# Patient Record
Sex: MALE | Race: WHITE | Employment: OTHER | URBAN - METROPOLITAN AREA
[De-identification: names, ages, dates, MRNs, and addresses within clinical notes are randomized per-mention and may not be internally consistent; named-entity substitution may affect disease eponyms.]

---

## 2017-01-04 ENCOUNTER — ANESTHESIA EVENT (OUTPATIENT)
Dept: ENDOSCOPY | Age: 77
End: 2017-01-04
Payer: MEDICARE

## 2017-01-05 ENCOUNTER — SURGERY (OUTPATIENT)
Age: 77
End: 2017-01-05

## 2017-01-05 ENCOUNTER — ANESTHESIA (OUTPATIENT)
Dept: ENDOSCOPY | Age: 77
End: 2017-01-05
Payer: MEDICARE

## 2017-01-05 PROCEDURE — 74011250636 HC RX REV CODE- 250/636

## 2017-01-05 RX ORDER — PROPOFOL 10 MG/ML
INJECTION, EMULSION INTRAVENOUS AS NEEDED
Status: DISCONTINUED | OUTPATIENT
Start: 2017-01-05 | End: 2017-01-05 | Stop reason: HOSPADM

## 2017-01-05 RX ADMIN — PROPOFOL 30 MG: 10 INJECTION, EMULSION INTRAVENOUS at 09:29

## 2017-01-05 RX ADMIN — PROPOFOL 20 MG: 10 INJECTION, EMULSION INTRAVENOUS at 09:27

## 2017-01-05 RX ADMIN — PROPOFOL 20 MG: 10 INJECTION, EMULSION INTRAVENOUS at 09:35

## 2017-01-05 RX ADMIN — PROPOFOL 50 MG: 10 INJECTION, EMULSION INTRAVENOUS at 09:26

## 2017-01-05 RX ADMIN — PROPOFOL 10 MG: 10 INJECTION, EMULSION INTRAVENOUS at 09:31

## 2017-01-05 NOTE — ANESTHESIA POSTPROCEDURE EVALUATION
Post-Anesthesia Evaluation and Assessment    Patient: Alexandre Friedman MRN: [id number]  SSN: [id number]    YOB: 1940  Age: 77 y.o. Sex: male       Cardiovascular Function/Vital Signs  Visit Vitals    /72    Pulse 78    Temp 36.6 °C (97.8 °F)    Resp 18    Ht 6' 2\" (1.88 m)    Wt 116.6 kg (257 lb)    SpO2 95%    BMI 33 kg/m2       Patient is status post MAC anesthesia for Procedure(s):  ENDOSCOPY w/ biopsies. Nausea/Vomiting: None    Postoperative hydration reviewed and adequate. Pain:  Pain Scale 1: Numeric (0 - 10) (01/05/17 0943)  Pain Intensity 1: 0 (01/05/17 0943)   Managed    Neurological Status: At baseline    Mental Status and Level of Consciousness: Arousable    Pulmonary Status:   O2 Device: Room air (01/05/17 0943)   Adequate oxygenation and airway patent    Complications related to anesthesia: None    Post-anesthesia assessment completed.  No concerns    Signed By: Kelsey Sena MD     January 5, 2017

## 2017-01-05 NOTE — ANESTHESIA PREPROCEDURE EVALUATION
Anesthetic History   No history of anesthetic complications            Review of Systems / Medical History  Patient summary reviewed and pertinent labs reviewed    Pulmonary  Within defined limits                 Neuro/Psych   Within defined limits           Cardiovascular  Within defined limits  Hypertension              Exercise tolerance: >4 METS     GI/Hepatic/Renal  Within defined limits              Endo/Other  Within defined limits      Arthritis     Other Findings   Comments: FLU SHOT received? YES       If NO, provide reason: Pt did not want a Flu shot    Current Smoker? NO       Elective Surgery? Yes       Abstained from smoking 24 hours prior to anesthesia? N/A    Risk Factors for Postoperative nausea/vomiting:       History of postoperative nausea/vomiting? NO       Female? NO       Motion sickness? NO       Intended opioid administration for postoperative analgesia?   NO           Physical Exam    Airway  Mallampati: II  TM Distance: 4 - 6 cm  Neck ROM: normal range of motion   Mouth opening: Normal     Cardiovascular    Rhythm: regular  Rate: normal         Dental  No notable dental hx       Pulmonary  Breath sounds clear to auscultation               Abdominal  GI exam deferred       Other Findings            Anesthetic Plan    ASA: 3  Anesthesia type: MAC          Induction: Intravenous  Anesthetic plan and risks discussed with: Patient

## 2017-01-11 ENCOUNTER — HOSPITAL ENCOUNTER (OUTPATIENT)
Dept: GENERAL RADIOLOGY | Age: 77
Discharge: HOME | End: 2017-01-11
Attending: INTERNAL MEDICINE
Payer: MEDICARE

## 2017-01-11 DIAGNOSIS — R13.10 PAINFUL SWALLOWING: ICD-10-CM

## 2017-01-11 PROCEDURE — 74011000255 HC RX REV CODE- 255: Performed by: INTERNAL MEDICINE

## 2017-01-11 PROCEDURE — 74011000250 HC RX REV CODE- 250: Performed by: INTERNAL MEDICINE

## 2017-01-11 PROCEDURE — 74220 X-RAY XM ESOPHAGUS 1CNTRST: CPT

## 2017-01-11 RX ADMIN — ANTACID/ANTIFLATULENT 4 G: 380; 550; 10; 10 GRANULE, EFFERVESCENT ORAL at 08:00

## 2017-01-11 RX ADMIN — BARIUM SULFATE 135 ML: 980 POWDER, FOR SUSPENSION ORAL at 08:00

## 2017-01-11 RX ADMIN — BARIUM SULFATE 700 MG: 700 TABLET ORAL at 08:00

## 2017-01-11 RX ADMIN — BARIUM SULFATE 105 G: 960 POWDER, FOR SUSPENSION ORAL at 08:00

## 2017-01-13 ENCOUNTER — HOSPITAL ENCOUNTER (OUTPATIENT)
Dept: CT IMAGING | Age: 77
Discharge: HOME | End: 2017-01-13
Attending: INTERNAL MEDICINE
Payer: MEDICARE

## 2017-01-13 DIAGNOSIS — R22.1 NECK MASS: ICD-10-CM

## 2017-01-13 LAB — CREAT UR-MCNC: 1.1 MG/DL (ref 0.6–1.3)

## 2017-01-13 PROCEDURE — 82565 ASSAY OF CREATININE: CPT

## 2017-01-13 PROCEDURE — 70491 CT SOFT TISSUE NECK W/DYE: CPT

## 2017-01-13 PROCEDURE — 74011636320 HC RX REV CODE- 636/320: Performed by: INTERNAL MEDICINE

## 2017-01-13 RX ADMIN — IOPAMIDOL 100 ML: 612 INJECTION, SOLUTION INTRAVENOUS at 09:59

## 2017-01-25 ENCOUNTER — HOSPITAL ENCOUNTER (OUTPATIENT)
Age: 77
Setting detail: OUTPATIENT SURGERY
Discharge: HOME | End: 2017-01-25
Attending: INTERNAL MEDICINE | Admitting: INTERNAL MEDICINE
Payer: MEDICARE

## 2017-01-25 ENCOUNTER — SURGERY (OUTPATIENT)
Age: 77
End: 2017-01-25

## 2017-01-25 VITALS
DIASTOLIC BLOOD PRESSURE: 73 MMHG | HEART RATE: 79 BPM | RESPIRATION RATE: 16 BRPM | OXYGEN SATURATION: 98 % | BODY MASS INDEX: 32.98 KG/M2 | WEIGHT: 257 LBS | SYSTOLIC BLOOD PRESSURE: 143 MMHG | HEIGHT: 74 IN

## 2017-01-25 PROCEDURE — 91010 ESOPHAGUS MOTILITY STUDY: CPT | Performed by: INTERNAL MEDICINE

## 2017-01-25 PROCEDURE — 74011000250 HC RX REV CODE- 250: Performed by: INTERNAL MEDICINE

## 2017-01-25 RX ORDER — LIDOCAINE HYDROCHLORIDE 20 MG/ML
JELLY TOPICAL AS NEEDED
Status: DISCONTINUED | OUTPATIENT
Start: 2017-01-25 | End: 2017-01-25 | Stop reason: HOSPADM

## 2017-01-25 RX ADMIN — LIDOCAINE HYDROCHLORIDE 5 ML: 20 JELLY TOPICAL at 08:53

## 2017-01-25 NOTE — PERIOP NOTES
Esophageal manometry performed. Lidocaine prior to probe insertion in Left Nare. Probe insertion, no resistance met. Patient tolerated procedure well. Impedance not showing up on study.

## 2017-01-25 NOTE — DISCHARGE INSTRUCTIONS
Krish Johnsoner  [id number]  1940      MANOMETRY DISCHARGE INSTRUCTION    You may resume your regular diet as tolerated. You may resume your normal daily activities. If you develop a sore throat- throat lozenges or warm salt water gargles will help. Call your Physician if you have any complications or questions. How can you care for yourself at home? Activity  · Rest as much as you need to after you go home. · You should be able to go back to your usual activities the day after the test.  Diet  · Follow your doctor's directions for eating after the test.  · Drink plenty of fluids (unless your doctor has told you not to). Medications  · If you have a sore throat the day after the test, use an over-the-counter spray to numb your throat. Follow-up care is a key part of your treatment and safety. Be sure to make and go to all appointments, and call your doctor if you are having problems. It's also a good idea to know your test results and keep a list of the medicines you take. When should you call for help? Call 911 anytime you think you may need emergency care. For example, call if:  · You passed out (lost consciousness). · You cough up blood. · You vomit blood or what looks like coffee grounds. · You pass maroon or very bloody stools. Call your doctor now or seek immediate medical care if:  · You have trouble swallowing. · You have belly pain. · Your stools are black and tarlike or have streaks of blood. · You are sick to your stomach or cannot keep fluids down. Watch closely for changes in your health, and be sure to contact your doctor if:  · Your throat still hurts after a day or two. · You do not get better as expected. Where can you learn more? Go to [employer].be  Enter J454 in the search box to learn more about \"Upper GI Endoscopy: What to Expect at Home. \"   © 3443-2543 Healthwise, Incorporated.  Care instructions adapted under license by Oliver Willis (which disclaims liability or warranty for this information). This care instruction is for use with your licensed healthcare professional. If you have questions about a medical condition or this instruction, always ask your healthcare professional. [address] any warranty or liability for your use of this information. Content Version: 33.1.855616; Current as of: November 14, 2014    DISCHARGE SUMMARY from Nurse     POST-PROCEDURE INSTRUCTIONS:    Call your Physician if you:  ? Observe any excess bleeding. ? Develop a temperature over 100.5o F.  ? Experience abdominal, shoulder or chest pain. ? Notice any signs of decreased circulation or nerve impairment to an extremity such as a change in color, persistent numbness, tingling, coldness or increase in pain. ? Vomit blood or you have nausea and vomiting lasting longer than 4 hours. ? Are unable to take medications. ? Are unable to urinate within 8 hours after discharge following general anesthesia or intravenous sedation. ? Resume your diet as directed by your physician. ? Resume medications as your physician has prescribed. ? Please give a list of your current medications to your Primary Care Provider. ? Please update this list whenever your medications are discontinued, doses are changed, or new medications (including over-the-counter products) are added. ? Please carry medication information at all times in case of emergency situations. These are general instructions for a healthy lifestyle:    No smoking/ No tobacco products/ Avoid exposure to second hand smoke.  Surgeon General's Warning:  Quitting smoking now greatly reduces serious risk to your health. Obesity, smoking, and a sedentary lifestyle greatly increase your risk for illness.    A healthy diet, regular physical exercise & weight monitoring are important for maintaining a healthy lifestyle   You may be retaining fluid if you have a history of heart failure or if you experience any of the following symptoms:  Weight gain of 3 pounds or more overnight or 5 pounds in a week, increased swelling in our hands or feet or shortness of breath while lying flat in bed. Please call your doctor as soon as you notice any of these symptoms; do not wait until your next office visit. Recognize signs and symptoms of STROKE:  F  -  Face looks uneven  A  -  Arms unable to move or move unevenly  S  -  Speech slurred or non-existent  T  -  Time to call 911 - as soon as signs and symptoms begin - DO NOT go back to bed or wait to see If you get better - TIME IS BRAIN. Colorectal Screening   Colorectal cancer almost always develops from precancerous polyps (abnormal growths) in the colon or rectum. Screening tests can find precancerous polyps, so that they can be removed before they turn into cancer. Screening tests can also find colorectal cancer early, when treatment works best.  Jasmina Gee Speak with your physician about when you should begin screening and how often you should be tested     Additional Information    If you have questions, please call [phone]. Remember, [employer] is NOT to be used for urgent needs. For medical emergencies, dial 911. APPOINTMENTS:    Please make a follow-up appointment with your physician. Discharge information has been reviewed with the patient. The patient verbalized understanding.

## 2017-01-25 NOTE — IP AVS SNAPSHOT
[address] [facility] 
 
 
 [address] [address] 
[phone] Patient: Celio Johansen MRN: [id number] HKR:[id number] You are allergic to the following No active allergies Recent Documentation Height Weight BMI Smoking Status 1.88 m 116.6 kg 33 kg/m2 Never Smoker Emergency Contacts Name Discharge Info Relation Home Work Mobile [address] CAREGIVER [3] Friend [5] [phone] About your hospitalization You were admitted on:  January 25, 2017 You last received care in the:  [facility] You were discharged on:  January 25, 2017 Unit phone number:  [phone] Why you were hospitalized Your primary diagnosis was:  Not on File Providers Seen During Your Hospitalizations Provider Role Specialty Primary office phone Adriana Machuca MD Attending Provider Gastroenterology [phone] Your Primary Care Physician (PCP) Primary Care Physician Office Phone Office Fax Angelina Stephen [phone] Follow-up Information Follow up With Details Comments Contact Info Adriana Machuca MD   [address] [place] [address] 200 200 [facility] 
[phone] Your Appointments Tuesday January 31, 2017  8:00 AM EST  
LAB with [facility] NURSE VISIT Internist of [address]  
[phone] Tuesday February 07, 2017  9:30 AM EST Office Visit with Sadie Mckee MD  
Internist of [address] [place] [address]  
[phone] Current Discharge Medication List  
  
ASK your doctor about these medications Dose & Instructions Dispensing Information Comments Morning Noon Evening Bedtime  
 aspirin 81 mg tablet Your next dose is: Today, Tomorrow Other:  _________ Dose:  81 mg Take 81 mg by mouth. Refills:  0  
     
   
   
   
  
 finasteride 5 mg tablet Commonly known as:  PROSCAR Your next dose is: Today, Tomorrow Other:  _________ Dose:  5 mg Take 1 Tab by mouth daily. Quantity:  90 Tab Refills:  3  
     
   
   
   
  
 fluticasone 50 mcg/actuation nasal spray Commonly known as:  Jaciel Metro Your next dose is: Today, Tomorrow Other:  _________ Dose:  2 Spray 2 Sprays by Both Nostrils route daily. Quantity:  3 Bottle Refills:  3  
     
   
   
   
  
 lisinopril 5 mg tablet Commonly known as:  [place] Roberto Carlos Your next dose is: Today, Tomorrow Other:  _________ TAKE 1 TABLET BY MOUTH DAILY Quantity:  90 Tab Refills:  3  
     
   
   
   
  
 multivitamin tablet Commonly known as:  ONE A DAY Your next dose is: Today, Tomorrow Other:  _________ Dose:  1 Tab Take 1 Tab by mouth daily. Refills:  0  
     
   
   
   
  
 OSTEO BI-FLEX PO Your next dose is: Today, Tomorrow Other:  _________ Dose:  1 Tab Take 1 Tab by mouth daily. Refills:  0  
     
   
   
   
  
 predniSONE 1 mg tablet Commonly known as:  Roman Razor Your next dose is: Today, Tomorrow Other:  _________ Dose:  8 mg Take 8 mg by mouth daily. Refills:  0  
     
   
   
   
  
 rosuvastatin 5 mg tablet Commonly known as:  CRESTOR Your next dose is: Today, Tomorrow Other:  _________ Take once or twice a week. Quantity:  90 Tab Refills:  3 VITAMIN D3 1,000 unit Cap Generic drug:  cholecalciferol Your next dose is: Today, Tomorrow Other:  _________ Take  by mouth daily. Refills:  0 Discharge Instructions Salvador Moore [id number] 1940 MANOMETRY DISCHARGE INSTRUCTION You may resume your regular diet as tolerated. You may resume your normal daily activities. If you develop a sore throat- throat lozenges or warm salt water gargles will help. Call your Physician if you have any complications or questions. How can you care for yourself at home? Activity · Rest as much as you need to after you go home. · You should be able to go back to your usual activities the day after the test. 
Diet · Follow your doctor's directions for eating after the test. 
· Drink plenty of fluids (unless your doctor has told you not to). Medications · If you have a sore throat the day after the test, use an over-the-counter spray to numb your throat. Follow-up care is a key part of your treatment and safety. Be sure to make and go to all appointments, and call your doctor if you are having problems. It's also a good idea to know your test results and keep a list of the medicines you take. When should you call for help? Call 911 anytime you think you may need emergency care. For example, call if: 
· You passed out (lost consciousness). · You cough up blood. · You vomit blood or what looks like coffee grounds. · You pass maroon or very bloody stools. Call your doctor now or seek immediate medical care if: 
· You have trouble swallowing. · You have belly pain. · Your stools are black and tarlike or have streaks of blood. · You are sick to your stomach or cannot keep fluids down. Watch closely for changes in your health, and be sure to contact your doctor if: 
· Your throat still hurts after a day or two. · You do not get better as expected. Where can you learn more? Go to Webymaster.be Enter [phone] in the search box to learn more about \"Upper GI Endoscopy: What to Expect at Home. \"  
© 6007-8734 Healthwise, Incorporated.  Care instructions adapted under license by Halie Wray (which disclaims liability or warranty for this information). This care instruction is for use with your licensed healthcare professional. If you have questions about a medical condition or this instruction, always ask your healthcare professional. [address] any warranty or liability for your use of this information. Content Version: 42.1.595116; Current as of: November 14, 2014 DISCHARGE SUMMARY from Nurse POST-PROCEDURE INSTRUCTIONS: 
 
Call your Physician if you: 
? Observe any excess bleeding. ? Develop a temperature over 100.5o F. 
? Experience abdominal, shoulder or chest pain. ? Notice any signs of decreased circulation or nerve impairment to an extremity such as a change in color, persistent numbness, tingling, coldness or increase in pain. ? Vomit blood or you have nausea and vomiting lasting longer than 4 hours. ? Are unable to take medications. ? Are unable to urinate within 8 hours after discharge following general anesthesia or intravenous sedation. ? Resume your diet as directed by your physician. ? Resume medications as your physician has prescribed. ? Please give a list of your current medications to your Primary Care Provider. ? Please update this list whenever your medications are discontinued, doses are changed, or new medications (including over-the-counter products) are added. ? Please carry medication information at all times in case of emergency situations. These are general instructions for a healthy lifestyle: No smoking/ No tobacco products/ Avoid exposure to second hand smoke. ? Surgeon General's Warning:  Quitting smoking now greatly reduces serious risk to your health. Obesity, smoking, and a sedentary lifestyle greatly increase your risk for illness. ? A healthy diet, regular physical exercise & weight monitoring are important for maintaining a healthy lifestyle ? You may be retaining fluid if you have a history of heart failure or if you experience any of the following symptoms:  Weight gain of 3 pounds or more overnight or 5 pounds in a week, increased swelling in our hands or feet or shortness of breath while lying flat in bed. Please call your doctor as soon as you notice any of these symptoms; do not wait until your next office visit. Recognize signs and symptoms of STROKE: 
F  -  Face looks uneven A  -  Arms unable to move or move unevenly S  -  Speech slurred or non-existent T  -  Time to call 911 - as soon as signs and symptoms begin - DO NOT go back to bed or wait to see If you get better - TIME IS BRAIN. Colorectal Screening ? Colorectal cancer almost always develops from precancerous polyps (abnormal growths) in the colon or rectum. Screening tests can find precancerous polyps, so that they can be removed before they turn into cancer. Screening tests can also find colorectal cancer early, when treatment works best. 
? Speak with your physician about when you should begin screening and how often you should be tested ? Additional Information If you have questions, please call [phone]. Remember, [employer] is NOT to be used for urgent needs. For medical emergencies, dial 911. APPOINTMENTS: 
 
Please make a follow-up appointment with your physician. Discharge information has been reviewed with the patient. The patient verbalized understanding. Discharge Orders None Introducing [facility]! Dear Loni Pryor: Thank you for requesting a [employer] account. Our records indicate that you already have an active [employer] account. You can access your account anytime at [email or web address] Did you know that you can access your hospital and ER discharge instructions at any time in [employer]? You can also review all of your test results from your hospital stay or ER visit. Additional Information If you have questions, please visit the Frequently Asked Questions section of the MyChart website at [email or web address] Remember, MyChart is NOT to be used for urgent needs. For medical emergencies, dial 911. Now available from your iPhone and Android! General Information Please provide this summary of care documentation to your next provider. Patient Signature:  ____________________________________________________________ Date:  ____________________________________________________________  
  
Román Baez Provider Signature:  ____________________________________________________________ Date:  ____________________________________________________________

## 2017-01-26 NOTE — PROCEDURES
[email or web address]  [phone]    HIGH RESOLUTION ESOPHAGEAL MANOMETRY REPORT      Indication:     1. Odynophagia R13.10    Date of procedure: 01/25/2017    Procedure:     After confirmation of potential allergies, a topical analgesic was used to numb the nares followed by trans-nasal insertion of a High Resolution Manometry Catheter. Pressure bands of both UES and LES were observed on the color contour. Patient instructed to take deep breath to verify placement of catheter; diaphragmatic pinch noted on inspiration. Patient was assisted to supine position and catheter was stabilized by taping at the nares. Patient wasencouraged to relax while acclimating to catheter for approximately 5 minutes. A 30 second baseline pressure was obtained to identify the UES and LES followed by a series of wet swallows using 5 ccs room temperature normal saline to assess esophageal motility and bolus transit. At the end of the study, a multiple rapid swallow sequence was performed. At the conclusion of the procedure; the catheter was removed. Findings:    1. Esophago-Gastric junction: There is swallow induced relaxation of the Esophago-gastric junction, though the IRP is just over the normal value at 15 (normal is less than 15). 2. Esophageal body: 2/10 swallows show hypercontractile pattern of peristaltic vigor, consistent with a diagnosis of Jackhammer esophagus. DCI on those swallows is >8000 mmHg-cm-s. This is considered to be a major motility abnormality and is never seen in normal subjects. There is no evidence of distal esophageal spasm. The other 8 swallows show contractions that are within the normal range of >450 mmHg-cm-s and <8000 mmHg-cm-s, but are tending to be towards the higher end of normal.    3. Impedance: Impedance could be be calculated due to technical issues    Impression:    1.  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 There is no suggestion of Achalasia or distal spasm. Recommendations:    1. If symptoms persist, will consider trial of calcium channel blocker or nitrates/PDE5 inhibitor    Reference: (Interpretation is based on Wildwood classification, version 3.0). The Wildwood Classification of esophageal motility disorders, v3.0. Neel Stanford.al. International High Resolution Manometry Working Group. Neurogastroenterol Motil. 2015;27(2):160-74.

## 2017-01-31 ENCOUNTER — HOSPITAL ENCOUNTER (OUTPATIENT)
Dept: LAB | Age: 77
Discharge: HOME | End: 2017-01-31
Payer: MEDICARE

## 2017-01-31 DIAGNOSIS — Z12.5 SCREENING PSA (PROSTATE SPECIFIC ANTIGEN): ICD-10-CM

## 2017-01-31 DIAGNOSIS — I10 ESSENTIAL HYPERTENSION WITH GOAL BLOOD PRESSURE LESS THAN 140/90: ICD-10-CM

## 2017-01-31 DIAGNOSIS — E55.9 VITAMIN D INSUFFICIENCY: ICD-10-CM

## 2017-01-31 DIAGNOSIS — E78.5 HYPERLIPIDEMIA, UNSPECIFIED HYPERLIPIDEMIA TYPE: ICD-10-CM

## 2017-01-31 LAB
ALBUMIN SERPL BCP-MCNC: 4 G/DL (ref 3.4–5)
ALBUMIN/GLOB SERPL: 1.6 {RATIO} (ref 0.8–1.7)
ALP SERPL-CCNC: 59 U/L (ref 45–117)
ALT SERPL-CCNC: 24 U/L (ref 16–61)
ANION GAP BLD CALC-SCNC: 7 MMOL/L (ref 3–18)
APPEARANCE UR: CLEAR
AST SERPL W P-5'-P-CCNC: 9 U/L (ref 15–37)
BACTERIA URNS QL MICRO: NEGATIVE /HPF
BASOPHILS # BLD AUTO: 0 K/UL (ref 0–0.06)
BASOPHILS # BLD: 0 % (ref 0–2)
BILIRUB SERPL-MCNC: 0.5 MG/DL (ref 0.2–1)
BILIRUB UR QL: NEGATIVE
BUN SERPL-MCNC: 15 MG/DL (ref 7–18)
BUN/CREAT SERPL: 15 (ref 12–20)
CALCIUM SERPL-MCNC: 8.7 MG/DL (ref 8.5–10.1)
CHLORIDE SERPL-SCNC: 103 MMOL/L (ref 100–108)
CHOLEST SERPL-MCNC: 165 MG/DL
CO2 SERPL-SCNC: 32 MMOL/L (ref 21–32)
COLOR UR: YELLOW
CREAT SERPL-MCNC: 1.03 MG/DL (ref 0.6–1.3)
DIFFERENTIAL METHOD BLD: ABNORMAL
EOSINOPHIL # BLD: 0.1 K/UL (ref 0–0.4)
EOSINOPHIL NFR BLD: 1 % (ref 0–5)
EPITH CASTS URNS QL MICRO: NORMAL /LPF (ref 0–5)
ERYTHROCYTE [DISTWIDTH] IN BLOOD BY AUTOMATED COUNT: 13.2 % (ref 11.6–14.5)
GLOBULIN SER CALC-MCNC: 2.5 G/DL (ref 2–4)
GLUCOSE SERPL-MCNC: 90 MG/DL (ref 74–99)
GLUCOSE UR STRIP.AUTO-MCNC: NEGATIVE MG/DL
HCT VFR BLD AUTO: 44.5 % (ref 36–48)
HDLC SERPL-MCNC: 59 MG/DL (ref 40–60)
HDLC SERPL: 2.8 {RATIO} (ref 0–5)
HGB BLD-MCNC: 14.1 G/DL (ref 13–16)
HGB UR QL STRIP: NEGATIVE
KETONES UR QL STRIP.AUTO: NEGATIVE MG/DL
LDLC SERPL CALC-MCNC: 80.4 MG/DL (ref 0–100)
LEUKOCYTE ESTERASE UR QL STRIP.AUTO: NEGATIVE
LIPID PROFILE,FLP: NORMAL
LYMPHOCYTES # BLD AUTO: 32 % (ref 21–52)
LYMPHOCYTES # BLD: 2.1 K/UL (ref 0.9–3.6)
MCH RBC QN AUTO: 31.8 PG (ref 24–34)
MCHC RBC AUTO-ENTMCNC: 31.7 G/DL (ref 31–37)
MCV RBC AUTO: 100.5 FL (ref 74–97)
MONOCYTES # BLD: 0.6 K/UL (ref 0.05–1.2)
MONOCYTES NFR BLD AUTO: 9 % (ref 3–10)
NEUTS SEG # BLD: 3.7 K/UL (ref 1.8–8)
NEUTS SEG NFR BLD AUTO: 58 % (ref 40–73)
NITRITE UR QL STRIP.AUTO: NEGATIVE
PH UR STRIP: 6.5 [PH] (ref 5–8)
PLATELET # BLD AUTO: 230 K/UL (ref 135–420)
PMV BLD AUTO: 10 FL (ref 9.2–11.8)
POTASSIUM SERPL-SCNC: 4.3 MMOL/L (ref 3.5–5.5)
PROT SERPL-MCNC: 6.5 G/DL (ref 6.4–8.2)
PROT UR STRIP-MCNC: NEGATIVE MG/DL
PSA SERPL-MCNC: 0.7 NG/ML (ref 0–4)
RBC # BLD AUTO: 4.43 M/UL (ref 4.7–5.5)
RBC #/AREA URNS HPF: 0 /HPF (ref 0–5)
SODIUM SERPL-SCNC: 142 MMOL/L (ref 136–145)
SP GR UR REFRACTOMETRY: 1.02 (ref 1–1.03)
TRIGL SERPL-MCNC: 128 MG/DL (ref ?–150)
TSH SERPL DL<=0.05 MIU/L-ACNC: 2.54 UIU/ML (ref 0.36–3.74)
UROBILINOGEN UR QL STRIP.AUTO: 0.2 EU/DL (ref 0.2–1)
VLDLC SERPL CALC-MCNC: 25.6 MG/DL
WBC # BLD AUTO: 6.5 K/UL (ref 4.6–13.2)
WBC URNS QL MICRO: NORMAL /HPF (ref 0–4)

## 2017-01-31 PROCEDURE — 84443 ASSAY THYROID STIM HORMONE: CPT | Performed by: INTERNAL MEDICINE

## 2017-01-31 PROCEDURE — 36415 COLL VENOUS BLD VENIPUNCTURE: CPT | Performed by: INTERNAL MEDICINE

## 2017-01-31 PROCEDURE — 85025 COMPLETE CBC W/AUTO DIFF WBC: CPT | Performed by: INTERNAL MEDICINE

## 2017-01-31 PROCEDURE — 80053 COMPREHEN METABOLIC PANEL: CPT | Performed by: INTERNAL MEDICINE

## 2017-01-31 PROCEDURE — 80061 LIPID PANEL: CPT | Performed by: INTERNAL MEDICINE

## 2017-01-31 PROCEDURE — 84153 ASSAY OF PSA TOTAL: CPT | Performed by: INTERNAL MEDICINE

## 2017-01-31 PROCEDURE — 82306 VITAMIN D 25 HYDROXY: CPT | Performed by: INTERNAL MEDICINE

## 2017-01-31 PROCEDURE — 81001 URINALYSIS AUTO W/SCOPE: CPT | Performed by: INTERNAL MEDICINE

## 2017-02-01 LAB — 25(OH)D3 SERPL-MCNC: 32.3 NG/ML (ref 30–100)

## 2017-02-07 ENCOUNTER — PATIENT OUTREACH (OUTPATIENT)
Dept: INTERNAL MEDICINE CLINIC | Age: 77
End: 2017-02-07

## 2017-02-07 ENCOUNTER — OFFICE VISIT (OUTPATIENT)
Dept: INTERNAL MEDICINE CLINIC | Age: 77
End: 2017-02-07

## 2017-02-07 VITALS
DIASTOLIC BLOOD PRESSURE: 70 MMHG | WEIGHT: 258.8 LBS | HEART RATE: 80 BPM | BODY MASS INDEX: 33.21 KG/M2 | HEIGHT: 74 IN | TEMPERATURE: 97.9 F | SYSTOLIC BLOOD PRESSURE: 120 MMHG

## 2017-02-07 DIAGNOSIS — Z71.89 ADVANCE DIRECTIVE DISCUSSED WITH PATIENT: ICD-10-CM

## 2017-02-07 DIAGNOSIS — I10 ESSENTIAL HYPERTENSION WITH GOAL BLOOD PRESSURE LESS THAN 140/90: ICD-10-CM

## 2017-02-07 DIAGNOSIS — R13.10 ODYNOPHAGIA: ICD-10-CM

## 2017-02-07 DIAGNOSIS — K22.4 ESOPHAGEAL DYSMOTILITY: ICD-10-CM

## 2017-02-07 DIAGNOSIS — Z00.00 MEDICARE ANNUAL WELLNESS VISIT, SUBSEQUENT: ICD-10-CM

## 2017-02-07 DIAGNOSIS — N32.0 BLADDER NECK CONTRACTURE: ICD-10-CM

## 2017-02-07 DIAGNOSIS — N13.8 BPH WITH OBSTRUCTION/LOWER URINARY TRACT SYMPTOMS: ICD-10-CM

## 2017-02-07 DIAGNOSIS — Z79.52 CURRENT CHRONIC USE OF SYSTEMIC STEROIDS: ICD-10-CM

## 2017-02-07 DIAGNOSIS — M35.3 PMR (POLYMYALGIA RHEUMATICA) (HCC): ICD-10-CM

## 2017-02-07 DIAGNOSIS — N40.1 BPH WITH OBSTRUCTION/LOWER URINARY TRACT SYMPTOMS: ICD-10-CM

## 2017-02-07 DIAGNOSIS — E78.5 HYPERLIPIDEMIA, UNSPECIFIED HYPERLIPIDEMIA TYPE: ICD-10-CM

## 2017-02-07 RX ORDER — DILTIAZEM HYDROCHLORIDE 30 MG/1
30 TABLET, FILM COATED ORAL 3 TIMES DAILY
COMMUNITY
End: 2017-09-17 | Stop reason: ALTCHOICE

## 2017-02-07 NOTE — ACP (ADVANCE CARE PLANNING)
Advance care planning discussion initiated, patient reports having an Advance Directive. Patient asked to bring a copy into the office at his next appointment.

## 2017-02-07 NOTE — MR AVS SNAPSHOT
Visit Information Date & Time Provider Department Dept. Phone Encounter #  
 2/7/2017  9:30 AM Jose Schmitz MD Internist of [phone] Follow-up Instructions Return in about 6 months (around 8/7/2017), or if symptoms worsen or fail to improve. Your Appointments 6/8/2017  9:45 AM  
Office Visit with Hollis Reyes MD  
[facility]) Appt Note: check up [address] [address]  
[phone] [address] [id number]  
  
    
 8/9/2017  8:30 AM  
Office Visit with Jose Schmitz MD  
Internist of [facility]) Appt Note: ov 6mos. sarris [address], [address] [place] [id number] [place] [address] [address] [place]  
  
   
 [address], [address] Upcoming Health Maintenance Date Due  
 GLAUCOMA SCREENING Q2Y 3/31/2017* MEDICARE YEARLY EXAM 2/8/2018 DTaP/Tdap/Td series (2 - Td) 4/5/2021 COLONOSCOPY 4/17/2022 *Topic was postponed. The date shown is not the original due date. Allergies as of 2/7/2017  Review Complete On: 2/7/2017 By: Jolie Zee No Known Allergies Current Immunizations  Reviewed on 11/10/2016 Name Date Influenza High Dose Vaccine PF 11/10/2016  2:23 PM, 11/30/2015  8:32 AM, 11/14/2013  8:55 AM  
 Influenza Vaccine 11/17/2014 Influenza Vaccine Split 11/29/2012  3:15 PM  
 Pneumococcal Conjugate (PCV-13) 5/29/2015 10:53 AM  
 Pneumococcal Vaccine (Unspecified Type) 3/20/2008 TDAP Vaccine 4/5/2011 Zoster 4/24/2012 Not reviewed this visit You Were Diagnosed With   
  
 Codes Comments Medicare annual wellness visit, subsequent    -  Primary ICD-10-CM: Z00.00 ICD-9-CM: V70.0 Advance directive discussed with patient     ICD-10-CM: Z71.89 ICD-9-CM: V65.49  Current chronic use of systemic steroids     ICD-10-CM: Z79.52 
 ICD-9-CM: V58.65 Vitals BP Pulse Temp Height(growth percentile) Weight(growth percentile) BMI  
 120/70 (BP 1 Location: Left arm, BP Patient Position: Sitting) 80 97.9 °F (36.6 °C) 6' 2\" (1.88 m) 258 lb 12.8 oz (117.4 kg) 33.23 kg/m2 Smoking Status Never Smoker Vitals History BMI and BSA Data Body Mass Index Body Surface Area  
 33.23 kg/m 2 2.48 m 2 Preferred Pharmacy Pharmacy Name Phone [address] [place] [address], [address] [place] [address] [phone] Your Updated Medication List  
  
   
This list is accurate as of: 2/7/17 10:05 AM.  Always use your most recent med list.  
  
  
  
  
 aspirin 81 mg tablet Take 81 mg by mouth. dilTIAZem 30 mg tablet Commonly known as:  CARDIZEM Take 30 mg by mouth three (3) times daily. finasteride 5 mg tablet Commonly known as:  PROSCAR Take 1 Tab by mouth daily. fluticasone 50 mcg/actuation nasal spray Commonly known as:  Domnick Means 2 Sprays by Both Nostrils route daily. lisinopril 5 mg tablet Commonly known as:  PRINIVIL, ZESTRIL  
TAKE 1 TABLET BY MOUTH DAILY  
  
 multivitamin tablet Commonly known as:  ONE A DAY Take 1 Tab by mouth daily. OSTEO BI-FLEX PO Take 1 Tab by mouth daily. predniSONE 1 mg tablet Commonly known as:  Tamie Hawks Take 8 mg by mouth daily. rosuvastatin 5 mg tablet Commonly known as:  CRESTOR Take once or twice a week. VITAMIN D3 1,000 unit Cap Generic drug:  cholecalciferol Take  by mouth daily. Follow-up Instructions Return in about 6 months (around 8/7/2017), or if symptoms worsen or fail to improve. To-Do List   
 02/07/2017 Imaging:  DEXA BONE DENSITY STUDY AXIAL Patient Instructions Medicare Part B Preventive Services Limitations Recommendation Scheduled Bone Mass Measurement (age 72 & older, biennial) Requires diagnosis related to osteoporosis or estrogen deficiency. Biennial benefit unless patient has history of long-term glucocorticoid tx or baseline is needed because initial test was by other method Every 2 years or more frequently if medically necessary. N/A Cardiovascular Screening Blood Tests (every 5 years) Total cholesterol, HDL, Triglycerides Order as a panel if possible Every 5 years. Done: 
1/31/2017 Colorectal Cancer Screening 
-Fecal occult blood test (annual) -Flexible sigmoidoscopy (5y) 
-Screening colonoscopy (10y) -Barium Enema Colorectal cancer screening, ages 54-65. For all patients 48 and older: 
-Annual fecal occult blood test or 
colonoscopy every 10 years or 
-Flexible sigmoidoscopy every 5 years or 
-lower endoscopy to be performed more frequently, if advised by GI. Done: 
4/17/2012 Counseling to Prevent Tobacco Use (up to 8 sessions per year) - Counseling greater than 3 and up to 10 minutes - Counseling greater than 10 minutes Patients must be asymptomatic of tobacco-related conditions to receive as preventive service Two cessation counseling attempts (up to 8 counseling sessions) per year. N/A Diabetes Screening Tests (at least every 3 years, Medicare covers annually or at 6-month intervals for prediabetic patients) Fasting blood sugar (FBS) or glucose tolerance test (GTT) Patient must be diagnosed with one of the following: 
-Hypertension, Dyslipidemia, obesity, previous impaired FBS or GTT 
Or any two of the following: overweight, FH of diabetes, age ? 72, history of gestational diabetes, birth of baby weighing more than 9 pounds Annually or every 6 months if previous diagnosis of elevated FBS, elevated HbA1c, or impaired GTT, or glucosuria. Done: 
1/31/2017 Diabetes Self-Management Training (DSMT) (no USPSTF recommendation) Requires referral by treating physician for patient with diabetes or renal disease. 10 hours of initial DSMT session of no less than 30 minutes each in a continuous 12-month period. 2 hours of follow-up DSMT in subsequent years. Up to 10 hours of initial training within a continuous 12 month period of subsequent years: up to 2 hours of follow-up training each year after the initial year. N/A Glaucoma Screening (no USPSTF recommendation) Diabetes mellitus, family history, , age 48 or over,  American, age 72 or over Annually for covered beneficiaries. Done: 
8/2016 Human Immunodeficiency Virus (HIV) Screening (annually for increased risk patients) HIV-1 and HIV-2 by EIA, NADINE, rapid antibody test, or oral mucosa transudate Patient must be at increased risk for HIV infection per USPSTF guidelines or pregnant. Tests covered annually for patients at increased risk. Pregnant patients may receive up to 3 test during pregnancy. Annually for beneficiaries at increased risk, including anyone who asks for the test. Not high risk Medical Nutrition Therapy (MNT) (for diabetes or renal disease not recommended schedule) Requires referral by treating physician for patient with diabetes or renal disease. Can be provided in same year as diabetes self-management training (DSMT), and CMS recommends medical nutrition therapy take place after DSMT. Up to 3 hours for initial year and 2 hours in subsequent years. First year: 3 hours of one-on-one counseling or subsequent years: 2 hours. N/A Prostate Cancer Screening (annually up to age 76) - Digital rectal exam (HOLA) - Prostate specific antigen (PSA) Annually (age 48 or over), HOLA not paid separately when covered E/M service is provided on same date Once every 12 months for patients age older than 48years of age includes: digital rectal exam and/or prostate specific antigen test. Done: 
1/31/2017 PSA: 0.7 Seasonal Influenza Vaccination (annually)  Once per fall or winter season. Done: 
11/10/2016 Pneumococcal Vaccination (once after 72)  Once after age 72 and if more than 5 years since last vaccination and/or uncertainty of vaccine status. Pneumococcal: 
3/20/2008 Prevnar 13: 
5/29/2015 Hepatitis B Vaccinations (if medium/high risk) Medium/high risk factors:  End-stage renal disease, Hemophiliacs who received Factor VIII or IX concentrates, Clients of institutions for the mentally retarded, Persons who live in the same house as a HepB virus carrier, Homosexual men, Illicit injectable drug abusers. Schedule course of vaccines if patient not previously vaccinated *additional shots if medically necessary. Not high risk Screening Mammography (biennial age 54-69)? Annually (age 36 or over) Age 28 through 44: one baseline or aged 36 and older: annually. N/A Screening Pap Tests and Pelvic Examination (up to age 79 and after 79 if unknown history or abnormal study last 10 years) Every 24 months except high risk Annually if at high risk for developing cervical or vaginal cancer, or childbearing, age with abnormal Pap test within past 3 years or every 2 years for women at normal risk. N/A Ultrasound Screening for Abdominal Aortic Aneurysm (AAA) (once) Patient must be referred through IPPE and not have had a screening for abdominal aortic aneurysm before under Medicare. Limited to patients who meet one of the following criteria: 
- Men who are 73-68 years old and have smoked more than 100 cigarettes in their lifetime. 
-Anyone with a FH of AAA 
-Anyone recommended for screening by USPSTF Once in a lifetime. N/A Schedule of Personalized Health Plan (Provide Copy to Patient) The best way to stay healthy is to live a healthy lifestyle. A healthy lifestyle includes regular exercise, eating a well-balanced diet, keeping a healthy weight and not smoking.  
 
Regular physical exams and screening tests are another important way to take care of yourself. Preventive exams provided by health care providers can find health problems early when treatment works best and can keep you from getting certain diseases or illnesses. Preventive services include exams, lab tests, screenings, shots, monitoring and information to help you take care of your own health. All people over 65 should have a pneumonia shot. Pneumonia shots are usually only needed once in a lifetime unless your doctor decides differently. All people over 65 should have a yearly flu shot. People over 65 are at medium to high risk for Hepatitis B. Three shots are needed for complete protection. In addition to your physical exam, some screening tests are recommended: 
 
Bone mass measurement (dexa scan) is recommended every two years if you have certain risk factors, such as personal history of vertebral fracture or chronic steroid medication use Diabetes Mellitus screening is recommended every year. Glaucoma is an eye disease caused by high pressure in the eye. An eye exam is recommended every year. Cardiovascular screening tests that check your cholesterol and other blood fat (lipid) levels are recommended every five years. Colorectal Cancer screening tests help to find pre-cancerous polyps (growths in the colon) so they can be removed before they turn into cancer. Tests ordered for screening depend on your personal and family history risk factors. Screening for Prostate Cancer is recommended yearly with a digital rectal exam and/or a PSA test 
 
Here is a list of your current Health Maintenance items with a due date: 
Health Maintenance Topic Date Due  GLAUCOMA SCREENING Q2Y  03/31/2017 (Originally 9/1/2016)  MEDICARE YEARLY EXAM  02/08/2018  DTaP/Tdap/Td series (2 - Td) 04/05/2021  COLONOSCOPY  04/17/2022  ZOSTER VACCINE AGE 60>  Completed  Pneumococcal 65+ Low/Medium Risk  Completed  INFLUENZA AGE 9 TO ADULT  Completed Preventing Falls: Care Instructions Your Care Instructions Getting around your home safely can be a challenge if you have injuries or health problems that make it easy for you to fall. Loose rugs and furniture in walkways are among the dangers for many older people who have problems walking or who have poor eyesight. People who have conditions such as arthritis, osteoporosis, or dementia also have to be careful not to fall. You can make your home safer with a few simple measures. Follow-up care is a key part of your treatment and safety. Be sure to make and go to all appointments, and call your doctor if you are having problems. It's also a good idea to know your test results and keep a list of the medicines you take. How can you care for yourself at home? Taking care of yourself · You may get dizzy if you do not drink enough water. To prevent dehydration, drink plenty of fluids, enough so that your urine is light yellow or clear like water. Choose water and other caffeine-free clear liquids. If you have kidney, heart, or liver disease and have to limit fluids, talk with your doctor before you increase the amount of fluids you drink. · Exercise regularly to improve your strength, muscle tone, and balance. Walk if you can. Swimming may be a good choice if you cannot walk easily. · Have your vision and hearing checked each year or any time you notice a change. If you have trouble seeing and hearing, you might not be able to avoid objects and could lose your balance. · Know the side effects of the medicines you take. Ask your doctor or pharmacist whether the medicines you take can affect your balance. Sleeping pills or sedatives can affect your balance. · Limit the amount of alcohol you drink. Alcohol can impair your balance and other senses. · Ask your doctor whether calluses or corns on your feet need to be removed.  If you wear loose-fitting shoes because of calluses or corns, you can lose your balance and fall. · Talk to your doctor if you have numbness in your feet. Preventing falls at home · Remove raised doorway thresholds, throw rugs, and clutter. Repair loose carpet or raised areas in the floor. · Move furniture and electrical cords to keep them out of walking paths. · Use nonskid floor wax, and wipe up spills right away, especially on ceramic tile floors. · If you use a walker or cane, put rubber tips on it. If you use crutches, clean the bottoms of them regularly with an abrasive pad, such as steel wool. · Keep your house well lit, especially Sofia Allis, and outside walkways. Use night-lights in areas such as hallways and bathrooms. Add extra light switches or use remote switches (such as switches that go on or off when you clap your hands) to make it easier to turn lights on if you have to get up during the night. · Install sturdy handrails on stairways. · Move items in your cabinets so that the things you use a lot are on the lower shelves (about waist level). · Keep a cordless phone and a flashlight with new batteries by your bed. If possible, put a phone in each of the main rooms of your house, or carry a cell phone in case you fall and cannot reach a phone. Or, you can wear a device around your neck or wrist. You push a button that sends a signal for help. · Wear low-heeled shoes that fit well and give your feet good support. Use footwear with nonskid soles. Check the heels and soles of your shoes for wear. Repair or replace worn heels or soles. · Do not wear socks without shoes on wood floors. · Walk on the grass when the sidewalks are slippery. If you live in an area that gets snow and ice in the winter, sprinkle salt on slippery steps and sidewalks. Preventing falls in the bath · Install grab bars and nonskid mats inside and outside your shower or tub and near the toilet and sinks. · Use shower chairs and bath benches. · Use a hand-held shower head that will allow you to sit while showering. · Get into a tub or shower by putting the weaker leg in first. Get out of a tub or shower with your strong side first. 
· Repair loose toilet seats and consider installing a raised toilet seat to make getting on and off the toilet easier. · Keep your bathroom door unlocked while you are in the shower. Where can you learn more? Go to http://felicia-myles.info/. Enter [id number] in the search box to learn more about \"Preventing Falls: Care Instructions. \" Current as of: August 4, 2016 Content Version: 11.1 © 4049-9936 [employer]. Care instructions adapted under license by [employer] (which disclaims liability or warranty for this information). If you have questions about a medical condition or this instruction, always ask your healthcare professional. [place] [address] any warranty or liability for your use of this information. Advance Directives: Care Instructions Your Care Instructions An advance directive is a legal way to state your wishes at the end of your life. It tells your family and your doctor what to do if you can no longer say what you want. There are two main types of advance directives. You can change them any time that your wishes change. · A living will tells your family and your doctor your wishes about life support and other treatment. · A medical power of  lets you name a person to make treatment decisions for you when you can't speak for yourself. This person is called a health care agent. If you do not have an advance directive, decisions about your medical care may be made by a doctor or a  who doesn't know you. It may help to think of an advance directive as a gift to the people who care for you. If you have one, they won't have to make tough decisions by themselves. Follow-up care is a key part of your treatment and safety. Be sure to make and go to all appointments, and call your doctor if you are having problems. It's also a good idea to know your test results and keep a list of the medicines you take. How can you care for yourself at home? · Discuss your wishes with your loved ones and your doctor. This way, there are no surprises. · Many states have a unique form. Or you might use a universal form that has been approved by many states. This kind of form can sometimes be completed and stored online. Your electronic copy will then be available wherever you have a connection to the Internet. In most cases, doctors will respect your wishes even if you have a form from a different state. · You don't need a  to do an advance directive. But you may want to get legal advice. · Think about these questions when you prepare an advance directive: ¨ Who do you want to make decisions about your medical care if you are not able to? Many people choose a family member, close friend, or doctor. ¨ Do you know enough about life support methods that might be used? If not, talk to your doctor so you understand. ¨ What are you most afraid of that might happen? You might be afraid of having pain, losing your independence, or being kept alive by machines. ¨ Where would you prefer to die? Choices include your home, a hospital, or a nursing home. ¨ Would you like to have information about hospice care to support you and your family? ¨ Do you want to donate organs when you die? ¨ Do you want certain Adventist practices performed before you die? If so, put your wishes in the advance directive. · Read your advance directive every year, and make changes as needed. When should you call for help? Be sure to contact your doctor if you have any questions. Where can you learn more? Go to http://felicia-myles.info/. Enter R264 in the search box to learn more about \"Advance Directives: Care Instructions. \" Current as of: February 24, 2016 Content Version: 11.1 © 0537-5563 [employer], [employer]. Care instructions adapted under license by [employer] (which disclaims liability or warranty for this information). If you have questions about a medical condition or this instruction, always ask your healthcare professional. [address] any warranty or liability for your use of this information. Introducing [facility]! Dear Muna Siegel: Thank you for requesting a [employer] account. Our records indicate that you already have an active [employer] account. You can access your account anytime at [email or web address] Did you know that you can access your hospital and ER discharge instructions at any time in [employer]? You can also review all of your test results from your hospital stay or ER visit. Additional Information If you have questions, please visit the Frequently Asked Questions section of the [employer] website at [email or web address] Remember, [employer] is NOT to be used for urgent needs. For medical emergencies, dial 911. Now available from your iPhone and Android! Please provide this summary of care documentation to your next provider. Your primary care clinician is listed as Gene Dominguez. If you have any questions after today's visit, please call [phone].

## 2017-02-07 NOTE — PROGRESS NOTES
1. Have you been to the ER, urgent care clinic or hospitalized since your last visit? NO.     2. Have you seen or consulted any other health care providers outside of the [address] [place] [address] since your last visit (Include any pap smears or colon screening)? NO      Do you have an Advanced Directive? YES    Would you like information on Advanced Directives?  NO

## 2017-02-07 NOTE — PROGRESS NOTES
Stuart Mora is a 77 y.o. male and presents for annual Medicare Wellness Visit. Problem List: Reviewed with patient and discussed risk factors.     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    Hyperlipidemia E78.5    Vitamin D insufficiency E55.9    Hearing loss H91.90    Essential hypertension with goal blood pressure less than 140/90 I10    Primary osteoarthritis involving multiple joints M15.0    PMR (polymyalgia rheumatica) ([facility]) M35.3    Hoarseness R49.0    Sore throat J02.9       Current medical providers:  Patient Care Team:  Nirmal Medellin MD as PCP - General (Internal Medicine)  Connor Herman, RN as Ambulatory Care Navigator (Internal Medicine)  Cherylynn Dance, MD (Urology)  Genesis Fuentes MD (Dermatology)  Di Banegas MD (Orthopedic Surgery)  Anne Worley MD (Gastroenterology)  Truong Hudson MD (Gastroenterology)  Joshua Briseno MD (Ophthalmology)    PSH: Reviewed with patient  Past Surgical History   Procedure Laterality Date    Pr laser vaporization surgery prostate, complete      Hc christal laser [id number]  3/2/11     Laser incision of bladder neck contracture    Cystoscopy      Hx turp       Laser    Endoscopy, colon, diagnostic      Hx mohs procedure  1/11     rt hip repl    Hx other surgical       tonsillectomy, back sx,     Hx other surgical       basel cell removed from right ear lobe        SH: Reviewed with patient  Social History   Substance Use Topics    Smoking status: Never Smoker    Smokeless tobacco: Never Used    Alcohol use 4.2 oz/week     7 Glasses of wine per week       FH: Reviewed with patient  Family History   Problem Relation Age of Onset    Cancer Other     Heart Disease Other        Medications/Allergies: Reviewed with patient  Current Outpatient Prescriptions on File Prior to Visit   Medication Sig Dispense Refill    multivitamin (ONE A DAY) tablet Take 1 Tab by mouth daily.  predniSONE (DELTASONE) 1 mg tablet Take 8 mg by mouth daily.  rosuvastatin (CRESTOR) 5 mg tablet Take once or twice a week. 90 Tab 3    lisinopril (PRINIVIL, ZESTRIL) 5 mg tablet TAKE 1 TABLET BY MOUTH DAILY 90 Tab 3    fluticasone (FLONASE) 50 mcg/actuation nasal spray 2 Sprays by Both Nostrils route daily. 3 Bottle 3    finasteride (PROSCAR) 5 mg tablet Take 1 Tab by mouth daily. 90 Tab 3    Cholecalciferol, Vitamin D3, (VITAMIN D3) 1,000 unit cap Take  by mouth daily.  GLUC/ERICA-MSM#1/C/CHARLY/RAMÓN/BOR (OSTEO BI-FLEX PO) Take 1 Tab by mouth daily.  aspirin 81 mg tablet Take 81 mg by mouth. No current facility-administered medications on file prior to visit. No Known Allergies    Objective:  Visit Vitals    /70 (BP 1 Location: Left arm, BP Patient Position: Sitting)    Pulse 80    Temp 97.9 °F (36.6 °C)    Ht 6' 2\" (1.88 m)    Wt 258 lb 12.8 oz (117.4 kg)    BMI 33.23 kg/m2    Body mass index is 33.23 kg/(m^2). Assessment of cognitive impairment: Alert and oriented x 3    Depression Screen:   PHQ 2 / 9, over the last two weeks 2/7/2017   Little interest or pleasure in doing things Not at all   Feeling down, depressed or hopeless Not at all   Total Score PHQ 2 0       Fall Risk Assessment:    Fall Risk Assessment, last 12 mths 2/7/2017   Able to walk? Yes   Fall in past 12 months? No       Functional Ability:   Does the patient exhibit a steady gait? yes   How long did it take the patient to get up and walk from a sitting position? 1 second. Is the patient self reliant?  (ie can do own laundry, meals, household chores)  yes     Does the patient handle his/her own medications? yes     Does the patient handle his/her own money? yes     Is the patients home safe (ie good lighting, handrails on stairs and bath, etc.)? yes     Did you notice or did patient express any hearing difficulties?    no Did you notice or did patient express any vision difficulties?   no     Were distance and reading eye charts used? no       Advance Care Planning:   Patient was offered the opportunity to discuss advance care planning:  yes     Does patient have an Advance Directive:  Yes, patient reports having an Advance Directive. Patient asked to bring a copy into the office at his next appointment. If no, did you provide information on Caring Connections? N/A       Plan:      Orders Placed This Encounter    dilTIAZem (CARDIZEM) 30 mg tablet       Health Maintenance   Topic Date Due    GLAUCOMA SCREENING Q2Y  03/31/2017 (Originally 9/1/2016)    MEDICARE YEARLY EXAM  02/08/2018    DTaP/Tdap/Td series (2 - Td) 04/05/2021    COLONOSCOPY  04/17/2022    ZOSTER VACCINE AGE 60>  Completed    Pneumococcal 65+ Low/Medium Risk  Completed    INFLUENZA AGE 9 TO ADULT  Completed       *Patient verbalized understanding and agreement with the plan. A copy of the After Visit Summary with personalized health plan was given to the patient today.

## 2017-02-07 NOTE — PATIENT INSTRUCTIONS
Medicare Part B Preventive Services Limitations Recommendation Scheduled   Bone Mass Measurement  (age 72 & older, biennial) Requires diagnosis related to osteoporosis or estrogen deficiency. Biennial benefit unless patient has history of long-term glucocorticoid tx or baseline is needed because initial test was by other method Every 2 years or more frequently if medically necessary. N/A       Cardiovascular Screening Blood Tests (every 5 years)  Total cholesterol, HDL, Triglycerides Order as a panel if possible Every 5 years. Done:  1/31/2017         Colorectal Cancer Screening  -Fecal occult blood test (annual)  -Flexible sigmoidoscopy (5y)  -Screening colonoscopy (10y)  -Barium Enema Colorectal cancer screening, ages 54-65. For all patients 48 and older:  -Annual fecal occult blood test or  colonoscopy every 10 years or  -Flexible sigmoidoscopy every 5 years or  -lower endoscopy to be performed more frequently, if advised by GI. Done:  4/17/2012         Counseling to Prevent Tobacco Use (up to 8 sessions per year)  - Counseling greater than 3 and up to 10 minutes  - Counseling greater than 10 minutes Patients must be asymptomatic of tobacco-related conditions to receive as preventive service Two cessation counseling attempts (up to 8 counseling sessions) per year. N/A   Diabetes Screening Tests (at least every 3 years, Medicare covers annually or at 6-month intervals for prediabetic patients)    Fasting blood sugar (FBS) or glucose tolerance test (GTT) Patient must be diagnosed with one of the following:  -Hypertension, Dyslipidemia, obesity, previous impaired FBS or GTT  Or any two of the following: overweight, FH of diabetes, age ? 72, history of gestational diabetes, birth of baby weighing more than 9 pounds Annually or every 6 months if previous diagnosis of elevated FBS, elevated HbA1c, or impaired GTT, or glucosuria.  Done:  1/31/2017         Diabetes Self-Management Training (DSMT) (no USPSTF recommendation) Requires referral by treating physician for patient with diabetes or renal disease. 10 hours of initial DSMT session of no less than 30 minutes each in a continuous 12-month period. 2 hours of follow-up DSMT in subsequent years. Up to 10 hours of initial training within a continuous 12 month period of subsequent years: up to 2 hours of follow-up training each year after the initial year. N/A   Glaucoma Screening (no USPSTF recommendation) Diabetes mellitus, family history, , age 48 or over,  American, age 72 or over Annually for covered beneficiaries. Done:  8/2016         Human Immunodeficiency Virus (HIV) Screening (annually for increased risk patients)  HIV-1 and HIV-2 by EIA, NADINE, rapid antibody test, or oral mucosa transudate Patient must be at increased risk for HIV infection per USPSTF guidelines or pregnant. Tests covered annually for patients at increased risk. Pregnant patients may receive up to 3 test during pregnancy. Annually for beneficiaries at increased risk, including anyone who asks for the test. Not high risk   Medical Nutrition Therapy (MNT) (for diabetes or renal disease not recommended schedule) Requires referral by treating physician for patient with diabetes or renal disease. Can be provided in same year as diabetes self-management training (DSMT), and CMS recommends medical nutrition therapy take place after DSMT. Up to 3 hours for initial year and 2 hours in subsequent years. First year: 3 hours of one-on-one counseling or subsequent years: 2 hours.  N/A   Prostate Cancer Screening (annually up to age 76)  - Digital rectal exam (HOLA)  - Prostate specific antigen (PSA) Annually (age 48 or over), HOLA not paid separately when covered E/M service is provided on same date Once every 12 months for patients age older than 48years of age includes: digital rectal exam and/or prostate specific antigen test. Done:  1/31/2017  PSA: 0.7       Seasonal Influenza Vaccination (annually)  Once per fall or winter season. Done:  11/10/2016         Pneumococcal Vaccination (once after 72)  Once after age 72 and if more than 5 years since last vaccination and/or uncertainty of vaccine status. Pneumococcal:  3/20/2008    Prevnar 13:  5/29/2015   Hepatitis B Vaccinations (if medium/high risk) Medium/high risk factors:  End-stage renal disease,  Hemophiliacs who received Factor VIII or IX concentrates, Clients of institutions for the mentally retarded, Persons who live in the same house as a HepB virus carrier, Homosexual men, Illicit injectable drug abusers. Schedule course of vaccines if patient not previously vaccinated  *additional shots if medically necessary. Not high risk   Screening Mammography (biennial age 54-69)? Annually (age 36 or over) Age 28 through 44: one baseline or aged 36 and older: annually. N/A         Screening Pap Tests and Pelvic Examination (up to age 79 and after 79 if unknown history or abnormal study last 10 years) Every 24 months except high risk Annually if at high risk for developing cervical or vaginal cancer, or childbearing, age with abnormal Pap test within past 3 years or every 2 years for women at normal risk. N/A         Ultrasound Screening for Abdominal Aortic Aneurysm (AAA) (once) Patient must be referred through IPPE and not have had a screening for abdominal aortic aneurysm before under Medicare. Limited to patients who meet one of the following criteria:  - Men who are 73-68 years old and have smoked more than 100 cigarettes in their lifetime.  -Anyone with a FH of AAA  -Anyone recommended for screening by USPSTF Once in a lifetime. N/A       Schedule of Personalized Health Plan  (Provide Copy to Patient)  The best way to stay healthy is to live a healthy lifestyle. A healthy lifestyle includes regular exercise, eating a well-balanced diet, keeping a healthy weight and not smoking.     Regular physical exams and screening tests are another important way to take care of yourself. Preventive exams provided by health care providers can find health problems early when treatment works best and can keep you from getting certain diseases or illnesses. Preventive services include exams, lab tests, screenings, shots, monitoring and information to help you take care of your own health. All people over 65 should have a pneumonia shot. Pneumonia shots are usually only needed once in a lifetime unless your doctor decides differently. All people over 65 should have a yearly flu shot. People over 65 are at medium to high risk for Hepatitis B. Three shots are needed for complete protection. In addition to your physical exam, some screening tests are recommended:    Bone mass measurement (dexa scan) is recommended every two years if you have certain risk factors, such as personal history of vertebral fracture or chronic steroid medication use    Diabetes Mellitus screening is recommended every year. Glaucoma is an eye disease caused by high pressure in the eye. An eye exam is recommended every year. Cardiovascular screening tests that check your cholesterol and other blood fat (lipid) levels are recommended every five years. Colorectal Cancer screening tests help to find pre-cancerous polyps (growths in the colon) so they can be removed before they turn into cancer. Tests ordered for screening depend on your personal and family history risk factors.     Screening for Prostate Cancer is recommended yearly with a digital rectal exam and/or a PSA test    Here is a list of your current Health Maintenance items with a due date:  Health Maintenance   Topic Date Due    GLAUCOMA SCREENING Q2Y  03/31/2017 (Originally 9/1/2016)    MEDICARE YEARLY EXAM  02/08/2018    DTaP/Tdap/Td series (2 - Td) 04/05/2021    COLONOSCOPY  04/17/2022    ZOSTER VACCINE AGE 60>  Completed    Pneumococcal 65+ Low/Medium Risk  Completed    INFLUENZA AGE 9 TO ADULT  Completed            Preventing Falls: Care Instructions  Your Care Instructions  Getting around your home safely can be a challenge if you have injuries or health problems that make it easy for you to fall. Loose rugs and furniture in walkways are among the dangers for many older people who have problems walking or who have poor eyesight. People who have conditions such as arthritis, osteoporosis, or dementia also have to be careful not to fall. You can make your home safer with a few simple measures. Follow-up care is a key part of your treatment and safety. Be sure to make and go to all appointments, and call your doctor if you are having problems. It's also a good idea to know your test results and keep a list of the medicines you take. How can you care for yourself at home? Taking care of yourself  · You may get dizzy if you do not drink enough water. To prevent dehydration, drink plenty of fluids, enough so that your urine is light yellow or clear like water. Choose water and other caffeine-free clear liquids. If you have kidney, heart, or liver disease and have to limit fluids, talk with your doctor before you increase the amount of fluids you drink. · Exercise regularly to improve your strength, muscle tone, and balance. Walk if you can. Swimming may be a good choice if you cannot walk easily. · Have your vision and hearing checked each year or any time you notice a change. If you have trouble seeing and hearing, you might not be able to avoid objects and could lose your balance. · Know the side effects of the medicines you take. Ask your doctor or pharmacist whether the medicines you take can affect your balance. Sleeping pills or sedatives can affect your balance. · Limit the amount of alcohol you drink. Alcohol can impair your balance and other senses. · Ask your doctor whether calluses or corns on your feet need to be removed.  If you wear loose-fitting shoes because of calluses or corns, you can lose your balance and fall. · Talk to your doctor if you have numbness in your feet. Preventing falls at home  · Remove raised doorway thresholds, throw rugs, and clutter. Repair loose carpet or raised areas in the floor. · Move furniture and electrical cords to keep them out of walking paths. · Use nonskid floor wax, and wipe up spills right away, especially on ceramic tile floors. · If you use a walker or cane, put rubber tips on it. If you use crutches, clean the bottoms of them regularly with an abrasive pad, such as steel wool. · Keep your house well lit, especially [facility], and outside walkways. Use night-lights in areas such as hallways and bathrooms. Add extra light switches or use remote switches (such as switches that go on or off when you clap your hands) to make it easier to turn lights on if you have to get up during the night. · Install sturdy handrails on stairways. · Move items in your cabinets so that the things you use a lot are on the lower shelves (about waist level). · Keep a cordless phone and a flashlight with new batteries by your bed. If possible, put a phone in each of the main rooms of your house, or carry a cell phone in case you fall and cannot reach a phone. Or, you can wear a device around your neck or wrist. You push a button that sends a signal for help. · Wear low-heeled shoes that fit well and give your feet good support. Use footwear with nonskid soles. Check the heels and soles of your shoes for wear. Repair or replace worn heels or soles. · Do not wear socks without shoes on wood floors. · Walk on the grass when the sidewalks are slippery. If you live in an area that gets snow and ice in the winter, sprinkle salt on slippery steps and sidewalks. Preventing falls in the bath  · Install grab bars and nonskid mats inside and outside your shower or tub and near the toilet and sinks. · Use shower chairs and bath benches.   · Use a hand-held shower head that will allow you to sit while showering. · Get into a tub or shower by putting the weaker leg in first. Get out of a tub or shower with your strong side first.  · Repair loose toilet seats and consider installing a raised toilet seat to make getting on and off the toilet easier. · Keep your bathroom door unlocked while you are in the shower. Where can you learn more? Go to http://felicia-myles.info/. Enter [id number] [id number] in the search box to learn more about \"Preventing Falls: Care Instructions. \"  Current as of: August 4, 2016  Content Version: 11.1  © 8828-9604 [employer]. Care instructions adapted under license by [employer] (which disclaims liability or warranty for this information). If you have questions about a medical condition or this instruction, always ask your healthcare professional. [place] [address] any warranty or liability for your use of this information. Advance Directives: Care Instructions  Your Care Instructions  An advance directive is a legal way to state your wishes at the end of your life. It tells your family and your doctor what to do if you can no longer say what you want. There are two main types of advance directives. You can change them any time that your wishes change. · A living will tells your family and your doctor your wishes about life support and other treatment. · A medical power of  lets you name a person to make treatment decisions for you when you can't speak for yourself. This person is called a health care agent. If you do not have an advance directive, decisions about your medical care may be made by a doctor or a  who doesn't know you. It may help to think of an advance directive as a gift to the people who care for you. If you have one, they won't have to make tough decisions by themselves. Follow-up care is a key part of your treatment and safety.  Be sure to make and go to all appointments, and call your doctor if you are having problems. It's also a good idea to know your test results and keep a list of the medicines you take. How can you care for yourself at home? · Discuss your wishes with your loved ones and your doctor. This way, there are no surprises. · Many states have a unique form. Or you might use a universal form that has been approved by many states. This kind of form can sometimes be completed and stored online. Your electronic copy will then be available wherever you have a connection to the Internet. In most cases, doctors will respect your wishes even if you have a form from a different state. · You don't need a  to do an advance directive. But you may want to get legal advice. · Think about these questions when you prepare an advance directive:  ¨ Who do you want to make decisions about your medical care if you are not able to? Many people choose a family member, close friend, or doctor. ¨ Do you know enough about life support methods that might be used? If not, talk to your doctor so you understand. ¨ What are you most afraid of that might happen? You might be afraid of having pain, losing your independence, or being kept alive by machines. ¨ Where would you prefer to die? Choices include your home, a hospital, or a nursing home. ¨ Would you like to have information about hospice care to support you and your family? ¨ Do you want to donate organs when you die? ¨ Do you want certain Anabaptism practices performed before you die? If so, put your wishes in the advance directive. · Read your advance directive every year, and make changes as needed. When should you call for help? Be sure to contact your doctor if you have any questions. Where can you learn more? Go to http://felicia-myles.info/. Enter R264 in the search box to learn more about \"Advance Directives: Care Instructions. \"  Current as of: February 24, 2016  Content Version: 11.1  © 7954-6822 [employer]. Care instructions adapted under license by [employer] (which disclaims liability or warranty for this information). If you have questions about a medical condition or this instruction, always ask your healthcare professional. [address] any warranty or liability for your use of this information.

## 2017-02-07 NOTE — PROGRESS NOTES
Advance Care Planning (ACP) Provider Note - Comprehensive     Date of ACP Conversation: 02/07/17  Persons included in Conversation:  patient  Length of ACP Conversation in minutes:  16 minutes    Authorized Decision Maker (if patient is incapable of making informed decisions): Alisha Ambrose (long time girlfriend)  This person is:  Healthcare Agent/Medical Power of  under Advance Directive          General ACP for ALL Patients with Decision Making Capacity:   Importance of advance care planning, including choosing a healthcare agent to communicate patient's healthcare decisions if patient lost the ability to make decisions, such as after a sudden illness or accident  Understanding of the healthcare agent role was assessed and information provided  Exploration of values, goals, and preferences if recovery is not expected, even with continued medical treatment in the event of: Imminent death  Severe, permanent brain injury  \"In these circumstances, what matters most to you? \"  Care focused more on comfort or quality of life. Review of Existing Advance Directive:  Patient already has a completed advance directive and living will with an . His girlfriend is his healthcare agent and she is aware of his wishes and will abide by them. For Serious or Chronic Illness:  Understanding of medical condition      Interventions Provided:  Recommended communicating the plan and making copies for the healthcare agent, personal physician, and others as appropriate (e.g., health system)  Recommended review of completed ACP document annually or upon change in health status   Recommended to bring completed advance directive to office to be copied and scanned into chart.

## 2017-02-07 NOTE — PROGRESS NOTES
Nurse Navigator contacted Dr. Lavell De La Fuente office to request a copy of the patients most recent glaucoma screening; contact information provided.

## 2017-02-10 PROBLEM — R13.10 ODYNOPHAGIA: Status: ACTIVE | Noted: 2017-02-10

## 2017-02-10 PROBLEM — K22.4 ESOPHAGEAL DYSMOTILITY: Status: ACTIVE | Noted: 2017-02-10

## 2017-02-10 NOTE — PROGRESS NOTES
HPI:   Leighann Spence is a very pleasant 77y.o. year old male who presents today for evaluation of hypertension, hyperlipidemia, polymyalgia rheumatica, and BPH. He reports that he is doing well. He was last seen on 11/10/2016 for complaints of a persistent non productive cough and sore throat occurring following a viral illness. He also reported some hoarseness with the sensation of needing to continually clear his throat. He was referred to see Dr. Yessi Ortiz, and patient reports that a laryngoscopy was performed showing evidence of reflux. He was started on Nexium for two weeks without improvement. Although his cough had improved, he continued to have pain with swallowing saliva, while denying any dysphagia or odynophagia while swallowing liquids or solids. He was referred to see Dr. Sim Yost, and underwent upper endoscopy (1/5/2017) showing abnormal esophageal motility with spastic and non-propulsive appearing contractions in the mid to distal esophagus (tortuous); mucosa appeared normal ( random biopsies: negative); normal stomach and duodenum. He underwent a barium swallow (1/11/2017) showing a small persistent smoothly marginated filling defect at the piriform sinus suggestive of an extrinsic mass effect. He subsequently had a neck CT scan (1/13/2017) which showed the right piriform sinus appeared larger than the left, but the walls of both were normal without surrounding mass or infiltrating process; nonspecific lymphadenopathy involving level 1 and 2 nodes. He underwent esophageal manometry, which showed a hypercontractile esophagus (\"Jackhammer\" esophagus). He was started on diltiazem 30 mg TID. He reports today that his cough has resolved although he continues to have some postnasal drainage. He states that since starting the diltiazem, his swallowing is no longer painful. He is otherwise without complaints.      He has a history of polymyalgia rheumatica, diagnosed in 7/2016 when he was seen for bilateral shoulder pain of several months duration. He had been under the care of Dr. Erick Rodríguez since 3/2016 when he presented with right wrist pain followed by the development of bilateral shoulder pain. He was treated with a Medrol dose pack and Tramadol and upon follow-up on 5/3/2016, reported some improvement in the bilateral shoulder and wrist pain, but pain persisted. Bilateral shoulder MRI's (5/11/2016) showed severe degenerative changes on the right at the acromioclavicular joint with moderate to severe or severe tendinopathy of the supraspinatus and infraspinatus; postoperative changes with tendinopathy of the supraspinatus, infraspinatus, and long head of the biceps was seen on the left. In 5/2016, lab data showed ESR 28, C-Reactive Protein 1.1, negative CRISTI and RF. He was treated with naproxen, tramadol and acetaminophen. He was evaluated on 7/19/2016 for worsening bilateral shoulder pain with bilateral hand pain involving the wrists, MCP and PIP joints. Evaluation included ESR 48; C-reactive protein 1.8; CRISTI positive; anti-RNP 1.4; negative antibodies to Zimmerman, dsDNA, anti-chromatin, RF and anti-CCP. Bilateral hand x-rays showed scattered arthritic changes with scattered small erosions. He was referred to see Dr. Pedro Pablo Cui who diagnosed polymyalgia rheumatica and started him on prednisone. Since initiating prednisone, he reports significant improvement. Today he describes nearly complete resolution of his symptoms. He states that he has regained full use of his shoulders and has no trouble reaching behind his back or pulling a shirt over his head. He states that he is continuing to taper his steroids, and reports that he is currently taking 6 mg each day. He states that he has instructions to decrease by 1 mg every two weeks. He denies an headache, jaw claudication, scalp tenderness, fever, or visual changes. He has a history of hypertension, treated with lisinopril.  He exercises mainly by walking, and denies any chest pain, shortness of breath at rest or with exertion, palpitations, lightheadedness, or edema. He also has hyperlipidemia, and has been intolerant to statins in the past. He was being treated with ezetimibe, but discontinued this in 2014 due to the high cost. He was started on rosuvastatin 5 mg three days a week in 9/2014, and this was decreased to one day a week in 10/2015 due to myalgias. He stopped taking rosuvastatin in 4/2016 with the onset of his shoulder pain. He reports that he has resumed taking rosuvastatin and is continuing taking it two days per week. He also has a history of BPH and underwent TURP in 2006. In 2/2011, he had laser vaporization of the prostate to relieve bladder outlet obstructive symptoms. In 3/2011 he had a laser incision performed on the bladder neck contracture. In 5/2012 and 8/2012, he underwent transurethral incision of the bladder neck contracture. Biopsy of the contracture (8/2012) was negative for malignancy. Since that time, he has had annual cystoscopic surveillance of the bladder neck contracture by Dr. Ofelia Sharif. In 8/2015, it was felt that no more surveillance was necessary as it had remained stable. He remains on Flomax and Proscar and his last PSA was 0.6 (6/2016). He denies difficulty with stream, dysuria, hematuria, or nocturia. He had a screening colonoscopy in 4/2012 by Dr. Melodie Coffey which was normal. Follow-up recommended for 10 years. He denies any abdominal pain, nausea, vomiting, melena, hematochezia, or change in bowel movements.       Past Medical History   Diagnosis Date    Bladder neck contracture     BPH with obstruction/lower urinary tract symptoms     Essential hypertension with goal blood pressure less than 140/90     Family history of colon cancer     Hyperlipidemia     Osteoarthritis     PMR (polymyalgia rheumatica) ([facility] [address] 7/20/2016     Past Surgical History   Procedure Laterality Date    Pr laser vaporization surgery prostate, complete  Hc christal laser [id number]  3/2/11     Laser incision of bladder neck contracture    Cystoscopy      Hx turp       Laser    Endoscopy, colon, diagnostic      Hx mohs procedure       rt hip repl    Hx other surgical       tonsillectomy, back sx,     Hx other surgical       basel cell removed from right ear lobe     Current Outpatient Prescriptions   Medication Sig    dilTIAZem (CARDIZEM) 30 mg tablet Take 30 mg by mouth three (3) times daily.  multivitamin (ONE A DAY) tablet Take 1 Tab by mouth daily.  predniSONE (DELTASONE) 1 mg tablet Take 8 mg by mouth daily.  rosuvastatin (CRESTOR) 5 mg tablet Take once or twice a week.  lisinopril (PRINIVIL, ZESTRIL) 5 mg tablet TAKE 1 TABLET BY MOUTH DAILY    fluticasone (FLONASE) 50 mcg/actuation nasal spray 2 Sprays by Both Nostrils route daily.  finasteride (PROSCAR) 5 mg tablet Take 1 Tab by mouth daily.  Cholecalciferol, Vitamin D3, (VITAMIN D3) 1,000 unit cap Take  by mouth daily.  GLUC/ERICA-MSM#1/C/CHARLY/RAMÓN/BOR (OSTEO BI-FLEX PO) Take 1 Tab by mouth daily.  aspirin 81 mg tablet Take 81 mg by mouth. No current facility-administered medications for this visit. Allergies and Intolerances:   No Known Allergies     Family History: His father  from CAD, and his brother has had CABG. His mother  from ovarian cancer. Family History   Problem Relation Age of Onset    Cancer Other     Heart Disease Other      Social History:   He  reports that he has never smoked. He has never used smokeless tobacco. He is  but has been living with his long time girlfriend for 43 years. He has two step children. He is a retired , and works on the [employer] part-time.     History   Alcohol Use    4.2 oz/week    7 Glasses of wine per week     Immunization History:  Immunization History   Administered Date(s) Administered    Influenza High Dose Vaccine PF 2013, 2015, 11/10/2016    Influenza Vaccine 11/17/2014    Influenza Vaccine Split 11/29/2012    Pneumococcal Conjugate (PCV-13) 05/29/2015    Pneumococcal Vaccine (Unspecified Type) 03/20/2008    TDAP Vaccine 04/05/2011    Zoster 04/24/2012       Review of Systems:   As above included in HPI. Otherwise 11 point review of systems negative including constitutional, skin, HENT, eyes, respiratory, cardiovascular, gastrointestinal, genitourinary, musculoskeletal, endocrine, hematologic, allergy, and neurologic. Physical:   Vitals:   BP: 120/70  HR: 80  WT: 258 lb 12.8 oz (117.4 kg)  BMI:  33.23 kg/m2    Exam:   Pt appears well; alert and oriented x 3; appropriate affect. HEENT: PERRLA, anicteric, no facial tenderness; oropharynx clear without erythema or lesions; no cervical adenopathy or thyromegaly; no neck swelling or tenderness anteriorly. Lungs: clear to auscultation, no wheezes, rhonchi, or rales. Heart: regular rate and rhythm. No murmur, rubs, gallops  Abdomen: soft, nontender, nondistended, normal bowel sounds, no hepatosplenomegaly or masses. Extremities: without edema. Pulses 1-2+ bilaterally. Review of Data:  Labs:  Hospital Outpatient Visit on 01/31/2017   Component Date Value Ref Range Status    WBC 01/31/2017 6.5  4.6 - 13.2 K/uL Final    RBC 01/31/2017 4.43* 4.70 - 5.50 M/uL Final    HGB 01/31/2017 14.1  13.0 - 16.0 g/dL Final    HCT 01/31/2017 44.5  36.0 - 48.0 % Final    MCV 01/31/2017 100.5* 74.0 - 97.0 FL Final    MCH 01/31/2017 31.8  24.0 - 34.0 PG Final    MCHC 01/31/2017 31.7  31.0 - 37.0 g/dL Final    RDW 01/31/2017 13.2  11.6 - 14.5 % Final    PLATELET 82/25/3186 171  135 - 420 K/uL Final    MPV 01/31/2017 10.0  9.2 - 11.8 FL Final    NEUTROPHILS 01/31/2017 58  40 - 73 % Final    LYMPHOCYTES 01/31/2017 32  21 - 52 % Final    MONOCYTES 01/31/2017 9  3 - 10 % Final    EOSINOPHILS 01/31/2017 1  0 - 5 % Final    BASOPHILS 01/31/2017 0  0 - 2 % Final    ABS.  NEUTROPHILS 01/31/2017 3.7  1.8 - 8.0 K/UL Final  ABS. LYMPHOCYTES 01/31/2017 2.1  0.9 - 3.6 K/UL Final    ABS. MONOCYTES 01/31/2017 0.6  0.05 - 1.2 K/UL Final    ABS. EOSINOPHILS 01/31/2017 0.1  0.0 - 0.4 K/UL Final    ABS. BASOPHILS 01/31/2017 0.0  0.0 - 0.06 K/UL Final    DF 01/31/2017 AUTOMATED    Final    LIPID PROFILE 01/31/2017        Final    Cholesterol, total 01/31/2017 165  <200 MG/DL Final    Triglyceride 01/31/2017 128  <150 MG/DL Final    HDL Cholesterol 01/31/2017 59  40 - 60 MG/DL Final    LDL, calculated 01/31/2017 80.4  0 - 100 MG/DL Final    VLDL, calculated 01/31/2017 25.6  MG/DL Final    CHOL/HDL Ratio 01/31/2017 2.8  0 - 5.0   Final    Sodium 01/31/2017 142  136 - 145 mmol/L Final    Potassium 01/31/2017 4.3  3.5 - 5.5 mmol/L Final    Chloride 01/31/2017 103  100 - 108 mmol/L Final    CO2 01/31/2017 32  21 - 32 mmol/L Final    Anion gap 01/31/2017 7  3.0 - 18 mmol/L Final    Glucose 01/31/2017 90  74 - 99 mg/dL Final    BUN 01/31/2017 15  7.0 - 18 MG/DL Final    Creatinine 01/31/2017 1.03  0.6 - 1.3 MG/DL Final    BUN/Creatinine ratio 01/31/2017 15  12 - 20   Final    GFR est AA 01/31/2017 >60  >60 ml/min/1.73m2 Final    GFR est non-AA 01/31/2017 >60  >60 ml/min/1.73m2 Final    Calcium 01/31/2017 8.7  8.5 - 10.1 MG/DL Final    Bilirubin, total 01/31/2017 0.5  0.2 - 1.0 MG/DL Final    ALT (SGPT) 01/31/2017 24  16 - 61 U/L Final    AST (SGOT) 01/31/2017 9* 15 - 37 U/L Final    Alk.  phosphatase 01/31/2017 59  45 - 117 U/L Final    Protein, total 01/31/2017 6.5  6.4 - 8.2 g/dL Final    Albumin 01/31/2017 4.0  3.4 - 5.0 g/dL Final    Globulin 01/31/2017 2.5  2.0 - 4.0 g/dL Final    A-G Ratio 01/31/2017 1.6  0.8 - 1.7   Final    Color 01/31/2017 YELLOW    Final    Appearance 01/31/2017 CLEAR    Final    Specific gravity 01/31/2017 1.022  1.005 - 1.030   Final    pH (UA) 01/31/2017 6.5  5.0 - 8.0   Final    Protein 01/31/2017 NEGATIVE   NEG mg/dL Final    Glucose 01/31/2017 NEGATIVE   NEG mg/dL Final    Ketone 01/31/2017 NEGATIVE   NEG mg/dL Final    Bilirubin 01/31/2017 NEGATIVE   NEG   Final    Blood 01/31/2017 NEGATIVE   NEG   Final    Urobilinogen 01/31/2017 0.2  0.2 - 1.0 EU/dL Final    Nitrites 01/31/2017 NEGATIVE   NEG   Final    Leukocyte Esterase 01/31/2017 NEGATIVE   NEG   Final    WBC 01/31/2017 0 to 1  0 - 4 /hpf Final    RBC 01/31/2017 0  0 - 5 /hpf Final    Epithelial cells 01/31/2017 FEW  0 - 5 /lpf Final    Bacteria 01/31/2017 NEGATIVE   NEG /hpf Final    TSH 01/31/2017 2.54  0.36 - 3.74 uIU/mL Final    Prostate Specific Ag 01/31/2017 0.7  0.0 - 4.0 ng/mL Final    Vitamin D 25-Hydroxy 01/31/2017 32.3  30 - 100 ng/mL Final   Hospital Outpatient Visit on 01/13/2017   Component Date Value Ref Range Status    Creatinine (POC) 01/13/2017 1.1  0.6 - 1.3 MG/DL Final    GFR-AA (POC) 01/13/2017 >60  >60 ml/min/1.73m2 Final    GFR, non-AA (POC) 01/13/2017 >60  >60 ml/min/1.73m2 Final     Health Maintenance:  Screening:    Colorectal: colonoscopy (4/2012) normal. Dr. Evi Tucker. Due 2022. Depression: none   DM (HbA1c/FPG): FPG 90 (1/2017)   Hepatitis C: negative (7/2016)   Falls: none   DEXA: N/A   PSA/HOLA: PSA 0.7 (1/2017); HOLA per Dr. Alex Asher   Glaucoma: regular eye exams with Dr. Mekhi Moss (last 8/2016)   Smoking: none   Vitamin D: 32.3 (1/2017)   Medicare Wellness: today      Impression:  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    Hyperlipidemia E78.5    Vitamin D insufficiency E55.9    Hearing loss H91.90    Essential hypertension with goal blood pressure less than 140/90 I10    Primary osteoarthritis involving multiple joints M15.0    PMR (polymyalgia rheumatica) (HCC) M35.3    Hoarseness R49.0    Esophageal dysmotility K22.4    Odynophagia R13.10       Plan:  1. Odynophagia. Secondary to esophageal dysmotility.  Appears to be responding to diltiazem as pain is improved, although patient now describing swallowing as \"different\". Could not locate any evidence of odynophagia +/- esophageal dysmotility being reported in association with PMR or GCA. Will continue to follow. 2. Polymyalgia rheumatica. Patient significantly improved since initiating steroids. Currently tapered down to 6 mg per day. Instructions to slowly wean by 1 mg every 2 weeks as tolerated. Being followed closely by Dr. Ryne Ruiz. At increased risk (10%) of concurrent giant cell arteritis with PMR, but no evidence of GCA currently. Continue to follow closely. 3. Hypertension. Blood pressure controlled on lisinopril. Renal function normal with creatinine 0.95/ eGFR >60. Continue to follow. On low dose aspirin. 4. Hyperlipidemia. On low intensity dose rosuvastatin, taking 5 mg two days per week, with good response with LDL 80. Emphasized importance of lifestyle modifications, including diet, exercise, and weight loss. 5. BPH with bladder neck contracture. Appears to be asymptomatic on current treatment with Flonase and Proscar. Followed by Dr. Rei Reis. PSA normal. Continue to follow. 6. Hoarseness. May be multifactorial, with post nasal drainage and esophageal dysmotility contributing. Reports that cough is resolved, so most likely not related to lisinopril. Patient feels it is improving, so will follow. 7. Health maintenance. Already received influenza vaccine. Other immunizations up to date. Colonoscopy due 2022. Continue regular eye exams with Dr. Dhaval Hernandez. Vitamin D level normal. Continue maintenance dose supplement. Given chronic steroid use, will check bone density scan. In addition, an annual Medicare wellness visit was done today. Reviewed and agree with assessment. Patient understands recommendations and agrees with plan. Follow-up in 6 months.

## 2017-03-09 ENCOUNTER — PATIENT OUTREACH (OUTPATIENT)
Dept: INTERNAL MEDICINE CLINIC | Age: 77
End: 2017-03-09

## 2017-05-17 ENCOUNTER — TELEPHONE (OUTPATIENT)
Dept: INTERNAL MEDICINE CLINIC | Age: 77
End: 2017-05-17

## 2017-05-17 ENCOUNTER — HOSPITAL ENCOUNTER (OUTPATIENT)
Dept: BONE DENSITY | Age: 77
Discharge: HOME | End: 2017-05-17
Attending: INTERNAL MEDICINE
Payer: MEDICARE

## 2017-05-17 DIAGNOSIS — Z79.52 CURRENT CHRONIC USE OF SYSTEMIC STEROIDS: ICD-10-CM

## 2017-05-17 PROCEDURE — 77080 DXA BONE DENSITY AXIAL: CPT

## 2017-06-08 ENCOUNTER — HOSPITAL ENCOUNTER (OUTPATIENT)
Dept: LAB | Age: 77
Discharge: HOME | End: 2017-06-08
Payer: MEDICARE

## 2017-06-08 ENCOUNTER — OFFICE VISIT (OUTPATIENT)
Dept: UROLOGY | Age: 77
End: 2017-06-08

## 2017-06-08 VITALS
WEIGHT: 256 LBS | HEART RATE: 81 BPM | DIASTOLIC BLOOD PRESSURE: 70 MMHG | HEIGHT: 74 IN | TEMPERATURE: 98 F | BODY MASS INDEX: 32.85 KG/M2 | OXYGEN SATURATION: 98 % | SYSTOLIC BLOOD PRESSURE: 120 MMHG

## 2017-06-08 DIAGNOSIS — N13.8 BPH (BENIGN PROSTATIC HYPERTROPHY) WITH URINARY OBSTRUCTION: Primary | ICD-10-CM

## 2017-06-08 DIAGNOSIS — N40.1 BPH (BENIGN PROSTATIC HYPERTROPHY) WITH URINARY OBSTRUCTION: Primary | ICD-10-CM

## 2017-06-08 DIAGNOSIS — N40.1 BPH (BENIGN PROSTATIC HYPERTROPHY) WITH URINARY OBSTRUCTION: ICD-10-CM

## 2017-06-08 DIAGNOSIS — N13.8 BPH (BENIGN PROSTATIC HYPERTROPHY) WITH URINARY OBSTRUCTION: ICD-10-CM

## 2017-06-08 LAB
BILIRUB UR QL STRIP: NEGATIVE
GLUCOSE UR-MCNC: NEGATIVE MG/DL
KETONES P FAST UR STRIP-MCNC: NEGATIVE MG/DL
PH UR STRIP: 6.5 [PH] (ref 4.6–8)
PROT UR QL STRIP: NEGATIVE MG/DL
PSA SERPL-MCNC: 0.5 NG/ML (ref 0–4)
SP GR UR STRIP: 1.01 (ref 1–1.03)
UA UROBILINOGEN AMB POC: NORMAL (ref 0.2–1)
URINALYSIS CLARITY POC: CLEAR
URINALYSIS COLOR POC: YELLOW
URINE BLOOD POC: NORMAL
URINE LEUKOCYTES POC: NEGATIVE
URINE NITRITES POC: NEGATIVE

## 2017-06-08 PROCEDURE — 84153 ASSAY OF PSA TOTAL: CPT | Performed by: UROLOGY

## 2017-06-08 RX ORDER — TAMSULOSIN HYDROCHLORIDE 0.4 MG/1
0.4 CAPSULE ORAL DAILY
Qty: 90 CAP | Refills: 3 | Status: SHIPPED | OUTPATIENT
Start: 2017-06-08 | End: 2019-03-23 | Stop reason: SDDI

## 2017-06-08 RX ORDER — FINASTERIDE 5 MG/1
5 TABLET, FILM COATED ORAL DAILY
Qty: 90 TAB | Refills: 3 | Status: SHIPPED | OUTPATIENT
Start: 2017-06-08 | End: 2019-09-28 | Stop reason: SDUPTHER

## 2017-06-08 NOTE — PROGRESS NOTES
Mr. Marc Frankel has a reminder for a \"due or due soon\" health maintenance. I have asked that he contact his primary care provider for follow-up on this health maintenance.

## 2017-06-08 NOTE — PROGRESS NOTES
Jeramie Mei 77 y.o. male     Mr. Gearld Schlatter seen today for follow-up symptomatic BPH status post TURP 2006 status post TUR [facility] 2012    Patient is voiding with a forceful stream good control-nocturia once per night on finasteride and Flomax    Patient has no complaints regarding urination at this time    PSA 0.7 in January 2017   PSA 0.5 In April 2015  PSA 0.4 in 2014     Review of Systems:    CNS: No seizures syncope and into dizziness  Respiratory: No wheezing or shortness of breath  Cardiovascular:Hypertension  Intestinal:Diverticulosis  Urinary: Obstructive prostatism with urinary retention relieved by the laser TURP 2006/bladder neck contracture incised 2012  Skeletal: Large joint arthritis  Endocrine:No diabetes or thyroid disease  Other:  Allergies: No Known Allergies   Medications:    Current Outpatient Prescriptions   Medication Sig Dispense Refill    finasteride (PROSCAR) 5 mg tablet Take 1 Tab by mouth daily. Indications: SYMPTOMATIC BENIGN PROSTATIC HYPERPLASIA 90 Tab 3    tamsulosin (FLOMAX) 0.4 mg capsule Take 1 Cap by mouth daily. Indications: SYMPTOMATIC BENIGN PROSTATIC HYPERPLASIA 90 Cap 3    dilTIAZem (CARDIZEM) 30 mg tablet Take 30 mg by mouth three (3) times daily.  multivitamin (ONE A DAY) tablet Take 1 Tab by mouth daily.  predniSONE (DELTASONE) 1 mg tablet Take 8 mg by mouth daily.  rosuvastatin (CRESTOR) 5 mg tablet Take once or twice a week. 90 Tab 3    lisinopril (PRINIVIL, ZESTRIL) 5 mg tablet TAKE 1 TABLET BY MOUTH DAILY 90 Tab 3    fluticasone (FLONASE) 50 mcg/actuation nasal spray 2 Sprays by Both Nostrils route daily. 3 Bottle 3    finasteride (PROSCAR) 5 mg tablet Take 1 Tab by mouth daily. 90 Tab 3    Cholecalciferol, Vitamin D3, (VITAMIN D3) 1,000 unit cap Take  by mouth daily.  GLUC/ERICA-MSM#1/C/CHARLY/RAMÓN/BOR (OSTEO BI-FLEX PO) Take 1 Tab by mouth daily.  aspirin 81 mg tablet Take 81 mg by mouth.            Past Medical History:   Diagnosis Date    Bladder neck contracture     BPH with obstruction/lower urinary tract symptoms     Essential hypertension with goal blood pressure less than 140/90     Family history of colon cancer     Hyperlipidemia     Osteoarthritis     PMR (polymyalgia rheumatica) ([address] 7/20/2016      Past Surgical History:   Procedure Laterality Date    CYSTOSCOPY      ENDOSCOPY, COLON, DIAGNOSTIC      HC BERENICE LASER [id number]  3/2/11    Laser incision of bladder neck contracture    HX MOHS PROCEDURES  1/11    rt hip repl    HX OTHER SURGICAL      tonsillectomy, back sx,     HX OTHER SURGICAL      basel cell removed from right ear lobe    HX TURP      Laser    LASER VAPORIZATION SURGERY PROSTATE, COMPLETE       Family History   Problem Relation Age of Onset    Cancer Other     Heart Disease Other         Physical Examination: Well-nourished mature male in no apparent distress    Prostate by HOLA is small, smooth, benign consistency and nontender-no induration no nodularity  No rectal masses induration or tenderness    Urinalysis negative dipstick/nitrite negative       Impression: Symptomatic BPH responding favorably to TURP and therapy with alpha-blocker and 5 alpha reductase inhibitor    Plan: Flomax 0.4 mg daily #90 refill ×3            Finasteride 5 mg daily #90 refill ×3  PSA today    rtc 1 yr-PSA HOLA PVR    More than 1/2 of this 15 minute visit was spent in counselling and coordination of care, as described above. Yahir Truong MD  -electronically signed-    PLEASE NOTE:  This document has been produced using voice recognition software. Unrecognized errors in transcription may be present.

## 2017-06-08 NOTE — PATIENT INSTRUCTIONS

## 2017-06-08 NOTE — MR AVS SNAPSHOT
Visit Information Date & Time Provider Department Dept. Phone Encounter #  
 6/8/2017  9:45 AM Antoinette Herring, [address] [facility] [phone] Your Appointments 8/2/2017  8:55 AM  
LAB with [facility] NURSE VISIT Internist of [facility] ([address] [place] [address] Road) Appt Note: labs 6mos. sarris [address], [address]  
  
   
 [address], [address]  
  
    
 8/9/2017  8:30 AM  
Office Visit with Abiodun Linares MD  
Internist of [address]) Appt Note: ov 6mos. sarris [address], [address]  
  
   
 [address], [address] Upcoming Health Maintenance Date Due INFLUENZA AGE 9 TO ADULT 8/1/2017 MEDICARE YEARLY EXAM 2/8/2018 GLAUCOMA SCREENING Q2Y 9/19/2018 DTaP/Tdap/Td series (2 - Td) 4/5/2021 COLONOSCOPY 4/17/2022 Allergies as of 6/8/2017  Review Complete On: 6/8/2017 By: Antoinette Herring MD  
 No Known Allergies Current Immunizations  Reviewed on 11/10/2016 Name Date Influenza High Dose Vaccine PF 11/10/2016  2:23 PM, 11/30/2015  8:32 AM, 11/14/2013  8:55 AM  
 Influenza Vaccine 11/17/2014 Influenza Vaccine Split 11/29/2012  3:15 PM  
 Pneumococcal Conjugate (PCV-13) 5/29/2015 10:53 AM  
 Pneumococcal Vaccine (Unspecified Type) 3/20/2008 TDAP Vaccine 4/5/2011 Zoster 4/24/2012 Not reviewed this visit You Were Diagnosed With   
  
 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  
 120/70 (BP 1 Location: Left arm, BP Patient Position: Sitting) 81 98 °F (36.7 °C) (Oral) 6' 2\" (1.88 m) 256 lb (116.1 kg) 98% BMI Smoking Status 32.87 kg/m2 Never Smoker BMI and BSA Data Body Mass Index Body Surface Area  
 32.87 kg/m 2 2.46 m 2 Preferred Pharmacy Pharmacy Name Phone [address], [address] [facility] [phone] Your Updated Medication List  
  
   
This list is accurate as of: 6/8/17 10:15 AM.  Always use your most recent med list.  
  
  
  
  
 aspirin 81 mg tablet Take 81 mg by mouth. dilTIAZem 30 mg tablet Commonly known as:  CARDIZEM Take 30 mg by mouth three (3) times daily. * finasteride 5 mg tablet Commonly known as:  PROSCAR Take 1 Tab by mouth daily. * finasteride 5 mg tablet Commonly known as:  PROSCAR Take 1 Tab by mouth daily. Indications: SYMPTOMATIC BENIGN PROSTATIC HYPERPLASIA  
  
 fluticasone 50 mcg/actuation nasal spray Commonly known as:  Chales Copa 2 Sprays by Both Nostrils route daily. lisinopril 5 mg tablet Commonly known as:  PRINIVIL, ZESTRIL  
TAKE 1 TABLET BY MOUTH DAILY  
  
 multivitamin tablet Commonly known as:  ONE A DAY Take 1 Tab by mouth daily. OSTEO BI-FLEX PO Take 1 Tab by mouth daily. predniSONE 1 mg tablet Commonly known as:  Vallorie Alan Take 8 mg by mouth daily. rosuvastatin 5 mg tablet Commonly known as:  CRESTOR Take once or twice a week. tamsulosin 0.4 mg capsule Commonly known as:  FLOMAX Take 1 Cap by mouth daily. Indications: SYMPTOMATIC BENIGN PROSTATIC HYPERPLASIA  
  
 VITAMIN D3 1,000 unit Cap Generic drug:  cholecalciferol Take  by mouth daily. * Notice: This list has 2 medication(s) that are the same as other medications prescribed for you. Read the directions carefully, and ask your doctor or other care provider to review them with you. Prescriptions Sent to Pharmacy Refills  
 finasteride (PROSCAR) 5 mg tablet 3 Sig: Take 1 Tab by mouth daily. Indications: SYMPTOMATIC BENIGN PROSTATIC HYPERPLASIA Class: Normal  
 Pharmacy: [id number] [facility], [address] [facility] Ph #: [phone] Route: Oral  
 tamsulosin (FLOMAX) 0.4 mg capsule 3 Sig: Take 1 Cap by mouth daily. Indications: SYMPTOMATIC BENIGN PROSTATIC HYPERPLASIA Class: Normal  
 Pharmacy: [id number] [facility], [address] [facility] Ph #: [phone] Route: Oral  
  
We Performed the Following AMB POC URINALYSIS DIP STICK AUTO W/O MICRO [82145 CPT(R)] IA COLLECTION VENOUS BLOOD,VENIPUNCTURE [id number] CPT(R)] To-Do List   
 06/08/2017 Lab:  PROSTATE SPECIFIC AG (PSA) Patient Instructions Benign Prostatic Hyperplasia: Care Instructions Your Care Instructions Benign prostatic hyperplasia, or BPH, is an enlarged prostate gland. The prostate is a small gland that makes some of the fluid in semen. Prostate enlargement happens to almost all men as they age. It is usually not serious. BPH does not cause prostate cancer. As the prostate gets bigger, it may partly block the flow of urine. You may have a hard time getting a urine stream started or completely stopped. BPH can cause dribbling. You may have a weak urine stream, or you may have to urinate more often than you used to, especially at night. Most men find these problems easy to manage. You do not need treatment unless your symptoms bother you a lot or you have other problems, such as bladder infections or stones. In these cases, medicines may help. Surgery is not needed unless the urine flow is blocked or the symptoms do not get better with medicine. Follow-up care is a key part of your treatment and safety. Be sure to make and go to all appointments, and call your doctor if you are having problems. It's also a good idea to know your test results and keep a list of the medicines you take. How can you care for yourself at home? · Take plenty of time to urinate. Try to relax. · Try \"double voiding. \" Urinate as much you can, relax for a few moments, and then try to urinate again. · Sit on the toilet to urinate. · Read or think of other things while you are waiting. · Turn on a faucet, or try to picture running water. Some men find that this helps get their urine flowing. · If dribbling is a problem, wash your penis daily to avoid skin irritation and infection. · Avoid caffeine and alcohol. These drinks will increase how often you need to urinate. Spread your fluid intake throughout the day. If the urge to urinate often wakes you at night, limit your fluid intake in the evening. Urinate right before you go to bed. · Many over-the-counter cold and allergy medicines can make the symptoms of BPH worse. Avoid antihistamines, decongestants, and allergy pills, if you can. Read the warnings on the package. · If you take any prescription medicines, especially tranquilizers or antidepressants, ask your doctor or pharmacist whether they can cause urination problems. There may be other medicines you can use that do not cause urinary problems. · Be safe with medicines. Take your medicines exactly as prescribed. Call your doctor if you think you are having a problem with your medicine. When should you call for help? Call your doctor now or seek immediate medical care if: 
· You cannot urinate at all. · You have symptoms of a urinary infection. For example: ¨ You have blood or pus in your urine. ¨ You have pain in your back just below your rib cage. This is called flank pain. ¨ You have a fever, chills, or body aches. ¨ It hurts to urinate. ¨ You have groin or belly pain. Watch closely for changes in your health, and be sure to contact your doctor if: 
· It hurts when you ejaculate. · Your urinary problems get a lot worse or bother you a lot. Where can you learn more? Go to http://felicia-myles.info/. Enter E535 in the search box to learn more about \"Benign Prostatic Hyperplasia: Care Instructions. \" Current as of: May 24, 2016 Content Version: 11.2 © 4572-5218 [employer] Care instructions adapted under license by [employer] (which disclaims liability or warranty for this information). If you have questions about a medical condition or this instruction, always ask your healthcare professional. [address] any warranty or liability for your use of this information. Introducing [facility]! Dear Trae Maldonado: Thank you for requesting a Lagou account. Our records indicate that you already have an active Lagou account. You can access your account anytime at [email or web address] Did you know that you can access your hospital and ER discharge instructions at any time in Lagou? You can also review all of your test results from your hospital stay or ER visit. Additional Information If you have questions, please visit the Frequently Asked Questions section of the Lagou website at [email or web address] Remember, Lagou is NOT to be used for urgent needs. For medical emergencies, dial 911. Now available from your iPhone and Android! Please provide this summary of care documentation to your next provider. Your primary care clinician is listed as Laurita Hernandez. If you have any questions after today's visit, please call [phone].

## 2017-06-14 ENCOUNTER — DOCUMENTATION ONLY (OUTPATIENT)
Dept: UROLOGY | Age: 77
End: 2017-06-14

## 2017-06-14 NOTE — PROGRESS NOTES
Dr. Marty Young put him on Proscar and Flomax but the patient says he doesn't need the Flomax. Spoke to Dr. Marty Young and he said for him to just take the Proscar and keep the Flomax on hand. Patient is in agreement.

## 2017-07-30 RX ORDER — LISINOPRIL 5 MG/1
TABLET ORAL
Qty: 90 TAB | Refills: 3 | Status: SHIPPED | OUTPATIENT
Start: 2017-07-30 | End: 2018-08-24 | Stop reason: SDUPTHER

## 2017-09-07 ENCOUNTER — HOSPITAL ENCOUNTER (OUTPATIENT)
Dept: LAB | Age: 77
Discharge: HOME | End: 2017-09-07
Payer: MEDICARE

## 2017-09-07 DIAGNOSIS — M35.3 PMR (POLYMYALGIA RHEUMATICA) (HCC): ICD-10-CM

## 2017-09-07 DIAGNOSIS — I10 ESSENTIAL HYPERTENSION WITH GOAL BLOOD PRESSURE LESS THAN 140/90: ICD-10-CM

## 2017-09-07 DIAGNOSIS — E78.5 HYPERLIPIDEMIA, UNSPECIFIED HYPERLIPIDEMIA TYPE: ICD-10-CM

## 2017-09-07 LAB
ALBUMIN SERPL-MCNC: 4.1 G/DL (ref 3.4–5)
ALBUMIN/GLOB SERPL: 1.4 {RATIO} (ref 0.8–1.7)
ALP SERPL-CCNC: 72 U/L (ref 45–117)
ALT SERPL-CCNC: 25 U/L (ref 16–61)
ANION GAP SERPL CALC-SCNC: 6 MMOL/L (ref 3–18)
AST SERPL-CCNC: 11 U/L (ref 15–37)
BASOPHILS # BLD: 0 K/UL (ref 0–0.06)
BASOPHILS NFR BLD: 0 % (ref 0–2)
BILIRUB SERPL-MCNC: 0.5 MG/DL (ref 0.2–1)
BUN SERPL-MCNC: 16 MG/DL (ref 7–18)
BUN/CREAT SERPL: 15 (ref 12–20)
CALCIUM SERPL-MCNC: 8.8 MG/DL (ref 8.5–10.1)
CHLORIDE SERPL-SCNC: 105 MMOL/L (ref 100–108)
CHOLEST SERPL-MCNC: 164 MG/DL
CO2 SERPL-SCNC: 31 MMOL/L (ref 21–32)
CREAT SERPL-MCNC: 1.09 MG/DL (ref 0.6–1.3)
DIFFERENTIAL METHOD BLD: ABNORMAL
EOSINOPHIL # BLD: 0.1 K/UL (ref 0–0.4)
EOSINOPHIL NFR BLD: 2 % (ref 0–5)
ERYTHROCYTE [DISTWIDTH] IN BLOOD BY AUTOMATED COUNT: 13.1 % (ref 11.6–14.5)
GLOBULIN SER CALC-MCNC: 2.9 G/DL (ref 2–4)
GLUCOSE SERPL-MCNC: 99 MG/DL (ref 74–99)
HCT VFR BLD AUTO: 42.7 % (ref 36–48)
HDLC SERPL-MCNC: 55 MG/DL (ref 40–60)
HDLC SERPL: 3 {RATIO} (ref 0–5)
HGB BLD-MCNC: 14.1 G/DL (ref 13–16)
LDLC SERPL CALC-MCNC: 84.4 MG/DL (ref 0–100)
LIPID PROFILE,FLP: NORMAL
LYMPHOCYTES # BLD: 1.6 K/UL (ref 0.9–3.6)
LYMPHOCYTES NFR BLD: 30 % (ref 21–52)
MCH RBC QN AUTO: 32.4 PG (ref 24–34)
MCHC RBC AUTO-ENTMCNC: 33 G/DL (ref 31–37)
MCV RBC AUTO: 98.2 FL (ref 74–97)
MONOCYTES # BLD: 0.4 K/UL (ref 0.05–1.2)
MONOCYTES NFR BLD: 8 % (ref 3–10)
NEUTS SEG # BLD: 3.2 K/UL (ref 1.8–8)
NEUTS SEG NFR BLD: 60 % (ref 40–73)
PLATELET # BLD AUTO: 206 K/UL (ref 135–420)
PMV BLD AUTO: 10.1 FL (ref 9.2–11.8)
POTASSIUM SERPL-SCNC: 4.6 MMOL/L (ref 3.5–5.5)
PROT SERPL-MCNC: 7 G/DL (ref 6.4–8.2)
RBC # BLD AUTO: 4.35 M/UL (ref 4.7–5.5)
SODIUM SERPL-SCNC: 142 MMOL/L (ref 136–145)
TRIGL SERPL-MCNC: 123 MG/DL (ref ?–150)
VLDLC SERPL CALC-MCNC: 24.6 MG/DL
WBC # BLD AUTO: 5.3 K/UL (ref 4.6–13.2)

## 2017-09-07 PROCEDURE — 36415 COLL VENOUS BLD VENIPUNCTURE: CPT | Performed by: INTERNAL MEDICINE

## 2017-09-07 PROCEDURE — 85025 COMPLETE CBC W/AUTO DIFF WBC: CPT | Performed by: INTERNAL MEDICINE

## 2017-09-07 PROCEDURE — 80053 COMPREHEN METABOLIC PANEL: CPT | Performed by: INTERNAL MEDICINE

## 2017-09-07 PROCEDURE — 80061 LIPID PANEL: CPT | Performed by: INTERNAL MEDICINE

## 2017-09-14 ENCOUNTER — OFFICE VISIT (OUTPATIENT)
Dept: INTERNAL MEDICINE CLINIC | Age: 77
End: 2017-09-14

## 2017-09-14 ENCOUNTER — HOSPITAL ENCOUNTER (OUTPATIENT)
Dept: LAB | Age: 77
Discharge: HOME | End: 2017-09-14
Payer: MEDICARE

## 2017-09-14 VITALS
RESPIRATION RATE: 16 BRPM | SYSTOLIC BLOOD PRESSURE: 126 MMHG | BODY MASS INDEX: 32.83 KG/M2 | OXYGEN SATURATION: 98 % | TEMPERATURE: 98 F | HEART RATE: 83 BPM | DIASTOLIC BLOOD PRESSURE: 74 MMHG | WEIGHT: 255.8 LBS | HEIGHT: 74 IN

## 2017-09-14 DIAGNOSIS — N13.8 BPH WITH OBSTRUCTION/LOWER URINARY TRACT SYMPTOMS: ICD-10-CM

## 2017-09-14 DIAGNOSIS — R49.0 HOARSENESS: ICD-10-CM

## 2017-09-14 DIAGNOSIS — R76.8 POSITIVE ANA (ANTINUCLEAR ANTIBODY): ICD-10-CM

## 2017-09-14 DIAGNOSIS — M35.3 PMR (POLYMYALGIA RHEUMATICA) (HCC): ICD-10-CM

## 2017-09-14 DIAGNOSIS — I10 ESSENTIAL HYPERTENSION: Primary | ICD-10-CM

## 2017-09-14 DIAGNOSIS — Z23 ENCOUNTER FOR IMMUNIZATION: ICD-10-CM

## 2017-09-14 DIAGNOSIS — K22.4 ESOPHAGEAL DYSMOTILITY: ICD-10-CM

## 2017-09-14 DIAGNOSIS — E78.5 HYPERLIPIDEMIA, UNSPECIFIED HYPERLIPIDEMIA TYPE: ICD-10-CM

## 2017-09-14 DIAGNOSIS — I10 ESSENTIAL HYPERTENSION: ICD-10-CM

## 2017-09-14 DIAGNOSIS — N32.0 BLADDER NECK CONTRACTURE: ICD-10-CM

## 2017-09-14 DIAGNOSIS — N40.1 BPH WITH OBSTRUCTION/LOWER URINARY TRACT SYMPTOMS: ICD-10-CM

## 2017-09-14 LAB
APPEARANCE UR: CLEAR
BACTERIA URNS QL MICRO: ABNORMAL /HPF
BILIRUB UR QL: NEGATIVE
COLOR UR: YELLOW
EPITH CASTS URNS QL MICRO: ABNORMAL /LPF (ref 0–5)
GLUCOSE UR STRIP.AUTO-MCNC: NEGATIVE MG/DL
HGB UR QL STRIP: NEGATIVE
KETONES UR QL STRIP.AUTO: NEGATIVE MG/DL
LEUKOCYTE ESTERASE UR QL STRIP.AUTO: NEGATIVE
MUCOUS THREADS URNS QL MICRO: ABNORMAL /LPF
NITRITE UR QL STRIP.AUTO: NEGATIVE
PH UR STRIP: 5.5 [PH] (ref 5–8)
PROT UR STRIP-MCNC: NEGATIVE MG/DL
RBC #/AREA URNS HPF: ABNORMAL /HPF (ref 0–5)
SP GR UR REFRACTOMETRY: 1.02 (ref 1–1.03)
UROBILINOGEN UR QL STRIP.AUTO: 0.2 EU/DL (ref 0.2–1)
WBC URNS QL MICRO: ABNORMAL /HPF (ref 0–4)

## 2017-09-14 PROCEDURE — 81001 URINALYSIS AUTO W/SCOPE: CPT | Performed by: INTERNAL MEDICINE

## 2017-09-14 NOTE — PATIENT INSTRUCTIONS
DASH Diet: Care Instructions  Your Care Instructions  The DASH diet is an eating plan that can help lower your blood pressure. DASH stands for Dietary Approaches to Stop Hypertension. Hypertension is high blood pressure. The DASH diet focuses on eating foods that are high in calcium, potassium, and magnesium. These nutrients can lower blood pressure. The foods that are highest in these nutrients are fruits, vegetables, low-fat dairy products, nuts, seeds, and legumes. But taking calcium, potassium, and magnesium supplements instead of eating foods that are high in those nutrients does not have the same effect. The DASH diet also includes whole grains, fish, and poultry. The DASH diet is one of several lifestyle changes your doctor may recommend to lower your high blood pressure. Your doctor may also want you to decrease the amount of sodium in your diet. Lowering sodium while following the DASH diet can lower blood pressure even further than just the DASH diet alone. Follow-up care is a key part of your treatment and safety. Be sure to make and go to all appointments, and call your doctor if you are having problems. It's also a good idea to know your test results and keep a list of the medicines you take. How can you care for yourself at home? Following the DASH diet  · Eat 4 to 5 servings of fruit each day. A serving is 1 medium-sized piece of fruit, ½ cup chopped or canned fruit, 1/4 cup dried fruit, or 4 ounces (½ cup) of fruit juice. Choose fruit more often than fruit juice. · Eat 4 to 5 servings of vegetables each day. A serving is 1 cup of lettuce or raw leafy vegetables, ½ cup of chopped or cooked vegetables, or 4 ounces (½ cup) of vegetable juice. Choose vegetables more often than vegetable juice. · Get 2 to 3 servings of low-fat and fat-free dairy each day. A serving is 8 ounces of milk, 1 cup of yogurt, or 1 ½ ounces of cheese. · Eat 6 to 8 servings of grains each day.  A serving is 1 slice of bread, 1 ounce of dry cereal, or ½ cup of cooked rice, pasta, or cooked cereal. Try to choose whole-grain products as much as possible. · Limit lean meat, poultry, and fish to 2 servings each day. A serving is 3 ounces, about the size of a deck of cards. · Eat 4 to 5 servings of nuts, seeds, and legumes (cooked dried beans, lentils, and split peas) each week. A serving is 1/3 cup of nuts, 2 tablespoons of seeds, or ½ cup of cooked beans or peas. · Limit fats and oils to 2 to 3 servings each day. A serving is 1 teaspoon of vegetable oil or 2 tablespoons of salad dressing. · Limit sweets and added sugars to 5 servings or less a week. A serving is 1 tablespoon jelly or jam, ½ cup sorbet, or 1 cup of lemonade. · Eat less than 2,300 milligrams (mg) of sodium a day. If you limit your sodium to 1,500 mg a day, you can lower your blood pressure even more. Tips for success  · Start small. Do not try to make dramatic changes to your diet all at once. You might feel that you are missing out on your favorite foods and then be more likely to not follow the plan. Make small changes, and stick with them. Once those changes become habit, add a few more changes. · Try some of the following:  ¨ Make it a goal to eat a fruit or vegetable at every meal and at snacks. This will make it easy to get the recommended amount of fruits and vegetables each day. ¨ Try yogurt topped with fruit and nuts for a snack or healthy dessert. ¨ Add lettuce, tomato, cucumber, and onion to sandwiches. ¨ Combine a ready-made pizza crust with low-fat mozzarella cheese and lots of vegetable toppings. Try using tomatoes, squash, spinach, broccoli, carrots, cauliflower, and onions. ¨ Have a variety of cut-up vegetables with a low-fat dip as an appetizer instead of chips and dip. ¨ Sprinkle sunflower seeds or chopped almonds over salads. Or try adding chopped walnuts or almonds to cooked vegetables. ¨ Try some vegetarian meals using beans and peas. Add garbanzo or kidney beans to salads. Make burritos and tacos with mashed jang beans or black beans. Where can you learn more? Go to http://felicia-myles.info/. Enter U726 in the search box to learn more about \"DASH Diet: Care Instructions. \"  Current as of: April 3, 2017  Content Version: 11.3  © 2222-1400 [employer]. Care instructions adapted under license by [employer] (which disclaims liability or warranty for this information). If you have questions about a medical condition or this instruction, always ask your healthcare professional. [place] [address] any warranty or liability for your use of this information. Hyperlipidemia: After Your Visit  Your Care Instructions  Hyperlipidemia is too much fat in your blood. The body has several kinds of fat, including cholesterol and triglycerides. Your body needs fat for many things, such as making new cells. But too much fat in your blood increases your chances of having a heart attack or stroke. You may be able to lower your cholesterol and triglycerides with a heart-healthy diet, exercise, and if needed, medicine. Your doctor may want you to try lifestyle changes first to see whether they lower the fat in your blood. You may need to take medicine if lifestyle changes do not lower the fat in your blood enough. Follow-up care is a key part of your treatment and safety. Be sure to make and go to all appointments, and call your doctor if you are having problems. Its also a good idea to know your test results and keep a list of the medicines you take. How can you care for yourself at home? Take your medicines  · Take your medicines exactly as prescribed. Call your doctor if you think you are having a problem with your medicine. · If you take medicine to lower your cholesterol, go to follow-up visits. You will need to have blood tests.   · Do not take large doses of niacin, which is a B vitamin, while taking medicine called statins. It may increase the chance of muscle pain and liver problems. · Talk to your doctor about avoiding grapefruit juice if you are taking statins. Grapefruit juice can raise the level of this medicine in your blood. This could increase side effects. Eat more fruits, vegetables, and fiber  · Fruits and vegetables have lots of nutrients that help protect against heart disease, and they have little--if any--fat. Try to eat at least five servings a day. Dark green, deep orange, or yellow fruits and vegetables are healthy choices. · Keep carrots, celery, and other veggies handy for snacks. Buy fruit that is in season and store it where you can see it so that you will be tempted to eat it. Cook dishes that have a lot of veggies in them, such as stir-fries and soups. · Foods high in fiber may reduce your cholesterol and provide important vitamins and minerals. High-fiber foods include whole-grain cereals and breads, oatmeal, beans, brown rice, citrus fruits, and apples. · Buy whole-grain breads and cereals instead of white bread and pastries. Limit saturated fat  · Read food labels and try to avoid saturated fat and trans fat. They increase your risk of heart disease. · Use olive or canola oil when you cook. Try cholesterol-lowering spreads, such as Benecol or Take Control. · Bake, broil, grill, or steam foods instead of frying them. · Limit the amount of high-fat meats you eat, including hot dogs and sausages. Cut out all visible fat when you prepare meat. · Eat fish, skinless poultry, and soy products such as tofu instead of high-fat meats. Soybeans may be especially good for your heart. Eat at least two servings of fish a week. Certain fish, such as salmon, contain omega-3 fatty acids, which may help reduce your risk of heart attack. · Choose low-fat or fat-free milk and dairy products. Get exercise, limit alcohol, and quit smoking  · Get more exercise.  Work with your doctor to set up an exercise program. Even if you can do only a small amount, exercise will help you get stronger, have more energy, and manage your weight and your stress. Walking is an easy way to get exercise. Gradually increase the amount you walk every day. Aim for at least 30 minutes on most days of the week. You also may want to swim, bike, or do other activities. · Limit alcohol to no more than 2 drinks a day for men and 1 drink a day for women. · Do not smoke. If you need help quitting, talk to your doctor about stop-smoking programs and medicines. These can increase your chances of quitting for good. When should you call for help? Call 911 anytime you think you may need emergency care. For example, call if:  · You have symptoms of a heart attack. These may include:  ¨ Chest pain or pressure, or a strange feeling in the chest.  ¨ Sweating. ¨ Shortness of breath. ¨ Nausea or vomiting. ¨ Pain, pressure, or a strange feeling in the back, neck, jaw, or upper belly or in one or both shoulders or arms. ¨ Lightheadedness or sudden weakness. ¨ A fast or irregular heartbeat. After you call 911, the  may tell you to chew 1 adult-strength or 2 to 4 low-dose aspirin. Wait for an ambulance. Do not try to drive yourself. · You have signs of a stroke. These may include:  ¨ Sudden numbness, paralysis, or weakness in your face, arm, or leg, especially on only one side of your body. ¨ New problems with walking or balance. ¨ Sudden vision changes. ¨ Drooling or slurred speech. ¨ New problems speaking or understanding simple statements, or feeling confused. ¨ A sudden, severe headache that is different from past headaches. · You passed out (lost consciousness). Call your doctor now or seek immediate medical care if:  · You have muscle pain or weakness. Watch closely for changes in your health, and be sure to contact your doctor if:  · You are very tired.   · You have an upset stomach, gas, constipation, or belly pain or cramps. Where can you learn more? Go to [email or web address].be  Enter C406 in the search box to learn more about \"Hyperlipidemia: After Your Visit. \"   © 5763-6101 [employer]. Care instructions adapted under license by [facility] (which disclaims liability or warranty for this information). This care instruction is for use with your licensed healthcare professional. If you have questions about a medical condition or this instruction, always ask your healthcare professional. [place] [address] any warranty or liability for your use of this information.   Content Version: 4.9.417462; Last Revised: October 13, 2011

## 2017-09-14 NOTE — PROGRESS NOTES
HPI:   Durward Sandhoff is a very pleasant 69y.o. year old male who presents today for evaluation of hypertension, hyperlipidemia, polymyalgia rheumatica, esophageal hypercontractility, and BPH. He reports that he is doing very well. He states that he has successfully been able to wean his prednisone dose to 1 mg daily without significant increase in his symptoms. He does report occasional increase in hand or right shoulder aching and stiffness, for which he will use Tylenol PM. He states that he has regained essentially full range of motion of his shoulders without significant pain. He also states that he has discontinued taking diltiazem for his esophageal dysmotility, but has not noticed an increase in symptoms. He otherwise is without complaints. He has a history of polymyalgia rheumatica, diagnosed in 7/2016 when he was seen for bilateral shoulder pain of several months duration. He had been under the care of Dr. Indiana Canseco since 3/2016 when he presented with right wrist pain followed by the development of bilateral shoulder pain. He was treated with a Medrol dose pack and Tramadol and upon follow-up on 5/3/2016, reported some improvement in the bilateral shoulder and wrist pain, but pain persisted. Bilateral shoulder MRI's (5/11/2016) showed severe degenerative changes on the right at the acromioclavicular joint with moderate to severe or severe tendinopathy of the supraspinatus and infraspinatus; postoperative changes with tendinopathy of the supraspinatus, infraspinatus, and long head of the biceps was seen on the left. In 5/2016, lab data showed ESR 28, C-Reactive Protein 1.1, negative CRISTI and RF. He was treated with naproxen, tramadol and acetaminophen. He was evaluated on 7/19/2016 for worsening bilateral shoulder pain with bilateral hand pain involving the wrists, MCP and PIP joints.  Evaluation included ESR 48; C-reactive protein 1.8; CRISTI positive; anti-RNP 1.4; negative antibodies to Beverli Ped, dsDNA, anti-chromatin, RF and anti-CCP. Bilateral hand x-rays showed scattered arthritic changes with scattered small erosions. He was referred to see Dr. Lisa Grijalva who diagnosed polymyalgia rheumatica and started him on prednisone. Since initiating prednisone, he reports significant improvement with nearly complete resolution of his symptoms. He states that he has regained full use of his shoulders and has no trouble reaching behind his back or pulling a shirt over his head. He denies any headache, jaw claudication, scalp tenderness, fever, or visual changes. In 11/2016, he presented with a persistent non productive cough, sore throat, and hoarseness. He was referred to see Dr. Nick Diez, and patient reports that a laryngoscopy was performed showing evidence of reflux. He was started on Nexium for two weeks with improvement in his cough, but he continued to have pain with swallowing. He was referred to see Dr. Chriss Chen, and underwent upper endoscopy (1/5/2017) showing abnormal esophageal motility with spastic and non-propulsive appearing contractions in the mid to distal esophagus (tortuous); mucosa appeared normal ( random biopsies: negative); normal stomach and duodenum. He underwent a barium swallow (1/11/2017) showing a small persistent smoothly marginated filling defect at the piriform sinus suggestive of an extrinsic mass effect. He subsequently had a neck CT scan (1/13/2017) which showed the right piriform sinus appeared larger than the left, but the walls of both were normal without surrounding mass or infiltrating process; nonspecific lymphadenopathy involving level 1 and 2 nodes. He underwent esophageal manometry, which showed a hypercontractile esophagus (\"Jackhammer\" esophagus). He was started on diltiazem 30 mg three times per day with resolution of symptoms. He discontinued diltiazem in 3/2017 without recurrence of symptoms. He has a history of hypertension, treated with lisinopril.  He exercises mainly by walking, and denies any chest pain, shortness of breath at rest or with exertion, palpitations, lightheadedness, or edema. He also has hyperlipidemia, treated with rosuvastatin two days per week. He also has a history of BPH and underwent TURP in 2006. In 2/2011, he had laser vaporization of the prostate to relieve bladder outlet obstructive symptoms. In 3/2011 he had a laser incision performed on the bladder neck contracture. In 5/2012 and 8/2012, he underwent transurethral incision of the bladder neck contracture. Biopsy of the contracture (8/2012) was negative for malignancy. Since that time, he has had annual cystoscopic surveillance of the bladder neck contracture by Dr. Lyn Castañeda. In 8/2015, it was felt that no more surveillance was necessary as it had remained stable. He remains on Flomax and Proscar, and is followed by Dr. Lyn Castañeda. He denies difficulty with stream, dysuria, hematuria, or nocturia. He had a screening colonoscopy in 4/2012 by Dr. Denver Kay which was normal. Follow-up recommended for 10 years. He denies any abdominal pain, nausea, vomiting, melena, hematochezia, or change in bowel movements.       Past Medical History:   Diagnosis Date    Bladder neck contracture     BPH with obstruction/lower urinary tract symptoms     Essential hypertension with goal blood pressure less than 140/90     Family history of colon cancer     Hyperlipidemia     Osteoarthritis     PMR (polymyalgia rheumatica) ([facility] [address] 7/20/2016     Past Surgical History:   Procedure Laterality Date    CYSTOSCOPY      ENDOSCOPY, COLON, DIAGNOSTIC      HC BERENICE LASER 17280NH  3/2/11    Laser incision of bladder neck contracture    HX MOHS PROCEDURES  1/11    rt hip repl    HX OTHER SURGICAL      tonsillectomy, back sx,     HX OTHER SURGICAL      basel cell removed from right ear lobe    HX TURP      Laser    LASER VAPORIZATION SURGERY PROSTATE, COMPLETE       Current Outpatient Prescriptions   Medication Sig    lisinopril (PRINIVIL, ZESTRIL) 5 mg tablet TAKE 1 TABLET BY MOUTH DAILY    tamsulosin (FLOMAX) 0.4 mg capsule Take 1 Cap by mouth daily. Indications: SYMPTOMATIC BENIGN PROSTATIC HYPERPLASIA    multivitamin (ONE A DAY) tablet Take 1 Tab by mouth daily.  predniSONE (DELTASONE) 1 mg tablet Take 8 mg by mouth daily.  rosuvastatin (CRESTOR) 5 mg tablet Take once or twice a week.  finasteride (PROSCAR) 5 mg tablet Take 1 Tab by mouth daily.  Cholecalciferol, Vitamin D3, (VITAMIN D3) 1,000 unit cap Take  by mouth daily.  GLUC/ERICA-MSM#1/C/CHARLY/RAMÓN/BOR (OSTEO BI-FLEX PO) Take 1 Tab by mouth daily.  aspirin 81 mg tablet Take 81 mg by mouth.  finasteride (PROSCAR) 5 mg tablet Take 1 Tab by mouth daily. Indications: SYMPTOMATIC BENIGN PROSTATIC HYPERPLASIA    dilTIAZem (CARDIZEM) 30 mg tablet Take 30 mg by mouth three (3) times daily.  fluticasone (FLONASE) 50 mcg/actuation nasal spray 2 Sprays by Both Nostrils route daily. No current facility-administered medications for this visit. Allergies and Intolerances:   No Known Allergies     Family History: His father  from CAD, and his brother has had CABG. His mother  from ovarian cancer. Family History   Problem Relation Age of Onset    Cancer Other     Heart Disease Other      Social History:   He  reports that he has never smoked. He has never used smokeless tobacco. He is  but has been living with his long time girlfriend for 43 years. He has two step children. He is a retired , and works on the [employer] part-time.     History   Alcohol Use    4.2 oz/week    7 Glasses of wine per week     Immunization History:  Immunization History   Administered Date(s) Administered    Influenza High Dose Vaccine PF 2013, 2015, 11/10/2016, 2017    Influenza Vaccine 2014    Influenza Vaccine Split 2012    Pneumococcal Conjugate (PCV-13) 2015    TDAP Vaccine 2011    ZZZ-RETIRED (DO NOT USE) Pneumococcal Vaccine (Unspecified Type) 03/20/2008    Zoster 04/24/2012       Review of Systems:   As above included in HPI. Otherwise 11 point review of systems negative including constitutional, skin, HENT, eyes, respiratory, cardiovascular, gastrointestinal, genitourinary, musculoskeletal, endocrine, hematologic, allergy, and neurologic. Physical:   Vitals:   BP: 126/74; repeat 116/74 left arm  HR: 83  WT: 255 lb 12.8 oz (116 kg)  BMI:  32.84 kg/m2    Exam:   Pt appears well; alert and oriented x 3; appropriate affect. HEENT: PERRLA, anicteric, no facial tenderness; oropharynx clear without erythema or lesions; no cervical adenopathy or thyromegaly; no neck swelling or tenderness anteriorly. Lungs: clear to auscultation, no wheezes, rhonchi, or rales. Heart: regular rate and rhythm. No murmur, rubs, gallops  Abdomen: soft, nontender, nondistended, normal bowel sounds, no hepatosplenomegaly or masses. Extremities: without edema. Pulses 1-2+ bilaterally. Review of Data:  Labs:  Hospital Outpatient Visit on 09/07/2017   Component Date Value Ref Range Status    WBC 09/07/2017 5.3  4.6 - 13.2 K/uL Final    RBC 09/07/2017 4.35* 4.70 - 5.50 M/uL Final    HGB 09/07/2017 14.1  13.0 - 16.0 g/dL Final    HCT 09/07/2017 42.7  36.0 - 48.0 % Final    MCV 09/07/2017 98.2* 74.0 - 97.0 FL Final    MCH 09/07/2017 32.4  24.0 - 34.0 PG Final    MCHC 09/07/2017 33.0  31.0 - 37.0 g/dL Final    RDW 09/07/2017 13.1  11.6 - 14.5 % Final    PLATELET 68/03/4833 065  135 - 420 K/uL Final    MPV 09/07/2017 10.1  9.2 - 11.8 FL Final    NEUTROPHILS 09/07/2017 60  40 - 73 % Final    LYMPHOCYTES 09/07/2017 30  21 - 52 % Final    MONOCYTES 09/07/2017 8  3 - 10 % Final    EOSINOPHILS 09/07/2017 2  0 - 5 % Final    BASOPHILS 09/07/2017 0  0 - 2 % Final    ABS. NEUTROPHILS 09/07/2017 3.2  1.8 - 8.0 K/UL Final    ABS. LYMPHOCYTES 09/07/2017 1.6  0.9 - 3.6 K/UL Final    ABS.  MONOCYTES 09/07/2017 0.4  0.05 - 1.2 K/UL Final    ABS. EOSINOPHILS 09/07/2017 0.1  0.0 - 0.4 K/UL Final    ABS. BASOPHILS 09/07/2017 0.0  0.0 - 0.06 K/UL Final    DF 09/07/2017 AUTOMATED    Final    Sodium 09/07/2017 142  136 - 145 mmol/L Final    Potassium 09/07/2017 4.6  3.5 - 5.5 mmol/L Final    Chloride 09/07/2017 105  100 - 108 mmol/L Final    CO2 09/07/2017 31  21 - 32 mmol/L Final    Anion gap 09/07/2017 6  3.0 - 18 mmol/L Final    Glucose 09/07/2017 99  74 - 99 mg/dL Final    BUN 09/07/2017 16  7.0 - 18 MG/DL Final    Creatinine 09/07/2017 1.09  0.6 - 1.3 MG/DL Final    BUN/Creatinine ratio 09/07/2017 15  12 - 20   Final    GFR est AA 09/07/2017 >60  >60 ml/min/1.73m2 Final    GFR est non-AA 09/07/2017 >60  >60 ml/min/1.73m2 Final    Calcium 09/07/2017 8.8  8.5 - 10.1 MG/DL Final    Bilirubin, total 09/07/2017 0.5  0.2 - 1.0 MG/DL Final    ALT (SGPT) 09/07/2017 25  16 - 61 U/L Final    AST (SGOT) 09/07/2017 11* 15 - 37 U/L Final    Alk.  phosphatase 09/07/2017 72  45 - 117 U/L Final    Protein, total 09/07/2017 7.0  6.4 - 8.2 g/dL Final    Albumin 09/07/2017 4.1  3.4 - 5.0 g/dL Final    Globulin 09/07/2017 2.9  2.0 - 4.0 g/dL Final    A-G Ratio 09/07/2017 1.4  0.8 - 1.7   Final    LIPID PROFILE 09/07/2017        Final    Cholesterol, total 09/07/2017 164  <200 MG/DL Final    Triglyceride 09/07/2017 123  <150 MG/DL Final    HDL Cholesterol 09/07/2017 55  40 - 60 MG/DL Final    LDL, calculated 09/07/2017 84.4  0 - 100 MG/DL Final    VLDL, calculated 09/07/2017 24.6  MG/DL Final    CHOL/HDL Ratio 09/07/2017 3.0  0 - 5.0   Final     Urinalysis (9/14/2017)    Color YELLOW     Final   Appearance CLEAR     Final   Specific gravity 1.024  1.005 - 1.030   Final   pH (UA) 5.5  5.0 - 8.0   Final   Protein NEGATIVE   NEG mg/dL Final   Glucose NEGATIVE   NEG mg/dL Final   Ketone NEGATIVE   NEG mg/dL Final   Bilirubin NEGATIVE   NEG   Final   Blood NEGATIVE   NEG   Final   Urobilinogen 0.2  0.2 - 1.0 EU/dL Final   Nitrites NEGATIVE   NEG   Final   Leukocyte Esterase NEGATIVE   NEG   Final   WBC 0 to 3  0 - 4 /hpf Final   RBC 0 to 3  0 - 5 /hpf Final   Epithelial cells 1+  0 - 5 /lpf Final   Bacteria 1+ (A) NEG /hpf Final   Mucus 1+ (A) NEG /lpf Final       Health Maintenance:  Screening:    Colorectal: colonoscopy (4/2012) normal. Dr. Ruba Polk. Due 2022. Depression: none   DM (HbA1c/FPG): FPG 99 (9/2017)   Hepatitis C: negative (7/2016)   Falls: none   DEXA: within normal limits (5/2017). Indication: chronic steroid use. PSA/HOLA: PSA 0.7 (1/2017); HOLA per Dr. Geovanni John   Glaucoma: regular eye exams with Dr. Harry Matias (last 9/2016)   Smoking: none   Vitamin D: 32.3 (1/2017)   Medicare Wellness: 2/7/2017      Impression:  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    Hyperlipidemia E78.5    Vitamin D insufficiency E55.9    Hearing loss H91.90    Essential hypertension with goal blood pressure less than 140/90 I10    Primary osteoarthritis involving multiple joints M15.0    PMR (polymyalgia rheumatica) (HCC) M35.3    Hoarseness R49.0    Esophageal dysmotility K22.4    Positive CRISTI (antinuclear antibody)/ RNP antibody R76.8       Plan:  1. Hypertension. Blood pressure well controlled on lisinopril. Renal function normal with creatinine 1.09/ eGFR >60. Continue to follow. 2. Hyperlipidemia. On low intensity dose rosuvastatin, taking 5 mg two days per week, with good response with LDL 84. Emphasized importance of lifestyle modifications, including diet, exercise, and weight loss. Continue low dose aspirin. 3. Polymyalgia rheumatica. Patient significantly improved since initiating steroids. Currently successfully tapered down to 1 mg per day and doing well. Occasional increase in mild joint complaints which respond to Tylenol. Being followed by Dr. Jose Barfield.  At increased risk (10%) of concurrent giant cell arteritis with PMR, but no evidence of GCA currently. Also with positive CRISTI/ positive RNP Ab, but no evidence of inflammation elsewhere. Urinalysis negative for proteinuria. Continue to follow closely. Given chronic steroid use, checked bone density scan in 5/2017 which was normal.   4. Odynophagia. Secondary to esophageal dysmotility. Responded to diltiazem, and now successfully stopped taking it without recurrence of symptoms. Will continue to follow. 5. BPH with bladder neck contracture. Appears to be asymptomatic on current treatment with Flonase and Proscar. Followed by Dr. Lucille Benson. PSA normal. Continue to follow. 6. Hoarseness. Patient reports that it has improved to baseline. Using Flonase as needed. Follow. 7. Health maintenance. Will give influenza vaccine today. Other immunizations up to date. Colonoscopy due 2022. Continue regular eye exams with Dr. Soledad Lake. Vitamin D level normal. Continue maintenance dose supplement. Medicare wellness visit up to date. Patient understands recommendations and agrees with plan. Follow-up in 6 months.

## 2017-09-14 NOTE — MR AVS SNAPSHOT
Visit Information Date & Time Provider Department Dept. Phone Encounter #  
 9/14/2017  8:30 AM Aida Kelley MD Internist of [address] [place] [address] Road [phone] [id number] Follow-up Instructions Return in about 6 months (around 3/14/2018), or if symptoms worsen or fail to improve. Your Appointments 3/14/2018  7:55 AM  
LAB with [facility] NURSE VISIT Internist of [facility] ([facility]) [address], [address] [address] [address] [place]  
  
   
 [address], [address]  
  
    
 3/21/2018  8:30 AM  
Office Visit with Aida Kelley MD  
Internist of [facility] Appt Note: 6 month f/u  
 [address] [facility], [address] [address] [address] [place]  
  
   
 [address], [address]  
  
    
 6/13/2018  9:45 AM  
Office Visit with Luke White MD  
[facility]) Appt Note: PSA  
 [address] [facility] [address]  
[phone] [address] [facility] [address] Upcoming Health Maintenance Date Due INFLUENZA AGE 9 TO ADULT 8/1/2017 MEDICARE YEARLY EXAM 2/8/2018 GLAUCOMA SCREENING Q2Y 9/19/2018 DTaP/Tdap/Td series (2 - Td) 4/5/2021 COLONOSCOPY 4/17/2022 Allergies as of 9/14/2017  Review Complete On: 9/14/2017 By: Jenifre Marinelli LPN No Known Allergies Current Immunizations  Reviewed on 9/14/2017 Name Date Influenza High Dose Vaccine PF 9/14/2017, 11/10/2016  2:23 PM, 11/30/2015  8:32 AM, 11/14/2013  8:55 AM  
 Influenza Vaccine 11/17/2014 Influenza Vaccine Split 11/29/2012  3:15 PM  
 Pneumococcal Conjugate (PCV-13) 5/29/2015 10:53 AM  
 TDAP Vaccine 4/5/2011 ZZZ-RETIRED (DO NOT USE) Pneumococcal Vaccine (Unspecified Type) 3/20/2008 Zoster 4/24/2012  Reviewed by Jenifer Marinelli LPN on 2/91/6838 at  8:52 AM  
You Were Diagnosed With   
  
 Codes Comments Essential hypertension    -  Primary ICD-10-CM: I10 
ICD-9-CM: 401.9 Encounter for immunization     ICD-10-CM: N26 ICD-9-CM: V03.89 PMR (polymyalgia rheumatica) ([facility])     ICD-10-CM: M35.3 ICD-9-CM: 112 Vitals BP Pulse Temp Resp Height(growth percentile) Weight(growth percentile) 126/74 83 98 °F (36.7 °C) (Oral) 16 6' 2\" (1.88 m) 255 lb 12.8 oz (116 kg) SpO2 BMI Smoking Status 98% 32.84 kg/m2 Never Smoker Vitals History BMI and BSA Data Body Mass Index Body Surface Area  
 32.84 kg/m 2 2.46 m 2 Preferred Pharmacy Pharmacy Name Phone [address] [place] [address], [address] [place] [address] [phone] Your Updated Medication List  
  
   
This list is accurate as of: 9/14/17  8:52 AM.  Always use your most recent med list.  
  
  
  
  
 aspirin 81 mg tablet Take 81 mg by mouth. dilTIAZem 30 mg tablet Commonly known as:  CARDIZEM Take 30 mg by mouth three (3) times daily. * finasteride 5 mg tablet Commonly known as:  PROSCAR Take 1 Tab by mouth daily. * finasteride 5 mg tablet Commonly known as:  PROSCAR Take 1 Tab by mouth daily. Indications: SYMPTOMATIC BENIGN PROSTATIC HYPERPLASIA  
  
 fluticasone 50 mcg/actuation nasal spray Commonly known as:  Anna Lorenzo 2 Sprays by Both Nostrils route daily. lisinopril 5 mg tablet Commonly known as:  PRINIVIL, ZESTRIL  
TAKE 1 TABLET BY MOUTH DAILY  
  
 multivitamin tablet Commonly known as:  ONE A DAY Take 1 Tab by mouth daily. OSTEO BI-FLEX PO Take 1 Tab by mouth daily. predniSONE 1 mg tablet Commonly known as:  Nicolás Amada Take 8 mg by mouth daily. rosuvastatin 5 mg tablet Commonly known as:  CRESTOR Take once or twice a week. tamsulosin 0.4 mg capsule Commonly known as:  FLOMAX Take 1 Cap by mouth daily. Indications: SYMPTOMATIC BENIGN PROSTATIC HYPERPLASIA VITAMIN D3 1,000 unit Cap Generic drug:  cholecalciferol Take  by mouth daily. * Notice: This list has 2 medication(s) that are the same as other medications prescribed for you. Read the directions carefully, and ask your doctor or other care provider to review them with you. We Performed the Following ADMIN INFLUENZA VIRUS VAC [ [facility] [place]] INFLUENZA VIRUS VACCINE, HIGH DOSE SEASONAL, PRESERVATIVE FREE [06738 CPT(R)] Follow-up Instructions Return in about 6 months (around 3/14/2018), or if symptoms worsen or fail to improve. To-Do List   
 09/14/2017 Lab:  URINALYSIS W/MICROSCOPIC Patient Instructions DASH Diet: Care Instructions Your Care Instructions The DASH diet is an eating plan that can help lower your blood pressure. DASH stands for Dietary Approaches to Stop Hypertension. Hypertension is high blood pressure. The DASH diet focuses on eating foods that are high in calcium, potassium, and magnesium. These nutrients can lower blood pressure. The foods that are highest in these nutrients are fruits, vegetables, low-fat dairy products, nuts, seeds, and legumes. But taking calcium, potassium, and magnesium supplements instead of eating foods that are high in those nutrients does not have the same effect. The DASH diet also includes whole grains, fish, and poultry. The DASH diet is one of several lifestyle changes your doctor may recommend to lower your high blood pressure. Your doctor may also want you to decrease the amount of sodium in your diet. Lowering sodium while following the DASH diet can lower blood pressure even further than just the DASH diet alone. Follow-up care is a key part of your treatment and safety. Be sure to make and go to all appointments, and call your doctor if you are having problems. It's also a good idea to know your test results and keep a list of the medicines you take. How can you care for yourself at home? Following the DASH diet · Eat 4 to 5 servings of fruit each day. A serving is 1 medium-sized piece of fruit, ½ cup chopped or canned fruit, 1/4 cup dried fruit, or 4 ounces (½ cup) of fruit juice. Choose fruit more often than fruit juice. · Eat 4 to 5 servings of vegetables each day. A serving is 1 cup of lettuce or raw leafy vegetables, ½ cup of chopped or cooked vegetables, or 4 ounces (½ cup) of vegetable juice. Choose vegetables more often than vegetable juice. · Get 2 to 3 servings of low-fat and fat-free dairy each day. A serving is 8 ounces of milk, 1 cup of yogurt, or 1 ½ ounces of cheese. · Eat 6 to 8 servings of grains each day. A serving is 1 slice of bread, 1 ounce of dry cereal, or ½ cup of cooked rice, pasta, or cooked cereal. Try to choose whole-grain products as much as possible. · Limit lean meat, poultry, and fish to 2 servings each day. A serving is 3 ounces, about the size of a deck of cards. · Eat 4 to 5 servings of nuts, seeds, and legumes (cooked dried beans, lentils, and split peas) each week. A serving is 1/3 cup of nuts, 2 tablespoons of seeds, or ½ cup of cooked beans or peas. · Limit fats and oils to 2 to 3 servings each day. A serving is 1 teaspoon of vegetable oil or 2 tablespoons of salad dressing. · Limit sweets and added sugars to 5 servings or less a week. A serving is 1 tablespoon jelly or jam, ½ cup sorbet, or 1 cup of lemonade. · Eat less than 2,300 milligrams (mg) of sodium a day. If you limit your sodium to 1,500 mg a day, you can lower your blood pressure even more. Tips for success · Start small. Do not try to make dramatic changes to your diet all at once. You might feel that you are missing out on your favorite foods and then be more likely to not follow the plan. Make small changes, and stick with them. Once those changes become habit, add a few more changes. · Try some of the following: ¨ Make it a goal to eat a fruit or vegetable at every meal and at snacks. This will make it easy to get the recommended amount of fruits and vegetables each day. ¨ Try yogurt topped with fruit and nuts for a snack or healthy dessert. ¨ Add lettuce, tomato, cucumber, and onion to sandwiches. ¨ Combine a ready-made pizza crust with low-fat mozzarella cheese and lots of vegetable toppings. Try using tomatoes, squash, spinach, broccoli, carrots, cauliflower, and onions. ¨ Have a variety of cut-up vegetables with a low-fat dip as an appetizer instead of chips and dip. ¨ Sprinkle sunflower seeds or chopped almonds over salads. Or try adding chopped walnuts or almonds to cooked vegetables. ¨ Try some vegetarian meals using beans and peas. Add garbanzo or kidney beans to salads. Make burritos and tacos with mashed jang beans or black beans. Where can you learn more? Go to http://felicia-myles.info/. Enter U519 in the search box to learn more about \"DASH Diet: Care Instructions. \" Current as of: April 3, 2017 Content Version: 11.3 © 4985-9694 [employer]. Care instructions adapted under license by [employer] (which disclaims liability or warranty for this information). If you have questions about a medical condition or this instruction, always ask your healthcare professional. [place] [address] any warranty or liability for your use of this information. Hyperlipidemia: After Your Visit Your Care Instructions Hyperlipidemia is too much fat in your blood. The body has several kinds of fat, including cholesterol and triglycerides. Your body needs fat for many things, such as making new cells. But too much fat in your blood increases your chances of having a heart attack or stroke. You may be able to lower your cholesterol and triglycerides with a heart-healthy diet, exercise, and if needed, medicine.  Your doctor may want you to try lifestyle changes first to see whether they lower the fat in your blood. You may need to take medicine if lifestyle changes do not lower the fat in your blood enough. Follow-up care is a key part of your treatment and safety. Be sure to make and go to all appointments, and call your doctor if you are having problems. Its also a good idea to know your test results and keep a list of the medicines you take. How can you care for yourself at home? Take your medicines · Take your medicines exactly as prescribed. Call your doctor if you think you are having a problem with your medicine. · If you take medicine to lower your cholesterol, go to follow-up visits. You will need to have blood tests. · Do not take large doses of niacin, which is a B vitamin, while taking medicine called statins. It may increase the chance of muscle pain and liver problems. · Talk to your doctor about avoiding grapefruit juice if you are taking statins. Grapefruit juice can raise the level of this medicine in your blood. This could increase side effects. Eat more fruits, vegetables, and fiber · Fruits and vegetables have lots of nutrients that help protect against heart disease, and they have littleif anyfat. Try to eat at least five servings a day. Dark green, deep orange, or yellow fruits and vegetables are healthy choices. · Keep carrots, celery, and other veggies handy for snacks. Buy fruit that is in season and store it where you can see it so that you will be tempted to eat it. Cook dishes that have a lot of veggies in them, such as stir-fries and soups. · Foods high in fiber may reduce your cholesterol and provide important vitamins and minerals. High-fiber foods include whole-grain cereals and breads, oatmeal, beans, brown rice, citrus fruits, and apples. · Buy whole-grain breads and cereals instead of white bread and pastries. Limit saturated fat · Read food labels and try to avoid saturated fat and trans fat. They increase your risk of heart disease. · Use olive or canola oil when you cook. Try cholesterol-lowering spreads, such as Benecol or Take Control. · Bake, broil, grill, or steam foods instead of frying them. · Limit the amount of high-fat meats you eat, including hot dogs and sausages. Cut out all visible fat when you prepare meat. · Eat fish, skinless poultry, and soy products such as tofu instead of high-fat meats. Soybeans may be especially good for your heart. Eat at least two servings of fish a week. Certain fish, such as salmon, contain omega-3 fatty acids, which may help reduce your risk of heart attack. · Choose low-fat or fat-free milk and dairy products. Get exercise, limit alcohol, and quit smoking · Get more exercise. Work with your doctor to set up an exercise program. Even if you can do only a small amount, exercise will help you get stronger, have more energy, and manage your weight and your stress. Walking is an easy way to get exercise. Gradually increase the amount you walk every day. Aim for at least 30 minutes on most days of the week. You also may want to swim, bike, or do other activities. · Limit alcohol to no more than 2 drinks a day for men and 1 drink a day for women. · Do not smoke. If you need help quitting, talk to your doctor about stop-smoking programs and medicines. These can increase your chances of quitting for good. When should you call for help? Call 911 anytime you think you may need emergency care. For example, call if: 
· You have symptoms of a heart attack. These may include: ¨ Chest pain or pressure, or a strange feeling in the chest. 
¨ Sweating. ¨ Shortness of breath. ¨ Nausea or vomiting. ¨ Pain, pressure, or a strange feeling in the back, neck, jaw, or upper belly or in one or both shoulders or arms. ¨ Lightheadedness or sudden weakness. ¨ A fast or irregular heartbeat.  
After you call 911, the  may tell you to chew 1 adult-strength or 2 to 4 low-dose aspirin. Wait for an ambulance. Do not try to drive yourself. · You have signs of a stroke. These may include: 
¨ Sudden numbness, paralysis, or weakness in your face, arm, or leg, especially on only one side of your body. ¨ New problems with walking or balance. ¨ Sudden vision changes. ¨ Drooling or slurred speech. ¨ New problems speaking or understanding simple statements, or feeling confused. ¨ A sudden, severe headache that is different from past headaches. · You passed out (lost consciousness). Call your doctor now or seek immediate medical care if: 
· You have muscle pain or weakness. Watch closely for changes in your health, and be sure to contact your doctor if: 
· You are very tired. · You have an upset stomach, gas, constipation, or belly pain or cramps. Where can you learn more? Go to NurseLiability.com.be Enter C406 in the search box to learn more about \"Hyperlipidemia: After Your Visit. \"  
© 7201-2407 [employer]wise[employer]. Care instructions adapted under license by [facility] (which disclaims liability or warranty for this information). This care instruction is for use with your licensed healthcare professional. If you have questions about a medical condition or this instruction, always ask your healthcare professional. [address] any warranty or liability for your use of this information. Content Version: 3.4.458241; Last Revised: October 13, 2011 Introducing [facility]! Dear Kimi Sparks: Thank you for requesting a [employer] account. Our records indicate that you already have an active [employer] account. You can access your account anytime at [email or web address] Did you know that you can access your hospital and ER discharge instructions at any time in [employer]? You can also review all of your test results from your hospital stay or ER visit. Additional Information If you have questions, please visit the Frequently Asked Questions section of the [employer] website at [email or web address] Remember, [employer] is NOT to be used for urgent needs. For medical emergencies, dial 911. Now available from your iPhone and Android! Please provide this summary of care documentation to your next provider. Your primary care clinician is listed as Lisa Singh. If you have any questions after today's visit, please call [phone].

## 2017-09-14 NOTE — PROGRESS NOTES
Pt is here for follow up to discuss labs. 1. Have you been to the ER, urgent care clinic since your last visit? Hospitalized since your last visit? No    2. Have you seen or consulted any other health care providers outside of the [address] [place] [address] since your last visit? Include any pap smears or colon screening. No      Pt given High dose Flu vaccine in L deltoid per verbral read back order Dr EMMA Andre. Pt tolerated procedure w/o reaction.

## 2017-09-17 PROBLEM — R76.8 POSITIVE ANA (ANTINUCLEAR ANTIBODY): Status: ACTIVE | Noted: 2017-09-17

## 2017-09-17 PROBLEM — R13.10 ODYNOPHAGIA: Status: RESOLVED | Noted: 2017-02-10 | Resolved: 2017-09-17

## 2018-03-14 ENCOUNTER — APPOINTMENT (OUTPATIENT)
Dept: INTERNAL MEDICINE CLINIC | Age: 78
End: 2018-03-14

## 2018-03-14 ENCOUNTER — HOSPITAL ENCOUNTER (OUTPATIENT)
Dept: LAB | Age: 78
Discharge: HOME | End: 2018-03-14
Payer: MEDICARE

## 2018-03-14 DIAGNOSIS — I10 ESSENTIAL HYPERTENSION: ICD-10-CM

## 2018-03-14 DIAGNOSIS — E78.5 HYPERLIPIDEMIA, UNSPECIFIED HYPERLIPIDEMIA TYPE: ICD-10-CM

## 2018-03-14 DIAGNOSIS — M35.3 PMR (POLYMYALGIA RHEUMATICA) (HCC): ICD-10-CM

## 2018-03-14 LAB
ALBUMIN SERPL-MCNC: 4.1 G/DL (ref 3.4–5)
ALBUMIN/GLOB SERPL: 1.2 {RATIO} (ref 0.8–1.7)
ALP SERPL-CCNC: 77 U/L (ref 45–117)
ALT SERPL-CCNC: 23 U/L (ref 16–61)
ANION GAP SERPL CALC-SCNC: 6 MMOL/L (ref 3–18)
APPEARANCE UR: CLEAR
AST SERPL-CCNC: 12 U/L (ref 15–37)
BACTERIA URNS QL MICRO: NEGATIVE /HPF
BASOPHILS # BLD: 0 K/UL (ref 0–0.06)
BASOPHILS NFR BLD: 0 % (ref 0–2)
BILIRUB SERPL-MCNC: 0.6 MG/DL (ref 0.2–1)
BILIRUB UR QL: NEGATIVE
BUN SERPL-MCNC: 13 MG/DL (ref 7–18)
BUN/CREAT SERPL: 12 (ref 12–20)
CALCIUM SERPL-MCNC: 8.5 MG/DL (ref 8.5–10.1)
CHLORIDE SERPL-SCNC: 102 MMOL/L (ref 100–108)
CHOLEST SERPL-MCNC: 171 MG/DL
CO2 SERPL-SCNC: 32 MMOL/L (ref 21–32)
COLOR UR: YELLOW
CREAT SERPL-MCNC: 1.11 MG/DL (ref 0.6–1.3)
DIFFERENTIAL METHOD BLD: ABNORMAL
EOSINOPHIL # BLD: 0.2 K/UL (ref 0–0.4)
EOSINOPHIL NFR BLD: 2 % (ref 0–5)
EPITH CASTS URNS QL MICRO: NORMAL /LPF (ref 0–5)
ERYTHROCYTE [DISTWIDTH] IN BLOOD BY AUTOMATED COUNT: 13.2 % (ref 11.6–14.5)
GLOBULIN SER CALC-MCNC: 3.3 G/DL (ref 2–4)
GLUCOSE SERPL-MCNC: 86 MG/DL (ref 74–99)
GLUCOSE UR STRIP.AUTO-MCNC: NEGATIVE MG/DL
HCT VFR BLD AUTO: 46.4 % (ref 36–48)
HDLC SERPL-MCNC: 48 MG/DL (ref 40–60)
HDLC SERPL: 3.6 {RATIO} (ref 0–5)
HGB BLD-MCNC: 15.1 G/DL (ref 13–16)
HGB UR QL STRIP: NEGATIVE
KETONES UR QL STRIP.AUTO: NEGATIVE MG/DL
LDLC SERPL CALC-MCNC: 94.2 MG/DL (ref 0–100)
LEUKOCYTE ESTERASE UR QL STRIP.AUTO: NEGATIVE
LIPID PROFILE,FLP: NORMAL
LYMPHOCYTES # BLD: 2.1 K/UL (ref 0.9–3.6)
LYMPHOCYTES NFR BLD: 24 % (ref 21–52)
MCH RBC QN AUTO: 32.1 PG (ref 24–34)
MCHC RBC AUTO-ENTMCNC: 32.5 G/DL (ref 31–37)
MCV RBC AUTO: 98.7 FL (ref 74–97)
MONOCYTES # BLD: 0.6 K/UL (ref 0.05–1.2)
MONOCYTES NFR BLD: 7 % (ref 3–10)
NEUTS SEG # BLD: 6.1 K/UL (ref 1.8–8)
NEUTS SEG NFR BLD: 67 % (ref 40–73)
NITRITE UR QL STRIP.AUTO: NEGATIVE
PH UR STRIP: 7 [PH] (ref 5–8)
PLATELET # BLD AUTO: 233 K/UL (ref 135–420)
PMV BLD AUTO: 10 FL (ref 9.2–11.8)
POTASSIUM SERPL-SCNC: 4.5 MMOL/L (ref 3.5–5.5)
PROT SERPL-MCNC: 7.4 G/DL (ref 6.4–8.2)
PROT UR STRIP-MCNC: NEGATIVE MG/DL
RBC # BLD AUTO: 4.7 M/UL (ref 4.7–5.5)
RBC #/AREA URNS HPF: 0 /HPF (ref 0–5)
SODIUM SERPL-SCNC: 140 MMOL/L (ref 136–145)
SP GR UR REFRACTOMETRY: 1.02 (ref 1–1.03)
TRIGL SERPL-MCNC: 144 MG/DL (ref ?–150)
TSH SERPL DL<=0.05 MIU/L-ACNC: 3.23 UIU/ML (ref 0.36–3.74)
UROBILINOGEN UR QL STRIP.AUTO: 0.2 EU/DL (ref 0.2–1)
VLDLC SERPL CALC-MCNC: 28.8 MG/DL
WBC # BLD AUTO: 9 K/UL (ref 4.6–13.2)
WBC URNS QL MICRO: NORMAL /HPF (ref 0–4)

## 2018-03-14 PROCEDURE — 80061 LIPID PANEL: CPT | Performed by: INTERNAL MEDICINE

## 2018-03-14 PROCEDURE — 81001 URINALYSIS AUTO W/SCOPE: CPT | Performed by: INTERNAL MEDICINE

## 2018-03-14 PROCEDURE — 80053 COMPREHEN METABOLIC PANEL: CPT | Performed by: INTERNAL MEDICINE

## 2018-03-14 PROCEDURE — 85025 COMPLETE CBC W/AUTO DIFF WBC: CPT | Performed by: INTERNAL MEDICINE

## 2018-03-14 PROCEDURE — 36415 COLL VENOUS BLD VENIPUNCTURE: CPT | Performed by: INTERNAL MEDICINE

## 2018-03-14 PROCEDURE — 84443 ASSAY THYROID STIM HORMONE: CPT | Performed by: INTERNAL MEDICINE

## 2018-03-21 ENCOUNTER — TELEPHONE (OUTPATIENT)
Dept: INTERNAL MEDICINE CLINIC | Age: 78
End: 2018-03-21

## 2018-03-21 ENCOUNTER — OFFICE VISIT (OUTPATIENT)
Dept: INTERNAL MEDICINE CLINIC | Age: 78
End: 2018-03-21

## 2018-03-21 VITALS
OXYGEN SATURATION: 97 % | TEMPERATURE: 97.8 F | DIASTOLIC BLOOD PRESSURE: 70 MMHG | WEIGHT: 257 LBS | HEIGHT: 72 IN | SYSTOLIC BLOOD PRESSURE: 132 MMHG | HEART RATE: 81 BPM | RESPIRATION RATE: 16 BRPM | BODY MASS INDEX: 34.81 KG/M2

## 2018-03-21 DIAGNOSIS — R22.31 NODULE OF FINGER OF RIGHT HAND: ICD-10-CM

## 2018-03-21 DIAGNOSIS — N32.0 BLADDER NECK CONTRACTURE: ICD-10-CM

## 2018-03-21 DIAGNOSIS — N13.8 BPH WITH OBSTRUCTION/LOWER URINARY TRACT SYMPTOMS: ICD-10-CM

## 2018-03-21 DIAGNOSIS — R76.8 POSITIVE ANA (ANTINUCLEAR ANTIBODY): ICD-10-CM

## 2018-03-21 DIAGNOSIS — Z00.00 MEDICARE ANNUAL WELLNESS VISIT, SUBSEQUENT: ICD-10-CM

## 2018-03-21 DIAGNOSIS — E78.5 HYPERLIPIDEMIA, UNSPECIFIED HYPERLIPIDEMIA TYPE: ICD-10-CM

## 2018-03-21 DIAGNOSIS — N40.1 BPH WITH OBSTRUCTION/LOWER URINARY TRACT SYMPTOMS: ICD-10-CM

## 2018-03-21 DIAGNOSIS — M15.9 PRIMARY OSTEOARTHRITIS INVOLVING MULTIPLE JOINTS: ICD-10-CM

## 2018-03-21 DIAGNOSIS — I10 ESSENTIAL HYPERTENSION WITH GOAL BLOOD PRESSURE LESS THAN 140/90: Primary | ICD-10-CM

## 2018-03-21 DIAGNOSIS — E66.09 CLASS 1 OBESITY DUE TO EXCESS CALORIES WITH SERIOUS COMORBIDITY AND BODY MASS INDEX (BMI) OF 34.0 TO 34.9 IN ADULT: ICD-10-CM

## 2018-03-21 DIAGNOSIS — K22.4 ESOPHAGEAL DYSMOTILITY: ICD-10-CM

## 2018-03-21 DIAGNOSIS — Z71.89 ACP (ADVANCE CARE PLANNING): ICD-10-CM

## 2018-03-21 DIAGNOSIS — M35.3 PMR (POLYMYALGIA RHEUMATICA) (HCC): ICD-10-CM

## 2018-03-21 DIAGNOSIS — E55.9 VITAMIN D INSUFFICIENCY: ICD-10-CM

## 2018-03-21 NOTE — PROGRESS NOTES
Chief Complaint   Patient presents with    Hypertension     6 month follow up with lab results. Health Maintenance Due   Topic Date Due    MEDICARE YEARLY EXAM  03/14/2018       1. Have you been to the ER, urgent care clinic or hospitalized since your last visit? NO.     2. Have you seen or consulted any other health care providers outside of the [address] [place] [address] since your last visit (Include any pap smears or colon screening)? YES, Eye exam from [facility] seen 4 months ago. Dr. Debbie Warner, urologist, last seen Dec. 2017. Do you have an Advanced Directive?  YES

## 2018-03-21 NOTE — MR AVS SNAPSHOT
[facility] 
 
 
 [address], [address] [place] [address] [facility] 
[phone] Patient: Ana Richards MRN: [id number] KOL:[id number] Visit Information Date & Time Provider Department Dept. Phone Encounter #  
 3/21/2018  8:30 AM Arline Carmona MD [facility] [address] [place] [address] [phone] [id number] Follow-up Instructions Return in about 6 months (around 9/21/2018), or if symptoms worsen or fail to improve. Your Appointments 6/13/2018  9:45 AM  
Office Visit with Breana Badillo MD  
[facility]) Appt Note: PSA  
 [address] [address] [phone] [address] [address]  
  
    
 9/13/2018  9:25 AM  
LAB with [facility] NURSE VISIT [facility] [address] [place] [address] ([facility]) Appt Note: labs 6mos. sarris [address], [address] [facility]  
  
   
 [address], [address]  
  
    
 9/20/2018  8:00 AM  
Office Visit with Arline Carmona MD  
[facility]) Appt Note: ov 6mos. sarris [address], [address] [facility]  
  
   
 [address], [address] Upcoming Health Maintenance Date Due  
 GLAUCOMA SCREENING Q2Y 9/19/2018 MEDICARE YEARLY EXAM 3/22/2019 DTaP/Tdap/Td series (2 - Td) 4/5/2021 COLONOSCOPY 4/17/2022 Allergies as of 3/21/2018  Review Complete On: 3/21/2018 By: Aida Rosas No Known Allergies Current Immunizations  Reviewed on 9/14/2017 Name Date Influenza High Dose Vaccine PF 9/14/2017, 11/10/2016  2:23 PM, 11/30/2015  8:32 AM, 11/14/2013  8:55 AM  
 Influenza Vaccine 11/17/2014 Influenza Vaccine Split 11/29/2012  3:15 PM  
 Pneumococcal Conjugate (PCV-13) 5/29/2015 10:53 AM  
 TDAP Vaccine 4/5/2011 ZZZ-RETIRED (DO NOT USE) Pneumococcal Vaccine (Unspecified Type) 3/20/2008 Zoster 4/24/2012 Not reviewed this visit Vitals BP Pulse Temp Resp Height(growth percentile) Weight(growth percentile) 132/70 (BP 1 Location: Right arm, BP Patient Position: Sitting) 81 97.8 °F (36.6 °C) (Oral) 16 6' (1.829 m) 257 lb (116.6 kg) SpO2 BMI Smoking Status 97% 34.86 kg/m2 Never Smoker Vitals History BMI and BSA Data Body Mass Index Body Surface Area 34.86 kg/m 2 2.43 m 2 Preferred Pharmacy Pharmacy Name Phone [address], [address] [place] [address] [phone] Your Updated Medication List  
  
   
This list is accurate as of 3/21/18  9:05 AM.  Always use your most recent med list.  
  
  
  
  
 aspirin 81 mg tablet Take 81 mg by mouth. finasteride 5 mg tablet Commonly known as:  PROSCAR Take 1 Tab by mouth daily. Indications: SYMPTOMATIC BENIGN PROSTATIC HYPERPLASIA  
  
 fluticasone 50 mcg/actuation nasal spray Commonly known as:  Maegan Brittany 2 Sprays by Both Nostrils route daily. lisinopril 5 mg tablet Commonly known as:  PRINIVIL, ZESTRIL  
TAKE 1 TABLET BY MOUTH DAILY  
  
 multivitamin tablet Commonly known as:  ONE A DAY Take 1 Tab by mouth daily. OSTEO BI-FLEX PO Take 1 Tab by mouth daily. predniSONE 1 mg tablet Commonly known as:  Valma Belts Take 8 mg by mouth daily. rosuvastatin 5 mg tablet Commonly known as:  CRESTOR Take 1 Tab by mouth two (2) days a week. STOOL SOFTENER PO Take  by mouth. tamsulosin 0.4 mg capsule Commonly known as:  FLOMAX Take 1 Cap by mouth daily. Indications: SYMPTOMATIC BENIGN PROSTATIC HYPERPLASIA  
  
 VITAMIN D3 1,000 unit Cap Generic drug:  cholecalciferol Take  by mouth daily. Follow-up Instructions Return in about 6 months (around 9/21/2018), or if symptoms worsen or fail to improve. Patient Instructions Medicare Wellness Visit, Male The best way to improve and maintain good health is to have a healthy lifestyle by eating a well-balanced diet, exercising regularly, limiting alcohol and stopping smoking. Regular visits with your physician or non-physician health care provider also support your good health. Preventive screening tests can find health problems before they become diseases or illnesses. Preventive services such as immunizations prevent serious infections. All people over age 72 should have a Pneumovax and a Prevnar-13 shot to prevent potentially life threatening infections with the pneumococcus bacteria, a common cause of pneumonia. These are once in a lifetime unless you and your provider decide differently. All people over 65 should have a yearly influenza vaccine or \"flu\" shot. This does not prevent infection with cold viruses but has been proven to prevent hospitalization and death from influenza. Although Medicare part B \"regular Medicare\" currently only covers tetanus vaccination in the context of an injury, a tetanus vaccine (Tdap or Td) is recommended every 10 years. A shingles vaccine is recommended once in a lifetime after age 61. The Shingles vaccine is also not covered by Medicare part B. Note, however, that both the Shingles vaccine and Tdap/Td are generally covered by secondary carriers. Please check your coverage and out of pocket expenses. Consider contacting your local health department because it may stock these vaccines for a reasonable charge. We currently have documentation of the following immunization history for you: 
Immunization History Administered Date(s) Administered  Influenza High Dose Vaccine PF 11/14/2013, 11/30/2015, 11/10/2016, 09/14/2017  Influenza Vaccine 11/17/2014  Influenza Vaccine Split 11/29/2012  Pneumococcal Conjugate (PCV-13) 05/29/2015  TDAP Vaccine 04/05/2011  ZZZ-RETIRED (DO NOT USE) Pneumococcal Vaccine (Unspecified Type) 03/20/2008  Zoster 04/24/2012 Screening for infection with Hepatitis C is recommended for anyone born between [birth year] through [address]. The table at the bottom of this document indicates the status of this and other screening services. Screening for diabetes mellitus with a blood sugar test (glucose) should be done at least every 3 years until age 79. You and your health care provider may decide whether to continue screening after age 79. The most recent blood glucose we have on file for you is:  
Lab Results Component Value Date/Time Glucose 86 03/14/2018 07:59 AM  
 
 
Glaucoma is a disease of the eye due to increased ocular pressure that can lead to blindness. People with risk factors for glaucoma ( race, diabetes, family history) should be screened at least every 2 years by an eye professional. This may be covered annually if indicated as determined by you and your doctor. Cardiovascular screening tests that check for elevated lipids or cholesterol (fatty part of blood) which can lead to heart disease and strokes should be done every 4-6 years through age 79. You and your health care provider may decide whether to continue screening after age 79. The most recent lipid panel we have on file for you is:  
Lab Results Component Value Date/Time Cholesterol, total 171 03/14/2018 07:59 AM  
 HDL Cholesterol 48 03/14/2018 07:59 AM  
 LDL, calculated 94.2 03/14/2018 07:59 AM  
 VLDL, calculated 28.8 03/14/2018 07:59 AM  
 Triglyceride 144 03/14/2018 07:59 AM  
 CHOL/HDL Ratio 3.6 03/14/2018 07:59 AM  
 
 
Colorectal cancer screening that evaluates for blood or polyps in your colon for people with average risk should be done yearly as a stool test, every five years as a flexible sigmoidoscope or every 10 years as a colonoscopy up to age 76.  You and your health care provider may decide whether to continue screening after age 76. Men up to age 76 may elect to screen for prostate cancer with a blood test called a PSA at certain intervals, depending on their personal and family history. This decision is between the patient and his provider. The most recent PSA values we have on file for you are: 
Lab Results Component Value Date/Time  
 Prostate Specific Ag 0.5 06/08/2017 11:00 AM  
 Prostate Specific Ag 0.7 01/31/2017 08:06 AM  
 Prostate Specific Ag 0.6 06/09/2016 10:00 AM  
 Prostate Specific Ag 0.5 06/04/2015 02:44 PM  
 Prostate Specific Ag 0.4 05/09/2014 08:16 AM  
 Prostate Specific Ag 0.4 05/16/2013 10:09 AM  
 
 
If you have been a smoker or had family history of abdominal aortic aneurysms, you and your provider may decide to schedule an ultrasound test of your aorta. Our records show this was done on:  N/A People who have smoked the equivalent of 1 pack per day for 30 years or more may benefit from screening for lung cancer with a yearly low dose CT scan until they have been non smokers for 15 years or competing health conditions render this unlikely to be beneficial. Our records show: N/A Your Medicare Wellness Exam is recommended annually. Here is a list of your current Health Maintenance items with a due date: 
Health Maintenance Topic Date Due  GLAUCOMA SCREENING Q2Y  09/19/2018  MEDICARE YEARLY EXAM  03/22/2019  
 DTaP/Tdap/Td series (2 - Td) 04/05/2021  COLONOSCOPY  04/17/2022  ZOSTER VACCINE AGE 60>  Completed  Pneumococcal 65+ Low/Medium Risk  Completed  Influenza Age 5 to Adult  Completed Introducing [facility]! Dear Bety Sands: Thank you for requesting a [employer] account. Our records indicate that you already have an active [employer] account. You can access your account anytime at [email or web address] Did you know that you can access your hospital and ER discharge instructions at any time in [email or web address]? You can also review all of your test results from your hospital stay or ER visit. Additional Information If you have questions, please visit the Frequently Asked Questions section of the [email or web address] website at [email or web address] Remember, [email or web address] is NOT to be used for urgent needs. For medical emergencies, dial 911. Now available from your iPhone and Android! Please provide this summary of care documentation to your next provider. Your primary care clinician is listed as Noemi Wolff. If you have any questions after today's visit, please call [phone].

## 2018-03-21 NOTE — PROGRESS NOTES
This is the Subsequent Medicare Annual Wellness Exam, performed 12 months or more after the Initial AWV or the last Subsequent AWV    I have reviewed the patient's medical history in detail and updated the computerized patient record. History     Past Medical History:   Diagnosis Date    Bladder neck contracture     BPH with obstruction/lower urinary tract symptoms     Essential hypertension with goal blood pressure less than 140/90     Family history of colon cancer     Hyperlipidemia     Osteoarthritis     PMR (polymyalgia rheumatica) ([facility] [address] 7/20/2016      Past Surgical History:   Procedure Laterality Date    CYSTOSCOPY      ENDOSCOPY, COLON, DIAGNOSTIC      HC BERENICE LASER 50472KG  3/2/11    Laser incision of bladder neck contracture    HX MOHS PROCEDURES  1/11    rt hip repl    HX OTHER SURGICAL      tonsillectomy, back sx,     HX OTHER SURGICAL      basel cell removed from right ear lobe    HX TURP      Laser    NM LASER VAPORIZATION SURGERY PROSTATE, COMPLETE       Current Outpatient Prescriptions   Medication Sig Dispense Refill    DOCUSATE SODIUM (STOOL SOFTENER PO) Take  by mouth.  rosuvastatin (CRESTOR) 5 mg tablet Take 1 Tab by mouth two (2) days a week. 45 Tab 3    lisinopril (PRINIVIL, ZESTRIL) 5 mg tablet TAKE 1 TABLET BY MOUTH DAILY 90 Tab 3    finasteride (PROSCAR) 5 mg tablet Take 1 Tab by mouth daily. Indications: SYMPTOMATIC BENIGN PROSTATIC HYPERPLASIA 90 Tab 3    tamsulosin (FLOMAX) 0.4 mg capsule Take 1 Cap by mouth daily. Indications: SYMPTOMATIC BENIGN PROSTATIC HYPERPLASIA 90 Cap 3    multivitamin (ONE A DAY) tablet Take 1 Tab by mouth daily.  predniSONE (DELTASONE) 1 mg tablet Take 8 mg by mouth daily.  Cholecalciferol, Vitamin D3, (VITAMIN D3) 1,000 unit cap Take  by mouth daily.  GLUC/ERICA-MSM#1/C/CHARLY/RAMÓN/BOR (OSTEO BI-FLEX PO) Take 1 Tab by mouth daily.  aspirin 81 mg tablet Take 81 mg by mouth.         fluticasone (FLONASE) 50 mcg/actuation nasal spray 2 Sprays by Both Nostrils route daily. 3 Bottle 3     No Known Allergies  Family History   Problem Relation Age of Onset    Cancer Other     Heart Disease Other      Social History   Substance Use Topics    Smoking status: Never Smoker    Smokeless tobacco: Never Used    Alcohol use 4.2 oz/week     7 Glasses of wine per week     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    Hyperlipidemia E78.5    Vitamin D insufficiency E55.9    Hearing loss H91.90    Essential hypertension with goal blood pressure less than 140/90 I10    Primary osteoarthritis involving multiple joints M15.0    PMR ([facility]ymyalgia rheumatica) ([facility]) M35.3    Hoarseness R49.0    Esophageal dysmotility K22.4    Positive CRISTI (antinuclear antibody)/ RNP antibody R76.8    Nodule of finger of right hand R22.31       Depression Risk Factor Screening:     PHQ over the last two weeks 3/21/2018   Little interest or pleasure in doing things Not at all   Feeling down, depressed or hopeless Not at all   Total Score PHQ 2 0     Alcohol Risk Factor Screening: You do not drink alcohol or very rarely. Functional Ability and Level of Safety:   Hearing Loss  Hearing is good. Activities of Daily Living  The home contains: no safety equipment. Patient does total self care    Fall Risk  Fall Risk Assessment, last 12 mths 3/21/2018   Able to walk? Yes   Fall in past 12 months?  No       Abuse Screen  Patient is not abused    Cognitive Screening   Evaluation of Cognitive Function:  Has your family/caregiver stated any concerns about your memory: no  Normal    Patient Care Team   Patient Care Team:  Huang Virgen MD as PCP - General (Internal Medicine)  Deedra Bamberger, RN as Ambulatory Care Navigator (Internal Medicine)  Tri Lion MD (Urology)  Ivelisse Woodward MD (Dermatology)  Radha Rodriguez MD (Orthopedic Surgery)  Dennise Yost MD (Gastroenterology)  Jennie Morales MD (Gastroenterology)  Chrissy Simon MD (Ophthalmology)  Claudine Rodrigez DO (Rheumatology)    Assessment/Plan   Education and counseling provided:  Are appropriate based on today's review and evaluation  End-of-Life planning (with patient's consent)  Influenza Vaccine  Colorectal cancer screening tests  Cardiovascular screening blood test  Screening for glaucoma  Diabetes screening test    Diagnoses and all orders for this visit:    1. Essential hypertension with goal blood pressure less than 158/24  -     METABOLIC PANEL, BASIC; Future  -     URINALYSIS W/MICROSCOPIC; Future  -     CBC WITH AUTOMATED DIFF; Future    2. Hyperlipidemia, unspecified hyperlipidemia type  -     LIPID PANEL; Future    3. PMR (polymyalgia rheumatica) (HCC)  -     URINALYSIS W/MICROSCOPIC; Future  -     CBC WITH AUTOMATED DIFF; Future  -     VITAMIN D, 25 HYDROXY; Future    4. Esophageal dysmotility    5. Positive CRISTI (antinuclear antibody)/ RNP antibody  -     URINALYSIS W/MICROSCOPIC; Future    6. Primary osteoarthritis involving multiple joints    7. BPH with obstruction/lower urinary tract symptoms    8. Bladder neck contracture    9. Nodule of finger of right hand    10. Vitamin D insufficiency  -     VITAMIN D, 25 HYDROXY; Future    11. Medicare annual wellness visit, subsequent    12. ACP (advance care planning)      There are no preventive care reminders to display for this patient.

## 2018-03-21 NOTE — PATIENT INSTRUCTIONS
Medicare Wellness Visit, Male    The best way to improve and maintain good health is to have a healthy lifestyle by eating a well-balanced diet, exercising regularly, limiting alcohol and stopping smoking. Regular visits with your physician or non-physician health care provider also support your good health. Preventive screening tests can find health problems before they become diseases or illnesses. Preventive services such as immunizations prevent serious infections. All people over age 72 should have a Pneumovax and a Prevnar-13 shot to prevent potentially life threatening infections with the pneumococcus bacteria, a common cause of pneumonia. These are once in a lifetime unless you and your provider decide differently. All people over 65 should have a yearly influenza vaccine or \"flu\" shot. This does not prevent infection with cold viruses but has been proven to prevent hospitalization and death from influenza. Although Medicare part B \"regular Medicare\" currently only covers tetanus vaccination in the context of an injury, a tetanus vaccine (Tdap or Td) is recommended every 10 years. A shingles vaccine is recommended once in a lifetime after age 61. The Shingles vaccine is also not covered by Medicare part B. Note, however, that both the Shingles vaccine and Tdap/Td are generally covered by secondary carriers. Please check your coverage and out of pocket expenses. Consider contacting your local health department because it may stock these vaccines for a reasonable charge.     We currently have documentation of the following immunization history for you:  Immunization History   Administered Date(s) Administered    Influenza High Dose Vaccine PF 11/14/2013, 11/30/2015, 11/10/2016, 09/14/2017    Influenza Vaccine 11/17/2014    Influenza Vaccine Split 11/29/2012    Pneumococcal Conjugate (PCV-13) 05/29/2015    TDAP Vaccine 04/05/2011    ZZZ-RETIRED (DO NOT USE) Pneumococcal Vaccine (Unspecified Type) 03/20/2008    Zoster 04/24/2012       Screening for infection with Hepatitis C is recommended for anyone born between [birth year] through [address]. The table at the bottom of this document indicates the status of this and other screening services. Screening for diabetes mellitus with a blood sugar test (glucose) should be done at least every 3 years until age 79. You and your health care provider may decide whether to continue screening after age 79. The most recent blood glucose we have on file for you is:   Lab Results   Component Value Date/Time    Glucose 86 03/14/2018 07:59 AM       Glaucoma is a disease of the eye due to increased ocular pressure that can lead to blindness. People with risk factors for glaucoma ( race, diabetes, family history) should be screened at least every 2 years by an eye professional. This may be covered annually if indicated as determined by you and your doctor. Cardiovascular screening tests that check for elevated lipids or cholesterol (fatty part of blood) which can lead to heart disease and strokes should be done every 4-6 years through age 79. You and your health care provider may decide whether to continue screening after age 79. The most recent lipid panel we have on file for you is:   Lab Results   Component Value Date/Time    Cholesterol, total 171 03/14/2018 07:59 AM    HDL Cholesterol 48 03/14/2018 07:59 AM    LDL, calculated 94.2 03/14/2018 07:59 AM    VLDL, calculated 28.8 03/14/2018 07:59 AM    Triglyceride 144 03/14/2018 07:59 AM    CHOL/HDL Ratio 3.6 03/14/2018 07:59 AM       Colorectal cancer screening that evaluates for blood or polyps in your colon for people with average risk should be done yearly as a stool test, every five years as a flexible sigmoidoscope or every 10 years as a colonoscopy up to age 76. You and your health care provider may decide whether to continue screening after age 76.     Men up to age 76 may elect to screen for prostate cancer with a blood test called a PSA at certain intervals, depending on their personal and family history. This decision is between the patient and his provider. The most recent PSA values we have on file for you are:  Lab Results   Component Value Date/Time    Prostate Specific Ag 0.5 06/08/2017 11:00 AM    Prostate Specific Ag 0.7 01/31/2017 08:06 AM    Prostate Specific Ag 0.6 06/09/2016 10:00 AM    Prostate Specific Ag 0.5 06/04/2015 02:44 PM    Prostate Specific Ag 0.4 05/09/2014 08:16 AM    Prostate Specific Ag 0.4 05/16/2013 10:09 AM       If you have been a smoker or had family history of abdominal aortic aneurysms, you and your provider may decide to schedule an ultrasound test of your aorta. Our records show this was done on:  N/A    People who have smoked the equivalent of 1 pack per day for 30 years or more may benefit from screening for lung cancer with a yearly low dose CT scan until they have been non smokers for 15 years or competing health conditions render this unlikely to be beneficial. Our records show: N/A    Your Medicare Wellness Exam is recommended annually.     Here is a list of your current Health Maintenance items with a due date:  Health Maintenance   Topic Date Due    GLAUCOMA SCREENING Q2Y  09/19/2018    MEDICARE YEARLY EXAM  03/22/2019    DTaP/Tdap/Td series (2 - Td) 04/05/2021    COLONOSCOPY  04/17/2022    ZOSTER VACCINE AGE 60>  Completed    Pneumococcal 65+ Low/Medium Risk  Completed    Influenza Age 5 to Adult  Completed

## 2018-03-21 NOTE — ACP (ADVANCE CARE PLANNING)
Advance Care Planning (ACP) Provider Note - Comprehensive     Date of ACP Conversation: 03/21/18  Persons included in Conversation:  patient  Length of ACP Conversation in minutes:  16 minutes    Authorized Decision Maker (if patient is incapable of making informed decisions): Melina Banuelos   This person is:  Healthcare Agent/Medical Power of  under Advance Directive          General ACP for ALL Patients with Decision Making Capacity:   Importance of advance care planning, including choosing a healthcare agent to communicate patient's healthcare decisions if patient lost the ability to make decisions, such as after a sudden illness or accident  Understanding of the healthcare agent role was assessed and information provided  Exploration of values, goals, and preferences if recovery is not expected, even with continued medical treatment in the event of: Imminent death  Severe, permanent brain injury  \"In these circumstances, what matters most to you? \"  Care focused more on comfort or quality of life. Review of Existing Advance Directive:  Patient has an existing advance directive. He states that it designates his longtime girlfriend, Melina Banuelos,  as his healthcare agents and expresses that he does not wish life prolonging procedures for end of life care. He will bring a copy to be scanned into the chart. For Serious or Chronic Illness:  Understanding of medical condition      Interventions Provided:  Reviewed existing Advance Directive   Recommended communicating the plan and making copies for the healthcare agent, personal physician, and others as appropriate (e.g., health system)  Recommended review of completed ACP document annually or upon change in health status   Recommended bringing form to office to be copied and scanned into chart.

## 2018-03-25 PROBLEM — R22.31 NODULE OF FINGER OF RIGHT HAND: Status: ACTIVE | Noted: 2018-03-25

## 2018-03-25 PROBLEM — E66.09 CLASS 1 OBESITY DUE TO EXCESS CALORIES WITH SERIOUS COMORBIDITY AND BODY MASS INDEX (BMI) OF 34.0 TO 34.9 IN ADULT: Status: ACTIVE | Noted: 2018-03-25

## 2018-03-25 NOTE — PROGRESS NOTES
HPI:   Ravin Tam is a very pleasant 69y.o. year old male who presents today for evaluation of hypertension, hyperlipidemia, polymyalgia rheumatica, esophageal hypercontractility, and BPH. He reports that he is doing very well. He states that he had discontinued prednisone in 11/2017, but developed some recurrence of symptoms so restarted at at 1 mg daily with improvement. He states that he will occasionally note some increase in his symptoms, particularly with aching pain and stiffness in his hand or right shoulder, for which he will use increase his dose of prednisone to 2 mg for a few days. He states that he has regained essentially full range of motion of his shoulders without significant pain. He continues to deny any recurrence of dysphagia. He otherwise is without complaints and feeling well. He has a history of polymyalgia rheumatica, diagnosed in 7/2016 when he was seen for bilateral shoulder pain of several months duration. He had been under the care of Dr. Rob Frazier since 3/2016 when he presented with right wrist pain followed by the development of bilateral shoulder pain. He was treated with a Medrol dose pack and Tramadol and upon follow-up on 5/3/2016, reported some improvement in the bilateral shoulder and wrist discomfort, but pain persisted. Bilateral shoulder MRI's (5/11/2016) showed severe degenerative changes on the right at the acromioclavicular joint with moderate to severe or severe tendinopathy of the supraspinatus and infraspinatus; postoperative changes with tendinopathy of the supraspinatus, infraspinatus, and long head of the biceps was seen on the left. In 5/2016, lab data showed ESR 28, C-Reactive Protein 1.1, negative CRISTI and RF. He was treated with naproxen, tramadol and acetaminophen. He was evaluated on 7/19/2016 for worsening bilateral shoulder pain with bilateral hand pain involving the wrists, MCP and PIP joints.  Evaluation included ESR 48; C-reactive protein 1.8; CRISTI positive; anti-RNP 1.4; negative antibodies to Zimmerman, dsDNA, anti-chromatin, RF and anti-CCP. Bilateral hand x-rays showed scattered arthritic changes with scattered small erosions. He was referred to see Dr. Darlyn Abbasi who diagnosed polymyalgia rheumatica and started him on prednisone. Since initiating prednisone, he had significant improvement with nearly complete resolution of his symptoms. He has regained full use of his shoulders and has no trouble reaching behind his back or pulling a shirt over his head. He denies any headache, jaw claudication, scalp tenderness, fever, or visual changes. In 11/2016, he presented with a persistent non productive cough, sore throat, and hoarseness. He was referred to see Dr. Naye Neal, and patient reports that a laryngoscopy was performed showing evidence of reflux. He was started on Nexium for two weeks with improvement in his cough, but he continued to have pain with swallowing. He was referred to see Dr. Attila Maloney, and underwent upper endoscopy (1/5/2017) showing abnormal esophageal motility with spastic and non-propulsive appearing contractions in the mid to distal esophagus (tortuous); mucosa appeared normal ( random biopsies: negative); normal stomach and duodenum. He underwent a barium swallow (1/11/2017) showing a small persistent smoothly marginated filling defect at the piriform sinus suggestive of an extrinsic mass effect. He subsequently had a neck CT scan (1/13/2017) which showed the right piriform sinus appeared larger than the left, but the walls of both were normal without surrounding mass or infiltrating process; nonspecific lymphadenopathy involving level 1 and 2 nodes. He underwent esophageal manometry, which showed a hypercontractile esophagus (\"Jackhammer\" esophagus). He was started on diltiazem 30 mg three times per day with resolution of symptoms. He discontinued diltiazem in 3/2017 without recurrence of symptoms.      He has a history of hypertension, treated with lisinopril. He exercises mainly by walking, and denies any chest pain, shortness of breath at rest or with exertion, palpitations, lightheadedness, or edema. He also has hyperlipidemia, treated with rosuvastatin two days per week. He also has a history of BPH and underwent TURP in 2006. In 2/2011, he had laser vaporization of the prostate to relieve bladder outlet obstructive symptoms. In 3/2011 he had a laser incision performed on the bladder neck contracture. In 5/2012 and 8/2012, he underwent transurethral incision of the bladder neck contracture. Biopsy of the contracture (8/2012) was negative for malignancy. Since that time, he has had annual cystoscopic surveillance of the bladder neck contracture by Dr. Keturah Fontaine. In 8/2015, it was felt that no more surveillance was necessary as it had remained stable. He remains on Flomax and Proscar, and is followed by Dr. Keturah Fontaine. He denies difficulty with stream, dysuria, hematuria, or nocturia. He had a screening colonoscopy in 4/2012 by Dr. Cyril Serrano which was normal. Follow-up recommended for 10 years. He denies any abdominal pain, nausea, vomiting, melena, hematochezia, or change in bowel movements.       Past Medical History:   Diagnosis Date    Bladder neck contracture     BPH with obstruction/lower urinary tract symptoms     Essential hypertension with goal blood pressure less than 140/90     Family history of colon cancer     Hyperlipidemia     Osteoarthritis     PMR (polymyalgia rheumatica) ([facility] [address] 7/20/2016     Past Surgical History:   Procedure Laterality Date    CYSTOSCOPY      ENDOSCOPY, COLON, DIAGNOSTIC      HC BERENICE LASER [id number]  3/2/11    Laser incision of bladder neck contracture    HX MOHS PROCEDURES  1/11    rt hip repl    HX OTHER SURGICAL      tonsillectomy, back sx,     HX OTHER SURGICAL      basel cell removed from right ear lobe    HX TURP      Laser    [place] LASER VAPORIZATION SURGERY PROSTATE, COMPLETE       Current Outpatient Prescriptions   Medication Sig    DOCUSATE SODIUM (STOOL SOFTENER PO) Take  by mouth.  rosuvastatin (CRESTOR) 5 mg tablet Take 1 Tab by mouth two (2) days a week.  lisinopril (PRINIVIL, ZESTRIL) 5 mg tablet TAKE 1 TABLET BY MOUTH DAILY    finasteride (PROSCAR) 5 mg tablet Take 1 Tab by mouth daily. Indications: SYMPTOMATIC BENIGN PROSTATIC HYPERPLASIA    tamsulosin (FLOMAX) 0.4 mg capsule Take 1 Cap by mouth daily. Indications: SYMPTOMATIC BENIGN PROSTATIC HYPERPLASIA    multivitamin (ONE A DAY) tablet Take 1 Tab by mouth daily.  predniSONE (DELTASONE) 1 mg tablet Take 8 mg by mouth daily.  Cholecalciferol, Vitamin D3, (VITAMIN D3) 1,000 unit cap Take  by mouth daily.  GLUC/ERICA-MSM#1/C/CHARLY/RAMÓN/BOR (OSTEO BI-FLEX PO) Take 1 Tab by mouth daily.  aspirin 81 mg tablet Take 81 mg by mouth.  fluticasone (FLONASE) 50 mcg/actuation nasal spray 2 Sprays by Both Nostrils route daily. No current facility-administered medications for this visit. Allergies and Intolerances:   No Known Allergies     Family History: His father  from CAD, and his brother has had CABG. His mother  from ovarian cancer. Family History   Problem Relation Age of Onset    Cancer Other     Heart Disease Other      Social History:   He  reports that he has never smoked. He has never used smokeless tobacco. He is  but has been living with his long time girlfriend Lesia Hodgkin) for 37 years. He has two step children. He is a retired , and works on the volunteer force part-time.     History   Alcohol Use    4.2 oz/week    7 Glasses of wine per week     Immunization History:  Immunization History   Administered Date(s) Administered    Influenza High Dose Vaccine PF 2013, 2015, 11/10/2016, 2017    Influenza Vaccine 2014    Influenza Vaccine Split 2012    Pneumococcal Conjugate (PCV-13) 2015    TDAP Vaccine 2011    ZZZ-RETIRED (DO NOT USE) Pneumococcal Vaccine (Unspecified Type) 03/20/2008    Zoster 04/24/2012       Review of Systems:   As above included in HPI. Otherwise 11 point review of systems negative including constitutional, skin, HENT, eyes, respiratory, cardiovascular, gastrointestinal, genitourinary, musculoskeletal, endocrine, hematologic, allergy, and neurologic. Physical:   Vitals:   BP: 132/70; repeat 128/78 right arm  HR: 81  WT: 257 lb (116.6 kg)  BMI:  34.86 kg/m2    Exam:   Patient appears in no apparent distress. Affect is appropriate. HEENT --Anicteric sclerae, tympanic membranes normal,  ear canals normal.  PERRL, EOMI, conjunctiva and lids normal.   Sinuses were nontender, turbinates normal, hearing normal.  Oropharynx without  erythema, normal tongue, oral mucosa and tonsils. No cervical lymphadenopathy. No thyromegaly, JVD, or bruits. Carotid pulses 2+ with normal upstroke. Lungs --Clear to auscultation. No wheezing or rales. Heart --Regular rate and rhythm, no murmurs, rubs, gallops, or clicks. Chest wall --Nontender to palpation. PMI normal.  Abdomen -- Soft and nontender, no hepatosplenomegaly or masses. Extremities -- Without cyanosis, clubbing, edema. 2+ pulses equally and bilaterally. Normal looking digits, ROM intact. Right hand with 1 x 1 cm hard nodule on palm.   Neuro -- CN 2-12 intact, strength 5/5 with intact soft touch in all extremities  Derm - no obvious abnormalities noted, no rash    Review of Data:  Labs:  Hospital Outpatient Visit on 03/14/2018   Component Date Value Ref Range Status    WBC 03/14/2018 9.0  4.6 - 13.2 K/uL Final    RBC 03/14/2018 4.70  4.70 - 5.50 M/uL Final    HGB 03/14/2018 15.1  13.0 - 16.0 g/dL Final    HCT 03/14/2018 46.4  36.0 - 48.0 % Final    MCV 03/14/2018 98.7* 74.0 - 97.0 FL Final    MCH 03/14/2018 32.1  24.0 - 34.0 PG Final    MCHC 03/14/2018 32.5  31.0 - 37.0 g/dL Final    RDW 03/14/2018 13.2  11.6 - 14.5 % Final    PLATELET 96/76/8369 756  135 - 420 K/uL Final    MPV 03/14/2018 10.0  9.2 - 11.8 FL Final    NEUTROPHILS 03/14/2018 67  40 - 73 % Final    LYMPHOCYTES 03/14/2018 24  21 - 52 % Final    MONOCYTES 03/14/2018 7  3 - 10 % Final    EOSINOPHILS 03/14/2018 2  0 - 5 % Final    BASOPHILS 03/14/2018 0  0 - 2 % Final    ABS. NEUTROPHILS 03/14/2018 6.1  1.8 - 8.0 K/UL Final    ABS. LYMPHOCYTES 03/14/2018 2.1  0.9 - 3.6 K/UL Final    ABS. MONOCYTES 03/14/2018 0.6  0.05 - 1.2 K/UL Final    ABS. EOSINOPHILS 03/14/2018 0.2  0.0 - 0.4 K/UL Final    ABS. BASOPHILS 03/14/2018 0.0  0.0 - 0.06 K/UL Final    DF 03/14/2018 AUTOMATED    Final    LIPID PROFILE 03/14/2018        Final    Cholesterol, total 03/14/2018 171  <200 MG/DL Final    Triglyceride 03/14/2018 144  <150 MG/DL Final    HDL Cholesterol 03/14/2018 48  40 - 60 MG/DL Final    LDL, calculated 03/14/2018 94.2  0 - 100 MG/DL Final    VLDL, calculated 03/14/2018 28.8  MG/DL Final    CHOL/HDL Ratio 03/14/2018 3.6  0 - 5.0   Final    Sodium 03/14/2018 140  136 - 145 mmol/L Final    Potassium 03/14/2018 4.5  3.5 - 5.5 mmol/L Final    Chloride 03/14/2018 102  100 - 108 mmol/L Final    CO2 03/14/2018 32  21 - 32 mmol/L Final    Anion gap 03/14/2018 6  3.0 - 18 mmol/L Final    Glucose 03/14/2018 86  74 - 99 mg/dL Final    BUN 03/14/2018 13  7.0 - 18 MG/DL Final    Creatinine 03/14/2018 1.11  0.6 - 1.3 MG/DL Final    BUN/Creatinine ratio 03/14/2018 12  12 - 20   Final    GFR est AA 03/14/2018 >60  >60 ml/min/1.73m2 Final    GFR est non-AA 03/14/2018 >60  >60 ml/min/1.73m2 Final    Calcium 03/14/2018 8.5  8.5 - 10.1 MG/DL Final    Bilirubin, total 03/14/2018 0.6  0.2 - 1.0 MG/DL Final    ALT (SGPT) 03/14/2018 23  16 - 61 U/L Final    AST (SGOT) 03/14/2018 12* 15 - 37 U/L Final    Alk.  phosphatase 03/14/2018 77  45 - 117 U/L Final    Protein, total 03/14/2018 7.4  6.4 - 8.2 g/dL Final    Albumin 03/14/2018 4.1  3.4 - 5.0 g/dL Final    Globulin 03/14/2018 3.3  2.0 - 4.0 g/dL Final  A-G Ratio 03/14/2018 1.2  0.8 - 1.7   Final    Color 03/14/2018 YELLOW    Final    Appearance 03/14/2018 CLEAR    Final    Specific gravity 03/14/2018 1.018  1.005 - 1.030   Final    pH (UA) 03/14/2018 7.0  5.0 - 8.0   Final    Protein 03/14/2018 NEGATIVE   NEG mg/dL Final    Glucose 03/14/2018 NEGATIVE   NEG mg/dL Final    Ketone 03/14/2018 NEGATIVE   NEG mg/dL Final    Bilirubin 03/14/2018 NEGATIVE   NEG   Final    Blood 03/14/2018 NEGATIVE   NEG   Final    Urobilinogen 03/14/2018 0.2  0.2 - 1.0 EU/dL Final    Nitrites 03/14/2018 NEGATIVE   NEG   Final    Leukocyte Esterase 03/14/2018 NEGATIVE   NEG   Final    WBC 03/14/2018 0 to 1  0 - 4 /hpf Final    RBC 03/14/2018 0  0 - 5 /hpf Final    Epithelial cells 03/14/2018 FEW  0 - 5 /lpf Final    Bacteria 03/14/2018 NEGATIVE   NEG /hpf Final    TSH 03/14/2018 3.23  0.36 - 3.74 uIU/mL Final           Health Maintenance:  Screening:    Colorectal: colonoscopy (4/2012) normal. Dr. Rio Nelson. Due 2022. Depression: none   DM (HbA1c/FPG): FPG 86 (3/2018)   Hepatitis C: negative (7/2016)   Falls: none   DEXA: within normal limits (5/2017) Indication: chronic steroid use. PSA/HOLA: PSA 0.7 (1/2017); H[facility]A per Dr. Jesús Stone   Glaucoma: regular eye exams with Dr. Jaida Diallo (last 9/2017)   Smoking: none   Vitamin D: 32.3 (1/2017)   Medicare Wellness: today      Impression:  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    Hyperlipidemia E78.5    Vitamin D insufficiency E55.9    Hearing loss H91.90    Essential hypertension with goal blood pressure less than 140/90 I10    Primary osteoarthritis involving multiple joints M15.0    PMR (polymyalgia rheumatica) (HCC) M35.3    Hoarseness R49.0    Esophageal dysmotility K22.4    Positive CRISTI (antinuclear antibody)/ RNP antibody R76.8    Nodule of finger of right hand R22.31       Plan:  1. Hypertension.  Blood pressure well controlled on lisinopril. Renal function remains normal with creatinine 1.11/ eGFR >60. Continue to follow. 2. Hyperlipidemia. On low intensity dose rosuvastatin, taking 5 mg two days per week, with good response with LDL 94. Emphasized importance of lifestyle modifications, including diet, exercise, and weight loss. Continue low dose aspirin. 3. Polymyalgia rheumatica. Patient significantly improved since initiating steroids. Currently successfully tapered down to 1 mg per day and doing well. Occasional increase in mild joint complaints which requires increase to 2 mg for short period. Being followed by Dr. Sultana Denis. At increased risk (10%) of concurrent giant cell arteritis with PMR, but no evidence of GCA currently. Also with positive CRISTI/ positive RNP Ab, but no evidence of inflammation elsewhere. Urinalysis continues to be negative for proteinuria. Continue to follow closely. Given chronic steroid use, checked bone density scan in 5/2017 which was normal.   4. Odynophagia. Secondary to esophageal dysmotility. Responded to diltiazem, and now successfully stopped taking it without recurrence of symptoms. Will continue to follow. 5. BPH with bladder neck contracture. Appears to be asymptomatic on current treatment with Flonase and Proscar. Followed by Dr. Vidal Medel. PSA normal. Continue to follow. 6. Hoarseness. Patient reports that it has improved to baseline. Using Flonase as needed. Follow. 7. Right hand nodule. Appears along a tendon sheath. Suspect ganglion cyst although could also represent a tenosynovial giant cell tumor. Patient wishing to hold off currently on referral to ortho given no discomfort. Will continue to follow closely. 8. Obesity. Emphasized importance of lifestyle modifications, including diet, exercise, and weight loss. Will readdress next visit. 9. Health maintenance. Already received influenza vaccine. Other immunizations up to date. Colonoscopy due 2022.  Continue regular eye exams with Dr. Danita Daniel. Vitamin D level normal. Continue maintenance dose supplement. In addition, an annual Medicare wellness visit was done today. Patient understands recommendations and agrees with plan. Follow-up in 6 months.

## 2018-05-18 ENCOUNTER — TELEPHONE (OUTPATIENT)
Dept: INTERNAL MEDICINE CLINIC | Age: 78
End: 2018-05-18

## 2018-06-13 ENCOUNTER — HOSPITAL ENCOUNTER (OUTPATIENT)
Dept: LAB | Age: 78
Discharge: HOME | End: 2018-06-13
Payer: MEDICARE

## 2018-06-13 ENCOUNTER — OFFICE VISIT (OUTPATIENT)
Dept: UROLOGY | Age: 78
End: 2018-06-13

## 2018-06-13 VITALS
HEART RATE: 86 BPM | OXYGEN SATURATION: 95 % | WEIGHT: 258 LBS | HEIGHT: 72 IN | SYSTOLIC BLOOD PRESSURE: 140 MMHG | DIASTOLIC BLOOD PRESSURE: 84 MMHG | BODY MASS INDEX: 34.95 KG/M2

## 2018-06-13 DIAGNOSIS — N40.1 BENIGN PROSTATIC HYPERPLASIA WITH URINARY OBSTRUCTION: Primary | ICD-10-CM

## 2018-06-13 DIAGNOSIS — N13.8 BENIGN PROSTATIC HYPERPLASIA WITH URINARY OBSTRUCTION: Primary | ICD-10-CM

## 2018-06-13 DIAGNOSIS — N40.1 BENIGN PROSTATIC HYPERPLASIA WITH URINARY OBSTRUCTION: ICD-10-CM

## 2018-06-13 DIAGNOSIS — N13.8 BENIGN PROSTATIC HYPERPLASIA WITH URINARY OBSTRUCTION: ICD-10-CM

## 2018-06-13 LAB
BILIRUB UR QL STRIP: NEGATIVE
GLUCOSE UR-MCNC: NEGATIVE MG/DL
KETONES P FAST UR STRIP-MCNC: NEGATIVE MG/DL
PH UR STRIP: 6.5 [PH] (ref 4.6–8)
PROT UR QL STRIP: NEGATIVE
PSA SERPL-MCNC: 1 NG/ML (ref 0–4)
SP GR UR STRIP: 1.02 (ref 1–1.03)
UA UROBILINOGEN AMB POC: NORMAL (ref 0.2–1)
URINALYSIS CLARITY POC: CLEAR
URINALYSIS COLOR POC: YELLOW
URINE BLOOD POC: NORMAL
URINE LEUKOCYTES POC: NEGATIVE
URINE NITRITES POC: NEGATIVE

## 2018-06-13 PROCEDURE — 84153 ASSAY OF PSA TOTAL: CPT | Performed by: UROLOGY

## 2018-06-13 RX ORDER — TAMSULOSIN HYDROCHLORIDE 0.4 MG/1
0.4 CAPSULE ORAL DAILY
Qty: 90 CAP | Refills: 3 | Status: SHIPPED | OUTPATIENT
Start: 2018-06-13 | End: 2018-09-20 | Stop reason: SDUPTHER

## 2018-06-13 RX ORDER — FINASTERIDE 5 MG/1
5 TABLET, FILM COATED ORAL DAILY
Qty: 90 TAB | Refills: 3 | Status: SHIPPED | OUTPATIENT
Start: 2018-06-13 | End: 2018-09-20 | Stop reason: SDUPTHER

## 2018-06-13 NOTE — PROGRESS NOTES
Jeramie Mei 69 y.o. male     Mr. Jefferson Tran seen today for annual follow-up symptomatic BPH currently on alpha-blocker and 5 alpha reductase inhibitor therapy with Flomax and finasteride-doing well with solid stream no frequency and nocturia one episode overnight    PSA 0.4 in 2014  PSA 0.5 in April 2015  PSA 0.7 in January 2017     June 2018      Review of Systems:    CNS: No seizures syncope and into dizziness  Respiratory: No wheezing or shortness of breath  Cardiovascular:Hypertension  Intestinal:Diverticulosis  Urinary: Obstructive prostatism with urinary retention relieved by the laser TURP 2006/bladder neck contracture incised 2012  Skeletal: Large joint arthritis  Endocrine:No diabetes or thyroid disease  Other:    Allergies: No Known Allergies   Medications:    Current Outpatient Prescriptions   Medication Sig Dispense Refill    DOCUSATE SODIUM (STOOL SOFTENER PO) Take  by mouth.  rosuvastatin (CRESTOR) 5 mg tablet Take 1 Tab by mouth two (2) days a week. 45 Tab 3    lisinopril (PRINIVIL, ZESTRIL) 5 mg tablet TAKE 1 TABLET BY MOUTH DAILY 90 Tab 3    finasteride (PROSCAR) 5 mg tablet Take 1 Tab by mouth daily. Indications: SYMPTOMATIC BENIGN PROSTATIC HYPERPLASIA 90 Tab 3    multivitamin (ONE A DAY) tablet Take 1 Tab by mouth daily.  predniSONE (DELTASONE) 1 mg tablet Take 8 mg by mouth daily.  Cholecalciferol, Vitamin D3, (VITAMIN D3) 1,000 unit cap Take  by mouth daily.  GLUC/ERICA-MSM#1/C/CHARLY/RAMÓN/BOR (OSTEO BI-FLEX PO) Take 1 Tab by mouth daily.  aspirin 81 mg tablet Take 81 mg by mouth.  tamsulosin (FLOMAX) 0.4 mg capsule Take 1 Cap by mouth daily. Indications: SYMPTOMATIC BENIGN PROSTATIC HYPERPLASIA 90 Cap 3    fluticasone (FLONASE) 50 mcg/actuation nasal spray 2 Sprays by Both Nostrils route daily.  3 Bottle 3       Past Medical History:   Diagnosis Date    Bladder neck contracture     BPH with obstruction/lower urinary tract symptoms     Essential hypertension with goal blood pressure less than 140/90     Family history of colon cancer     Hyperlipidemia     Osteoarthritis     [facility] 7/20/2016      Past Surgical History:   Procedure Laterality Date    CYSTOSCOPY      ENDOSCOPY, COLON, DIAGNOSTIC      HC BERENICE LASER [id number]  3/2/11    Laser incision of bladder neck contracture    HX MOHS PROCEDURES  1/11    rt hip repl    HX OTHER SURGICAL      tonsillectomy, back sx,     HX OTHER SURGICAL      basel cell removed from right ear lobe    HX TURP      Laser    [place] LASER VAPORIZATION SURGERY PROSTATE, COMPLETE       Family History   Problem Relation Age of Onset    Cancer Other     Heart Disease Other         Physical Examination: Well-nourished mature male in no apparent distress    Prostate by HOLA is large rounded smooth benign consistency and nontender no induration no nodularity  No rectal masses induration or tenderness      Urinalysis: Negative dipstick/nitrite negative/heme-negative     PVR today 1 31 cc        Impression: Symptomatic BPH responding favorably to alpha-blocker and 5 alpha reductase                         inhibitor Rx        Plan: Flomax 0.4 mg daily #90 refill ×3             Proscar 5 mg daily #90 refill ×3    PSA today    RTC 1 year      More than 1/2 of this 15 minute visit was spent in counselling and coordination of care, as described above. Malgorzata Schroeder MD  -electronically signed-    PLEASE NOTE:  This document has been produced using voice recognition software. Unrecognized errors in transcription may be present.

## 2018-06-13 NOTE — MR AVS SNAPSHOT
[address] [facility] [place] [facility] [address] [address] 
[phone] Patient: Donny Stone MRN: [id number] Visit Information Date & Time Provider Department Dept. Phone Encounter #  
 6/13/2018  9:45 AM Sandra Pemberton [place] [facility] [id number] Your Appointments 9/13/2018  9:25 AM  
LAB with C NURSE VISIT [facility] [address] [place] [address] ([address] [place] [address]) Appt Note: labs 6mos. sarris [address], Suite [facility] [address] [place]  
  
   
 [address], [place]  
  
    
 9/20/2018  8:00 AM  
Office Visit with Patrick Belcher MD  
[facility] [address]) Appt Note: ov 6mos. sarris [address], [address] [place] [address] [place]  
  
   
 [address], [facility]  
  
    
 6/12/2019  9:45 AM  
Office Visit with Fabián Lion MD  
[facility] [address] [place] [address]) Appt Note: PSA  
 [address] [place] A [address] [address]  
[phone] [address] [place] [id number] Upcoming Health Maintenance Date Due Influenza Age 5 to Adult 8/1/2018 GLAUCOMA SCREENING Q2Y 9/19/2018 MEDICARE YEARLY EXAM 3/22/2019 DTaP/Tdap/Td series (2 - Td) 4/5/2021 COLONOSCOPY 4/17/2022 Allergies as of 6/13/2018  Review Complete On: 6/13/2018 By: Fabián Lion MD  
 No Known Allergies Current Immunizations  Reviewed on 9/14/2017 Name Date Influenza High Dose Vaccine PF 9/14/2017, 11/10/2016  2:23 PM, 11/30/2015  8:32 AM, 11/14/2013  8:55 AM  
 Influenza Vaccine 11/17/2014 Influenza Vaccine Split 11/29/2012  3:15 PM  
 Pneumococcal Conjugate (PCV-13) 5/29/2015 10:53 AM  
 TDAP Vaccine 4/5/2011 ZZZ-RETIRED (DO NOT USE) Pneumococcal Vaccine (Unspecified Type) 3/20/2008 Zoster 4/24/2012 Not reviewed this visit You Were Diagnosed With   
  
 Codes Comments Benign prostatic hyperplasia with urinary obstruction    -  Primary ICD-10-CM: N40.1, N13.8 ICD-9-CM: 600.01, 599.69 Vitals BP Pulse Height(growth percentile) Weight(growth percentile) SpO2 BMI  
 140/84 (BP 1 Location: Left arm, BP Patient Position: Sitting) 86 6' (1.829 m) 258 lb (117 kg) 95% 34.99 kg/m2 Smoking Status Never Smoker Vitals History BMI and BSA Data Body Mass Index Body Surface Area 34.99 kg/m 2 2.44 m 2 Preferred Pharmacy Pharmacy Name Phone [address], [address] [place], [place] [address]way [phone] Your Updated Medication List  
  
   
This list is accurate as of 6/13/18 10:27 AM.  Always use your most recent med list.  
  
  
  
  
 aspirin 81 mg tablet Take 81 mg by mouth. * finasteride 5 mg tablet Commonly known as:  PROSCAR Take 1 Tab by mouth daily. Indications: SYMPTOMATIC BENIGN PROSTATIC HYPERPLASIA * finasteride 5 mg tablet Commonly known as:  PROSCAR Take 1 Tab by mouth daily. fluticasone 50 mcg/actuation nasal spray Commonly known as:  [place] 2 Sprays by Both Nostrils route daily. lisinopril 5 mg tablet Commonly known as:  PRINIVIL, ZESTRIL  
TAKE 1 TABLET BY MOUTH DAILY  
  
 multivitamin tablet Commonly known as:  ONE A DAY Take 1 Tab by mouth daily. OSTEO BI-FLEX PO Take 1 Tab by mouth daily. predniSONE 1 mg tablet Commonly known as:  [place] Take 8 mg by mouth daily. rosuvastatin 5 mg tablet Commonly known as:  CRESTOR Take 1 Tab by mouth two (2) days a week. STOOL SOFTENER PO Take  by mouth. * tamsulosin 0.4 mg capsule Commonly known as:  FLOMAX Take 1 Cap by mouth daily. Indications: SYMPTOMATIC BENIGN PROSTATIC HYPERPLASIA * tamsulosin 0.4 mg capsule Commonly known as:  FLOMAX Take 1 Cap by mouth daily. Indications: benign prostatic hyperplasia with lower urinary tract sx VITAMIN D3 1,000 unit Cap Generic drug:  cholecalciferol Take  by mouth daily. * Notice: This list has 4 medication(s) that are the same as other medications prescribed for you. Read the directions carefully, and ask your doctor or other care provider to review them with you. Prescriptions Sent to Pharmacy Refills  
 finasteride (PROSCAR) 5 mg tablet 3 Sig: Take 1 Tab by mouth daily. Class: Normal  
 Pharmacy: [address] [place] [address], [address] [place] [address] Ph #: [phone] Route: Oral  
 tamsulosin (FLOMAX) 0.4 mg capsule 3 Sig: Take 1 Cap by mouth daily. Indications: benign prostatic hyperplasia with lower urinary tract sx Class: Normal  
 Pharmacy: [address] [place] [address], [address] [place] [address] Ph #: [phone] Route: Oral  
  
We Performed the Following AMB POC URINALYSIS DIP STICK AUTO W/O MICRO [90344 CPT(R)] COLLECTION VENOUS BLOOD,VENIPUNCTURE [id number] CPT(R)] Patient Instructions Prostate Cancer Screening: Care Instructions Your Care Instructions The prostate gland is an organ found just below a man's bladder. It is the size and shape of a walnut. It surrounds the tube that carries urine from the bladder out of the body through the penis. This tube is called the urethra. Prostate cancer is the abnormal growth of cells in the prostate. It is the second most common type of cancer in men. (Skin cancer is the most common.) Most cases of prostate cancer occur in men older than 72. The disease runs in families. And it's more common in -American men. When it's found and treated early, prostate cancer may be cured. But it is not always treated. This is because prostate cancer may not shorten your life, especially if you are older and the cancer is growing slowly. Follow-up care is a key part of your treatment and safety. Be sure to make and go to all appointments, and call your doctor if you are having problems. It's also a good idea to know your test results and keep a list of the medicines you take. What are the screening tests for prostate cancer? The main screening test for prostate cancer is the prostate-specific antigen (PSA) test. This is a blood test that measures how much PSA is in your blood. A high level may mean that you have an enlargement, an infection, or cancer. Along with the PSA test, you may have a digital rectal exam. The digital (finger) rectal exam checks for anything abnormal in your prostate. To do the exam, the doctor puts a lubricated, gloved finger into your rectum. If these tests suggest cancer, you may need a prostate biopsy. How is prostate cancer diagnosed? In a biopsy, the doctor takes small tissue samples from your prostate gland. Another doctor then looks at the tissue under a microscope to see if there are cancer cells, signs of infection, or other problems. The results help diagnose prostate cancer. What are the pros and cons of screening? Neither a PSA test nor a digital rectal exam can tell you for sure that you do or do not have cancer. But they can help you decide if you need more tests, such as a prostate biopsy. Screening tests may be useful because most men with prostate cancer don't have symptoms. It can be hard to know if you have cancer until it is more advanced. And then it's harder to treat. But having a PSA test can also cause harm. The test may show high levels of PSA that aren't caused by cancer. So you could have a prostate biopsy you didn't need. Or the PSA test might be normal when there is cancer, so a cancer might not be found early. The test can also find cancers that would never have caused a problem during your lifetime. So you might have treatment that was not needed. Prostate cancer usually develops late in life and grows slowly. For many men, it does not shorten their lives. Some experts advise screening only for men who are at high risk. Talk with your doctor to see if screening is right for you. Where can you learn more? Go to http://felicia-myles.info/. Enter R550 in the search box to learn more about \"Prostate Cancer Screening: Care Instructions. \" Current as of: May 12, 2017 Content Version: 11.4 © 2603-1328 [employer]. Care instructions adapted under license by [employer] (which disclaims liability or warranty for this information). If you have questions about a medical condition or this instruction, always ask your healthcare professional. [address] any warranty or liability for your use of this information. Introducing [facility]! Dear Alondra Martínez: Thank you for requesting a [employer] account. Our records indicate that you already have an active [employer] account. You can access your account anytime at [email or web address]. [email or web address] Did you know that you can access your hospital and ER discharge instructions at any time in [employer]? You can also review all of your test results from your hospital stay or ER visit. Additional Information If you have questions, please visit the Frequently Asked Questions section of the [employer] website at [email or web address] Remember, [employer] is NOT to be used for urgent needs. For medical emergencies, dial 911. Now available from your iPhone and Android! Please provide this summary of care documentation to your next provider. Your primary care clinician is listed as Orinda Habermann. If you have any questions after today's visit, please call [phone].

## 2018-06-13 NOTE — PATIENT INSTRUCTIONS
Prostate Cancer Screening: Care Instructions  Your Care Instructions    The prostate gland is an organ found just below a man's bladder. It is the size and shape of a walnut. It surrounds the tube that carries urine from the bladder out of the body through the penis. This tube is called the urethra. Prostate cancer is the abnormal growth of cells in the prostate. It is the second most common type of cancer in men. (Skin cancer is the most common.)  Most cases of prostate cancer occur in men older than 72. The disease runs in families. And it's more common in -American men. When it's found and treated early, prostate cancer may be cured. But it is not always treated. This is because prostate cancer may not shorten your life, especially if you are older and the cancer is growing slowly. Follow-up care is a key part of your treatment and safety. Be sure to make and go to all appointments, and call your doctor if you are having problems. It's also a good idea to know your test results and keep a list of the medicines you take. What are the screening tests for prostate cancer? The main screening test for prostate cancer is the prostate-specific antigen (PSA) test. This is a blood test that measures how much PSA is in your blood. A high level may mean that you have an enlargement, an infection, or cancer. Along with the PSA test, you may have a digital rectal exam. The digital (finger) rectal exam checks for anything abnormal in your prostate. To do the exam, the doctor puts a lubricated, gloved finger into your rectum. If these tests suggest cancer, you may need a prostate biopsy. How is prostate cancer diagnosed? In a biopsy, the doctor takes small tissue samples from your prostate gland. Another doctor then looks at the tissue under a microscope to see if there are cancer cells, signs of infection, or other problems. The results help diagnose prostate cancer.   What are the pros and cons of screening? Neither a PSA test nor a digital rectal exam can tell you for sure that you do or do not have cancer. But they can help you decide if you need more tests, such as a prostate biopsy. Screening tests may be useful because most men with prostate cancer don't have symptoms. It can be hard to know if you have cancer until it is more advanced. And then it's harder to treat. But having a PSA test can also cause harm. The test may show high levels of PSA that aren't caused by cancer. So you could have a prostate biopsy you didn't need. Or the PSA test might be normal when there is cancer, so a cancer might not be found early. The test can also find cancers that would never have caused a problem during your lifetime. So you might have treatment that was not needed. Prostate cancer usually develops late in life and grows slowly. For many men, it does not shorten their lives. Some experts advise screening only for men who are at high risk. Talk with your doctor to see if screening is right for you. Where can you learn more? Go to http://felicia-myles.info/. Enter R550 in the search box to learn more about \"Prostate Cancer Screening: Care Instructions. \"  Current as of: May 12, 2017  Content Version: 11.4  © 6242-3025 [employer], [employer]. Care instructions adapted under license by [employer] (which disclaims liability or warranty for this information). If you have questions about a medical condition or this instruction, always ask your healthcare professional. [facility] [address] any warranty or liability for your use of this information.

## 2018-06-13 NOTE — PROGRESS NOTES
Mr. Virginie Chaidez has a reminder for a \"due or due soon\" health maintenance. I have asked that he contact his primary care provider for follow-up on this health maintenance. RBV Per Dr. Yasemin Cyr draw lab today for PSA for BPH.

## 2018-06-26 ENCOUNTER — TELEPHONE (OUTPATIENT)
Dept: INTERNAL MEDICINE CLINIC | Age: 78
End: 2018-06-26

## 2018-06-26 NOTE — TELEPHONE ENCOUNTER
Left detailed VM with Dr José Castaneda office---need office note from Jan 2018 (if there is one) to be faxed over.

## 2018-08-24 RX ORDER — LISINOPRIL 5 MG/1
TABLET ORAL
Qty: 90 TAB | Refills: 3 | Status: SHIPPED | OUTPATIENT
Start: 2018-08-24 | End: 2019-09-04 | Stop reason: SDUPTHER

## 2018-09-17 ENCOUNTER — HOSPITAL ENCOUNTER (OUTPATIENT)
Dept: LAB | Age: 78
Discharge: HOME | End: 2018-09-17
Payer: MEDICARE

## 2018-09-17 DIAGNOSIS — E78.5 HYPERLIPEMIA: ICD-10-CM

## 2018-09-17 DIAGNOSIS — R76.8 FALSE POSITIVE SEROLOGICAL TEST FOR SYPHILIS: ICD-10-CM

## 2018-09-17 DIAGNOSIS — M35.3 POLYMYALGIA RHEUMATICA (HCC): ICD-10-CM

## 2018-09-17 DIAGNOSIS — E55.9 AVITAMINOSIS D: ICD-10-CM

## 2018-09-17 DIAGNOSIS — I10 ESSENTIAL HYPERTENSION, MALIGNANT: ICD-10-CM

## 2018-09-17 LAB
ANION GAP SERPL CALC-SCNC: 5 MMOL/L (ref 3–18)
APPEARANCE UR: CLEAR
BASOPHILS # BLD: 0 K/UL (ref 0–0.1)
BASOPHILS NFR BLD: 0 % (ref 0–2)
BILIRUB UR QL: NEGATIVE
BUN SERPL-MCNC: 12 MG/DL (ref 7–18)
BUN/CREAT SERPL: 12 (ref 12–20)
CALCIUM SERPL-MCNC: 9.2 MG/DL (ref 8.5–10.1)
CHLORIDE SERPL-SCNC: 102 MMOL/L (ref 100–108)
CHOLEST SERPL-MCNC: 187 MG/DL
CO2 SERPL-SCNC: 32 MMOL/L (ref 21–32)
COLOR UR: YELLOW
CREAT SERPL-MCNC: 1.03 MG/DL (ref 0.6–1.3)
DIFFERENTIAL METHOD BLD: ABNORMAL
EOSINOPHIL # BLD: 0.1 K/UL (ref 0–0.4)
EOSINOPHIL NFR BLD: 1 % (ref 0–5)
ERYTHROCYTE [DISTWIDTH] IN BLOOD BY AUTOMATED COUNT: 12.8 % (ref 11.6–14.5)
GLUCOSE SERPL-MCNC: 96 MG/DL (ref 74–99)
GLUCOSE UR STRIP.AUTO-MCNC: NEGATIVE MG/DL
HCT VFR BLD AUTO: 43.2 % (ref 36–48)
HDLC SERPL-MCNC: 43 MG/DL (ref 40–60)
HDLC SERPL: 4.3 {RATIO} (ref 0–5)
HGB BLD-MCNC: 14.6 G/DL (ref 13–16)
HGB UR QL STRIP: NEGATIVE
KETONES UR QL STRIP.AUTO: NEGATIVE MG/DL
LDLC SERPL CALC-MCNC: 106.2 MG/DL (ref 0–100)
LEUKOCYTE ESTERASE UR QL STRIP.AUTO: NEGATIVE
LIPID PROFILE,FLP: ABNORMAL
LYMPHOCYTES # BLD: 1.7 K/UL (ref 0.9–3.6)
LYMPHOCYTES NFR BLD: 28 % (ref 21–52)
MCH RBC QN AUTO: 31.6 PG (ref 24–34)
MCHC RBC AUTO-ENTMCNC: 33.8 G/DL (ref 31–37)
MCV RBC AUTO: 93.5 FL (ref 74–97)
MONOCYTES # BLD: 0.5 K/UL (ref 0.05–1.2)
MONOCYTES NFR BLD: 8 % (ref 3–10)
NEUTS SEG # BLD: 3.8 K/UL (ref 1.8–8)
NEUTS SEG NFR BLD: 63 % (ref 40–73)
NITRITE UR QL STRIP.AUTO: NEGATIVE
PH UR STRIP: 7 [PH] (ref 5–8)
PLATELET # BLD AUTO: 210 K/UL (ref 135–420)
PMV BLD AUTO: 9.5 FL (ref 9.2–11.8)
POTASSIUM SERPL-SCNC: 4.5 MMOL/L (ref 3.5–5.5)
PROT UR STRIP-MCNC: NEGATIVE MG/DL
RBC # BLD AUTO: 4.62 M/UL (ref 4.7–5.5)
SODIUM SERPL-SCNC: 139 MMOL/L (ref 136–145)
SP GR UR REFRACTOMETRY: 1.01 (ref 1–1.03)
TRIGL SERPL-MCNC: 189 MG/DL (ref ?–150)
UROBILINOGEN UR QL STRIP.AUTO: 0.2 EU/DL (ref 0.2–1)
VLDLC SERPL CALC-MCNC: 37.8 MG/DL
WBC # BLD AUTO: 6.1 K/UL (ref 4.6–13.2)

## 2018-09-17 PROCEDURE — 80061 LIPID PANEL: CPT | Performed by: INTERNAL MEDICINE

## 2018-09-17 PROCEDURE — 82306 VITAMIN D 25 HYDROXY: CPT | Performed by: INTERNAL MEDICINE

## 2018-09-17 PROCEDURE — 80048 BASIC METABOLIC PNL TOTAL CA: CPT | Performed by: INTERNAL MEDICINE

## 2018-09-17 PROCEDURE — 85025 COMPLETE CBC W/AUTO DIFF WBC: CPT | Performed by: INTERNAL MEDICINE

## 2018-09-17 PROCEDURE — 81003 URINALYSIS AUTO W/O SCOPE: CPT | Performed by: INTERNAL MEDICINE

## 2018-09-17 PROCEDURE — 36415 COLL VENOUS BLD VENIPUNCTURE: CPT | Performed by: INTERNAL MEDICINE

## 2018-09-20 ENCOUNTER — TELEPHONE (OUTPATIENT)
Dept: INTERNAL MEDICINE CLINIC | Age: 78
End: 2018-09-20

## 2018-09-20 ENCOUNTER — OFFICE VISIT (OUTPATIENT)
Dept: INTERNAL MEDICINE CLINIC | Age: 78
End: 2018-09-20

## 2018-09-20 VITALS
HEART RATE: 75 BPM | RESPIRATION RATE: 14 BRPM | WEIGHT: 253.2 LBS | TEMPERATURE: 98.3 F | SYSTOLIC BLOOD PRESSURE: 122 MMHG | HEIGHT: 72 IN | DIASTOLIC BLOOD PRESSURE: 76 MMHG | OXYGEN SATURATION: 91 % | BODY MASS INDEX: 34.29 KG/M2

## 2018-09-20 DIAGNOSIS — R49.0 HOARSENESS: ICD-10-CM

## 2018-09-20 DIAGNOSIS — R76.8 POSITIVE ANA (ANTINUCLEAR ANTIBODY): ICD-10-CM

## 2018-09-20 DIAGNOSIS — Z23 ENCOUNTER FOR IMMUNIZATION: ICD-10-CM

## 2018-09-20 DIAGNOSIS — E78.5 HYPERLIPIDEMIA, UNSPECIFIED HYPERLIPIDEMIA TYPE: ICD-10-CM

## 2018-09-20 DIAGNOSIS — E66.09 CLASS 1 OBESITY DUE TO EXCESS CALORIES WITH SERIOUS COMORBIDITY AND BODY MASS INDEX (BMI) OF 34.0 TO 34.9 IN ADULT: ICD-10-CM

## 2018-09-20 DIAGNOSIS — K22.4 ESOPHAGEAL DYSMOTILITY: ICD-10-CM

## 2018-09-20 DIAGNOSIS — I10 ESSENTIAL HYPERTENSION: Primary | ICD-10-CM

## 2018-09-20 DIAGNOSIS — M35.3 PMR (POLYMYALGIA RHEUMATICA) (HCC): ICD-10-CM

## 2018-09-20 DIAGNOSIS — E55.9 VITAMIN D INSUFFICIENCY: ICD-10-CM

## 2018-09-20 DIAGNOSIS — M15.9 PRIMARY OSTEOARTHRITIS INVOLVING MULTIPLE JOINTS: ICD-10-CM

## 2018-09-20 DIAGNOSIS — N32.0 BLADDER NECK CONTRACTURE: ICD-10-CM

## 2018-09-20 DIAGNOSIS — N13.8 BPH WITH OBSTRUCTION/LOWER URINARY TRACT SYMPTOMS: ICD-10-CM

## 2018-09-20 DIAGNOSIS — R22.31 NODULE OF FINGER OF RIGHT HAND: ICD-10-CM

## 2018-09-20 DIAGNOSIS — N40.1 BPH WITH OBSTRUCTION/LOWER URINARY TRACT SYMPTOMS: ICD-10-CM

## 2018-09-20 NOTE — PROGRESS NOTES
Chief Complaint   Patient presents with    Hypertension     6 month follow up with lab results. Health Maintenance Due   Topic Date Due    Influenza Age 5 to Adult  08/01/2018    GLAUCOMA SCREENING Q2Y  09/19/2018     Patient given influenza vaccine, Adjuvanted FLUAD, in left deltoid, per verbal order from Dr. Patience Bradford. Instructed patient to sit and wait 10-20 minutes before leaving the premises so that we can watch for any complications or adverse reactions. Patient given vaccine information statement handout before vaccine was given. Patient tolerated well without adverse reactions or complications. 1. Have you been to the ER, urgent care clinic or hospitalized since your last visit? NO.     2. Have you seen or consulted any other health care providers outside of the [address] [place] [address] since your last visit (Include any pap smears or colon screening)? YES, Last seen in June 2018 for glaucoma screening with eye doctor. Do you have an Advanced Directive? YES, patient informed to bring in.

## 2018-09-20 NOTE — MR AVS SNAPSHOT
[address] [facility] 
 
 
 [address], [address] [place] [address] [facility] 
[phone] Patient: Katie Early MRN: [id number] Visit Information Date & Time Provider Department Dept. Phone Encounter #  
 9/20/2018  8:00 AM Bailey Garrison MD [facility] [phone] [id number] Follow-up Instructions Return in about 6 months (around 3/20/2019), or if symptoms worsen or fail to improve. Your Appointments 3/12/2019  9:25 AM  
LAB with [facility] NURSE VISIT [facility] ([address] [place] [address]) Appt Note: labs [address], [address]  
  
   
 [address], [address]  
  
    
 3/19/2019  1:00 PM  
Office Visit with Bailey Garrison MD  
[facility] [address]) Appt Note: ov 6mos. sarris [address], [address] [place] [id number] [place] [address] [address]  
  
   
 [address], [address]  
  
    
 6/12/2019  9:45 AM  
Office Visit with Jose Stacy MD  
[facility] [address] [place] [address]) Appt Note: PSA  
 [address] John [place] A [address] [address]  
[phone] [address] [place] [id number] Upcoming Health Maintenance Date Due Influenza Age 5 to Adult 8/1/2018 GLAUCOMA SCREENING Q2Y 10/5/2018* DTaP/Tdap/Td series (2 - Td) 4/5/2021 COLONOSCOPY 4/17/2022 *Topic was postponed. The date shown is not the original due date. Allergies as of 9/20/2018  Review Complete On: 9/20/2018 By: Codi Styles No Known Allergies Current Immunizations  Reviewed on 9/20/2018 Name Date Influenza High Dose Vaccine PF 9/14/2017, 11/10/2016  2:23 PM, 11/30/2015  8:32 AM, 11/14/2013  8:55 AM  
 Influenza Vaccine 11/17/2014  Influenza Vaccine (Tri) Adjuvanted 9/20/2018  8:02 AM  
 Influenza Vaccine Split 11/29/2012  3:15 PM  
 Pneumococcal Conjugate (PCV-13) 5/29/2015 10:53 AM  
 TDAP Vaccine 4/5/2011 ZZZ-RETIRED (DO NOT USE) Pneumococcal Vaccine (Unspecified Type) 3/20/2008 Zoster 4/24/2012 Reviewed by Rama Escoto on 9/20/2018 at  8:05 AM  
You Were Diagnosed With   
  
 Codes Comments Encounter for immunization    -  Primary ICD-10-CM: N48 ICD-9-CM: V03.89 Vitals BP Pulse Temp Resp Height(growth percentile) Weight(growth percentile) 122/76 (BP 1 Location: Left arm, BP Patient Position: Sitting) 75 98.3 °F (36.8 °C) (Oral) 14 6' (1.829 m) 253 lb 3.2 oz (114.9 kg) SpO2 BMI Smoking Status 91% 34.34 kg/m2 Never Smoker Vitals History BMI and BSA Data Body Mass Index Body Surface Area  
 34.34 kg/m 2 2.42 m 2 Preferred Pharmacy Pharmacy Name Phone [address] [place] [address], [address] [place] [address] [phone] Your Updated Medication List  
  
   
This list is accurate as of 9/20/18  8:28 AM.  Always use your most recent med list.  
  
  
  
  
 aspirin 81 mg tablet Take 81 mg by mouth. finasteride 5 mg tablet Commonly known as:  PROSCAR Take 1 Tab by mouth daily. Indications: SYMPTOMATIC BENIGN PROSTATIC HYPERPLASIA  
  
 fluticasone 50 mcg/actuation nasal spray Commonly known as:  Caffie Euler 2 Sprays by Both Nostrils route daily. lisinopril 5 mg tablet Commonly known as:  PRINIVIL, ZESTRIL  
TAKE 1 TABLET BY MOUTH DAILY  
  
 multivitamin tablet Commonly known as:  ONE A DAY Take 1 Tab by mouth daily. OSTEO BI-FLEX PO Take 1 Tab by mouth daily. rosuvastatin 5 mg tablet Commonly known as:  CRESTOR Take 1 Tab by mouth two (2) days a week. STOOL SOFTENER PO Take  by mouth. tamsulosin 0.4 mg capsule Commonly known as:  FLOMAX Take 1 Cap by mouth daily. Indications: SYMPTOMATIC BENIGN PROSTATIC HYPERPLASIA VITAMIN D3 1,000 unit Cap Generic drug:  cholecalciferol Take  by mouth daily. We Performed the Following INFLUENZA VACCINE INACTIVATED (IIV), SUBUNIT, ADJUVANTED, IM [id number] CPT(R)] Follow-up Instructions Return in about 6 months (around 3/20/2019), or if symptoms worsen or fail to improve. Patient Instructions Increase rosuvastatin to 3 days per week. Hyperlipidemia: After Your Visit Your Care Instructions Hyperlipidemia is too much fat in your blood. The body has several kinds of fat, including cholesterol and triglycerides. Your body needs fat for many things, such as making new cells. But too much fat in your blood increases your chances of having a heart attack or stroke. You may be able to lower your cholesterol and triglycerides with a heart-healthy diet, exercise, and if needed, medicine. Your doctor may want you to try lifestyle changes first to see whether they lower the fat in your blood. You may need to take medicine if lifestyle changes do not lower the fat in your blood enough. Follow-up care is a key part of your treatment and safety. Be sure to make and go to all appointments, and call your doctor if you are having problems. Its also a good idea to know your test results and keep a list of the medicines you take. How can you care for yourself at home? Take your medicines · Take your medicines exactly as prescribed. Call your doctor if you think you are having a problem with your medicine. · If you take medicine to lower your cholesterol, go to follow-up visits. You will need to have blood tests. · Do not take large doses of niacin, which is a B vitamin, while taking medicine called statins. It may increase the chance of muscle pain and liver problems. · Talk to your doctor about avoiding grapefruit juice if you are taking statins. Grapefruit juice can raise the level of this medicine in your blood. This could increase side effects. Eat more fruits, vegetables, and fiber · Fruits and vegetables have lots of nutrients that help protect against heart disease, and they have littleif anyfat. Try to eat at least five servings a day. Dark green, deep orange, or yellow fruits and vegetables are healthy choices. · Keep carrots, celery, and other veggies handy for snacks. Buy fruit that is in season and store it where you can see it so that you will be tempted to eat it. Cook dishes that have a lot of veggies in them, such as stir-fries and soups. · Foods high in fiber may reduce your cholesterol and provide important vitamins and minerals. High-fiber foods include whole-grain cereals and breads, oatmeal, beans, brown rice, citrus fruits, and apples. · Buy whole-grain breads and cereals instead of white bread and pastries. Limit saturated fat · Read food labels and try to avoid saturated fat and trans fat. They increase your risk of heart disease. · Use olive or canola oil when you cook. Try cholesterol-lowering spreads, such as Benecol or Take Control. · Bake, broil, grill, or steam foods instead of frying them. · Limit the amount of high-fat meats you eat, including hot dogs and sausages. Cut out all visible fat when you prepare meat. · Eat fish, skinless poultry, and soy products such as tofu instead of high-fat meats. Soybeans may be especially good for your heart. Eat at least two servings of fish a week. Certain fish, such as salmon, contain omega-3 fatty acids, which may help reduce your risk of heart attack. · Choose low-fat or fat-free milk and dairy products. Get exercise, limit alcohol, and quit smoking · Get more exercise. Work with your doctor to set up an exercise program. Even if you can do only a small amount, exercise will help you get stronger, have more energy, and manage your weight and your stress. Walking is an easy way to get exercise.  Gradually increase the amount you walk every day. Aim for at least 30 minutes on most days of the week. You also may want to swim, bike, or do other activities. · Limit alcohol to no more than 2 drinks a day for men and 1 drink a day for women. · Do not smoke. If you need help quitting, talk to your doctor about stop-smoking programs and medicines. These can increase your chances of quitting for good. When should you call for help? Call 911 anytime you think you may need emergency care. For example, call if: 
· You have symptoms of a heart attack. These may include: ¨ Chest pain or pressure, or a strange feeling in the chest. 
¨ Sweating. ¨ Shortness of breath. ¨ Nausea or vomiting. ¨ Pain, pressure, or a strange feeling in the back, neck, jaw, or upper belly or in one or both shoulders or arms. ¨ Lightheadedness or sudden weakness. ¨ A fast or irregular heartbeat. After you call 911, the  may tell you to chew 1 adult-strength or 2 to 4 low-dose aspirin. Wait for an ambulance. Do not try to drive yourself. · You have signs of a stroke. These may include: 
¨ Sudden numbness, paralysis, or weakness in your face, arm, or leg, especially on only one side of your body. ¨ New problems with walking or balance. ¨ Sudden vision changes. ¨ Drooling or slurred speech. ¨ New problems speaking or understanding simple statements, or feeling confused. ¨ A sudden, severe headache that is different from past headaches. · You passed out (lost consciousness). Call your doctor now or seek immediate medical care if: 
· You have muscle pain or weakness. Watch closely for changes in your health, and be sure to contact your doctor if: 
· You are very tired. · You have an upset stomach, gas, constipation, or belly pain or cramps. Where can you learn more? Go to [employer] Enter C406 in the search box to learn more about \"Hyperlipidemia: After Your Visit. \"  
 © 0363-5512 Healthwise, Incorporated. Care instructions adapted under license by [employer] (which disclaims liability or warranty for this information). This care instruction is for use with your licensed healthcare professional. If you have questions about a medical condition or this instruction, always ask your healthcare professional. [address] any warranty or liability for your use of this information. Content Version: 0.8.231915; Last Revised: October 13, 2011 Introducing [facility]! Dear Kerri Yao: Thank you for requesting a LoveThis account. Our records indicate that you already have an active LoveThis account. You can access your account anytime at [email or web address] Did you know that you can access your hospital and ER discharge instructions at any time in LoveThis? You can also review all of your test results from your hospital stay or ER visit. Additional Information If you have questions, please visit the Frequently Asked Questions section of the LoveThis website at [email or web address] Remember, LoveThis is NOT to be used for urgent needs. For medical emergencies, dial 911. Now available from your iPhone and Android! Please provide this summary of care documentation to your next provider. Your primary care clinician is listed as Raghu Joe. If you have any questions after today's visit, please call [phone].

## 2018-09-20 NOTE — PATIENT INSTRUCTIONS
Increase rosuvastatin to 3 days per week. Hyperlipidemia: After Your Visit  Your Care Instructions  Hyperlipidemia is too much fat in your blood. The body has several kinds of fat, including cholesterol and triglycerides. Your body needs fat for many things, such as making new cells. But too much fat in your blood increases your chances of having a heart attack or stroke. You may be able to lower your cholesterol and triglycerides with a heart-healthy diet, exercise, and if needed, medicine. Your doctor may want you to try lifestyle changes first to see whether they lower the fat in your blood. You may need to take medicine if lifestyle changes do not lower the fat in your blood enough. Follow-up care is a key part of your treatment and safety. Be sure to make and go to all appointments, and call your doctor if you are having problems. Its also a good idea to know your test results and keep a list of the medicines you take. How can you care for yourself at home? Take your medicines  · Take your medicines exactly as prescribed. Call your doctor if you think you are having a problem with your medicine. · If you take medicine to lower your cholesterol, go to follow-up visits. You will need to have blood tests. · Do not take large doses of niacin, which is a B vitamin, while taking medicine called statins. It may increase the chance of muscle pain and liver problems. · Talk to your doctor about avoiding grapefruit juice if you are taking statins. Grapefruit juice can raise the level of this medicine in your blood. This could increase side effects. Eat more fruits, vegetables, and fiber  · Fruits and vegetables have lots of nutrients that help protect against heart disease, and they have little--if any--fat. Try to eat at least five servings a day. Dark green, deep orange, or yellow fruits and vegetables are healthy choices. · Keep carrots, celery, and other veggies handy for snacks.  Buy fruit that is in season and store it where you can see it so that you will be tempted to eat it. Cook dishes that have a lot of veggies in them, such as stir-fries and soups. · Foods high in fiber may reduce your cholesterol and provide important vitamins and minerals. High-fiber foods include whole-grain cereals and breads, oatmeal, beans, brown rice, citrus fruits, and apples. · Buy whole-grain breads and cereals instead of white bread and pastries. Limit saturated fat  · Read food labels and try to avoid saturated fat and trans fat. They increase your risk of heart disease. · Use olive or canola oil when you cook. Try cholesterol-lowering spreads, such as Benecol or Take Control. · Bake, broil, grill, or steam foods instead of frying them. · Limit the amount of high-fat meats you eat, including hot dogs and sausages. Cut out all visible fat when you prepare meat. · Eat fish, skinless poultry, and soy products such as tofu instead of high-fat meats. Soybeans may be especially good for your heart. Eat at least two servings of fish a week. Certain fish, such as salmon, contain omega-3 fatty acids, which may help reduce your risk of heart attack. · Choose low-fat or fat-free milk and dairy products. Get exercise, limit alcohol, and quit smoking  · Get more exercise. Work with your doctor to set up an exercise program. Even if you can do only a small amount, exercise will help you get stronger, have more energy, and manage your weight and your stress. Walking is an easy way to get exercise. Gradually increase the amount you walk every day. Aim for at least 30 minutes on most days of the week. You also may want to swim, bike, or do other activities. · Limit alcohol to no more than 2 drinks a day for men and 1 drink a day for women. · Do not smoke. If you need help quitting, talk to your doctor about stop-smoking programs and medicines. These can increase your chances of quitting for good.   When should you call for help?  Call 911 anytime you think you may need emergency care. For example, call if:  · You have symptoms of a heart attack. These may include:  ¨ Chest pain or pressure, or a strange feeling in the chest.  ¨ Sweating. ¨ Shortness of breath. ¨ Nausea or vomiting. ¨ Pain, pressure, or a strange feeling in the back, neck, jaw, or upper belly or in one or both shoulders or arms. ¨ Lightheadedness or sudden weakness. ¨ A fast or irregular heartbeat. After you call 911, the  may tell you to chew 1 adult-strength or 2 to 4 low-dose aspirin. Wait for an ambulance. Do not try to drive yourself. · You have signs of a stroke. These may include:  ¨ Sudden numbness, paralysis, or weakness in your face, arm, or leg, especially on only one side of your body. ¨ New problems with walking or balance. ¨ Sudden vision changes. ¨ Drooling or slurred speech. ¨ New problems speaking or understanding simple statements, or feeling confused. ¨ A sudden, severe headache that is different from past headaches. · You passed out (lost consciousness). Call your doctor now or seek immediate medical care if:  · You have muscle pain or weakness. Watch closely for changes in your health, and be sure to contact your doctor if:  · You are very tired. · You have an upset stomach, gas, constipation, or belly pain or cramps. Where can you learn more? Go to [employer].be  Enter C406 in the search box to learn more about \"Hyperlipidemia: After Your Visit. \"   © 4669-8377 Healthwise, Incorporated. Care instructions adapted under license by [employer] (which disclaims liability or warranty for this information). This care instruction is for use with your licensed healthcare professional. If you have questions about a medical condition or this instruction, always ask your healthcare professional. [address] any warranty or liability for your use of this information.   Content Version: 9. 7.994770; Last Revised: October 13, 2011

## 2018-09-23 NOTE — PROGRESS NOTES
HPI:   Samm Witt is a very pleasant 69y.o. year old male who presents today for evaluation of hypertension, hyperlipidemia, polymyalgia rheumatica, esophageal hypercontractility, and BPH. He reports that he is doing very well. He states that he has successfully weaned from taking prednisone one month ago. He had previously had stopped taking prednisone in 11/2017, but then due to a resurgence of symptoms, would need to restart and take 1-2 mg daily. He states that since stopping one month ago, he has not had symptoms significant enough to require him to restart. He reports that he has regained essentially full range of motion of his shoulders without significant pain. He continues to deny any recurrence of dysphagia. He otherwise is without complaints and feeling well. He has a history of polymyalgia rheumatica, diagnosed in 7/2016 when he was seen for bilateral shoulder pain of several months duration. He had been under the care of Dr. John Owen since 3/2016 when he presented with right wrist pain followed by the development of bilateral shoulder pain. He was treated with a Medrol dose pack and Tramadol and upon follow-up on 5/3/2016, reported some improvement in the bilateral shoulder and wrist discomfort, but pain persisted. Bilateral shoulder MRI's (5/11/2016) showed severe degenerative changes on the right at the acromioclavicular joint with moderate to severe or severe tendinopathy of the supraspinatus and infraspinatus; postoperative changes with tendinopathy of the supraspinatus, infraspinatus, and long head of the biceps was seen on the left. In 5/2016, lab data showed ESR 28, C-Reactive Protein 1.1, negative CRISTI and RF. He was treated with naproxen, tramadol and acetaminophen. He was evaluated on 7/19/2016 for worsening bilateral shoulder pain with bilateral hand pain involving the wrists, MCP and PIP joints.  Evaluation included ESR 48; C-reactive protein 1.8; CRISTI positive; anti-RNP 1.4; negative antibodies to Zimmerman, dsDNA, anti-chromatin, RF and anti-CCP. Bilateral hand x-rays showed scattered arthritic changes with scattered small erosions. He was referred to see Dr. Amye Hodgkins who diagnosed polymyalgia rheumatica and started him on prednisone. Since initiating prednisone, he had significant improvement with nearly complete resolution of his symptoms. He has regained full use of his shoulders and has no trouble reaching behind his back or pulling a shirt over his head. He denies any headache, jaw claudication, scalp tenderness, fever, or visual changes. In 11/2016, he presented with a persistent non productive cough, sore throat, and hoarseness. He was referred to see Dr. Zelda Brand, and patient reports that a laryngoscopy was performed showing evidence of reflux. He was started on Nexium for two weeks with improvement in his cough, but he continued to have pain with swallowing. He was referred to see Dr. Kameron Cunningham, and underwent upper endoscopy (1/5/2017) showing abnormal esophageal motility with spastic and non-propulsive appearing contractions in the mid to distal esophagus (tortuous); mucosa appeared normal ( random biopsies: negative); normal stomach and duodenum. He underwent a barium swallow (1/11/2017) showing a small persistent smoothly marginated filling defect at the piriform sinus suggestive of an extrinsic mass effect. He subsequently had a neck CT scan (1/13/2017) which showed the right piriform sinus appeared larger than the left, but the walls of both were normal without surrounding mass or infiltrating process; nonspecific lymphadenopathy involving level 1 and 2 nodes. He underwent esophageal manometry, which showed a hypercontractile esophagus (\"Jackhammer\" esophagus). He was started on diltiazem 30 mg three times per day with resolution of symptoms. He discontinued diltiazem in 3/2017 without recurrence of symptoms. He has a history of hypertension, treated with lisinopril.  He exercises mainly by walking, and denies any chest pain, shortness of breath at rest or with exertion, palpitations, lightheadedness, or edema. He also has hyperlipidemia, treated with rosuvastatin two days per week. He also has a history of BPH and underwent TURP in 2006. In 2/2011, he had laser vaporization of the prostate to relieve bladder outlet obstructive symptoms. In 3/2011 he had a laser incision performed on the bladder neck contracture. In 5/2012 and 8/2012, he underwent transurethral incision of the bladder neck contracture. Biopsy of the contracture (8/2012) was negative for malignancy. Since that time, he has had annual cystoscopic surveillance of the bladder neck contracture by Dr. Essie Brown. In 8/2015, it was felt that no more surveillance was necessary as it had remained stable. He remains on Flomax and Proscar, and is followed by Dr. Essie Brown. He denies difficulty with stream, dysuria, hematuria, or nocturia. He had a screening colonoscopy in 4/2012 by Dr. Fátima Doe which was normal. Follow-up recommended for 10 years. He denies any abdominal pain, nausea, vomiting, melena, hematochezia, or change in bowel movements.       Past Medical History:   Diagnosis Date    Bladder neck contracture     BPH with obstruction/lower urinary tract symptoms     Essential hypertension with goal blood pressure less than 140/90     Family history of colon cancer     Hyperlipidemia     Osteoarthritis     PMR (polymyalgia rheumatica) ([facility] [address] 7/20/2016     Past Surgical History:   Procedure Laterality Date    CYSTOSCOPY      ENDOSCOPY, COLON, DIAGNOSTIC      HC BERENICE LASER [id number]  3/2/11    Laser incision of bladder neck contracture    HX MOHS PROCEDURES  1/11    rt hip repl    HX OTHER SURGICAL      tonsillectomy, back sx,     HX OTHER SURGICAL      basel cell removed from right ear lobe    HX TURP      Laser    IN LASER VAPORIZATION SURGERY PROSTATE, COMPLETE       Current Outpatient Prescriptions   Medication Sig    lisinopril (PRINIVIL, ZESTRIL) 5 mg tablet TAKE 1 TABLET BY MOUTH DAILY    DOCUSATE SODIUM (STOOL SOFTENER PO) Take  by mouth.  rosuvastatin (CRESTOR) 5 mg tablet Take 1 Tab by mouth two (2) days a week.  finasteride (PROSCAR) 5 mg tablet Take 1 Tab by mouth daily. Indications: SYMPTOMATIC BENIGN PROSTATIC HYPERPLASIA    multivitamin (ONE A DAY) tablet Take 1 Tab by mouth daily.  Cholecalciferol, Vitamin D3, (VITAMIN D3) 1,000 unit cap Take  by mouth daily.  GLUC/ERICA-MSM#1/C/CHARLY/RAMÓN/BOR (OSTEO BI-FLEX PO) Take 1 Tab by mouth daily.  aspirin 81 mg tablet Take 81 mg by mouth.  tamsulosin (FLOMAX) 0.4 mg capsule Take 1 Cap by mouth daily. Indications: SYMPTOMATIC BENIGN PROSTATIC HYPERPLASIA    fluticasone (FLONASE) 50 mcg/actuation nasal spray 2 Sprays by Both Nostrils route daily. No current facility-administered medications for this visit. Allergies and Intolerances:   No Known Allergies     Family History: His father  from CAD, and his brother has had CABG. His mother  from ovarian cancer. Family History   Problem Relation Age of Onset    Cancer Other     Heart Disease Other      Social History:   He  reports that he has never smoked. He has never used smokeless tobacco. He is  but has been living with his long time girlfriend Luciano Mercado for 37 years. He has two step children. He is a retired , and works on the [employer] force part-time.     History   Alcohol Use    4.2 oz/week    7 Glasses of wine per week     Immunization History:  Immunization History   Administered Date(s) Administered    Influenza High Dose Vaccine PF 2013, 2015, 11/10/2016, 2017    Influenza Vaccine 2014    Influenza Vaccine (Tri) Adjuvanted 2018    Influenza Vaccine Split 2012    Pneumococcal Conjugate (PCV-13) 2015    TDAP Vaccine 2011    ZZZ-RETIRED (DO NOT USE) Pneumococcal Vaccine (Unspecified Type) 03/20/2008    Zoster 04/24/2012       Review of Systems:   As above included in HPI. Otherwise 11 point review of systems negative including constitutional, skin, HENT, eyes, respiratory, cardiovascular, gastrointestinal, genitourinary, musculoskeletal, endocrine, hematologic, allergy, and neurologic. Physical:   Vitals:   BP: 122/76  HR: 75  WT: 253 lb 3.2 oz (114.9 kg)  BMI:  34.34 kg/m2    Exam:   Patient appears in no apparent distress. Affect is appropriate. HEENT --Anicteric sclerae, tympanic membranes normal,  ear canals normal.  PERRL, EOMI, conjunctiva and lids normal.   Sinuses were nontender, turbinates normal, hearing normal.  Oropharynx without  erythema, normal tongue, oral mucosa and tonsils. No cervical lymphadenopathy. No thyromegaly, JVD, or bruits. Carotid pulses 2+ with normal upstroke. Lungs --Clear to auscultation. No wheezing or rales. Heart --Regular rate and rhythm, no murmurs, rubs, gallops, or clicks. Chest wall --Nontender to palpation. PMI normal.  Abdomen -- Soft and nontender, no hepatosplenomegaly or masses. Extremities -- Without cyanosis, clubbing, edema. 2+ pulses equally and bilaterally. Normal looking digits, ROM intact. Right hand with 1 x 1 cm hard nodule on palm.   Derm - no obvious abnormalities noted, no rash    Review of Data:  Labs:  Hospital Outpatient Visit on 09/17/2018   Component Date Value Ref Range Status    LIPID PROFILE 09/17/2018        Final    Cholesterol, total 09/17/2018 187  <200 MG/DL Final    Triglyceride 09/17/2018 189* <150 MG/DL Final    HDL Cholesterol 09/17/2018 43  40 - 60 MG/DL Final    LDL, calculated 09/17/2018 106.2* 0 - 100 MG/DL Final    VLDL, calculated 09/17/2018 37.8  MG/DL Final    CHOL/HDL Ratio 09/17/2018 4.3  0 - 5.0   Final    Sodium 09/17/2018 139  136 - 145 mmol/L Final    Potassium 09/17/2018 4.5  3.5 - 5.5 mmol/L Final    Chloride 09/17/2018 102  100 - 108 mmol/L Final    CO2 09/17/2018 32  21 - 32 mmol/L Final    Anion gap 09/17/2018 5  3.0 - 18 mmol/L Final    Glucose 09/17/2018 96  74 - 99 mg/dL Final    BUN 09/17/2018 12  7.0 - 18 MG/DL Final    Creatinine 09/17/2018 1.03  0.6 - 1.3 MG/DL Final    BUN/Creatinine ratio 09/17/2018 12  12 - 20   Final    GFR est AA 09/17/2018 >60  >60 ml/min/1.73m2 Final    GFR est non-AA 09/17/2018 >60  >60 ml/min/1.73m2 Final    Calcium 09/17/2018 9.2  8.5 - 10.1 MG/DL Final    WBC 09/17/2018 6.1  4.6 - 13.2 K/uL Final    RBC 09/17/2018 4.62* 4.70 - 5.50 M/uL Final    HGB 09/17/2018 14.6  13.0 - 16.0 g/dL Final    HCT 09/17/2018 43.2  36.0 - 48.0 % Final    MCV 09/17/2018 93.5  74.0 - 97.0 FL Final    MCH 09/17/2018 31.6  24.0 - 34.0 PG Final    MCHC 09/17/2018 33.8  31.0 - 37.0 g/dL Final    RDW 09/17/2018 12.8  11.6 - 14.5 % Final    PLATELET 36/89/4177 943  135 - 420 K/uL Final    MPV 09/17/2018 9.5  9.2 - 11.8 FL Final    NEUTROPHILS 09/17/2018 63  40 - 73 % Final    LYMPHOCYTES 09/17/2018 28  21 - 52 % Final    MONOCYTES 09/17/2018 8  3 - 10 % Final    EOSINOPHILS 09/17/2018 1  0 - 5 % Final    BASOPHILS 09/17/2018 0  0 - 2 % Final    ABS. NEUTROPHILS 09/17/2018 3.8  1.8 - 8.0 K/UL Final    ABS. LYMPHOCYTES 09/17/2018 1.7  0.9 - 3.6 K/UL Final    ABS. MONOCYTES 09/17/2018 0.5  0.05 - 1.2 K/UL Final    ABS. EOSINOPHILS 09/17/2018 0.1  0.0 - 0.4 K/UL Final    ABS.  BASOPHILS 09/17/2018 0.0  0.0 - 0.1 K/UL Final    DF 09/17/2018 AUTOMATED    Final    Vit D 25-Hydroxy 09/17/2018 47  ng/mL Final    Vit D-2 25-Hydroxy 09/17/2018 <1.0  ng/mL Final    Vit D-3 25-Hydroxy 09/17/2018 47  ng/mL Final    Color 09/17/2018 YELLOW    Final    Appearance 09/17/2018 CLEAR    Final    Specific gravity 09/17/2018 1.013  1.005 - 1.030   Final    pH (UA) 09/17/2018 7.0  5.0 - 8.0   Final    Protein 09/17/2018 NEGATIVE   NEG mg/dL Final    Glucose 09/17/2018 NEGATIVE   NEG mg/dL Final    Ketone 09/17/2018 NEGATIVE   NEG mg/dL Final    Bilirubin 09/17/2018 NEGATIVE   NEG   Final    Blood 09/17/2018 NEGATIVE   NEG   Final    Urobilinogen 09/17/2018 0.2  0.2 - 1.0 EU/dL Final    Nitrites 09/17/2018 NEGATIVE   NEG   Final    Leukocyte Esterase 09/17/2018 NEGATIVE   NEG   Final           Health Maintenance:  Screening:    Colorectal: colonoscopy (4/2012) normal. Dr. Zana Aguirre. Due 2022. Depression: none   DM (HbA1c/FPG): FPG 96 (9/2018)   Hepatitis C: negative (7/2016)   Falls: none   DEXA: within normal limits (5/2017) Indication: chronic steroid use. PSA/HOLA: PSA 0.7 (1/2017); HOLA per Dr. Theodore Aggarwal   Glaucoma: regular eye exams with Dr. Ed Lacy (last 9/2018)   Smoking: none   Vitamin D: 47 (9/2018)   Medicare Wellness: 3/21/2018      Impression:  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    Hyperlipidemia E78.5    Vitamin D insufficiency E55.9    Hearing loss H91.90    Essential hypertension I10    Primary osteoarthritis involving multiple joints M15.0    PM[facility] M35.3    Hoarseness R49.0    Esophageal dysmotility K22.4    Positive CRISTI (antinuclear antibody)/ RNP antibody R76.8    Nodule of finger of right hand R22.31    Class 1 obesity due to excess calories with serious comorbidity and body mass index (BMI) of 34.0 to 34.9 in adult E66.09, Z68.34       Plan:  1. Hypertension. Blood pressure remains well controlled on lisinopril 5 mg daily. Renal function remains normal with creatinine 1.03/ eGFR >60. Continue to follow. 2. Hyperlipidemia. On low intensity dose rosuvastatin, taking 5 mg two days per week, with reasonable response with . Discussed possibly increasing to 3 days per week so as to achieve goal of <100. Emphasized importance of lifestyle modifications, including diet, exercise, and weight loss. 3. Polymyalgia rheumatica. Patient significantly improved with steroid treatment.  Currently successfully tapered off prednisone for one month and doing well without need to restart. Had previous success with temporary discontinuation in 11/2017, but developed an increase in mild joint complaints requiring him to restart 1-2 mg daily. Being followed by Dr. Lily Schneider. Known increased risk (10%) of concurrent giant cell arteritis with PMR, but no evidence of GCA currently. Also with positive CRISTI/ positive RNP Ab, but no evidence of inflammation elsewhere. Urinalysis continues to be negative for proteinuria. Continue to follow closely. Given chronic steroid use, checked bone density scan in 5/2017 which was normal.   4. Odynophagia. Secondary to esophageal dysmotility. Responded to diltiazem, and now successfully stopped taking it without recurrence of symptoms. Will continue to follow. 5. BPH with bladder neck contracture. Appears to be asymptomatic on current treatment with Flonase and Proscar. Followed by Dr. Neeru Arroyo. PSA normal. Continue to follow. 6. Hoarseness. Patient reports that it has improved to baseline. Using Flonase as needed. Follow. 7. Right hand nodule. Appears along a tendon sheath. Unchanged from six months ago. Suspect ganglion cyst although could also represent a tenosynovial giant cell tumor. However, patient wishing to hold off currently on referral to ortho given no significant discomfort. Will continue to follow closely. 8. Obesity. Emphasized importance of lifestyle modifications, including diet, exercise, and weight loss. Will readdress next visit. 9. Health maintenance. Will give influenza vaccine. Not a candidate for Shingrix given uncertainty in patients with autoimmune disorders and possibility of inducing a flare. Other immunizations up to date. Colonoscopy due 2022. Continue regular eye exams with Dr. Lyn Javed. Vitamin D level normal. Continue maintenance dose supplement. Medicare wellness visit up to date. Patient understands recommendations and agrees with plan.   Follow-up in 6 months.

## 2018-09-24 LAB
25(OH)D2 SERPL-MCNC: <1 NG/ML
25(OH)D3 SERPL-MCNC: 47 NG/ML
25(OH)D3+25(OH)D2 SERPL-MCNC: 47 NG/ML

## 2018-12-11 RX ORDER — ROSUVASTATIN CALCIUM 5 MG/1
TABLET, COATED ORAL
Qty: 45 TAB | Refills: 3 | Status: SHIPPED | OUTPATIENT
Start: 2018-12-11 | End: 2019-06-24 | Stop reason: SDUPTHER

## 2019-03-12 ENCOUNTER — APPOINTMENT (OUTPATIENT)
Dept: INTERNAL MEDICINE CLINIC | Age: 79
End: 2019-03-12

## 2019-03-12 ENCOUNTER — HOSPITAL ENCOUNTER (OUTPATIENT)
Dept: LAB | Age: 79
Discharge: HOME | End: 2019-03-12
Payer: MEDICARE

## 2019-03-12 DIAGNOSIS — R22.31 NODULE OF FINGER OF RIGHT HAND: ICD-10-CM

## 2019-03-12 DIAGNOSIS — E55.9 VITAMIN D INSUFFICIENCY: ICD-10-CM

## 2019-03-12 DIAGNOSIS — M35.3 PMR (POLYMYALGIA RHEUMATICA) (HCC): ICD-10-CM

## 2019-03-12 DIAGNOSIS — I10 ESSENTIAL HYPERTENSION: ICD-10-CM

## 2019-03-12 DIAGNOSIS — E78.5 HYPERLIPIDEMIA, UNSPECIFIED HYPERLIPIDEMIA TYPE: ICD-10-CM

## 2019-03-12 DIAGNOSIS — N13.8 BPH WITH OBSTRUCTION/LOWER URINARY TRACT SYMPTOMS: ICD-10-CM

## 2019-03-12 DIAGNOSIS — E66.09 CLASS 1 OBESITY DUE TO EXCESS CALORIES WITH SERIOUS COMORBIDITY AND BODY MASS INDEX (BMI) OF 34.0 TO 34.9 IN ADULT: ICD-10-CM

## 2019-03-12 DIAGNOSIS — N40.1 BPH WITH OBSTRUCTION/LOWER URINARY TRACT SYMPTOMS: ICD-10-CM

## 2019-03-12 DIAGNOSIS — R49.0 HOARSENESS: ICD-10-CM

## 2019-03-12 DIAGNOSIS — Z23 ENCOUNTER FOR IMMUNIZATION: ICD-10-CM

## 2019-03-12 DIAGNOSIS — R76.8 POSITIVE ANA (ANTINUCLEAR ANTIBODY): ICD-10-CM

## 2019-03-12 DIAGNOSIS — N32.0 BLADDER NECK CONTRACTURE: ICD-10-CM

## 2019-03-12 DIAGNOSIS — K22.4 ESOPHAGEAL DYSMOTILITY: ICD-10-CM

## 2019-03-12 DIAGNOSIS — M15.9 PRIMARY OSTEOARTHRITIS INVOLVING MULTIPLE JOINTS: ICD-10-CM

## 2019-03-12 LAB
APPEARANCE UR: CLEAR
BACTERIA URNS QL MICRO: NEGATIVE /HPF
BASOPHILS # BLD: 0 K/UL (ref 0–0.1)
BASOPHILS NFR BLD: 0 % (ref 0–2)
BILIRUB UR QL: NEGATIVE
COLOR UR: YELLOW
DIFFERENTIAL METHOD BLD: ABNORMAL
EOSINOPHIL # BLD: 0.1 K/UL (ref 0–0.4)
EOSINOPHIL NFR BLD: 1 % (ref 0–5)
EPITH CASTS URNS QL MICRO: NORMAL /LPF (ref 0–5)
ERYTHROCYTE [DISTWIDTH] IN BLOOD BY AUTOMATED COUNT: 13.5 % (ref 11.6–14.5)
GLUCOSE UR STRIP.AUTO-MCNC: NEGATIVE MG/DL
HCT VFR BLD AUTO: 46 % (ref 36–48)
HGB BLD-MCNC: 14.4 G/DL (ref 13–16)
HGB UR QL STRIP: NEGATIVE
KETONES UR QL STRIP.AUTO: NEGATIVE MG/DL
LEUKOCYTE ESTERASE UR QL STRIP.AUTO: NEGATIVE
LYMPHOCYTES # BLD: 1.1 K/UL (ref 0.9–3.6)
LYMPHOCYTES NFR BLD: 14 % (ref 21–52)
MCH RBC QN AUTO: 31.5 PG (ref 24–34)
MCHC RBC AUTO-ENTMCNC: 31.3 G/DL (ref 31–37)
MCV RBC AUTO: 100.7 FL (ref 74–97)
MONOCYTES # BLD: 1.1 K/UL (ref 0.05–1.2)
MONOCYTES NFR BLD: 14 % (ref 3–10)
NEUTS SEG # BLD: 5.7 K/UL (ref 1.8–8)
NEUTS SEG NFR BLD: 71 % (ref 40–73)
NITRITE UR QL STRIP.AUTO: NEGATIVE
PH UR STRIP: 7.5 [PH] (ref 5–8)
PLATELET # BLD AUTO: 197 K/UL (ref 135–420)
PMV BLD AUTO: 9.9 FL (ref 9.2–11.8)
PROT UR STRIP-MCNC: NEGATIVE MG/DL
RBC # BLD AUTO: 4.57 M/UL (ref 4.7–5.5)
RBC #/AREA URNS HPF: 0 /HPF (ref 0–5)
SP GR UR REFRACTOMETRY: 1.02 (ref 1–1.03)
UROBILINOGEN UR QL STRIP.AUTO: 1 EU/DL (ref 0.2–1)
WBC # BLD AUTO: 8.1 K/UL (ref 4.6–13.2)
WBC URNS QL MICRO: NORMAL /HPF (ref 0–4)

## 2019-03-12 PROCEDURE — 80061 LIPID PANEL: CPT

## 2019-03-12 PROCEDURE — 85025 COMPLETE CBC W/AUTO DIFF WBC: CPT

## 2019-03-12 PROCEDURE — 80053 COMPREHEN METABOLIC PANEL: CPT

## 2019-03-12 PROCEDURE — 84443 ASSAY THYROID STIM HORMONE: CPT

## 2019-03-12 PROCEDURE — 81001 URINALYSIS AUTO W/SCOPE: CPT

## 2019-03-12 PROCEDURE — 36415 COLL VENOUS BLD VENIPUNCTURE: CPT

## 2019-03-12 PROCEDURE — 82306 VITAMIN D 25 HYDROXY: CPT

## 2019-03-13 LAB
25(OH)D3 SERPL-MCNC: 49.4 NG/ML (ref 30–100)
ALBUMIN SERPL-MCNC: 4.4 G/DL (ref 3.4–5)
ALBUMIN/GLOB SERPL: 1.6 {RATIO} (ref 0.8–1.7)
ALP SERPL-CCNC: 81 U/L (ref 45–117)
ALT SERPL-CCNC: 25 U/L (ref 16–61)
ANION GAP SERPL CALC-SCNC: 6 MMOL/L (ref 3–18)
AST SERPL-CCNC: 13 U/L (ref 15–37)
BILIRUB SERPL-MCNC: 0.5 MG/DL (ref 0.2–1)
BUN SERPL-MCNC: 15 MG/DL (ref 7–18)
BUN/CREAT SERPL: 13 (ref 12–20)
CALCIUM SERPL-MCNC: 8.5 MG/DL (ref 8.5–10.1)
CHLORIDE SERPL-SCNC: 105 MMOL/L (ref 100–108)
CHOLEST SERPL-MCNC: 165 MG/DL
CO2 SERPL-SCNC: 29 MMOL/L (ref 21–32)
CREAT SERPL-MCNC: 1.2 MG/DL (ref 0.6–1.3)
GLOBULIN SER CALC-MCNC: 2.8 G/DL (ref 2–4)
GLUCOSE SERPL-MCNC: 99 MG/DL (ref 74–99)
HDLC SERPL-MCNC: 51 MG/DL (ref 40–60)
HDLC SERPL: 3.2 {RATIO} (ref 0–5)
LDLC SERPL CALC-MCNC: 89 MG/DL (ref 0–100)
LIPID PROFILE,FLP: NORMAL
POTASSIUM SERPL-SCNC: 4.9 MMOL/L (ref 3.5–5.5)
PROT SERPL-MCNC: 7.2 G/DL (ref 6.4–8.2)
SODIUM SERPL-SCNC: 140 MMOL/L (ref 136–145)
TRIGL SERPL-MCNC: 125 MG/DL (ref ?–150)
TSH SERPL DL<=0.05 MIU/L-ACNC: 1.5 UIU/ML (ref 0.36–3.74)
VLDLC SERPL CALC-MCNC: 25 MG/DL

## 2019-03-19 ENCOUNTER — OFFICE VISIT (OUTPATIENT)
Dept: INTERNAL MEDICINE CLINIC | Age: 79
End: 2019-03-19

## 2019-03-19 VITALS
HEART RATE: 84 BPM | HEIGHT: 72 IN | TEMPERATURE: 98 F | BODY MASS INDEX: 34.4 KG/M2 | RESPIRATION RATE: 14 BRPM | SYSTOLIC BLOOD PRESSURE: 128 MMHG | OXYGEN SATURATION: 97 % | WEIGHT: 254 LBS | DIASTOLIC BLOOD PRESSURE: 74 MMHG

## 2019-03-19 DIAGNOSIS — I10 ESSENTIAL HYPERTENSION: Primary | ICD-10-CM

## 2019-03-19 DIAGNOSIS — N40.1 BPH WITH OBSTRUCTION/LOWER URINARY TRACT SYMPTOMS: ICD-10-CM

## 2019-03-19 DIAGNOSIS — E55.9 VITAMIN D INSUFFICIENCY: ICD-10-CM

## 2019-03-19 DIAGNOSIS — M15.9 PRIMARY OSTEOARTHRITIS INVOLVING MULTIPLE JOINTS: ICD-10-CM

## 2019-03-19 DIAGNOSIS — R76.8 POSITIVE ANA (ANTINUCLEAR ANTIBODY): ICD-10-CM

## 2019-03-19 DIAGNOSIS — K22.4 ESOPHAGEAL DYSMOTILITY: ICD-10-CM

## 2019-03-19 DIAGNOSIS — N32.0 BLADDER NECK CONTRACTURE: ICD-10-CM

## 2019-03-19 DIAGNOSIS — E78.5 HYPERLIPIDEMIA, UNSPECIFIED HYPERLIPIDEMIA TYPE: ICD-10-CM

## 2019-03-19 DIAGNOSIS — Z00.00 MEDICARE ANNUAL WELLNESS VISIT, SUBSEQUENT: ICD-10-CM

## 2019-03-19 DIAGNOSIS — R49.0 HOARSENESS: ICD-10-CM

## 2019-03-19 DIAGNOSIS — M35.3 PMR (POLYMYALGIA RHEUMATICA) (HCC): ICD-10-CM

## 2019-03-19 DIAGNOSIS — N13.8 BPH WITH OBSTRUCTION/LOWER URINARY TRACT SYMPTOMS: ICD-10-CM

## 2019-03-19 DIAGNOSIS — Z71.89 ACP (ADVANCE CARE PLANNING): ICD-10-CM

## 2019-03-19 DIAGNOSIS — E66.09 CLASS 1 OBESITY DUE TO EXCESS CALORIES WITH SERIOUS COMORBIDITY AND BODY MASS INDEX (BMI) OF 34.0 TO 34.9 IN ADULT: ICD-10-CM

## 2019-03-19 DIAGNOSIS — R22.31 NODULE OF FINGER OF RIGHT HAND: ICD-10-CM

## 2019-03-19 NOTE — PATIENT INSTRUCTIONS
Medicare Wellness Visit, Male    The best way to improve and maintain good health is to have a healthy lifestyle by eating a well-balanced diet, exercising regularly, limiting alcohol and stopping smoking. Regular visits with your physician or non-physician health care provider also support your good health. Preventive screening tests can find health problems before they become diseases or illnesses. Preventive services such as immunizations prevent serious infections. All people over age 72 should have a Pneumovax and a Prevnar-13 shot to prevent potentially life threatening infections with the pneumococcus bacteria, a common cause of pneumonia. These are once in a lifetime unless you and your provider decide differently. All people over 65 should have a yearly influenza vaccine or \"flu\" shot. This does not prevent infection with cold viruses but has been proven to prevent hospitalization and death from influenza. Although Medicare part B \"regular Medicare\" currently only covers tetanus vaccination in the context of an injury, a tetanus vaccine (Tdap or Td) is recommended every 10 years. A shingles vaccine is recommended once in a lifetime after age 61. The Shingles vaccine is also not covered by Medicare part B. Note, however, that both the Shingles vaccine and Tdap/Td are generally covered by secondary carriers. Please check your coverage and out of pocket expenses. Consider contacting your local health department because it may stock these vaccines for a reasonable charge.     We currently have documentation of the following immunization history for you:  Immunization History   Administered Date(s) Administered    Influenza High Dose Vaccine PF 11/14/2013, 11/30/2015, 11/10/2016, 09/14/2017    Influenza Vaccine 11/17/2014    Influenza Vaccine (Tri) Adjuvanted 09/20/2018    Influenza Vaccine Split 11/29/2012    Pneumococcal Conjugate (PCV-13) 05/29/2015    TDAP Vaccine 04/05/2011    ZZZ-RETIRED (DO NOT USE) Pneumococcal Vaccine (Unspecified Type) 03/20/2008    Zoster 04/24/2012       Screening for infection with Hepatitis C is recommended for anyone born between [address] through 1964. The table at the bottom of this document indicates the status of this and other screening services. Screening for diabetes mellitus with a blood sugar test (glucose) should be done at least every 3 years until age 79. You and your health care provider may decide whether to continue screening after age 79. The most recent blood glucose we have on file for you is:   Lab Results   Component Value Date/Time    Glucose 99 03/12/2019 09:10 AM       Glaucoma is a disease of the eye due to increased ocular pressure that can lead to blindness. People with risk factors for glaucoma ( race, diabetes, family history) should be screened at least every 2 years by an eye professional. This may be covered annually if indicated as determined by you and your doctor. Cardiovascular screening tests that check for elevated lipids or cholesterol (fatty part of blood) which can lead to heart disease and strokes should be done every 4-6 years through age 79. You and your health care provider may decide whether to continue screening after age 79. The most recent lipid panel we have on file for you is:   Lab Results   Component Value Date/Time    Cholesterol, total 165 03/12/2019 09:10 AM    HDL Cholesterol 51 03/12/2019 09:10 AM    LDL, calculated 89 03/12/2019 09:10 AM    VLDL, calculated 25 03/12/2019 09:10 AM    Triglyceride 125 03/12/2019 09:10 AM    CHOL/HDL Ratio 3.2 03/12/2019 09:10 AM       Colorectal cancer screening that evaluates for blood or polyps in your colon for people with average risk should be done yearly as a stool test, every five years as a flexible sigmoidoscope or every 10 years as a colonoscopy up to age 76.  You and your health care provider may decide whether to continue screening after age 76.    Men up to age 76 may elect to screen for prostate cancer with a blood test called a PSA at certain intervals, depending on their personal and family history. This decision is between the patient and his provider. The most recent PSA values we have on file for you are:  Lab Results   Component Value Date/Time    Prostate Specific Ag 1.0 06/13/2018 09:37 AM    Prostate Specific Ag 0.5 06/08/2017 11:00 AM    Prostate Specific Ag 0.7 01/31/2017 08:06 AM    Prostate Specific Ag 0.5 06/04/2015 02:44 PM    Prostate Specific Ag 0.4 05/09/2014 08:16 AM    Prostate Specific Ag 0.4 05/16/2013 10:09 AM       If you have been a smoker or had family history of abdominal aortic aneurysms, you and your provider may decide to schedule an ultrasound test of your aorta. Our records show this was done on: n/a    People who have smoked the equivalent of 1 pack per day for 30 years or more may benefit from screening for lung cancer with a yearly low dose CT scan until they have been non smokers for 15 years or competing health conditions render this unlikely to be beneficial. Our records show:n/a    Your Medicare Wellness Exam is recommended annually.     Here is a list of your current Health Maintenance items with a due date:  Health Maintenance   Topic Date Due    Shingrix Vaccine Age 50> (1 of 2) 06/15/1991    GLAUCOMA SCREENING Q2Y  09/19/2019    MEDICARE YEARLY EXAM  03/19/2020    DTaP/Tdap/Td series (2 - Td) 04/05/2021    COLONOSCOPY  04/17/2022    Pneumococcal 65+ Low/Medium Risk  Completed    Influenza Age 5 to Adult  Completed

## 2019-03-19 NOTE — PROGRESS NOTES
This is the Subsequent Medicare Annual Wellness Exam, performed 12 months or more after the Initial AWV or the last Subsequent AWV    I have reviewed the patient's medical history in detail and updated the computerized patient record. History     Past Medical History:   Diagnosis Date    Bladder neck contracture     BPH with obstruction/lower urinary tract symptoms     Essential hypertension with goal blood pressure less than 140/90     Family history of colon cancer     Hyperlipidemia     Osteoarthritis     PMR (polymyalgia rheumatica) ([address] 7/20/2016      Past Surgical History:   Procedure Laterality Date    CYSTOSCOPY      ENDOSCOPY, COLON, DIAGNOSTIC      HC BERENICE LASER [id number]  3/2/11    Laser incision of bladder neck contracture    HX MOHS PROCEDURES  1/11    rt hip repl    HX OTHER SURGICAL      tonsillectomy, back sx,     HX OTHER SURGICAL      basel cell removed from right ear lobe    HX TURP      Laser    [place] LASER VAPORIZATION SURGERY PROSTATE, COMPLETE       Current Outpatient Medications   Medication Sig Dispense Refill    rosuvastatin (CRESTOR) 5 mg tablet TAKE 1 TABLET BY MOUTH 2 DAYS PER WEEK 45 Tab 3    lisinopril (PRINIVIL, ZESTRIL) 5 mg tablet TAKE 1 TABLET BY MOUTH DAILY 90 Tab 3    DOCUSATE SODIUM (STOOL SOFTENER PO) Take  by mouth.  finasteride (PROSCAR) 5 mg tablet Take 1 Tab by mouth daily. Indications: SYMPTOMATIC BENIGN PROSTATIC HYPERPLASIA 90 Tab 3    multivitamin (ONE A DAY) tablet Take 1 Tab by mouth daily.  Cholecalciferol, Vitamin D3, (VITAMIN D3) 1,000 unit cap Take  by mouth daily.  GLUC/ERICA-MSM#1/C/CHARLY/RAMÓN/BOR (OSTEO BI-FLEX PO) Take 1 Tab by mouth daily.  aspirin 81 mg tablet Take 81 mg by mouth.  fluticasone (FLONASE) 50 mcg/actuation nasal spray 2 Sprays by Both Nostrils route daily.  3 Bottle 3     No Known Allergies  Family History   Problem Relation Age of Onset    Cancer Other     Heart Disease Other      Social History Tobacco Use    Smoking status: Never Smoker    Smokeless tobacco: Never Used   Substance Use Topics    Alcohol use: Yes     Alcohol/week: 4.2 oz     Types: 7 Glasses of wine per week     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    Hyperlipidemia E78.5    Vitamin D insufficiency E55.9    Hearing loss H91.90    Essential hypertension I10    Primary osteoarthritis involving multiple joints M15.0    PMR (polymyalgia rheumatica) (HCC) M35.3    Hoarseness R49.0    Esophageal dysmotility K22.4    Positive CRISTI (antinuclear antibody)/ RNP antibody R76.8    Nodule of finger of right hand R22.31    Class 1 obesity due to excess calories with serious comorbidity and body mass index (BMI) of 34.0 to 34.9 in adult E66.09, Z68.34       Depression Risk Factor Screening:     3 most recent PHQ Screens 3/19/2019   Little interest or pleasure in doing things Not at all   Feeling down, depressed, irritable, or hopeless Not at all   Total Score PHQ 2 0     Alcohol Risk Factor Screening: You do not drink alcohol or very rarely. Functional Ability and Level of Safety:   Hearing Loss  Hearing is good. Activities of Daily Living  The home contains: no safety equipment. Patient does total self care    Fall Risk  Fall Risk Assessment, last 12 mths 3/19/2019   Able to walk? Yes   Fall in past 12 months?  No       Abuse Screen  Patient is not abused    Cognitive Screening   Evaluation of Cognitive Function:  Has your family/caregiver stated any concerns about your memory: no  Normal    Patient Care Team   Patient Care Team:  Inna Lau MD as PCP - General (Internal Medicine)  Mercedes Maza MD (Urology)  Libra Dash MD (Dermatology)  Simi Pyle MD (Gastroenterology)  Angela Guzman MD (Gastroenterology)  Salomon De Luna MD (Ophthalmology)  Shima Zhong, DO (Rheumatology)    Assessment/Plan   Education and counseling provided:  Are appropriate based on today's review and evaluation  End-of-Life planning (with patient's consent)  Influenza Vaccine  Colorectal cancer screening tests  Cardiovascular screening blood test  Screening for glaucoma  Diabetes screening test  Shingrix vaccine    Diagnoses and all orders for this visit:    1. Essential hypertension    2. Hyperlipidemia, unspecified hyperlipidemia type    3. PMR (polymyalgia rheumatica) (HCC)    4. Esophageal dysmotility    5. BPH with obstruction/lower urinary tract symptoms    6. Bladder neck contracture    7. Hoarseness    8. Class 1 obesity due to excess calories with serious comorbidity and body mass index (BMI) of 34.0 to 34.9 in adult    9. Nodule of finger of right hand    10. Positive CRISTI (antinuclear antibody)/ RNP antibody    11. Primary osteoarthritis involving multiple joints    12. Vitamin D insufficiency    13. Medicare annual wellness visit, subsequent    14.  ACP (advance care planning)        Health Maintenance Due   Topic Date Due    Shingrix Vaccine Age 49> (1 of 2) 06/15/1991    Pneumococcal 65+ years (2 of 2 - PPSV23) 05/29/2016

## 2019-03-19 NOTE — PROGRESS NOTES
Chief Complaint   Patient presents with    Hypertension     6 month follow up with lab review. Health Maintenance Due   Topic Date Due    Shingrix Vaccine Age 49> (1 of 2) 06/15/1991    MEDICARE YEARLY EXAM  03/22/2019     1. Have you been to the ER, urgent care clinic or hospitalized since your last visit? NO.     2. Have you seen or consulted any other health care providers outside of the [address] [place] [address] since your last visit (Include any pap smears or colon screening)?  NO

## 2019-03-19 NOTE — ACP (ADVANCE CARE PLANNING)
Advance Care Planning (ACP) Provider Note - Comprehensive     Date of ACP Conversation: 03/19/19  Persons included in Conversation:  patient  Length of ACP Conversation in minutes:  16 minutes    Authorized Decision Maker (if patient is incapable of making informed decisions): Long-term girlfriend, Jessica Wu  This person is:  Healthcare Agent/Medical Power of  under Advance Directive          General ACP for ALL Patients with Decision Making Capacity:   Importance of advance care planning, including choosing a healthcare agent to communicate patient's healthcare decisions if patient lost the ability to make decisions, such as after a sudden illness or accident  Understanding of the healthcare agent role was assessed and information provided  Exploration of values, goals, and preferences if recovery is not expected, even with continued medical treatment in the event of: Imminent death  Severe, permanent brain injury  \"In these circumstances, what matters most to you? \"  Care focused more on comfort or quality of life. Review of Existing Advance Directive:  Patient has an existing advance directive completed previously with an . It designates his girlfriend  as his healthcare agents and expresses that he does not wish life prolonging procedures for end of life care. It was recommended that he bring a copy to the office to scan into the chart.      For Serious or Chronic Illness:  Understanding of medical condition      Interventions Provided:  Discussed existing Advance Directive   Recommended communicating the plan and making copies for the healthcare agent, personal physician, and others as appropriate (e.g., health system)  Recommended review of completed ACP document annually or upon change in health status

## 2019-03-23 NOTE — PROGRESS NOTES
HPI:   Sarahi Cole is a very pleasant 69y.o. year old male who presents today for a physical exam and for evaluation of hypertension, hyperlipidemia, polymyalgia rheumatica, esophageal hypercontractility, and BPH. He reports that he is doing relatively well. He reports that he has had an upper respiratory infection for the last several days with nasal congestion and cough, but reports that he feels that it is resolving. He denies any fever, chills, or shortness of breath. He also states that he is currently not taking any prednisone, but did have to restart approximately one month ago for increasing shoulder and proximal leg pain. He states that he took 3 mg for 1 week and decreased by 1 mg weekly until tapered completely. He states that his symptoms have now improved. He continues to deny any recurrence of dysphagia. He otherwise is without complaints and feeling generally well. He has a history of polymyalgia rheumatica, diagnosed in 7/2016 when he was seen for bilateral shoulder pain of several months duration. He had been under the care of Dr. Ramone Barnard since 3/2016 when he presented with right wrist pain followed by the development of bilateral shoulder pain. He was treated with a Medrol dose pack and Tramadol and upon follow-up on 5/3/2016, reported some improvement in the bilateral shoulder and wrist discomfort, but pain persisted. Bilateral shoulder MRI's (5/11/2016) showed severe degenerative changes on the right at the acromioclavicular joint with moderate to severe or severe tendinopathy of the supraspinatus and infraspinatus; postoperative changes with tendinopathy of the supraspinatus, infraspinatus, and long head of the biceps was seen on the left. In 5/2016, lab data showed ESR 28, C-Reactive Protein 1.1, negative CRISTI and RF. He was treated with naproxen, tramadol and acetaminophen.    He was evaluated on 7/19/2016 for worsening bilateral shoulder pain with bilateral hand pain involving the wrists, MCP and PIP joints. Evaluation included ESR 48; C-reactive protein 1.8; CRISTI positive; anti-RNP 1.4; negative antibodies to Zimmerman, dsDNA, anti-chromatin, RF and anti-CCP. Bilateral hand x-rays showed scattered arthritic changes with scattered small erosions. He was referred to see Dr. Jamin Pressley who diagnosed polymyalgia rheumatica and started him on prednisone. Since initiating prednisone, he had significant improvement with nearly complete resolution of his symptoms. He has regained full use of his shoulders and has no trouble reaching behind his back or pulling a shirt over his head. He denies any headache, jaw claudication, scalp tenderness, fever, or visual changes. In 11/2016, he presented with a persistent non productive cough, sore throat, and hoarseness. He was referred to see Dr. Neeru Pettit, and patient reports that a laryngoscopy was performed showing evidence of reflux. He was started on Nexium for two weeks with improvement in his cough, but he continued to have pain with swallowing. He was referred to see Dr. Santa Perdomo, and underwent upper endoscopy (1/5/2017) showing abnormal esophageal motility with spastic and non-propulsive appearing contractions in the mid to distal esophagus (tortuous); mucosa appeared normal ( random biopsies: negative); normal stomach and duodenum. He underwent a barium swallow (1/11/2017) showing a small persistent smoothly marginated filling defect at the piriform sinus suggestive of an extrinsic mass effect. He subsequently had a neck CT scan (1/13/2017) which showed the right piriform sinus appeared larger than the left, but the walls of both were normal without surrounding mass or infiltrating process; nonspecific lymphadenopathy involving level 1 and 2 nodes. He underwent esophageal manometry, which showed a hypercontractile esophagus (\"Jackhammer\" esophagus). He was started on diltiazem 30 mg three times per day with resolution of symptoms.  He discontinued diltiazem in 3/2017 without recurrence of symptoms. He has a history of hypertension, treated with lisinopril. He exercises mainly by walking, and denies any chest pain, shortness of breath at rest or with exertion, palpitations, lightheadedness, or edema. He also has hyperlipidemia, treated with rosuvastatin two days per week. He also has a history of BPH and underwent TURP in 2006. In 2/2011, he had laser vaporization of the prostate to relieve bladder outlet obstructive symptoms. In 3/2011 he had a laser incision performed on the bladder neck contracture. In 5/2012 and 8/2012, he underwent transurethral incision of the bladder neck contracture. Biopsy of the contracture (8/2012) was negative for malignancy. Since that time, he has had annual cystoscopic surveillance of the bladder neck contracture by Dr. Mauro Neal. In 8/2015, it was felt that no more surveillance was necessary as it had remained stable. He continues to take Proscar, but reports that he stopped Flomax on his own and began taking Super Beta prostate supplement instead. He being followed by Dr. Mauro Neal. He denies difficulty with stream, dysuria, hematuria, or nocturia. He had a screening colonoscopy in 4/2012 by Dr. Orville Alvarado which was normal. Follow-up recommended for 10 years. He denies any abdominal pain, nausea, vomiting, melena, hematochezia, or change in bowel movements.       Past Medical History:   Diagnosis Date    Bladder neck contracture     BPH with obstruction/lower urinary tract symptoms     Essential hypertension with goal blood pressure less than 140/90     Family history of colon cancer     Hyperlipidemia     Osteoarthritis     PMR (polymyalgia rheumatica) ([facility] [address] 7/20/2016     Past Surgical History:   Procedure Laterality Date    CYSTOSCOPY      ENDOSCOPY, COLON, DIAGNOSTIC      HC BERENICE LASER [id number]  3/2/11    Laser incision of bladder neck contracture    HX MOHS PROCEDURES  1/11    rt hip repl    HX OTHER SURGICAL tonsillectomy, back sx,     HX OTHER SURGICAL      basel cell removed from right ear lobe    HX TURP      Laser    VT LASER VAPORIZATION SURGERY PROSTATE, COMPLETE       Current Outpatient Medications   Medication Sig    OTHER     rosuvastatin (CRESTOR) 5 mg tablet TAKE 1 TABLET BY MOUTH 2 DAYS PER WEEK    lisinopril (PRINIVIL, ZESTRIL) 5 mg tablet TAKE 1 TABLET BY MOUTH DAILY    DOCUSATE SODIUM (STOOL SOFTENER PO) Take  by mouth.  finasteride (PROSCAR) 5 mg tablet Take 1 Tab by mouth daily. Indications: SYMPTOMATIC BENIGN PROSTATIC HYPERPLASIA    multivitamin (ONE A DAY) tablet Take 1 Tab by mouth daily.  Cholecalciferol, Vitamin D3, (VITAMIN D3) 1,000 unit cap Take  by mouth daily.  GLUC/ERICA-MSM#1/C/CHARLY/RAMÓN/BOR (OSTEO BI-FLEX PO) Take 1 Tab by mouth daily.  aspirin 81 mg tablet Take 81 mg by mouth.  tamsulosin (FLOMAX) 0.4 mg capsule Take 1 Cap by mouth daily. Indications: SYMPTOMATIC BENIGN PROSTATIC HYPERPLASIA    fluticasone (FLONASE) 50 mcg/actuation nasal spray 2 Sprays by Both Nostrils route daily. No current facility-administered medications for this visit. Allergies and Intolerances:   No Known Allergies     Family History: His father  from CAD, and his brother has had CABG. His mother  from ovarian cancer. Family History   Problem Relation Age of Onset    Cancer Other     Heart Disease Other      Social History:   He  reports that he has never smoked. He has never used smokeless tobacco. He is  but has been living with his long time girlfriend Ramesh Hamilton) for 37 years. He has two step children. He is a retired , and works on the [employer] force part-time.     Social History     Substance and Sexual Activity   Alcohol Use Yes    Alcohol/week: 4.2 oz    Types: 7 Glasses of wine per week     Immunization History:  Immunization History   Administered Date(s) Administered    Influenza High Dose Vaccine PF 2013, 2015, 11/10/2016, 09/14/2017    Influenza Vaccine 11/17/2014    Influenza Vaccine (Tri) Adjuvanted 09/20/2018    Influenza Vaccine Split 11/29/2012    Pneumococcal Conjugate (PCV-13) 05/29/2015    TDAP Vaccine 04/05/2011    ZZZ-RETIRED (DO NOT USE) Pneumococcal Vaccine (Unspecified Type) 03/20/2008    Zoster 04/24/2012       Review of Systems:   As above included in HPI. Otherwise 11 point review of systems negative including constitutional, skin, HENT, eyes, respiratory, cardiovascular, gastrointestinal, genitourinary, musculoskeletal, endocrine, hematologic, allergy, and neurologic. Physical:   Vitals:   BP: 128/74  HR: 84  WT: 254 lb (115.2 kg)  BMI:  34.45 kg/m2    Exam:   Patient appears in no apparent distress. Affect is appropriate. HEENT --Anicteric sclerae, tympanic membranes normal,  ear canals normal.  PERRL, EOMI, conjunctiva and lids normal.   Sinuses were nontender, turbinates normal, hearing normal.  Oropharynx without  erythema, normal tongue, oral mucosa and tonsils. No cervical lymphadenopathy. No thyromegaly, JVD, or bruits. Carotid pulses 2+ with normal upstroke. Lungs --Clear to auscultation. No wheezing or rales. Heart --Regular rate and rhythm, no murmurs, rubs, gallops, or clicks. Chest wall --Nontender to palpation. PMI normal.  Abdomen -- Soft and nontender, no hepatosplenomegaly or masses. Extremities -- Without cyanosis, clubbing, edema. 2+ pulses equally and bilaterally. Normal looking digits, ROM intact. Right hand with 1 x 1 cm hard nodule on palm.   Derm - no obvious abnormalities noted, no rash    Review of Data:  Labs:  Hospital Outpatient Visit on 03/12/2019   Component Date Value Ref Range Status    WBC 03/12/2019 8.1  4.6 - 13.2 K/uL Final    RBC 03/12/2019 4.57* 4.70 - 5.50 M/uL Final    HGB 03/12/2019 14.4  13.0 - 16.0 g/dL Final    HCT 03/12/2019 46.0  36.0 - 48.0 % Final    MCV 03/12/2019 100.7* 74.0 - 97.0 FL Final    MCH 03/12/2019 31.5  24.0 - 34.0 PG Final    MCHC 03/12/2019 31.3  31.0 - 37.0 g/dL Final    RDW 03/12/2019 13.5  11.6 - 14.5 % Final    PLATELET 32/63/5816 121  135 - 420 K/uL Final    MPV 03/12/2019 9.9  9.2 - 11.8 FL Final    NEUTROPHILS 03/12/2019 71  40 - 73 % Final    LYMPHOCYTES 03/12/2019 14* 21 - 52 % Final    MONOCYTES 03/12/2019 14* 3 - 10 % Final    EOSINOPHILS 03/12/2019 1  0 - 5 % Final    BASOPHILS 03/12/2019 0  0 - 2 % Final    ABS. NEUTROPHILS 03/12/2019 5.7  1.8 - 8.0 K/UL Final    ABS. LYMPHOCYTES 03/12/2019 1.1  0.9 - 3.6 K/UL Final    ABS. MONOCYTES 03/12/2019 1.1  0.05 - 1.2 K/UL Final    ABS. EOSINOPHILS 03/12/2019 0.1  0.0 - 0.4 K/UL Final    ABS. BASOPHILS 03/12/2019 0.0  0.0 - 0.1 K/UL Final    DF 03/12/2019 AUTOMATED    Final    LIPID PROFILE 03/12/2019        Final    Cholesterol, total 03/12/2019 165  <200 MG/DL Final    Triglyceride 03/12/2019 125  <150 MG/DL Final    HDL Cholesterol 03/12/2019 51  40 - 60 MG/DL Final    LDL, calculated 03/12/2019 89  0 - 100 MG/DL Final    VLDL, calculated 03/12/2019 25  MG/DL Final    CHOL/HDL Ratio 03/12/2019 3.2  0 - 5.0   Final    Sodium 03/12/2019 140  136 - 145 mmol/L Final    Potassium 03/12/2019 4.9  3.5 - 5.5 mmol/L Final    Chloride 03/12/2019 105  100 - 108 mmol/L Final    CO2 03/12/2019 29  21 - 32 mmol/L Final    Anion gap 03/12/2019 6  3.0 - 18 mmol/L Final    Glucose 03/12/2019 99  74 - 99 mg/dL Final    BUN 03/12/2019 15  7.0 - 18 MG/DL Final    Creatinine 03/12/2019 1.20  0.6 - 1.3 MG/DL Final    BUN/Creatinine ratio 03/12/2019 13  12 - 20   Final    GFR est AA 03/12/2019 >60  >60 ml/min/1.73m2 Final    GFR est non-AA 03/12/2019 59* >60 ml/min/1.73m2 Final    Calcium 03/12/2019 8.5  8.5 - 10.1 MG/DL Final    Bilirubin, total 03/12/2019 0.5  0.2 - 1.0 MG/DL Final    ALT (SGPT) 03/12/2019 25  16 - 61 U/L Final    AST (SGOT) 03/12/2019 13* 15 - 37 U/L Final    Alk.  phosphatase 03/12/2019 81  45 - 117 U/L Final    Protein, total 03/12/2019 7.2  6.4 - 8.2 g/dL Final    Albumin 03/12/2019 4.4  3.4 - 5.0 g/dL Final    Globulin 03/12/2019 2.8  2.0 - 4.0 g/dL Final    A-G Ratio 03/12/2019 1.6  0.8 - 1.7   Final    TSH 03/12/2019 1.50  0.36 - 3.74 uIU/mL Final    Color 03/12/2019 YELLOW    Final    Appearance 03/12/2019 CLEAR    Final    Specific gravity 03/12/2019 1.020  1.005 - 1.030   Final    pH (UA) 03/12/2019 7.5  5.0 - 8.0   Final    Protein 03/12/2019 NEGATIVE   NEG mg/dL Final    Glucose 03/12/2019 NEGATIVE   NEG mg/dL Final    Ketone 03/12/2019 NEGATIVE   NEG mg/dL Final    Bilirubin 03/12/2019 NEGATIVE   NEG   Final    Blood 03/12/2019 NEGATIVE   NEG   Final    Urobilinogen 03/12/2019 1.0  0.2 - 1.0 EU/dL Final    Nitrites 03/12/2019 NEGATIVE   NEG   Final    Leukocyte Esterase 03/12/2019 NEGATIVE   NEG   Final    WBC 03/12/2019 0 to 1  0 - 4 /hpf Final    RBC 03/12/2019 0  0 - 5 /hpf Final    Epithelial cells 03/12/2019 FEW  0 - 5 /lpf Final    Bacteria 03/12/2019 NEGATIVE   NEG /hpf Final    Vitamin D 25-Hydroxy 03/12/2019 49.4  30 - 100 ng/mL Final           Health Maintenance:  Screening:    Colorectal: colonoscopy (4/2012) normal. Dr. Dav Devries. Due 2022. Depression: none   DM (HbA1c/FPG): FPG 99 (3/2019)   Hepatitis C: negative (7/2016)   Falls: none   DEXA: within normal limits (5/2017) Indication chronic steroid use.    PSA/HOLA: PSA 0.7 (1/2017); HOLA per Dr. Meme Everett   Glaucoma: regular eye exams with Dr. Zarina Campos (last 9/2018)   Smoking: none   Vitamin D: 49.4 (3/2019)   Medicare Wellness: today      Impression:  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    Hyperlipidemia E78.5    Vitamin D insufficiency E55.9    Hearing loss H91.90    Essential hypertension I10    Primary osteoarthritis involving multiple joints M15.0    PM[facility]a) [facility] M35.3    Hoarseness R49.0    Esophageal dysmotility K22.4    Positive CRISTI (antinuclear antibody)/ RNP antibody R76.8    Nodule of finger of right hand R22.31    Class 1 obesity due to excess calories with serious comorbidity and body mass index (BMI) of 34.0 to 34.9 in adult E66.09, Z68.34       Plan:  1. Hypertension. Blood pressure remains well controlled on lisinopril 5 mg daily. Renal function with mild decrease today with creatinine 1.20/ eGFR 59. Encouraged to increase fluid intake and avoid NSAIDS. Will continue to follow. 2. Hyperlipidemia. On low intensity dose rosuvastatin, taking 5 mg three days per week, with good response with LDL 89 and HDL 51. Emphasized importance of lifestyle modifications, including diet, exercise, and weight loss. 3. Polymyalgia rheumatica. Patient significantly improved with steroid treatment. Currently successfully tapered off prednisone for one week after restarting for increased pain. Finding need to use prednisone intermittently for pain control. Being followed by Dr. Shellie Bob. Known increased risk (10%) of concurrent giant cell arteritis with PMR, but no evidence of GCA currently. Also with positive CRISTI/ positive RNP Ab, but no evidence of inflammation elsewhere. Urinalysis continues to be negative for proteinuria. Continue to follow closely. Given chronic steroid use, checked bone density scan in 5/2017 which was normal.   4. Odynophagia. Secondary to esophageal dysmotility. Responded to diltiazem, and now successfully stopped taking it without recurrence of symptoms. Will continue to follow. 5. BPH with bladder neck contracture. Appears to be asymptomatic on current treatment with Proscar. Patient decided to discontinue Flomax and is now taking an over the counter prostate supplement. Not currently experiencing an increase in LUT symptoms. Being followed by Dr. Russell Loya. PSA normal. Continue to follow. 6. Hoarseness. Patient reports that it has improved to baseline. Using Flonase as needed. Follow.   7. Right hand nodule. Appears along a tendon sheath. Unchanged from six months ago. Suspect ganglion cyst although could also represent a tenosynovial giant cell tumor. However, patient wishing to hold off currently on referral to ortho given no significant discomfort. Reports that it is unchanged today and not causing him any difficulty. Will continue to follow closely. 8. Obesity. Emphasized importance of lifestyle modifications, including diet, exercise, and weight loss. Will readdress next visit. 9. Health maintenance. Will give influenza vaccine. Discussed Shingrix vaccine and possibility of inducing a flare with administration. However, patient wishing to proceed. Will obtain from pharmacy. Other immunizations up to date. Colonoscopy due 2022. Continue regular eye exams with Dr. Anna Walker. Vitamin D level normal. Continue maintenance dose supplement. In addition, an annual Medicare wellness visit was done today. Patient understands recommendations and agrees with plan. Follow-up in 6 months.

## 2019-06-12 ENCOUNTER — HOSPITAL ENCOUNTER (OUTPATIENT)
Dept: LAB | Age: 79
Discharge: HOME | End: 2019-06-12
Payer: MEDICARE

## 2019-06-12 ENCOUNTER — OFFICE VISIT (OUTPATIENT)
Dept: UROLOGY | Age: 79
End: 2019-06-12

## 2019-06-12 VITALS
HEART RATE: 80 BPM | OXYGEN SATURATION: 98 % | HEIGHT: 72 IN | BODY MASS INDEX: 34.81 KG/M2 | DIASTOLIC BLOOD PRESSURE: 75 MMHG | SYSTOLIC BLOOD PRESSURE: 132 MMHG | WEIGHT: 257 LBS

## 2019-06-12 DIAGNOSIS — N13.8 BENIGN PROSTATIC HYPERPLASIA WITH URINARY OBSTRUCTION: ICD-10-CM

## 2019-06-12 DIAGNOSIS — N13.8 BENIGN PROSTATIC HYPERPLASIA WITH URINARY OBSTRUCTION: Primary | ICD-10-CM

## 2019-06-12 DIAGNOSIS — N40.1 BENIGN PROSTATIC HYPERPLASIA WITH URINARY OBSTRUCTION: Primary | ICD-10-CM

## 2019-06-12 DIAGNOSIS — N40.1 BENIGN PROSTATIC HYPERPLASIA WITH URINARY OBSTRUCTION: ICD-10-CM

## 2019-06-12 LAB
BILIRUB UR QL STRIP: NEGATIVE
GLUCOSE UR-MCNC: NEGATIVE MG/DL
KETONES P FAST UR STRIP-MCNC: NEGATIVE MG/DL
PH UR STRIP: 6 [PH] (ref 4.6–8)
PROT UR QL STRIP: NEGATIVE
SP GR UR STRIP: 1.01 (ref 1–1.03)
UA UROBILINOGEN AMB POC: NORMAL (ref 0.2–1)
URINALYSIS CLARITY POC: CLEAR
URINALYSIS COLOR POC: YELLOW
URINE BLOOD POC: NEGATIVE
URINE LEUKOCYTES POC: NEGATIVE
URINE NITRITES POC: NEGATIVE

## 2019-06-12 PROCEDURE — 84153 ASSAY OF PSA TOTAL: CPT

## 2019-06-12 NOTE — PATIENT INSTRUCTIONS
Prostate Cancer Screening: Care Instructions  Your Care Instructions    The prostate gland is an organ found just below a man's bladder. It is the size and shape of a walnut. It surrounds the tube that carries urine from the bladder out of the body through the penis. This tube is called the urethra. Prostate cancer is the abnormal growth of cells in the prostate. It is the second most common type of cancer in men. (Skin cancer is the most common.)  Most cases of prostate cancer occur in men older than 72. The disease runs in families. And it's more common in -American men. When it's found and treated early, prostate cancer may be cured. But it is not always treated. This is because prostate cancer may not shorten your life, especially if you are older and the cancer is growing slowly. Follow-up care is a key part of your treatment and safety. Be sure to make and go to all appointments, and call your doctor if you are having problems. It's also a good idea to know your test results and keep a list of the medicines you take. What are the screening tests for prostate cancer? The main screening test for prostate cancer is the prostate-specific antigen (PSA) test. This is a blood test that measures how much PSA is in your blood. A high level may mean that you have an enlargement, an infection, or cancer. Along with the PSA test, you may have a digital rectal exam. The digital (finger) rectal exam checks for anything abnormal in your prostate. To do the exam, the doctor puts a lubricated, gloved finger into your rectum. If these tests suggest cancer, you may need a prostate biopsy. How is prostate cancer diagnosed? In a biopsy, the doctor takes small tissue samples from your prostate gland. Another doctor then looks at the tissue under a microscope to see if there are cancer cells, signs of infection, or other problems. The results help diagnose prostate cancer.   What are the pros and cons of screening? Neither a PSA test nor a digital rectal exam can tell you for sure that you do or do not have cancer. But they can help you decide if you need more tests, such as a prostate biopsy. Screening tests may be useful because most men with prostate cancer don't have symptoms. It can be hard to know if you have cancer until it is more advanced. And then it's harder to treat. But having a PSA test can also cause harm. The test may show high levels of PSA that aren't caused by cancer. So you could have a prostate biopsy you didn't need. Or the PSA test might be normal when there is cancer, so a cancer might not be found early. The test can also find cancers that would never have caused a problem during your lifetime. So you might have treatment that was not needed. Prostate cancer usually develops late in life and grows slowly. For many men, it does not shorten their lives. Some experts advise screening only for men who are at high risk. Talk with your doctor to see if screening is right for you. Where can you learn more? Go to http://felicia-myles.info/. Enter R550 in the search box to learn more about \"Prostate Cancer Screening: Care Instructions. \"  Current as of: March 27, 2018  Content Version: 11.9  © 3961-2963 [employer]. Care instructions adapted under license by [employer] (which disclaims liability or warranty for this information). If you have questions about a medical condition or this instruction, always ask your healthcare professional. [place] [address] any warranty or liability for your use of this information.

## 2019-06-12 NOTE — PROGRESS NOTES
Mr. Yehuda Bailey has a reminder for a \"due or due soon\" health maintenance. I have asked that he contact his primary care provider for follow-up on this health maintenance.

## 2019-06-13 LAB — PSA SERPL-MCNC: 0.6 NG/ML (ref 0–4)

## 2019-06-13 NOTE — PROGRESS NOTES
Christiano Herbert [facility] 69 y.o. male     Mr. Silvestre Gitelman seen today for annual prostate evaluation currently on alpha-blocker and 5 alpha reductase inhibitor therapy Flomax and finasteride patient is voiding well with solid stream good control nocturia once per night no complaints regarding urination at this time    PSA 0.4 in 2014  PSA 0.5 in April 2015  PSA 0.7 in January 2017  PSA 1.0 in June 2018      June 2018  PVR 15 cc in June 2019      Review of Systems:    CNS: No seizures syncope and into dizziness  Respiratory: No wheezing or shortness of breath  Cardiovascular:Hypertension  Intestinal:Diverticulosis  Urinary: Obstructive prostatism with urinary retention relieved by the laser TURP 2006/bladder neck contracture incised 2012  Skeletal: Large joint arthritis  Endocrine:No diabetes or thyroid disease  Other:    Allergies: No Known Allergies   Medications:    Current Outpatient Medications   Medication Sig Dispense Refill    rosuvastatin (CRESTOR) 5 mg tablet TAKE 1 TABLET BY MOUTH 2 DAYS PER WEEK 45 Tab 3    lisinopril (PRINIVIL, ZESTRIL) 5 mg tablet TAKE 1 TABLET BY MOUTH DAILY 90 Tab 3    DOCUSATE SODIUM (STOOL SOFTENER PO) Take  by mouth.  finasteride (PROSCAR) 5 mg tablet Take 1 Tab by mouth daily. Indications: SYMPTOMATIC BENIGN PROSTATIC HYPERPLASIA 90 Tab 3    multivitamin (ONE A DAY) tablet Take 1 Tab by mouth daily.  Cholecalciferol, Vitamin D3, (VITAMIN D3) 1,000 unit cap Take  by mouth daily.  GLUC/ERICA-MSM#1/C/CHARLY/RAMÓN/BOR (OSTEO BI-FLEX PO) Take 1 Tab by mouth daily.  aspirin 81 mg tablet Take 81 mg by mouth.  fluticasone (FLONASE) 50 mcg/actuation nasal spray 2 Sprays by Both Nostrils route daily.  3 Bottle 3       Past Medical History:   Diagnosis Date    Bladder neck contracture     BPH with obstruction/lower urinary tract symptoms     Essential hypertension with goal blood pressure less than 140/90     Family history of colon cancer     Hyperlipidemia  Osteoarthritis     [facility] 7/20/2016      Past Surgical History:   Procedure Laterality Date    CYSTOSCOPY      ENDOSCOPY, COLON, DIAGNOSTIC      HC BERENICE LASER [id number]  3/2/11    Laser incision of bladder neck contracture    HX MOHS PROCEDURES  1/11    rt hip repl    HX OTHER SURGICAL      tonsillectomy, back sx,     HX OTHER SURGICAL      basel cell removed from right ear lobe    HX TURP      Laser    MD LASER VAPORIZATION SURGERY PROSTATE, COMPLETE       Social History     Socioeconomic History    Marital status: SINGLE     Spouse name: Not on file    Number of children: Not on file    Years of education: Not on file    Highest education level: Not on file   Occupational History    Not on file   Social Needs    Financial resource strain: Not on file    Food insecurity:     Worry: Not on file     Inability: Not on file    Transportation needs:     Medical: Not on file     Non-medical: Not on file   Tobacco Use    Smoking status: Never Smoker    Smokeless tobacco: Never Used   Substance and Sexual Activity    Alcohol use:  Yes     Alcohol/week: 4.2 oz     Types: 7 Glasses of wine per week    Drug use: No    Sexual activity: Yes     Partners: Female   Lifestyle    Physical activity:     Days per week: Not on file     Minutes per session: Not on file    Stress: Not on file   Relationships    Social connections:     Talks on phone: Not on file     Gets together: Not on file     Attends Anabaptist service: Not on file     Active member of club or organization: Not on file     Attends meetings of clubs or organizations: Not on file     Relationship status: Not on file    Intimate partner violence:     Fear of current or ex partner: Not on file     Emotionally abused: Not on file     Physically abused: Not on file     Forced sexual activity: Not on file   Other Topics Concern    Not on file   Social History Narrative    Not on file      Family History   Problem Relation Age of Onset    Cancer Other     Heart Disease Other         Physical Examination: Well-nourished mature male in no apparent distress    Prostate by HOLA is large rounded smooth benign consistency nontender-no induration no nodularity  No rectal masses induration or tenderness      Urinalysis: Negative dipstick/nitrite negative heme-negative    PVR today 15 cc    Impression: Symptomatic BPH responding favorably to alpha-blocker and 5 alpha reductase inhibitor therapy        Plan: Flomax 0.4 mg daily 90 refill x3            Finasteride 5 mg daily 90 refill x3    PSA today    RTC 1 year PSA HOLA PVR        More than 1/2 of this 15 minute visit was spent in counselling and coordination of care, as described above. Alivn Mcghee MD  -electronically signed-    PLEASE NOTE:  This document has been produced using voice recognition software. Unrecognized errors in transcription may be present.

## 2019-06-24 RX ORDER — ROSUVASTATIN CALCIUM 5 MG/1
5 TABLET, COATED ORAL
Qty: 36 TAB | Refills: 3 | Status: SHIPPED | OUTPATIENT
Start: 2019-06-24 | End: 2020-05-28 | Stop reason: SDUPTHER

## 2019-06-24 NOTE — TELEPHONE ENCOUNTER
Dose was increased per last OV note- new Rx pended, please sign if appropriate. Last Visit: 3/19/19 with MD Gunjan Romero  Next Appointment: 9/24/19 with MD Gunjan Romero    Requested Prescriptions     Pending Prescriptions Disp Refills    rosuvastatin (CRESTOR) 5 mg tablet 36 Tab 3     Sig: Take 1 Tab by mouth three (3) days a week. Note from last OV:  2. Hyperlipidemia.  On low intensity dose rosuvastatin, taking 5 mg three days per week, with good response

## 2019-07-18 DIAGNOSIS — N40.1 BENIGN PROSTATIC HYPERPLASIA WITH URINARY OBSTRUCTION: ICD-10-CM

## 2019-07-18 DIAGNOSIS — N13.8 BENIGN PROSTATIC HYPERPLASIA WITH URINARY OBSTRUCTION: ICD-10-CM

## 2019-07-18 RX ORDER — FINASTERIDE 5 MG/1
TABLET, FILM COATED ORAL
Qty: 90 TAB | Refills: 3 | Status: SHIPPED | OUTPATIENT
Start: 2019-07-18 | End: 2021-01-21 | Stop reason: SDUPTHER

## 2019-09-04 RX ORDER — LISINOPRIL 5 MG/1
TABLET ORAL
Qty: 90 TAB | Refills: 3 | Status: SHIPPED | OUTPATIENT
Start: 2019-09-04 | End: 2020-08-29

## 2019-09-17 ENCOUNTER — HOSPITAL ENCOUNTER (OUTPATIENT)
Dept: LAB | Age: 79
Discharge: HOME | End: 2019-09-17
Payer: MEDICARE

## 2019-09-17 ENCOUNTER — APPOINTMENT (OUTPATIENT)
Dept: INTERNAL MEDICINE CLINIC | Age: 79
End: 2019-09-17

## 2019-09-17 DIAGNOSIS — M35.3 PMR (POLYMYALGIA RHEUMATICA) (HCC): ICD-10-CM

## 2019-09-17 DIAGNOSIS — E66.09 CLASS 1 OBESITY DUE TO EXCESS CALORIES WITH SERIOUS COMORBIDITY AND BODY MASS INDEX (BMI) OF 34.0 TO 34.9 IN ADULT: ICD-10-CM

## 2019-09-17 DIAGNOSIS — N40.1 BPH WITH OBSTRUCTION/LOWER URINARY TRACT SYMPTOMS: ICD-10-CM

## 2019-09-17 DIAGNOSIS — E55.9 VITAMIN D INSUFFICIENCY: ICD-10-CM

## 2019-09-17 DIAGNOSIS — N32.0 BLADDER NECK CONTRACTURE: ICD-10-CM

## 2019-09-17 DIAGNOSIS — R22.31 NODULE OF FINGER OF RIGHT HAND: ICD-10-CM

## 2019-09-17 DIAGNOSIS — N13.8 BPH WITH OBSTRUCTION/LOWER URINARY TRACT SYMPTOMS: ICD-10-CM

## 2019-09-17 DIAGNOSIS — M15.9 PRIMARY OSTEOARTHRITIS INVOLVING MULTIPLE JOINTS: ICD-10-CM

## 2019-09-17 DIAGNOSIS — E78.5 HYPERLIPIDEMIA, UNSPECIFIED HYPERLIPIDEMIA TYPE: ICD-10-CM

## 2019-09-17 DIAGNOSIS — R76.8 POSITIVE ANA (ANTINUCLEAR ANTIBODY): ICD-10-CM

## 2019-09-17 DIAGNOSIS — K22.4 ESOPHAGEAL DYSMOTILITY: ICD-10-CM

## 2019-09-17 DIAGNOSIS — R49.0 HOARSENESS: ICD-10-CM

## 2019-09-17 DIAGNOSIS — I10 ESSENTIAL HYPERTENSION: ICD-10-CM

## 2019-09-17 LAB
ALBUMIN SERPL-MCNC: 4.2 G/DL (ref 3.4–5)
ALBUMIN/GLOB SERPL: 1.5 {RATIO} (ref 0.8–1.7)
ALP SERPL-CCNC: 73 U/L (ref 45–117)
ALT SERPL-CCNC: 25 U/L (ref 16–61)
ANION GAP SERPL CALC-SCNC: 6 MMOL/L (ref 3–18)
APPEARANCE UR: CLEAR
AST SERPL-CCNC: 15 U/L (ref 10–38)
BACTERIA URNS QL MICRO: NEGATIVE /HPF
BASOPHILS # BLD: 0 K/UL (ref 0–0.1)
BASOPHILS NFR BLD: 0 % (ref 0–2)
BILIRUB SERPL-MCNC: 0.6 MG/DL (ref 0.2–1)
BILIRUB UR QL: NEGATIVE
BUN SERPL-MCNC: 17 MG/DL (ref 7–18)
BUN/CREAT SERPL: 15 (ref 12–20)
CALCIUM SERPL-MCNC: 8.9 MG/DL (ref 8.5–10.1)
CHLORIDE SERPL-SCNC: 105 MMOL/L (ref 100–111)
CO2 SERPL-SCNC: 29 MMOL/L (ref 21–32)
COLOR UR: YELLOW
CREAT SERPL-MCNC: 1.1 MG/DL (ref 0.6–1.3)
DIFFERENTIAL METHOD BLD: ABNORMAL
EOSINOPHIL # BLD: 0.1 K/UL (ref 0–0.4)
EOSINOPHIL NFR BLD: 2 % (ref 0–5)
EPITH CASTS URNS QL MICRO: NORMAL /LPF (ref 0–5)
ERYTHROCYTE [DISTWIDTH] IN BLOOD BY AUTOMATED COUNT: 13.6 % (ref 11.6–14.5)
GLOBULIN SER CALC-MCNC: 2.8 G/DL (ref 2–4)
GLUCOSE SERPL-MCNC: 102 MG/DL (ref 74–99)
GLUCOSE UR STRIP.AUTO-MCNC: NEGATIVE MG/DL
HCT VFR BLD AUTO: 44.2 % (ref 36–48)
HGB BLD-MCNC: 13.9 G/DL (ref 13–16)
HGB UR QL STRIP: NEGATIVE
KETONES UR QL STRIP.AUTO: NEGATIVE MG/DL
LEUKOCYTE ESTERASE UR QL STRIP.AUTO: NEGATIVE
LYMPHOCYTES # BLD: 1.8 K/UL (ref 0.9–3.6)
LYMPHOCYTES NFR BLD: 29 % (ref 21–52)
MCH RBC QN AUTO: 31.4 PG (ref 24–34)
MCHC RBC AUTO-ENTMCNC: 31.4 G/DL (ref 31–37)
MCV RBC AUTO: 99.8 FL (ref 74–97)
MONOCYTES # BLD: 0.6 K/UL (ref 0.05–1.2)
MONOCYTES NFR BLD: 9 % (ref 3–10)
NEUTS SEG # BLD: 3.7 K/UL (ref 1.8–8)
NEUTS SEG NFR BLD: 60 % (ref 40–73)
NITRITE UR QL STRIP.AUTO: NEGATIVE
PH UR STRIP: 6 [PH] (ref 5–8)
PLATELET # BLD AUTO: 215 K/UL (ref 135–420)
PMV BLD AUTO: 9.8 FL (ref 9.2–11.8)
POTASSIUM SERPL-SCNC: 4.5 MMOL/L (ref 3.5–5.5)
PROT SERPL-MCNC: 7 G/DL (ref 6.4–8.2)
PROT UR STRIP-MCNC: NEGATIVE MG/DL
RBC # BLD AUTO: 4.43 M/UL (ref 4.7–5.5)
RBC #/AREA URNS HPF: 0 /HPF (ref 0–5)
SODIUM SERPL-SCNC: 140 MMOL/L (ref 136–145)
SP GR UR REFRACTOMETRY: 1.02 (ref 1–1.03)
UROBILINOGEN UR QL STRIP.AUTO: 0.2 EU/DL (ref 0.2–1)
WBC # BLD AUTO: 6.1 K/UL (ref 4.6–13.2)
WBC URNS QL MICRO: NORMAL /HPF (ref 0–4)

## 2019-09-17 PROCEDURE — 81001 URINALYSIS AUTO W/SCOPE: CPT

## 2019-09-17 PROCEDURE — 36415 COLL VENOUS BLD VENIPUNCTURE: CPT

## 2019-09-17 PROCEDURE — 85025 COMPLETE CBC W/AUTO DIFF WBC: CPT

## 2019-09-17 PROCEDURE — 80061 LIPID PANEL: CPT

## 2019-09-17 PROCEDURE — 80053 COMPREHEN METABOLIC PANEL: CPT

## 2019-09-18 LAB
CHOLEST SERPL-MCNC: 171 MG/DL
HDLC SERPL-MCNC: 44 MG/DL (ref 40–60)
HDLC SERPL: 3.9 {RATIO} (ref 0–5)
LDLC SERPL CALC-MCNC: 100.6 MG/DL (ref 0–100)
LIPID PROFILE,FLP: ABNORMAL
TRIGL SERPL-MCNC: 132 MG/DL (ref ?–150)
VLDLC SERPL CALC-MCNC: 26.4 MG/DL

## 2019-09-24 ENCOUNTER — OFFICE VISIT (OUTPATIENT)
Dept: INTERNAL MEDICINE CLINIC | Age: 79
End: 2019-09-24

## 2019-09-24 ENCOUNTER — TELEPHONE (OUTPATIENT)
Dept: INTERNAL MEDICINE CLINIC | Age: 79
End: 2019-09-24

## 2019-09-24 VITALS
OXYGEN SATURATION: 98 % | HEART RATE: 85 BPM | BODY MASS INDEX: 34.4 KG/M2 | TEMPERATURE: 98 F | SYSTOLIC BLOOD PRESSURE: 120 MMHG | HEIGHT: 72 IN | DIASTOLIC BLOOD PRESSURE: 70 MMHG | RESPIRATION RATE: 14 BRPM | WEIGHT: 254 LBS

## 2019-09-24 DIAGNOSIS — M35.3 PMR (POLYMYALGIA RHEUMATICA) (HCC): ICD-10-CM

## 2019-09-24 DIAGNOSIS — M15.9 PRIMARY OSTEOARTHRITIS INVOLVING MULTIPLE JOINTS: ICD-10-CM

## 2019-09-24 DIAGNOSIS — I10 ESSENTIAL HYPERTENSION: Primary | ICD-10-CM

## 2019-09-24 DIAGNOSIS — Z23 ENCOUNTER FOR IMMUNIZATION: ICD-10-CM

## 2019-09-24 DIAGNOSIS — N40.1 BPH WITH OBSTRUCTION/LOWER URINARY TRACT SYMPTOMS: ICD-10-CM

## 2019-09-24 DIAGNOSIS — E55.9 VITAMIN D INSUFFICIENCY: ICD-10-CM

## 2019-09-24 DIAGNOSIS — E66.09 CLASS 1 OBESITY DUE TO EXCESS CALORIES WITH SERIOUS COMORBIDITY AND BODY MASS INDEX (BMI) OF 34.0 TO 34.9 IN ADULT: ICD-10-CM

## 2019-09-24 DIAGNOSIS — K22.4 ESOPHAGEAL DYSMOTILITY: ICD-10-CM

## 2019-09-24 DIAGNOSIS — R76.8 POSITIVE ANA (ANTINUCLEAR ANTIBODY): ICD-10-CM

## 2019-09-24 DIAGNOSIS — N32.0 BLADDER NECK CONTRACTURE: ICD-10-CM

## 2019-09-24 DIAGNOSIS — E78.5 HYPERLIPIDEMIA, UNSPECIFIED HYPERLIPIDEMIA TYPE: ICD-10-CM

## 2019-09-24 DIAGNOSIS — N13.8 BPH WITH OBSTRUCTION/LOWER URINARY TRACT SYMPTOMS: ICD-10-CM

## 2019-09-24 DIAGNOSIS — R73.01 ABNORMAL FASTING GLUCOSE: ICD-10-CM

## 2019-09-24 DIAGNOSIS — R22.31 NODULE OF FINGER OF RIGHT HAND: ICD-10-CM

## 2019-09-24 NOTE — TELEPHONE ENCOUNTER
Please request recent eye exam from Dr. Iva Macedo . Patient reports being seen in 9/2018 . Thank you.

## 2019-09-24 NOTE — PROGRESS NOTES
Chief Complaint   Patient presents with    Hypertension     6 month follow up with labs. Health Maintenance Due   Topic Date Due    Shingrix Vaccine Age 49> (1 of 2) 06/15/1991    Pneumococcal 65+ years (2 of 2 - PPSV23) 05/29/2016    Influenza Age 5 to Adult  08/01/2019    GLAUCOMA SCREENING Q2Y  09/19/2019     Patient given influenza vaccine, FLUAD, in left deltoid, per verbal order from Dr. Felipe De León with read back. Instructed patient to sit and wait 10-20 minutes before leaving the premises so that we can watch for any complications or adverse reactions. Patient given vaccine information statement handout before vaccine was given. Patient tolerated well without adverse reactions or complications. 1. Have you been to the ER, urgent care clinic or hospitalized since your last visit? NO.     2. Have you seen or consulted any other health care providers outside of the [address] [place] [address] since your last visit (Include any pap smears or colon screening)? NO      Do you have an Advanced Directive? YES, patient brought in a copy today. Learning Assessment 9/24/2019   PRIMARY LEARNER Patient   BARRIERS PRIMARY LEARNER -   CO-LEARNER CAREGIVER -   PRIMARY LANGUAGE ENGLISH   LEARNER PREFERENCE PRIMARY LISTENING   ANSWERED BY patient   RELATIONSHIP SELF     Abuse Screening Questionnaire 9/24/2019   Do you ever feel afraid of your partner? N   Are you in a relationship with someone who physically or mentally threatens you? N   Is it safe for you to go home? Y     3 most recent PHQ Screens 9/24/2019   Little interest or pleasure in doing things Not at all   Feeling down, depressed, irritable, or hopeless Not at all   Total Score PHQ 2 0     Fall Risk Assessment, last 12 mths 9/24/2019   Able to walk? Yes   Fall in past 12 months?  No

## 2019-09-28 NOTE — PROGRESS NOTES
HPI:   Jameson Gaona is a very pleasant 67y.o. year old male who presents today for a routine visit and for evaluation of hypertension, hyperlipidemia, polymyalgia rheumatica, esophageal hypercontractility, and BPH. He reports that he is doing relatively well. He states that he remains off prednisone for his PMR, and was released from care by Dr. Tali Fowler. He does report some intermittent L>R shoulder pain, but states that it is mild. He continues to deny any recurrence of dysphagia. He otherwise is without complaints and feeling generally well. He has a history of polymyalgia rheumatica, diagnosed in 7/2016 when he was seen for bilateral shoulder pain of several months duration. He had been under the care of Dr. Jordy Sutherland since 3/2016 when he presented with right wrist pain followed by the development of bilateral shoulder pain. He was treated with a Medrol dose pack and Tramadol and upon follow-up on 5/3/2016, reported some improvement in the bilateral shoulder and wrist discomfort, but pain persisted. Bilateral shoulder MRI's (5/11/2016) showed severe degenerative changes on the right at the acromioclavicular joint with moderate to severe or severe tendinopathy of the supraspinatus and infraspinatus; postoperative changes with tendinopathy of the supraspinatus, infraspinatus, and long head of the biceps was seen on the left. In 5/2016, lab data showed ESR 28, C-Reactive Protein 1.1, negative CRISTI and RF. He was treated with naproxen, tramadol and acetaminophen. He was evaluated on 7/19/2016 for worsening bilateral shoulder pain with bilateral hand pain involving the wrists, MCP and PIP joints. Evaluation included ESR 48; C-reactive protein 1.8; CRISTI positive; anti-RNP 1.4; negative antibodies to Zimmerman, dsDNA, anti-chromatin, RF and anti-CCP. Bilateral hand x-rays showed scattered arthritic changes with scattered small erosions.  He was referred to see Dr. Tali Fowler who diagnosed polymyalgia rheumatica and started him on prednisone. Since initiating prednisone, he had significant improvement with nearly complete resolution of his symptoms. He has regained full use of his shoulders and has no trouble reaching behind his back or pulling a shirt over his head. He denies any headache, jaw claudication, scalp tenderness, fever, or visual changes. He successfully weaned off prednisone in 3/2019, and has not had a resurgence of symptoms. In 11/2016, he presented with a persistent non productive cough, sore throat, and hoarseness. He was referred to see Dr. Tanner Vela, and patient reports that a laryngoscopy was performed showing evidence of reflux. He was started on Nexium for two weeks with improvement in his cough, but he continued to have pain with swallowing. He was referred to see Dr. Chris Delgadillo, and underwent upper endoscopy (1/5/2017) showing abnormal esophageal motility with spastic and non-propulsive appearing contractions in the mid to distal esophagus (tortuous); mucosa appeared normal ( random biopsies: negative); normal stomach and duodenum. He underwent a barium swallow (1/11/2017) showing a small persistent smoothly marginated filling defect at the piriform sinus suggestive of an extrinsic mass effect. He subsequently had a neck CT scan (1/13/2017) which showed the right piriform sinus appeared larger than the left, but the walls of both were normal without surrounding mass or infiltrating process; nonspecific lymphadenopathy involving level 1 and 2 nodes. He underwent esophageal manometry, which showed a hypercontractile esophagus (\"Jackhammer\" esophagus). He was started on diltiazem 30 mg three times per day with resolution of symptoms. He discontinued diltiazem in 3/2017 without recurrence of symptoms. He has a history of hypertension, treated with lisinopril. He exercises mainly by walking, and denies any chest pain, shortness of breath at rest or with exertion, palpitations, lightheadedness, or edema.  He also has hyperlipidemia, treated with rosuvastatin two days per week. He also has a history of BPH and underwent TURP in 2006. In 2/2011, he had laser vaporization of the prostate to relieve bladder outlet obstructive symptoms. In 3/2011 he had a laser incision performed on the bladder neck contracture. In 5/2012 and 8/2012, he underwent transurethral incision of the bladder neck contracture. Biopsy of the contracture (8/2012) was negative for malignancy. Since that time, he has had annual cystoscopic surveillance of the bladder neck contracture by Dr. Lorie Finn. In 8/2015, it was felt that no more surveillance was necessary as it had remained stable. He continues to take Proscar, but reports that he stopped Flomax on his own and began taking Super Beta prostate supplement instead. He being followed by Dr. Lorie Finn. He denies difficulty with stream, dysuria, or hematuria. He does report 2-3 episodes of nocturia. He had a screening colonoscopy in 4/2012 by Dr. Mason Hare which was normal. Follow-up recommended for 10 years. He denies any abdominal pain, nausea, vomiting, melena, hematochezia, or change in bowel movements.       Past Medical History:   Diagnosis Date    Bladder neck contracture     BPH with obstruction/lower urinary tract symptoms     Essential hypertension with goal blood pressure less than 140/90     Family history of colon cancer     Hyperlipidemia     Osteoarthritis     PMR (polymyalgia rheumatica) ([facility] [place] [address] 7/20/2016     Past Surgical History:   Procedure Laterality Date    CYSTOSCOPY      ENDOSCOPY, COLON, DIAGNOSTIC      HC BERENICE LASER [id number]  3/2/11    Laser incision of bladder neck contracture    HX MOHS PROCEDURES  1/11    rt hip repl    HX OTHER SURGICAL      tonsillectomy, back sx,     HX OTHER SURGICAL      basel cell removed from right ear lobe    HX TURP      Laser    OK LASER VAPORIZATION SURGERY PROSTATE, COMPLETE       Current Outpatient Medications   Medication Sig    lisinopril (PRINIVIL, ZESTRIL) 5 mg tablet TAKE 1 TABLET BY MOUTH DAILY    rosuvastatin (CRESTOR) 5 mg tablet Take 1 Tab by mouth three (3) days a week.  DOCUSATE SODIUM (STOOL SOFTENER PO) Take  by mouth.  multivitamin (ONE A DAY) tablet Take 1 Tab by mouth daily.  Cholecalciferol, Vitamin D3, (VITAMIN D3) 1,000 unit cap Take  by mouth daily.  GLUC/ERICA-MSM#1/C/CHARLY/RAMÓN/BOR (OSTEO BI-FLEX PO) Take 1 Tab by mouth daily.  aspirin 81 mg tablet Take 81 mg by mouth.  finasteride (PROSCAR) 5 mg tablet TAKE 1 TABLET BY MOUTH ONCE DAILY     No current facility-administered medications for this visit. Allergies and Intolerances:   No Known Allergies     Family History: His father  from CAD, and his brother has had CABG. His mother  from ovarian cancer. Family History   Problem Relation Age of Onset    Cancer Other     Heart Disease Other      Social History:   He  reports that he has never smoked. He has never used smokeless tobacco. He is  but has been living with his long time girlfriend Trip Arias) for 37 years. He has two step children. He is a retired , and works on the volunteer force part-time. Social History     Substance and Sexual Activity   Alcohol Use Yes    Alcohol/week: 7.0 standard drinks    Types: 7 Glasses of wine per week     Immunization History:  Immunization History   Administered Date(s) Administered    (RETIRED) Pneumococcal Vaccine (Unspecified Type) 2008    Influenza High Dose Vaccine PF 2013, 2015, 11/10/2016, 2017    Influenza Vaccine 2014    Influenza Vaccine (Tri) Adjuvanted 2018, 2019    Influenza Vaccine Split 2012    Pneumococcal Conjugate (PCV-13) 2015    Pneumococcal Polysaccharide (PPSV-23) 2008    TDAP Vaccine 2011    Zoster 2012       Review of Systems:   As above included in HPI.   Otherwise 11 point review of systems negative including constitutional, skin, HENT, eyes, respiratory, cardiovascular, gastrointestinal, genitourinary, musculoskeletal, endocrine, hematologic, allergy, and neurologic. Physical:   Vitals:   BP: 120/70; repeat 112/64 right arm  HR: 85  WT: 254 lb (115.2 kg)  BMI:  34.45 kg/m2    Exam:   Patient appears in no apparent distress. Affect is appropriate. HEENT: PERRLA, anicteric, oropharynx clear, no JVD, adenopathy or thyromegaly. No carotid bruits or radiated murmur. Lungs: clear to auscultation, no wheezes, rhonchi, or rales. Heart: regular rate and rhythm. No murmur, rubs, gallops  Abdomen: soft, nontender, nondistended, normal bowel sounds, no hepatosplenomegaly or masses. Extremities: without edema. Pulses 1-2+ bilaterally. Review of Data:  Labs:  Hospital Outpatient Visit on 09/17/2019   Component Date Value Ref Range Status    WBC 09/17/2019 6.1  4.6 - 13.2 K/uL Final    RBC 09/17/2019 4.43* 4.70 - 5.50 M/uL Final    HGB 09/17/2019 13.9  13.0 - 16.0 g/dL Final    HCT 09/17/2019 44.2  36.0 - 48.0 % Final    MCV 09/17/2019 99.8* 74.0 - 97.0 FL Final    MCH 09/17/2019 31.4  24.0 - 34.0 PG Final    MCHC 09/17/2019 31.4  31.0 - 37.0 g/dL Final    RDW 09/17/2019 13.6  11.6 - 14.5 % Final    PLATELET 61/80/5536 534  135 - 420 K/uL Final    MPV 09/17/2019 9.8  9.2 - 11.8 FL Final    NEUTROPHILS 09/17/2019 60  40 - 73 % Final    LYMPHOCYTES 09/17/2019 29  21 - 52 % Final    MONOCYTES 09/17/2019 9  3 - 10 % Final    EOSINOPHILS 09/17/2019 2  0 - 5 % Final    BASOPHILS 09/17/2019 0  0 - 2 % Final    ABS. NEUTROPHILS 09/17/2019 3.7  1.8 - 8.0 K/UL Final    ABS. LYMPHOCYTES 09/17/2019 1.8  0.9 - 3.6 K/UL Final    ABS. MONOCYTES 09/17/2019 0.6  0.05 - 1.2 K/UL Final    ABS. EOSINOPHILS 09/17/2019 0.1  0.0 - 0.4 K/UL Final    ABS.  BASOPHILS 09/17/2019 0.0  0.0 - 0.1 K/UL Final    DF 09/17/2019 AUTOMATED    Final    LIPID PROFILE 09/17/2019        Final    Cholesterol, total 09/17/2019 171  <200 MG/DL Final    Triglyceride 09/17/2019 132  <150 MG/DL Final    HDL Cholesterol 09/17/2019 44  40 - 60 MG/DL Final    LDL, calculated 09/17/2019 100.6* 0 - 100 MG/DL Final    VLDL, calculated 09/17/2019 26.4  MG/DL Final    CHOL/HDL Ratio 09/17/2019 3.9  0 - 5.0   Final    Sodium 09/17/2019 140  136 - 145 mmol/L Final    Potassium 09/17/2019 4.5  3.5 - 5.5 mmol/L Final    Chloride 09/17/2019 105  100 - 111 mmol/L Final    CO2 09/17/2019 29  21 - 32 mmol/L Final    Anion gap 09/17/2019 6  3.0 - 18 mmol/L Final    Glucose 09/17/2019 102* 74 - 99 mg/dL Final    BUN 09/17/2019 17  7.0 - 18 MG/DL Final    Creatinine 09/17/2019 1.10  0.6 - 1.3 MG/DL Final    BUN/Creatinine ratio 09/17/2019 15  12 - 20   Final    GFR est AA 09/17/2019 >60  >60 ml/min/1.73m2 Final    GFR est non-AA 09/17/2019 >60  >60 ml/min/1.73m2 Final    Calcium 09/17/2019 8.9  8.5 - 10.1 MG/DL Final    Bilirubin, total 09/17/2019 0.6  0.2 - 1.0 MG/DL Final    ALT (SGPT) 09/17/2019 25  16 - 61 U/L Final    AST (SGOT) 09/17/2019 15  10 - 38 U/L Final    Alk.  phosphatase 09/17/2019 73  45 - 117 U/L Final    Protein, total 09/17/2019 7.0  6.4 - 8.2 g/dL Final    Albumin 09/17/2019 4.2  3.4 - 5.0 g/dL Final    Globulin 09/17/2019 2.8  2.0 - 4.0 g/dL Final    A-G Ratio 09/17/2019 1.5  0.8 - 1.7   Final    Color 09/17/2019 YELLOW    Final    Appearance 09/17/2019 CLEAR    Final    Specific gravity 09/17/2019 1.022  1.005 - 1.030   Final    pH (UA) 09/17/2019 6.0  5.0 - 8.0   Final    Protein 09/17/2019 NEGATIVE   NEG mg/dL Final    Glucose 09/17/2019 NEGATIVE   NEG mg/dL Final    Ketone 09/17/2019 NEGATIVE   NEG mg/dL Final    Bilirubin 09/17/2019 NEGATIVE   NEG   Final    Blood 09/17/2019 NEGATIVE   NEG   Final    Urobilinogen 09/17/2019 0.2  0.2 - 1.0 EU/dL Final    Nitrites 09/17/2019 NEGATIVE   NEG   Final    Leukocyte Esterase 09/17/2019 NEGATIVE   NEG   Final    WBC 09/17/2019 0 to 1  0 - 4 /hpf Final    RBC 09/17/2019 0  0 - 5 /hpf Final    Epithelial cells 09/17/2019 FEW  0 - 5 /lpf Final    Bacteria 09/17/2019 NEGATIVE   NEG /hpf Final           Health Maintenance:  Screening:    Colorectal: colonoscopy (4/2012) normal. Dr. Rox Rojas. Due 2022. Depression: none   DM (HbA1c/FPG):  (9/2019)   Hepatitis C: negative (7/2016)   Falls: none   DEXA: within normal limits (5/2017)    PSA/HOLA: PSA 0.6 (6/2019); HOLA per Dr. Ashley Iglesias   Glaucoma: regular eye exams with Dr. Abdifatah Padron (last 9/2018)   Smoking: none   Vitamin D: 49.4 (3/2019)   Medicare Wellness: 3/19/2019      Impression:  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    Hyperlipidemia E78.5    Vitamin D insufficiency E55.9    Hearing loss H91.90    Essential hypertension I10    Primary osteoarthritis involving multiple joints M15.0    PMR (polymyalgia rheumatica) ([facility]) M35.3    Hoarseness R49.0    Esophageal dysmotility K22.4    Positive CRISTI (antinuclear antibody)/ RNP antibody R76.8    Nodule of finger of right hand R22.31    Class 1 obesity due to excess calories with serious comorbidity and body mass index (BMI) of 34.0 to 34.9 in adult E66.09, Z68.34       Plan:  1. Hypertension. Blood pressure remains well controlled on lisinopril 5 mg daily. Renal function normal today with creatinine 1.10/ eGFR >60. Encouraged to increase fluid intake and avoid NSAIDS. Will continue to follow. 2. Hyperlipidemia. On low intensity dose rosuvastatin, taking 5 mg three days per week, with reasonable control with  and HDL 44. Emphasized importance of lifestyle modifications, including diet, exercise, and weight loss. 3. Polymyalgia rheumatica. Patient significantly improved with steroid treatment. Successfully tapered off prednisone in 3/2019 and has not needed to restart. Released from care by Dr. Graciela Boyd.  Known increased risk (10%) of concurrent giant cell arteritis with PMR, but no evidence of GCA. Also with positive CRISTI/ positive RNP Ab, but no evidence of inflammation elsewhere. Urinalysis continues to be negative for proteinuria. Continue to follow closely. Given chronic steroid use, bone density scan in 5/2017 normal.   4. Odynophagia. Secondary to esophageal dysmotility. Responded to diltiazem, and now successfully stopped taking it without recurrence of symptoms. Will continue to follow. 5. BPH with bladder neck contracture. Appears to be asymptomatic on current treatment with Proscar. Patient decided to discontinue Flomax and had started taking an over the counter prostate supplement. Does report 2-3 episodes of nocturia each night, but not wishing to resume Flomax. Being followed by Dr. Bruce Cassidy. PSA normal. Continue to follow. 6. Hoarseness. Patient reports that it has improved to baseline. Using Flonase as needed. Follow. 7. Right hand nodule. Appears along a tendon sheath. Unchanged from six months ago. Suspect ganglion cyst although could also represent a tenosynovial giant cell tumor. However, patient wishing to hold off currently on referral to ortho given no significant discomfort. Reports that it is unchanged today and not causing him any difficulty. Will continue to follow closely. 8. Abnormal fasting glucose. FPG elevated to 102. Will check HbA1c with next blood draw. 9. Obesity. Emphasized importance of lifestyle modifications, including diet, exercise, and weight loss. Will readdress next visit. 10. Health maintenance. Will give influenza vaccine. Has not yet obtained Shingrix vaccine. Other immunizations up to date. Colonoscopy due 2022. Continue regular eye exams with Dr. Kenan Mohan. Vitamin D level normal at last visit. Continue maintenance dose supplement. Discussed recommendations regarding aspirin use and primary prevention, particularly for age >74, and patient stated that he will consider discontinuing it.   Medicare wellness visit up to date. Patient understands recommendations and agrees with plan. Follow-up in 6 months.

## 2019-11-15 DIAGNOSIS — N40.1 BENIGN PROSTATIC HYPERPLASIA WITH URINARY OBSTRUCTION: Primary | ICD-10-CM

## 2019-11-15 DIAGNOSIS — N13.8 BENIGN PROSTATIC HYPERPLASIA WITH URINARY OBSTRUCTION: Primary | ICD-10-CM

## 2019-11-20 ENCOUNTER — HOSPITAL ENCOUNTER (OUTPATIENT)
Dept: LAB | Age: 79
Discharge: HOME | End: 2019-11-20
Payer: MEDICARE

## 2019-11-20 DIAGNOSIS — N13.8 BENIGN PROSTATIC HYPERPLASIA WITH URINARY OBSTRUCTION: ICD-10-CM

## 2019-11-20 DIAGNOSIS — N40.1 BENIGN PROSTATIC HYPERPLASIA WITH URINARY OBSTRUCTION: ICD-10-CM

## 2019-11-20 LAB — PSA SERPL-MCNC: 0.5 NG/ML (ref 0–4)

## 2019-11-20 PROCEDURE — 36415 COLL VENOUS BLD VENIPUNCTURE: CPT

## 2019-11-20 PROCEDURE — 84153 ASSAY OF PSA TOTAL: CPT

## 2019-12-12 ENCOUNTER — OFFICE VISIT (OUTPATIENT)
Dept: UROLOGY | Age: 79
End: 2019-12-12

## 2019-12-12 VITALS
DIASTOLIC BLOOD PRESSURE: 78 MMHG | OXYGEN SATURATION: 98 % | BODY MASS INDEX: 34.45 KG/M2 | HEIGHT: 72 IN | SYSTOLIC BLOOD PRESSURE: 131 MMHG | HEART RATE: 87 BPM

## 2019-12-12 DIAGNOSIS — N40.1 BENIGN PROSTATIC HYPERPLASIA WITH URINARY OBSTRUCTION: Primary | ICD-10-CM

## 2019-12-12 DIAGNOSIS — N13.8 BENIGN PROSTATIC HYPERPLASIA WITH URINARY OBSTRUCTION: Primary | ICD-10-CM

## 2019-12-12 LAB
BILIRUB UR QL STRIP: NEGATIVE
GLUCOSE UR-MCNC: NEGATIVE MG/DL
KETONES P FAST UR STRIP-MCNC: NEGATIVE MG/DL
PH UR STRIP: 7 [PH] (ref 4.6–8)
PROT UR QL STRIP: NEGATIVE
SP GR UR STRIP: 1.01 (ref 1–1.03)
UA UROBILINOGEN AMB POC: NORMAL (ref 0.2–1)
URINALYSIS CLARITY POC: CLEAR
URINALYSIS COLOR POC: YELLOW
URINE BLOOD POC: NORMAL
URINE LEUKOCYTES POC: NEGATIVE
URINE NITRITES POC: NEGATIVE

## 2019-12-12 RX ORDER — TAMSULOSIN HYDROCHLORIDE 0.4 MG/1
0.4 CAPSULE ORAL DAILY
Qty: 90 CAP | Refills: 3 | Status: SHIPPED | OUTPATIENT
Start: 2019-12-12 | End: 2020-09-11 | Stop reason: CLARIF

## 2019-12-12 RX ORDER — FINASTERIDE 5 MG/1
5 TABLET, FILM COATED ORAL DAILY
Qty: 90 TAB | Refills: 3 | Status: SHIPPED | OUTPATIENT
Start: 2019-12-12 | End: 2020-09-11 | Stop reason: CLARIF

## 2019-12-12 NOTE — PROGRESS NOTES
Mr. Antonieta Medel has a reminder for a \"due or due soon\" health maintenance. I have asked that he contact his primary care provider for follow-up on this health maintenance.

## 2019-12-12 NOTE — PROGRESS NOTES
Jeramie Mei 67 y.o. male     Mr. Larisa Mace seen today for annual BPH assessment currently on alpha-blocker and 5 alpha reductase inhibitor therapy with Flomax and Finasteride doing well voiding with a solid stream good control nocturia once per night    Interval history-symptomatic hemorrhoids       PSA 0.4 in 2014  PSA 0.5 in April 2015  PSA 0.7 in January 2017  PSA 1.0 in June 2018  PSA 0.6 in June 2019  PSA 0.5 in November 2019        June 2018  PVR 15 cc in June 2019  PVR 67 cc in December 2019eview of Systems:    CNS: No seizures syncope and into dizziness  Respiratory: No wheezing or shortness of breath  Cardiovascular:Hypertension  Intestinal:Diverticulosis  Urinary: Obstructive prostatism with urinary retention relieved by the laser TURP 2006/bladder neck contracture incised 2012  Skeletal: Large joint arthritis  Endocrine:No diabetes or thyroid disease  Other:       Allergies: No Known Allergies   Medications:    Current Outpatient Medications   Medication Sig Dispense Refill    lisinopril (PRINIVIL, ZESTRIL) 5 mg tablet TAKE 1 TABLET BY MOUTH DAILY 90 Tab 3    finasteride (PROSCAR) 5 mg tablet TAKE 1 TABLET BY MOUTH ONCE DAILY 90 Tab 3    rosuvastatin (CRESTOR) 5 mg tablet Take 1 Tab by mouth three (3) days a week. 36 Tab 3    DOCUSATE SODIUM (STOOL SOFTENER PO) Take  by mouth.  multivitamin (ONE A DAY) tablet Take 1 Tab by mouth daily.  Cholecalciferol, Vitamin D3, (VITAMIN D3) 1,000 unit cap Take  by mouth daily.  GLUC/ERICA-MSM#1/C/CHARLY/RAMÓN/BOR (OSTEO BI-FLEX PO) Take 1 Tab by mouth daily.  aspirin 81 mg tablet Take 81 mg by mouth.            Past Medical History:   Diagnosis Date    Bladder neck contracture     BPH with obstruction/lower urinary tract symptoms     Essential hypertension with goal blood pressure less than 140/90     Family history of colon cancer     Hyperlipidemia     Osteoarthritis     PMR (polymyalgia rheumatica) ([facility] [address] 7/20/2016      Past Surgical History:   Procedure Laterality Date    CYSTOSCOPY      ENDOSCOPY, COLON, DIAGNOSTIC      HC BERENICE LASER 65383CM  3/2/11    Laser incision of bladder neck contracture    HX MOHS PROCEDURES  1/11    rt hip repl    HX OTHER SURGICAL      tonsillectomy, back sx,     HX OTHER SURGICAL      basel cell removed from right ear lobe    HX TURP      Laser    MN LASER VAPORIZATION SURGERY PROSTATE, COMPLETE       Social History     Socioeconomic History    Marital status: SINGLE     Spouse name: Not on file    Number of children: Not on file    Years of education: Not on file    Highest education level: Not on file   Occupational History    Not on file   Social Needs    Financial resource strain: Not on file    Food insecurity:     Worry: Not on file     Inability: Not on file    Transportation needs:     Medical: Not on file     Non-medical: Not on file   Tobacco Use    Smoking status: Never Smoker    Smokeless tobacco: Never Used   Substance and Sexual Activity    Alcohol use:  Yes     Alcohol/week: 7.0 standard drinks     Types: 7 Glasses of wine per week    Drug use: No    Sexual activity: Yes     Partners: Female   Lifestyle    Physical activity:     Days per week: Not on file     Minutes per session: Not on file    Stress: Not on file   Relationships    Social connections:     Talks on phone: Not on file     Gets together: Not on file     Attends Sikhism service: Not on file     Active member of club or organization: Not on file     Attends meetings of clubs or organizations: Not on file     Relationship status: Not on file    Intimate partner violence:     Fear of current or ex partner: Not on file     Emotionally abused: Not on file     Physically abused: Not on file     Forced sexual activity: Not on file   Other Topics Concern    Not on file   Social History Narrative    Not on file      Family History   Problem Relation Age of Onset    Cancer Other     Heart Disease Other Physical Examination: Well-nourished mature male in no apparent distress    Prostate by HOLA is rounded, smooth, benign consistency nontender-no induration no nodularity  No rectal masses induration or tenderness      Urinalysis: Negative dipstick/nitrite negative/heme-negative      PVR today 67 cc    Impression: Symptomatic BPH responding favorably to alpha-blocker and 5 alpha reductase inhibitor therapy      Plan: Flomax 0.4 mg daily 90 refill x3            Finasteride 5 mg daily #90 refill x3    RTC 1 year PSA HOLA PVR-refer to urology as above [place]-as this office will close permanently later today       More than 1/2 of this 15 minute visit was spent in counselling and coordination of care, as described above. Ian Rodriguez MD  -electronically signed-    PLEASE NOTE:  This document has been produced using voice recognition software. Unrecognized errors in transcription may be present.

## 2019-12-12 NOTE — PATIENT INSTRUCTIONS
Benign Prostatic Hyperplasia: Care Instructions  Your Care Instructions    Benign prostatic hyperplasia, or BPH, is an enlarged prostate gland. The prostate is a small gland that makes some of the fluid in semen. Prostate enlargement happens to almost all men as they age. It is usually not serious. BPH does not cause prostate cancer. As the prostate gets bigger, it may partly block the flow of urine. You may have a hard time getting a urine stream started or completely stopped. BPH can cause dribbling. You may have a weak urine stream, or you may have to urinate more often than you used to, especially at night. Most men find these problems easy to manage. You do not need treatment unless your symptoms bother you a lot or you have other problems, such as bladder infections or stones. In these cases, medicines may help. Surgery is not needed unless the urine flow is blocked or the symptoms do not get better with medicine. Follow-up care is a key part of your treatment and safety. Be sure to make and go to all appointments, and call your doctor if you are having problems. It's also a good idea to know your test results and keep a list of the medicines you take. How can you care for yourself at home? · Take plenty of time to urinate. Try to relax. · Try \"double voiding. \" Urinate as much you can, relax for a few moments, and then try to urinate again. · Sit on the toilet to urinate. · Read or think of other things while you are waiting. · Turn on a faucet, or try to picture running water. Some men find that this helps get their urine flowing. · If dribbling is a problem, wash your penis daily to avoid skin irritation and infection. · Avoid caffeine and alcohol. These drinks will increase how often you need to urinate. Spread your fluid intake throughout the day. If the urge to urinate often wakes you at night, limit your fluid intake in the evening. Urinate right before you go to bed.   · Many over-the-counter cold and allergy medicines can make the symptoms of BPH worse. Avoid antihistamines, decongestants, and allergy pills, if you can. Read the warnings on the package. · If you take any prescription medicines, especially tranquilizers or antidepressants, ask your doctor or pharmacist whether they can cause urination problems. There may be other medicines you can use that do not cause urinary problems. · Be safe with medicines. Take your medicines exactly as prescribed. Call your doctor if you think you are having a problem with your medicine. When should you call for help? Call your doctor now or seek immediate medical care if:    · You cannot urinate at all.     · You have symptoms of a urinary infection. For example:  ? You have blood or pus in your urine. ? You have pain in your back just below your rib cage. This is called flank pain. ? You have a fever, chills, or body aches. ? It hurts to urinate. ? You have groin or belly pain.    Watch closely for changes in your health, and be sure to contact your doctor if:    · It hurts when you ejaculate.     · Your urinary problems get a lot worse or bother you a lot. Where can you learn more? Go to http://felicia-myles.info/. Enter Z793 in the search box to learn more about \"Benign Prostatic Hyperplasia: Care Instructions. \"  Current as of: May 28, 2019  Content Version: 12.2  © 5060-4944 [employer]. Care instructions adapted under license by [employer] (which disclaims liability or warranty for this information). If you have questions about a medical condition or this instruction, always ask your healthcare professional. [address] any warranty or liability for your use of this information.

## 2020-03-19 ENCOUNTER — HOSPITAL ENCOUNTER (OUTPATIENT)
Dept: LAB | Age: 80
Discharge: HOME | End: 2020-03-19
Payer: MEDICARE

## 2020-03-19 ENCOUNTER — APPOINTMENT (OUTPATIENT)
Dept: INTERNAL MEDICINE CLINIC | Age: 80
End: 2020-03-19

## 2020-03-19 DIAGNOSIS — E55.9 VITAMIN D INSUFFICIENCY: ICD-10-CM

## 2020-03-19 DIAGNOSIS — N32.0 BLADDER NECK CONTRACTURE: ICD-10-CM

## 2020-03-19 DIAGNOSIS — K22.4 ESOPHAGEAL DYSMOTILITY: ICD-10-CM

## 2020-03-19 DIAGNOSIS — E78.5 HYPERLIPIDEMIA, UNSPECIFIED HYPERLIPIDEMIA TYPE: ICD-10-CM

## 2020-03-19 DIAGNOSIS — M35.3 PMR (POLYMYALGIA RHEUMATICA) (HCC): ICD-10-CM

## 2020-03-19 DIAGNOSIS — R22.31 NODULE OF FINGER OF RIGHT HAND: ICD-10-CM

## 2020-03-19 DIAGNOSIS — R76.8 POSITIVE ANA (ANTINUCLEAR ANTIBODY): ICD-10-CM

## 2020-03-19 DIAGNOSIS — N40.1 BPH WITH OBSTRUCTION/LOWER URINARY TRACT SYMPTOMS: ICD-10-CM

## 2020-03-19 DIAGNOSIS — M15.9 PRIMARY OSTEOARTHRITIS INVOLVING MULTIPLE JOINTS: ICD-10-CM

## 2020-03-19 DIAGNOSIS — R73.01 ABNORMAL FASTING GLUCOSE: ICD-10-CM

## 2020-03-19 DIAGNOSIS — I10 ESSENTIAL HYPERTENSION: ICD-10-CM

## 2020-03-19 DIAGNOSIS — Z23 ENCOUNTER FOR IMMUNIZATION: ICD-10-CM

## 2020-03-19 DIAGNOSIS — N13.8 BPH WITH OBSTRUCTION/LOWER URINARY TRACT SYMPTOMS: ICD-10-CM

## 2020-03-19 DIAGNOSIS — E66.09 CLASS 1 OBESITY DUE TO EXCESS CALORIES WITH SERIOUS COMORBIDITY AND BODY MASS INDEX (BMI) OF 34.0 TO 34.9 IN ADULT: ICD-10-CM

## 2020-03-19 LAB
25(OH)D3 SERPL-MCNC: 44.4 NG/ML (ref 30–100)
ALBUMIN SERPL-MCNC: 4.1 G/DL (ref 3.4–5)
ALBUMIN/GLOB SERPL: 1.3 {RATIO} (ref 0.8–1.7)
ALP SERPL-CCNC: 92 U/L (ref 45–117)
ALT SERPL-CCNC: 22 U/L (ref 16–61)
ANION GAP SERPL CALC-SCNC: 2 MMOL/L (ref 3–18)
APPEARANCE UR: NORMAL
AST SERPL-CCNC: 15 U/L (ref 10–38)
BASOPHILS # BLD: 0 K/UL (ref 0–0.1)
BASOPHILS NFR BLD: 0 % (ref 0–2)
BILIRUB SERPL-MCNC: 0.6 MG/DL (ref 0.2–1)
BILIRUB UR QL: NEGATIVE
BUN SERPL-MCNC: 17 MG/DL (ref 7–18)
BUN/CREAT SERPL: 14 (ref 12–20)
CALCIUM SERPL-MCNC: 8.9 MG/DL (ref 8.5–10.1)
CHLORIDE SERPL-SCNC: 105 MMOL/L (ref 100–111)
CHOLEST SERPL-MCNC: 152 MG/DL
CO2 SERPL-SCNC: 31 MMOL/L (ref 21–32)
COLOR UR: YELLOW
CREAT SERPL-MCNC: 1.18 MG/DL (ref 0.6–1.3)
DIFFERENTIAL METHOD BLD: ABNORMAL
EOSINOPHIL # BLD: 0.1 K/UL (ref 0–0.4)
EOSINOPHIL NFR BLD: 2 % (ref 0–5)
ERYTHROCYTE [DISTWIDTH] IN BLOOD BY AUTOMATED COUNT: 13.2 % (ref 11.6–14.5)
EST. AVERAGE GLUCOSE BLD GHB EST-MCNC: 111 MG/DL
GLOBULIN SER CALC-MCNC: 3.2 G/DL (ref 2–4)
GLUCOSE SERPL-MCNC: 91 MG/DL (ref 74–99)
GLUCOSE UR STRIP.AUTO-MCNC: NEGATIVE MG/DL
HBA1C MFR BLD: 5.5 % (ref 4.2–5.6)
HCT VFR BLD AUTO: 44.7 % (ref 36–48)
HDLC SERPL-MCNC: 41 MG/DL (ref 40–60)
HDLC SERPL: 3.7 {RATIO} (ref 0–5)
HGB BLD-MCNC: 14.5 G/DL (ref 13–16)
HGB UR QL STRIP: NEGATIVE
KETONES UR QL STRIP.AUTO: NEGATIVE MG/DL
LDLC SERPL CALC-MCNC: 90.4 MG/DL (ref 0–100)
LEUKOCYTE ESTERASE UR QL STRIP.AUTO: NEGATIVE
LIPID PROFILE,FLP: NORMAL
LYMPHOCYTES # BLD: 1.5 K/UL (ref 0.9–3.6)
LYMPHOCYTES NFR BLD: 23 % (ref 21–52)
MCH RBC QN AUTO: 31.3 PG (ref 24–34)
MCHC RBC AUTO-ENTMCNC: 32.4 G/DL (ref 31–37)
MCV RBC AUTO: 96.5 FL (ref 74–97)
MONOCYTES # BLD: 0.8 K/UL (ref 0.05–1.2)
MONOCYTES NFR BLD: 12 % (ref 3–10)
NEUTS SEG # BLD: 4.2 K/UL (ref 1.8–8)
NEUTS SEG NFR BLD: 63 % (ref 40–73)
NITRITE UR QL STRIP.AUTO: NEGATIVE
PH UR STRIP: 6 [PH] (ref 5–8)
PLATELET # BLD AUTO: 217 K/UL (ref 135–420)
PMV BLD AUTO: 10.2 FL (ref 9.2–11.8)
POTASSIUM SERPL-SCNC: 4.8 MMOL/L (ref 3.5–5.5)
PROT SERPL-MCNC: 7.3 G/DL (ref 6.4–8.2)
PROT UR STRIP-MCNC: NEGATIVE MG/DL
RBC # BLD AUTO: 4.63 M/UL (ref 4.7–5.5)
SODIUM SERPL-SCNC: 138 MMOL/L (ref 136–145)
SP GR UR REFRACTOMETRY: 1.02 (ref 1–1.03)
TRIGL SERPL-MCNC: 103 MG/DL (ref ?–150)
TSH SERPL DL<=0.05 MIU/L-ACNC: 2.39 UIU/ML (ref 0.36–3.74)
UROBILINOGEN UR QL STRIP.AUTO: 0.2 EU/DL (ref 0.2–1)
VLDLC SERPL CALC-MCNC: 20.6 MG/DL
WBC # BLD AUTO: 6.6 K/UL (ref 4.6–13.2)

## 2020-03-19 PROCEDURE — 84443 ASSAY THYROID STIM HORMONE: CPT

## 2020-03-19 PROCEDURE — 82306 VITAMIN D 25 HYDROXY: CPT

## 2020-03-19 PROCEDURE — 80061 LIPID PANEL: CPT

## 2020-03-19 PROCEDURE — 80053 COMPREHEN METABOLIC PANEL: CPT

## 2020-03-19 PROCEDURE — 36415 COLL VENOUS BLD VENIPUNCTURE: CPT

## 2020-03-19 PROCEDURE — 85025 COMPLETE CBC W/AUTO DIFF WBC: CPT

## 2020-03-19 PROCEDURE — 83036 HEMOGLOBIN GLYCOSYLATED A1C: CPT

## 2020-03-19 PROCEDURE — 81003 URINALYSIS AUTO W/O SCOPE: CPT

## 2020-03-25 ENCOUNTER — TELEPHONE (OUTPATIENT)
Dept: INTERNAL MEDICINE CLINIC | Age: 80
End: 2020-03-25

## 2020-03-25 NOTE — TELEPHONE ENCOUNTER
Pt calling, says he was supposed to hear from Dr. Renetta Vizcarra per Elio Dang. He has a visit with Dr. Renetta Vizcarra tomorrow but says he can put that off for a while if Dr. Renetta Vizcarra wants. He has no symptoms. Says Ms Sunny Jolley still isn't doing very well.     Please advise if you want to make visit at a later date or a virtual visit

## 2020-03-25 NOTE — TELEPHONE ENCOUNTER
Called patient and verified full name and date of birth. Informed patient that we are going virtual and he states that he would like to just have his appointment rescheduled for a later date. He also states that he has [facility] messaging if he needs to get in contact with Dr. Feliciano Lim for any reason. [address] [facility] office- please call patient to reschedule appointment.

## 2020-05-28 RX ORDER — ROSUVASTATIN CALCIUM 5 MG/1
5 TABLET, COATED ORAL
Qty: 36 TAB | Refills: 3 | Status: SHIPPED | OUTPATIENT
Start: 2020-05-29 | End: 2021-04-23

## 2020-05-28 NOTE — TELEPHONE ENCOUNTER
Last Visit: 9/24/19 with MD Virginie Patel  Next Appointment: 6/18/20 with MD Andre  Previous Refill Encounter(s): 6/24/19 #36 with 3 refills    Requested Prescriptions     Pending Prescriptions Disp Refills    rosuvastatin (CRESTOR) 5 mg tablet 36 Tab 3     Sig: Take 1 Tab by mouth three (3) days a week.

## 2020-06-18 ENCOUNTER — VIRTUAL VISIT (OUTPATIENT)
Dept: INTERNAL MEDICINE CLINIC | Age: 80
End: 2020-06-18

## 2020-06-18 ENCOUNTER — TELEPHONE (OUTPATIENT)
Dept: INTERNAL MEDICINE CLINIC | Age: 80
End: 2020-06-18

## 2020-06-18 DIAGNOSIS — N40.1 BPH WITH OBSTRUCTION/LOWER URINARY TRACT SYMPTOMS: ICD-10-CM

## 2020-06-18 DIAGNOSIS — R22.31 NODULE OF FINGER OF RIGHT HAND: ICD-10-CM

## 2020-06-18 DIAGNOSIS — R76.8 POSITIVE ANA (ANTINUCLEAR ANTIBODY): ICD-10-CM

## 2020-06-18 DIAGNOSIS — M15.9 PRIMARY OSTEOARTHRITIS INVOLVING MULTIPLE JOINTS: ICD-10-CM

## 2020-06-18 DIAGNOSIS — I10 ESSENTIAL HYPERTENSION: Primary | ICD-10-CM

## 2020-06-18 DIAGNOSIS — E55.9 VITAMIN D INSUFFICIENCY: ICD-10-CM

## 2020-06-18 DIAGNOSIS — E66.09 CLASS 1 OBESITY DUE TO EXCESS CALORIES WITH SERIOUS COMORBIDITY AND BODY MASS INDEX (BMI) OF 32.0 TO 32.9 IN ADULT: ICD-10-CM

## 2020-06-18 DIAGNOSIS — N32.0 BLADDER NECK CONTRACTURE: ICD-10-CM

## 2020-06-18 DIAGNOSIS — Z71.89 ACP (ADVANCE CARE PLANNING): ICD-10-CM

## 2020-06-18 DIAGNOSIS — M35.3 PMR (POLYMYALGIA RHEUMATICA) (HCC): ICD-10-CM

## 2020-06-18 DIAGNOSIS — K22.4 ESOPHAGEAL DYSMOTILITY: ICD-10-CM

## 2020-06-18 DIAGNOSIS — Z00.00 MEDICARE ANNUAL WELLNESS VISIT, SUBSEQUENT: ICD-10-CM

## 2020-06-18 DIAGNOSIS — N13.8 BPH WITH OBSTRUCTION/LOWER URINARY TRACT SYMPTOMS: ICD-10-CM

## 2020-06-18 DIAGNOSIS — E78.5 HYPERLIPIDEMIA, UNSPECIFIED HYPERLIPIDEMIA TYPE: ICD-10-CM

## 2020-06-18 NOTE — PATIENT INSTRUCTIONS
Medicare Wellness Visit, Male The best way to improve and maintain good health is to have a healthy lifestyle by eating a well-balanced diet, exercising regularly, limiting alcohol and stopping smoking. Regular visits with your physician or non-physician health care provider also support your good health. Preventive screening tests can find health problems before they become diseases or illnesses. Preventive services such as immunizations prevent serious infections. All people over age 72 should have a Pneumovax and a Prevnar-13 shot to prevent potentially life threatening infections with the pneumococcus bacteria, a common cause of pneumonia. These are once in a lifetime unless you and your provider decide differently. All people over 65 should have a yearly influenza vaccine or \"flu\" shot. This does not prevent infection with cold viruses but has been proven to prevent hospitalization and death from influenza. Although Medicare part B \"regular Medicare\" currently only covers tetanus vaccination in the context of an injury, a tetanus vaccine (Tdap or Td) is recommended every 10 years. A shingles vaccine is recommended once in a lifetime after age 61. The Shingles vaccine is also not covered by Medicare part B. Note, however, that both the Shingles vaccine and Tdap/Td are generally covered by secondary carriers. Please check your coverage and out of pocket expenses. Consider contacting your local health department because it may stock these vaccines for a reasonable charge. We currently have documentation of the following immunization history for you: 
Immunization History Administered Date(s) Administered  (RETIRED) Pneumococcal Vaccine (Unspecified Type) 03/20/2008  Influenza High Dose Vaccine PF 11/14/2013, 11/30/2015, 11/10/2016, 09/14/2017  Influenza Vaccine 11/17/2014  Influenza Vaccine (Tri) Adjuvanted 09/20/2018, 09/24/2019  Influenza Vaccine Split 11/29/2012  Pneumococcal Conjugate (PCV-13) 05/29/2015  Pneumococcal Polysaccharide (PPSV-23) 03/20/2008  TDAP Vaccine 04/05/2011  Zoster 04/24/2012  Zoster Recombinant 10/10/2019, 03/17/2020 Screening for infection with Hepatitis C is recommended for anyone born between [address] [place] [address] through [place] [address]. The table at the bottom of this document indicates the status of this and other screening services. Screening for diabetes mellitus with a blood sugar test (glucose) should be done at least every 3 years until age 79. You and your health care provider may decide whether to continue screening after age 79. The most recent blood glucose we have on file for you is:  
Lab Results Component Value Date/Time Glucose 91 03/19/2020 08:31 AM  
 
 
Glaucoma is a disease of the eye due to increased ocular pressure that can lead to blindness. People with risk factors for glaucoma ( race, diabetes, family history) should be screened at least every 2 years by an eye professional. This may be covered annually if indicated as determined by you and your doctor. Cardiovascular screening tests that check for elevated lipids or cholesterol (fatty part of blood) which can lead to heart disease and strokes should be done every 4-6 years through age 79. You and your health care provider may decide whether to continue screening after age 79. The most recent lipid panel we have on file for you is:  
Lab Results Component Value Date/Time  Cholesterol, total 152 03/19/2020 08:31 AM  
 HDL Cholesterol 41 03/19/2020 08:31 AM  
 LDL, calculated 90.4 03/19/2020 08:31 AM  
 VLDL, calculated 20.6 03/19/2020 08:31 AM  
 Triglyceride 103 03/19/2020 08:31 AM  
 CHOL/HDL Ratio 3.7 03/19/2020 08:31 AM  
 
 
Colorectal cancer screening that evaluates for blood or polyps in your colon for people with average risk should be done yearly as a stool test, every five years as a flexible sigmoidoscope or every 10 years as a colonoscopy up to age 76. You and your health care provider may decide whether to continue screening after age 76. Men up to age 76 may elect to screen for prostate cancer with a blood test called a PSA at certain intervals, depending on their personal and family history. This decision is between the patient and his provider. The most recent PSA values we have on file for you are: 
Lab Results Component Value Date/Time  
 Prostate Specific Ag 0.5 11/20/2019 11:30 AM  
 Prostate Specific Ag 0.6 06/12/2019 10:04 AM  
 Prostate Specific Ag 1.0 06/13/2018 09:37 AM  
 Prostate Specific Ag 0.5 06/04/2015 02:44 PM  
 Prostate Specific Ag 0.4 05/09/2014 08:16 AM  
 Prostate Specific Ag 0.4 05/16/2013 10:09 AM  
 
 
If you have been a smoker or had family history of abdominal aortic aneurysms, you and your provider may decide to schedule an ultrasound test of your aorta. Our records show this was done on:  n/a People who have smoked the equivalent of 1 pack per day for 30 years or more may benefit from screening for lung cancer with a yearly low dose CT scan until they have been non smokers for 15 years or competing health conditions render this unlikely to be beneficial. Our records show: n/a Your Medicare Wellness Exam is recommended annually. Here is a list of your current Health Maintenance items with a due date: 
Health Maintenance Topic Date Due  Influenza Age 5 to Adult  08/01/2020  GLAUCOMA SCREENING Q2Y  12/03/2020  Lipid Screen  03/19/2021  
 DTaP/Tdap/Td series (2 - Td) 04/05/2021  Medicare Yearly Exam  06/19/2021  Shingrix Vaccine Age 50>  Completed  Pneumococcal 65+ years  Completed

## 2020-06-18 NOTE — PROGRESS NOTES
This is the Subsequent Medicare Annual Wellness Exam, performed 12 months or more after the Initial AWV or the last Subsequent AWV    Consent: Alissa Goddard, who was seen by synchronous (real-time) audio-video technology, and/or his healthcare decision maker, is aware that this patient-initiated, Telehealth encounter on 6/18/2020 is a billable service. While AWVs are fully covered by Medicare, any services rendered on this date that are not included in an AWV are subject to additional billing, with coverage as determined by his insurance carrier. He is aware that he may receive a bill for any such additional services and has provided verbal consent to proceed: Yes. I have reviewed the patient's medical history in detail and updated the computerized patient record.      History     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    Hyperlipidemia E78.5    Vitamin D insufficiency E55.9    Hearing loss H91.90    Essential hypertension I10    Primary osteoarthritis involving multiple joints M89.49    PMR (polymyalgia rheumatica) ([facility]) M35.3    Hoarseness R49.0    Esophageal dysmotility K22.4    Positive CRISTI (antinuclear antibody)/ RNP antibody R76.8    Nodule of finger of right hand R22.31    Class 1 obesity due to excess calories with serious comorbidity and body mass index (BMI) of 32.0 to 32.9 in adult E66.09, Z68.32     Past Medical History:   Diagnosis Date    Bladder neck contracture     BPH with obstruction/lower urinary tract symptoms     Essential hypertension with goal blood pressure less than 140/90     Family history of colon cancer     Hyperlipidemia     Osteoarthritis     PMR (polymyalgia rheumatica) ([facility] [address] 7/20/2016      Past Surgical History:   Procedure Laterality Date    CYSTOSCOPY      ENDOSCOPY, COLON, DIAGNOSTIC      HC BERENICE LASER [id number]  3/2/11    Laser incision of bladder neck contracture    HX MOHS PROCEDURES  1/11    rt hip repl    HX OTHER SURGICAL      tonsillectomy, back sx,     HX OTHER SURGICAL      basel cell removed from right ear lobe    HX TURP      Laser    OR LASER VAPORIZATION SURGERY PROSTATE, COMPLETE       Current Outpatient Medications   Medication Sig Dispense Refill    rosuvastatin (CRESTOR) 5 mg tablet Take 1 Tab by mouth three (3) days a week. 36 Tab 3    mirabegron ER (Myrbetriq) 25 mg ER tablet Take 1 Tab by mouth daily. 90 Tab 3    tamsulosin (FLOMAX) 0.4 mg capsule Take 1 Cap by mouth daily. 90 Cap 3    finasteride (PROSCAR) 5 mg tablet Take 1 Tab by mouth daily. 90 Tab 3    lisinopril (PRINIVIL, ZESTRIL) 5 mg tablet TAKE 1 TABLET BY MOUTH DAILY 90 Tab 3    finasteride (PROSCAR) 5 mg tablet TAKE 1 TABLET BY MOUTH ONCE DAILY 90 Tab 3    DOCUSATE SODIUM (STOOL SOFTENER PO) Take  by mouth.  multivitamin (ONE A DAY) tablet Take 1 Tab by mouth daily.  Cholecalciferol, Vitamin D3, (VITAMIN D3) 1,000 unit cap Take  by mouth daily.  GLUC/ERICA-MSM#1/C/CHARLY/RAMÓN/BOR (OSTEO BI-FLEX PO) Take 1 Tab by mouth daily.  aspirin 81 mg tablet Take 81 mg by mouth. No Known Allergies    Family History   Problem Relation Age of Onset    Cancer Other     Heart Disease Other      Social History     Tobacco Use    Smoking status: Never Smoker    Smokeless tobacco: Never Used   Substance Use Topics    Alcohol use: Yes     Alcohol/week: 7.0 standard drinks     Types: 7 Glasses of wine per week       Depression Risk Factor Screening:     3 most recent PHQ Screens 6/18/2020   Little interest or pleasure in doing things Not at all   Feeling down, depressed, irritable, or hopeless Not at all   Total Score PHQ 2 0       Alcohol Risk Factor Screening (MALE > 65):    Do you average more 1 drink per night or more than 7 drinks a week: No    In the past three months have you have had more than 4 drinks containing alcohol on one occasion: No      Functional Ability and Level of Safety:   Hearing: Hearing is good. Activities of Daily Living: The home contains: no safety equipment. Patient does total self care     Ambulation: with no difficulty     Fall Risk:  Fall Risk Assessment, last 12 mths 6/18/2020   Able to walk? Yes   Fall in past 12 months? No     Abuse Screen:  Patient is not abused       Cognitive Screening   Has your family/caregiver stated any concerns about your memory: no    Cognitive Screening: none performed    Patient Care Team   Patient Care Team:  Claudia Rojas MD as PCP - General (Internal Medicine)  Claudia Rojas MD as PCP [facility]  Elliot Ingram MD (Urology)  Mal Luna MD (Dermatology)  Sacha Rolon MD (Gastroenterology)  Zoraida Chang MD (Gastroenterology)  Jayne Méndez MD (Ophthalmology)  Julienne Bermudez DO (Rheumatology)    Assessment/Plan   Education and counseling provided:  Are appropriate based on today's review and evaluation  End-of-Life planning (with patient's consent)  Influenza Vaccine  Colorectal cancer screening tests  Cardiovascular screening blood test  Screening for glaucoma  Diabetes screening test    Diagnoses and all orders for this visit:    1. Essential hypertension  -     CBC WITH AUTOMATED DIFF; Future  -     LIPID PANEL; Future  -     MAGNESIUM; Future  -     METABOLIC PANEL, COMPREHENSIVE; Future  -     MICROALBUMIN, UR, RAND W/ MICROALB/CREAT RATIO; Future  -     VITAMIN D, 25 HYDROXY; Future  -     URINALYSIS W/MICROSCOPIC; Future    2. Hyperlipidemia, unspecified hyperlipidemia type  -     CBC WITH AUTOMATED DIFF; Future  -     LIPID PANEL; Future  -     MAGNESIUM; Future  -     METABOLIC PANEL, COMPREHENSIVE; Future  -     MICROALBUMIN, UR, RAND W/ MICROALB/CREAT RATIO; Future  -     VITAMIN D, 25 HYDROXY; Future  -     URINALYSIS W/MICROSCOPIC; Future    3. PMR (polymyalgia rheumatica) (HCC)  -     CBC WITH AUTOMATED DIFF;  Future  - LIPID PANEL; Future  -     MAGNESIUM; Future  -     METABOLIC PANEL, COMPREHENSIVE; Future  -     MICROALBUMIN, UR, RAND W/ MICROALB/CREAT RATIO; Future  -     VITAMIN D, 25 HYDROXY; Future  -     URINALYSIS W/MICROSCOPIC; Future    4. Positive CRISTI (antinuclear antibody)/ RNP antibody  -     CBC WITH AUTOMATED DIFF; Future  -     LIPID PANEL; Future  -     MAGNESIUM; Future  -     METABOLIC PANEL, COMPREHENSIVE; Future  -     MICROALBUMIN, UR, RAND W/ MICROALB/CREAT RATIO; Future  -     VITAMIN D, 25 HYDROXY; Future  -     URINALYSIS W/MICROSCOPIC; Future    5. BPH with obstruction/lower urinary tract symptoms  -     CBC WITH AUTOMATED DIFF; Future  -     LIPID PANEL; Future  -     MAGNESIUM; Future  -     METABOLIC PANEL, COMPREHENSIVE; Future  -     MICROALBUMIN, UR, RAND W/ MICROALB/CREAT RATIO; Future  -     VITAMIN D, 25 HYDROXY; Future  -     URINALYSIS W/MICROSCOPIC; Future    6. Bladder neck contracture  -     CBC WITH AUTOMATED DIFF; Future  -     LIPID PANEL; Future  -     MAGNESIUM; Future  -     METABOLIC PANEL, COMPREHENSIVE; Future  -     MICROALBUMIN, UR, RAND W/ MICROALB/CREAT RATIO; Future  -     VITAMIN D, 25 HYDROXY; Future  -     URINALYSIS W/MICROSCOPIC; Future    7. Esophageal dysmotility  -     CBC WITH AUTOMATED DIFF; Future  -     LIPID PANEL; Future  -     MAGNESIUM; Future  -     METABOLIC PANEL, COMPREHENSIVE; Future  -     MICROALBUMIN, UR, RAND W/ MICROALB/CREAT RATIO; Future  -     VITAMIN D, 25 HYDROXY; Future  -     URINALYSIS W/MICROSCOPIC; Future    8. Primary osteoarthritis involving multiple joints  -     CBC WITH AUTOMATED DIFF; Future  -     LIPID PANEL; Future  -     MAGNESIUM; Future  -     METABOLIC PANEL, COMPREHENSIVE; Future  -     MICROALBUMIN, UR, RAND W/ MICROALB/CREAT RATIO; Future  -     VITAMIN D, 25 HYDROXY; Future  -     URINALYSIS W/MICROSCOPIC; Future    9. Nodule of finger of right hand  -     CBC WITH AUTOMATED DIFF; Future  -     LIPID PANEL;  Future  - MAGNESIUM; Future  -     METABOLIC PANEL, COMPREHENSIVE; Future  -     MICROALBUMIN, UR, RAND W/ MICROALB/CREAT RATIO; Future  -     VITAMIN D, 25 HYDROXY; Future  -     URINALYSIS W/MICROSCOPIC; Future    10. Class 1 obesity due to excess calories with serious comorbidity and body mass index (BMI) of 32.0 to 32.9 in adult  -     CBC WITH AUTOMATED DIFF; Future  -     LIPID PANEL; Future  -     MAGNESIUM; Future  -     METABOLIC PANEL, COMPREHENSIVE; Future  -     MICROALBUMIN, UR, RAND W/ MICROALB/CREAT RATIO; Future  -     VITAMIN D, 25 HYDROXY; Future  -     URINALYSIS W/MICROSCOPIC; Future    11. Vitamin D insufficiency  -     CBC WITH AUTOMATED DIFF; Future  -     LIPID PANEL; Future  -     MAGNESIUM; Future  -     METABOLIC PANEL, COMPREHENSIVE; Future  -     MICROALBUMIN, UR, RAND W/ MICROALB/CREAT RATIO; Future  -     VITAMIN D, 25 HYDROXY; Future  -     URINALYSIS W/MICROSCOPIC; Future    12. Medicare annual wellness visit, subsequent    13. ACP (advance care planning)        There are no preventive care reminders to display for this patient. Lindsay Solis is a 79 y.o. male who was evaluated by an audio-video encounter for concerns as above. Patient identification was verified prior to start of the visit. A caregiver was present when appropriate. Due to this being a TeleHealth encounter (During RVABY-10 public health emergency), evaluation of the following organ systems was limited: Vitals/Constitutional/EENT/Resp/CV/GI//MS/Neuro/Skin/Heme-Lymph-Imm. Pursuant to the emergency declaration under the [facility] waiver authority and the [employer] Act, this Virtual Visit was conducted, with patient's (and/or legal guardian's) consent, to reduce the patient's risk of exposure to COVID-19 and provide necessary medical care.      Services were provided through a synchronous discussion virtually to substitute for in-person clinic visit. I was in the office. The patient was at home.       Alana Huffman MD

## 2020-06-18 NOTE — ACP (ADVANCE CARE PLANNING)
Advance Care Planning       Advance Care Planning (ACP) Physician/NP/PA (Provider) Conversation        Date of ACP Conversation: 6/18/2020    Conversation Conducted with:   Patient with Decision Making Debra Du Maker:    Current Designated Health Care Decision Maker:   (If there is a valid [address] named in the [address]" box in the ACP activity, but it is not visible above, be sure to open that field and then select the health care decision maker relationship (ie \"primary\") in the blank space to the right of the name.)    Note: Assess and validate information in current ACP documents, as indicated. If no Authorized Decision Maker has previously been identified, then patient chooses [address]:  \"Who would you like to name as your primary health care decision-maker? \"    Name: Robert Patel  Relationship: wife Phone number: [phone]  Katie Ty this person be reached easily? \" YES  \"Who would you like to name as your back-up decision maker? \"   Name: Elpidio Peralta  Relationship: twin brother  Phone number   Katie Ty this person be reached easily? \" YES    Note: If the relationship of these Decision-Makers to the patient does NOT follow your state's Next of Kin hierarchy, recommend that patient complete ACP document that meets state-specific requirements to allow them to act on the patient's behalf when appropriate. Care Preferences:    Hospitalization: \"If your health worsens and it becomes clear that your chance of recovery is unlikely, what would your preference be regarding hospitalization? \"  If the patient would want hospitalization, answer \"yes\". If the patient would prefer comfort-focused treatment without hospitalization, answer \"no\". yes      Ventilation:   \"If you were in your present state of health and suddenly became very ill and were unable to breathe on your own, what would your preference be about the use of a ventilator (breathing machine) if it was available to you? \"    If patient would desire the use of a ventilator (breathing machine), answer \"yes\", if not answer \"no\":yes    \"If your health worsens and it becomes clear that your chance of recovery is unlikely, what would your preference be about the use of a ventilator (breathing machine) if it was available to you? \"   yes      Resuscitation:  \"CPR works best to restart the heart when there is a sudden event, like a heart attack, in someone who is otherwise healthy. Unfortunately, CPR does not typically restart the heart for people who have serious health conditions or who are very sick. \"    \"In the event your heart stopped as a result of an underlying serious health condition, would you want attempts to be made to restart your heart (answer \"yes\" for attempt to resuscitate) or would you prefer a natural death (answer \"no\" for do not attempt to resuscitate)? \"   yes, if reasonable chance of recovery    NOTE: If the patient has a valid advance directive AND provides care preference(s) that are inconsistent with that prior directive, advise the patient to consider either: creating a new advance directive that complies with state-specific requirements; or, if that is not possible, orally revoking that prior directive in accordance with state-specific requirements, which must be documented in the EHR. Conversation Outcomes / Follow-Up Plan: Patient has an existing advance directive on file, signed 7/23/2014. It designates his wife and twin brother  as his healthcare agents and expresses that he does not wish life prolonging procedures for end of life care. It was reviewed with him and still reflects his wishes. With regard to COVID-19, he states that he would wish the interventions as outlined above.             Length of Voluntary ACP Conversation in minutes:  16 minutes      Karen Alvarado MD

## 2020-06-21 NOTE — PROGRESS NOTES
Timoteo Whittington is a 79 y.o. male who was seen by synchronous (real-time) audio-video technology on 6/18/2020. Consent: Timoteo Whittington, who was seen by synchronous (real-time) audio-video technology, and/or his healthcare decision maker, is aware that this patient-initiated, Telehealth encounter on 6/18/2020 is a billable service, with coverage as determined by his insurance carrier. He is aware that he may receive a bill and has provided verbal consent to proceed: Yes. HPI:   Timoteo Whittington is a very pleasant 79y.o. year old male who has a history of hypertension, hyperlipidemia, polymyalgia rheumatica, esophageal hypercontractility, and BPH. He reports that he is doing relatively well. 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 He states that he has been following social distancing guidelines, and is walking for exercise. He states that he remained off prednisone for his PMR until 5/2020, when he noticed an increase in his bilateral shoulder pain. He contacted Dr. Brandon Mukherjee and was treated with a Sterapred dose pack. He states that he experienced some improvement 1-2 days after starting it, and the beneficial effects of treatment lasted approximately 1-2 weeks. However, his pain recurred and he received a second course. He has follow-up scheduled with Dr. Brandon Mukherjee to address further. He also reports some worsening nocturia, and admits that he is only taking finasteride since he stopped taking Flomax on his own. He states that his stream during the day is unchanged. He has an appointment to establish care with Dr. Birgit Corea later this month. He continues to deny any recurrence of dysphagia. He otherwise is without new complaints and feeling generally well. He has a history of polymyalgia rheumatica, diagnosed in 7/2016 when he was seen for bilateral shoulder pain of several months duration.  He had been under the care of Dr. Tatiana Carmona since 3/2016 when he presented with right wrist pain followed by the development of bilateral shoulder pain. He was treated with a Medrol dose pack and Tramadol and upon follow-up on 5/3/2016, reported some improvement in the bilateral shoulder and wrist discomfort, but pain persisted. Bilateral shoulder MRI's (5/11/2016) showed severe degenerative changes on the right at the acromioclavicular joint with moderate to severe or severe tendinopathy of the supraspinatus and infraspinatus; postoperative changes with tendinopathy of the supraspinatus, infraspinatus, and long head of the biceps was seen on the left. In 5/2016, lab data showed ESR 28, C-Reactive Protein 1.1, negative CRISTI and RF. He was treated with naproxen, tramadol and acetaminophen. He was evaluated on 7/19/2016 for worsening bilateral shoulder pain with bilateral hand pain involving the wrists, MCP and PIP joints. Evaluation included ESR 48; C-reactive protein 1.8; CRISTI positive; anti-RNP 1.4; negative antibodies to Zimmerman, dsDNA, anti-chromatin, RF and anti-CCP. Bilateral hand x-rays showed scattered arthritic changes with scattered small erosions. He was referred to see Dr. Gabriel Wiley who diagnosed polymyalgia rheumatica and started him on prednisone. Since initiating prednisone, he had significant improvement with nearly complete resolution of his symptoms. He has regained full use of his shoulders and has no trouble reaching behind his back or pulling a shirt over his head. He denies any headache, jaw claudication, scalp tenderness, fever, or visual changes. He successfully weaned off prednisone in 3/2019, and has not had a resurgence of symptoms. In 11/2016, he presented with a persistent non productive cough, sore throat, and hoarseness. He was referred to see Dr. Cristina Myers, and patient reports that a laryngoscopy was performed showing evidence of reflux. He was started on Nexium for two weeks with improvement in his cough, but he continued to have pain with swallowing.  He was referred to see Dr. Dale Juares, and underwent upper endoscopy (1/5/2017) showing abnormal esophageal motility with spastic and non-propulsive appearing contractions in the mid to distal esophagus (tortuous); mucosa appeared normal ( random biopsies: negative); normal stomach and duodenum. He underwent a barium swallow (1/11/2017) showing a small persistent smoothly marginated filling defect at the piriform sinus suggestive of an extrinsic mass effect. He subsequently had a neck CT scan (1/13/2017) which showed the right piriform sinus appeared larger than the left, but the walls of both were normal without surrounding mass or infiltrating process; nonspecific lymphadenopathy involving level 1 and 2 nodes. He underwent esophageal manometry, which showed a hypercontractile esophagus (\"Jackhammer\" esophagus). He was started on diltiazem 30 mg three times per day with resolution of symptoms. He discontinued diltiazem in 3/2017 without recurrence of symptoms. He has a history of hypertension, treated with lisinopril. He exercises mainly by walking, and denies any chest pain, shortness of breath at rest or with exertion, palpitations, lightheadedness, or edema. He also has hyperlipidemia, treated with rosuvastatin two days per week. He also has a history of BPH and underwent TURP in 2006. In 2/2011, he had laser vaporization of the prostate to relieve bladder outlet obstructive symptoms. In 3/2011 he had a laser incision performed on the bladder neck contracture. In 5/2012 and 8/2012, he underwent transurethral incision of the bladder neck contracture. Biopsy of the contracture (8/2012) was negative for malignancy. Since that time, he has had annual cystoscopic surveillance of the bladder neck contracture by Dr. Alonzo Haile. In 8/2015, it was felt that no more surveillance was necessary as it had remained stable.  He continues to take Proscar, but reports that he stopped Flomax on his own and began taking Super Beta prostate supplement instead. He being followed by Dr. Kerri Lopez. He denies difficulty with stream, dysuria, or hematuria. He does report 2-3 episodes of nocturia. He had a screening colonoscopy in 4/2012 by Dr. Fareed Da Silva which was normal. Follow-up recommended for 10 years. He denies any abdominal pain, nausea, vomiting, melena, hematochezia, or change in bowel movements. Past Medical History:   Diagnosis Date    Bladder neck contracture     BPH with obstruction/lower urinary tract symptoms     Essential hypertension with goal blood pressure less than 140/90     Family history of colon cancer     Hyperlipidemia     Osteoarthritis     PMR [facility]ymyalgia rheumatica) ([facility] [address] 7/20/2016     Past Surgical History:   Procedure Laterality Date    CYSTOSCOPY      ENDOSCOPY, COLON, DIAGNOSTIC      HC BERENICE LASER [id number]  3/2/11    Laser incision of bladder neck contracture    HX MOHS PROCEDURES  1/11    rt hip repl    HX OTHER SURGICAL      tonsillectomy, back sx,     HX OTHER SURGICAL      basel cell removed from right ear lobe    HX TURP      Laser    SD LASER VAPORIZATION SURGERY PROSTATE, COMPLETE       Current Outpatient Medications   Medication Sig    rosuvastatin (CRESTOR) 5 mg tablet Take 1 Tab by mouth three (3) days a week.  mirabegron ER (Myrbetriq) 25 mg ER tablet Take 1 Tab by mouth daily.  tamsulosin (FLOMAX) 0.4 mg capsule Take 1 Cap by mouth daily.  finasteride (PROSCAR) 5 mg tablet Take 1 Tab by mouth daily.  lisinopril (PRINIVIL, ZESTRIL) 5 mg tablet TAKE 1 TABLET BY MOUTH DAILY    finasteride (PROSCAR) 5 mg tablet TAKE 1 TABLET BY MOUTH ONCE DAILY    DOCUSATE SODIUM (STOOL SOFTENER PO) Take  by mouth.  multivitamin (ONE A DAY) tablet Take 1 Tab by mouth daily.  Cholecalciferol, Vitamin D3, (VITAMIN D3) 1,000 unit cap Take  by mouth daily.  GLUC/ERICA-MSM#1/C/CHARLY/RAMÓN/BOR (OSTEO BI-FLEX PO) Take 1 Tab by mouth daily.  aspirin 81 mg tablet Take 81 mg by mouth.        No current facility-administered medications for this visit. Allergies and Intolerances:   No Known Allergies     Family History: His father  from CAD, and his brother has had CABG. His mother  from ovarian cancer. Family History   Problem Relation Age of Onset    Cancer Other     Heart Disease Other      Social History:   He  reports that he has never smoked. He has never used smokeless tobacco. He is  but has been living with his long time girlfriend Jameel Bang) for 37 years. He has two step children. He is a retired , and works on the [employer] force part-time. Social History     Substance and Sexual Activity   Alcohol Use Yes    Alcohol/week: 7.0 standard drinks    Types: 7 Glasses of wine per week     Immunization History:  Immunization History   Administered Date(s) Administered    (RETIRED) Pneumococcal Vaccine (Unspecified Type) 2008    Influenza High Dose Vaccine PF 2013, 2015, 11/10/2016, 2017    Influenza Vaccine 2014    Influenza Vaccine (Tri) Adjuvanted 2018, 2019    Influenza Vaccine Split 2012    Pneumococcal Conjugate (PCV-13) 2015    Pneumococcal Polysaccharide (PPSV-23) 2008    TDAP Vaccine 2011    Zoster 2012    Zoster Recombinant 10/10/2019, 2020       Review of Systems:   As above included in HPI. Otherwise 11 point review of systems negative including constitutional, skin, HENT, eyes, respiratory, cardiovascular, gastrointestinal, genitourinary, musculoskeletal, endocrine, hematologic, allergy, and neurologic. Physical:   Vitals:   BP:   134/78  HR:    87  WT:    255 pounds  BMI:   kg/m2    Exam:   Objective:   Vital Signs: (As obtained by patient/caregiver at home)  There were no vitals taken for this visit.        Constitutional: [x] Appears well-developed and well-nourished [x] No apparent distress      [] Abnormal -     Mental status: [x] Alert and awake  [x] Oriented to person/place/time [x] Able to follow commands    [] Abnormal -     Eyes:   EOM    [x]  Normal    [] Abnormal -   Sclera  [x]  Normal    [] Abnormal -          Discharge [x]  None visible   [] Abnormal -     HENT: [x] Normocephalic, atraumatic  [] Abnormal -   [x] Mouth/Throat: Mucous membranes are moist    External Ears [x] Normal  [] Abnormal -    Neck: [x] No visualized mass [] Abnormal -     Pulmonary/Chest: [x] Respiratory effort normal   [x] No visualized signs of difficulty breathing or respiratory distress        [] Abnormal -      Musculoskeletal:   [x] Normal gait with no signs of ataxia         [x] Normal range of motion of neck        [] Abnormal -     Neurological:        [x] No Facial Asymmetry (Cranial nerve 7 motor function) (limited exam due to video visit)          [x] No gaze palsy        [] Abnormal -          Skin:        [x] No significant exanthematous lesions or discoloration noted on facial skin         [] Abnormal -            Psychiatric:       [x] Normal Affect [] Abnormal -        [x] No Hallucinations          Review of Data:  Labs:  No visits with results within 1 Month(s) from this visit.    Latest known visit with results is:   Hospital Outpatient Visit on 03/19/2020   Component Date Value Ref Range Status    WBC 03/19/2020 6.6  4.6 - 13.2 K/uL Final    RBC 03/19/2020 4.63* 4.70 - 5.50 M/uL Final    HGB 03/19/2020 14.5  13.0 - 16.0 g/dL Final    HCT 03/19/2020 44.7  36.0 - 48.0 % Final    MCV 03/19/2020 96.5  74.0 - 97.0 FL Final    MCH 03/19/2020 31.3  24.0 - 34.0 PG Final    MCHC 03/19/2020 32.4  31.0 - 37.0 g/dL Final    RDW 03/19/2020 13.2  11.6 - 14.5 % Final    PLATELET 32/69/8539 052  135 - 420 K/uL Final    MPV 03/19/2020 10.2  9.2 - 11.8 FL Final    NEUTROPHILS 03/19/2020 63  40 - 73 % Final    LYMPHOCYTES 03/19/2020 23  21 - 52 % Final    MONOCYTES 03/19/2020 12* 3 - 10 % Final    EOSINOPHILS 03/19/2020 2  0 - 5 % Final    BASOPHILS 03/19/2020 0  0 - 2 % Final    ABS. NEUTROPHILS 03/19/2020 4.2  1.8 - 8.0 K/UL Final    ABS. LYMPHOCYTES 03/19/2020 1.5  0.9 - 3.6 K/UL Final    ABS. MONOCYTES 03/19/2020 0.8  0.05 - 1.2 K/UL Final    ABS. EOSINOPHILS 03/19/2020 0.1  0.0 - 0.4 K/UL Final    ABS. BASOPHILS 03/19/2020 0.0  0.0 - 0.1 K/UL Final    DF 03/19/2020 AUTOMATED    Final    Hemoglobin A1c 03/19/2020 5.5  4.2 - 5.6 % Final    Est. average glucose 03/19/2020 111  mg/dL Final    LIPID PROFILE 03/19/2020        Final    Cholesterol, total 03/19/2020 152  <200 MG/DL Final    Triglyceride 03/19/2020 103  <150 MG/DL Final    HDL Cholesterol 03/19/2020 41  40 - 60 MG/DL Final    LDL, calculated 03/19/2020 90.4  0 - 100 MG/DL Final    VLDL, calculated 03/19/2020 20.6  MG/DL Final    CHOL/HDL Ratio 03/19/2020 3.7  0 - 5.0   Final    Sodium 03/19/2020 138  136 - 145 mmol/L Final    Potassium 03/19/2020 4.8  3.5 - 5.5 mmol/L Final    Chloride 03/19/2020 105  100 - 111 mmol/L Final    CO2 03/19/2020 31  21 - 32 mmol/L Final    Anion gap 03/19/2020 2* 3.0 - 18 mmol/L Final    Glucose 03/19/2020 91  74 - 99 mg/dL Final    BUN 03/19/2020 17  7.0 - 18 MG/DL Final    Creatinine 03/19/2020 1.18  0.6 - 1.3 MG/DL Final    BUN/Creatinine ratio 03/19/2020 14  12 - 20   Final    GFR est AA 03/19/2020 >60  >60 ml/min/1.73m2 Final    GFR est non-AA 03/19/2020 60* >60 ml/min/1.73m2 Final    Calcium 03/19/2020 8.9  8.5 - 10.1 MG/DL Final    Bilirubin, total 03/19/2020 0.6  0.2 - 1.0 MG/DL Final    ALT (SGPT) 03/19/2020 22  16 - 61 U/L Final    AST (SGOT) 03/19/2020 15  10 - 38 U/L Final    Alk.  phosphatase 03/19/2020 92  45 - 117 U/L Final    Protein, total 03/19/2020 7.3  6.4 - 8.2 g/dL Final    Albumin 03/19/2020 4.1  3.4 - 5.0 g/dL Final    Globulin 03/19/2020 3.2  2.0 - 4.0 g/dL Final    A-G Ratio 03/19/2020 1.3  0.8 - 1.7   Final    TSH 03/19/2020 2.39  0.36 - 3.74 uIU/mL Final    Vitamin D 25-Hydroxy 03/19/2020 44.4  30 - 100 ng/mL Final    Color 03/19/2020 YELLOW    Final    Appearance 03/19/2020 CLOUDY    Final    Specific gravity 03/19/2020 1.017  1.005 - 1.030   Final    pH (UA) 03/19/2020 6.0  5.0 - 8.0   Final    Protein 03/19/2020 NEGATIVE   NEG mg/dL Final    Glucose 03/19/2020 NEGATIVE   NEG mg/dL Final    Ketone 03/19/2020 NEGATIVE   NEG mg/dL Final    Bilirubin 03/19/2020 NEGATIVE   NEG   Final    Blood 03/19/2020 NEGATIVE   NEG   Final    Urobilinogen 03/19/2020 0.2  0.2 - 1.0 EU/dL Final    Nitrites 03/19/2020 NEGATIVE   NEG   Final    Leukocyte Esterase 03/19/2020 NEGATIVE   NEG   Final           Health Maintenance:  Screening:    Colorectal: colonoscopy (4/2012) normal. Dr. Claudene Flakes. Due 2022. Depression: none   DM (HbA1c/FPG): FPG 91/ HbA1c 5.5 (3/2020)   Hepatitis C: negative (7/2016)   Falls: none   DEXA: within normal limits (5/2017)    PSA/HOLA: PSA 0.6 (6/2019); HOLA per Dr. Linda Amador   Glaucoma: regular eye exams with Dr. Sathya Vergara (last 9/2019)   Smoking: none   Vitamin D: 44.4 (3/2020)   Medicare Wellness: today      Impression:  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    Hyperlipidemia E78.5    Vitamin D insufficiency E55.9    Hearing loss H91.90    Essential hypertension I10    Primary osteoarthritis involving multiple joints M89.49    PM[facility]a) [facility] M35.3    Hoarseness R49.0    Esophageal dysmotility K22.4    Positive CRISTI (antinuclear antibody)/ RNP antibody R76.8    Nodule of finger of right hand R22.31    Class 1 obesity due to excess calories with serious comorbidity and body mass index (BMI) of 32.0 to 32.9 in adult E66.09, Z68.32       Plan:  1. Hypertension. Blood pressure remains well controlled on lisinopril 5 mg daily. Renal function near normal today with creatinine 1.18/ eGFR 60. Encouraged to increase fluid intake and avoid NSAIDS. Will continue to follow. 2. Hyperlipidemia.  On low intensity dose rosuvastatin, taking 5 mg three days per week, with reasonable control with LDL 90 and HDL 41. Emphasized importance of lifestyle modifications, including diet, exercise, and weight loss. 3. Polymyalgia rheumatica. Patient had significantly improved with steroid treatment. Had successfully tapered off prednisone in 3/2019 and released from care by Dr. Valentin Nolasco. However, reported worsening shoulder symptoms with bilateral pain in 5/2020, and evaluated by Dr. Valentin Nolasco. Treated with two courses of Sterapred dose packs with some improvement which lasts approximately 1-2 weeks, but then notices pain recurs. Has follow-up scheduled. Known increased risk (10%) of concurrent giant cell arteritis with PMR, but no evidence of GCA. Also with positive CRISTI/ positive RNP Ab, but no evidence of inflammation elsewhere. Urinalysis continues to be negative for proteinuria. Continue to follow closely. Given chronic steroid use, bone density scan in 5/2017 normal.   4. Odynophagia. Secondary to esophageal dysmotility. Responded to diltiazem, and now successfully stopped taking it without recurrence of symptoms. Will continue to follow. 5. BPH with bladder neck contracture. Appears to be asymptomatic on current treatment with Proscar. Patient decided to discontinue Flomax on his own, but reports worsening nocturia so that awakening 3-6 times per night Will resume Flomax until upcoming appointment to establish care with Dr. Yahir Bills. Continue to follow. 6. Hoarseness. Patient reports that it has improved to baseline. Using Flonase as needed. Follow. 7. Right hand nodule. Appears along a tendon sheath. Unchanged from six months ago. Suspect ganglion cyst although could also represent a tenosynovial giant cell tumor. However, patient wishing to hold off currently on referral to ortho given no significant discomfort. Reports that it is unchanged today and not causing him any difficulty. Will continue to follow. 8. Abnormal fasting glucose. FPG normal today at 91 with HbA1c also normal at 5.5. Will continue to monitor. 9. Obesity. Emphasized importance of lifestyle modifications, including diet, exercise, and weight loss. Will readdress next visit. 10. Health maintenance. Already received influenza vaccine. Has not yet obtained Shingrix vaccine. Other immunizations up to date. Colonoscopy due 2022. Continue regular eye exams with Dr. Kelly Marinelli. Vitamin D level normal. Continue maintenance dose supplement. Discussed recommendations regarding aspirin use and primary prevention, particularly for age >74, and patient stated that he will consider discontinuing it. Counseled patient regarding strict mitigation measures for COVID-19, including hand washing, social distancing and diligent hand washing, as recommended by the CDC. In addition, an annual Medicare wellness visit was done today. Patient understands recommendations and agrees with plan. Follow-up in 6 months. We discussed the expected course, resolution and complications of the diagnosis(es) in detail. Medication risks, benefits, costs, interactions, and alternatives were discussed as indicated. I advised him to contact the office if his condition worsens, changes or fails to improve as anticipated. He expressed understanding with the diagnosis(es) and plan. Derian Briceno is a 79 y.o. male who was evaluated by a video visit encounter for concerns as above. Patient identification was verified prior to start of the visit. A caregiver was present when appropriate. Due to this being a TeleHealth encounter (During DALJA-44 public health emergency), evaluation of the following organ systems was limited: Vitals/Constitutional/EENT/Resp/CV/GI//MS/Neuro/Skin/Heme-Lymph-Imm.   Pursuant to the emergency declaration under the [facility] [place], [address] waiver authority and the [employer] and Response Supplemental Appropriations Act, this Virtual  Visit was conducted, with patient's (and/or legal guardian's) consent, to reduce the patient's risk of exposure to COVID-19 and provide necessary medical care. Services were provided through a video synchronous discussion virtually to substitute for in-person clinic visit. Patient was at home and provider was located in the office.       Rosemary Mas MD

## 2020-08-29 RX ORDER — LISINOPRIL 5 MG/1
TABLET ORAL
Qty: 90 TAB | Refills: 3 | Status: SHIPPED | OUTPATIENT
Start: 2020-08-29 | End: 2021-09-20

## 2020-09-10 ENCOUNTER — HOSPITAL ENCOUNTER (OUTPATIENT)
Dept: LAB | Age: 80
Discharge: HOME | End: 2020-09-10
Payer: MEDICARE

## 2020-09-10 DIAGNOSIS — I10 HYPERTENSION: ICD-10-CM

## 2020-09-10 LAB
ANION GAP SERPL CALC-SCNC: 3 MMOL/L (ref 3–18)
BUN SERPL-MCNC: 14 MG/DL (ref 7–18)
BUN/CREAT SERPL: 14 (ref 12–20)
CALCIUM SERPL-MCNC: 9.5 MG/DL (ref 8.5–10.1)
CHLORIDE SERPL-SCNC: 103 MMOL/L (ref 100–111)
CO2 SERPL-SCNC: 32 MMOL/L (ref 21–32)
CREAT SERPL-MCNC: 1.03 MG/DL (ref 0.6–1.3)
GLUCOSE SERPL-MCNC: 84 MG/DL (ref 74–99)
POTASSIUM SERPL-SCNC: 4.7 MMOL/L (ref 3.5–5.5)
SODIUM SERPL-SCNC: 138 MMOL/L (ref 136–145)

## 2020-09-10 PROCEDURE — 36415 COLL VENOUS BLD VENIPUNCTURE: CPT

## 2020-09-10 PROCEDURE — 80048 BASIC METABOLIC PNL TOTAL CA: CPT

## 2020-09-10 PROCEDURE — 93005 ELECTROCARDIOGRAM TRACING: CPT

## 2020-09-11 LAB
ATRIAL RATE: 84 BPM
CALCULATED P AXIS, ECG09: 47 DEGREES
CALCULATED R AXIS, ECG10: 17 DEGREES
CALCULATED T AXIS, ECG11: 28 DEGREES
DIAGNOSIS, 93000: NORMAL
P-R INTERVAL, ECG05: 208 MS
Q-T INTERVAL, ECG07: 370 MS
QRS DURATION, ECG06: 96 MS
QTC CALCULATION (BEZET), ECG08: 437 MS
VENTRICULAR RATE, ECG03: 84 BPM

## 2020-11-12 ENCOUNTER — HOSPITAL ENCOUNTER (OUTPATIENT)
Dept: LAB | Age: 80
Discharge: HOME | End: 2020-11-12
Payer: MEDICARE

## 2020-11-12 ENCOUNTER — APPOINTMENT (OUTPATIENT)
Dept: INTERNAL MEDICINE CLINIC | Age: 80
End: 2020-11-12

## 2020-11-12 DIAGNOSIS — R22.31 NODULE OF FINGER OF RIGHT HAND: ICD-10-CM

## 2020-11-12 DIAGNOSIS — E66.09 CLASS 1 OBESITY DUE TO EXCESS CALORIES WITH SERIOUS COMORBIDITY AND BODY MASS INDEX (BMI) OF 32.0 TO 32.9 IN ADULT: ICD-10-CM

## 2020-11-12 DIAGNOSIS — N13.8 BPH WITH OBSTRUCTION/LOWER URINARY TRACT SYMPTOMS: ICD-10-CM

## 2020-11-12 DIAGNOSIS — M35.3 PMR (POLYMYALGIA RHEUMATICA) (HCC): ICD-10-CM

## 2020-11-12 DIAGNOSIS — N40.1 BPH WITH OBSTRUCTION/LOWER URINARY TRACT SYMPTOMS: ICD-10-CM

## 2020-11-12 DIAGNOSIS — K22.4 ESOPHAGEAL DYSMOTILITY: ICD-10-CM

## 2020-11-12 DIAGNOSIS — I10 ESSENTIAL HYPERTENSION: ICD-10-CM

## 2020-11-12 DIAGNOSIS — E78.5 HYPERLIPIDEMIA, UNSPECIFIED HYPERLIPIDEMIA TYPE: ICD-10-CM

## 2020-11-12 DIAGNOSIS — N32.0 BLADDER NECK CONTRACTURE: ICD-10-CM

## 2020-11-12 DIAGNOSIS — M15.9 PRIMARY OSTEOARTHRITIS INVOLVING MULTIPLE JOINTS: ICD-10-CM

## 2020-11-12 DIAGNOSIS — E55.9 VITAMIN D INSUFFICIENCY: ICD-10-CM

## 2020-11-12 DIAGNOSIS — R76.8 POSITIVE ANA (ANTINUCLEAR ANTIBODY): ICD-10-CM

## 2020-11-12 LAB
25(OH)D3 SERPL-MCNC: 47.2 NG/ML (ref 30–100)
ALBUMIN SERPL-MCNC: 4 G/DL (ref 3.4–5)
ALBUMIN/GLOB SERPL: 1.3 {RATIO} (ref 0.8–1.7)
ALP SERPL-CCNC: 82 U/L (ref 45–117)
ALT SERPL-CCNC: 25 U/L (ref 16–61)
ANION GAP SERPL CALC-SCNC: 7 MMOL/L (ref 3–18)
APPEARANCE UR: CLEAR
AST SERPL-CCNC: 11 U/L (ref 10–38)
BASOPHILS # BLD: 0 K/UL (ref 0–0.1)
BASOPHILS NFR BLD: 0 % (ref 0–2)
BILIRUB SERPL-MCNC: 0.4 MG/DL (ref 0.2–1)
BILIRUB UR QL: NEGATIVE
BUN SERPL-MCNC: 20 MG/DL (ref 7–18)
BUN/CREAT SERPL: 17 (ref 12–20)
CALCIUM SERPL-MCNC: 9.2 MG/DL (ref 8.5–10.1)
CHLORIDE SERPL-SCNC: 108 MMOL/L (ref 100–111)
CHOLEST SERPL-MCNC: 175 MG/DL
CO2 SERPL-SCNC: 27 MMOL/L (ref 21–32)
COLOR UR: YELLOW
CREAT SERPL-MCNC: 1.21 MG/DL (ref 0.6–1.3)
CREAT UR-MCNC: 231 MG/DL (ref 30–125)
DIFFERENTIAL METHOD BLD: ABNORMAL
EOSINOPHIL # BLD: 0.1 K/UL (ref 0–0.4)
EOSINOPHIL NFR BLD: 1 % (ref 0–5)
ERYTHROCYTE [DISTWIDTH] IN BLOOD BY AUTOMATED COUNT: 13.2 % (ref 11.6–14.5)
GLOBULIN SER CALC-MCNC: 3.1 G/DL (ref 2–4)
GLUCOSE SERPL-MCNC: 93 MG/DL (ref 74–99)
GLUCOSE UR STRIP.AUTO-MCNC: NEGATIVE MG/DL
HCT VFR BLD AUTO: 43.2 % (ref 36–48)
HDLC SERPL-MCNC: 40 MG/DL (ref 40–60)
HDLC SERPL: 4.4 {RATIO} (ref 0–5)
HGB BLD-MCNC: 14.1 G/DL (ref 13–16)
HGB UR QL STRIP: NEGATIVE
KETONES UR QL STRIP.AUTO: NEGATIVE MG/DL
LDLC SERPL CALC-MCNC: 108.4 MG/DL (ref 0–100)
LEUKOCYTE ESTERASE UR QL STRIP.AUTO: NEGATIVE
LIPID PROFILE,FLP: ABNORMAL
LYMPHOCYTES # BLD: 1.7 K/UL (ref 0.9–3.6)
LYMPHOCYTES NFR BLD: 25 % (ref 21–52)
MAGNESIUM SERPL-MCNC: 2.3 MG/DL (ref 1.6–2.6)
MCH RBC QN AUTO: 31.5 PG (ref 24–34)
MCHC RBC AUTO-ENTMCNC: 32.6 G/DL (ref 31–37)
MCV RBC AUTO: 96.4 FL (ref 74–97)
MICROALBUMIN UR-MCNC: 2.17 MG/DL (ref 0–3)
MICROALBUMIN/CREAT UR-RTO: 9 MG/G (ref 0–30)
MONOCYTES # BLD: 0.6 K/UL (ref 0.05–1.2)
MONOCYTES NFR BLD: 9 % (ref 3–10)
NEUTS SEG # BLD: 4.4 K/UL (ref 1.8–8)
NEUTS SEG NFR BLD: 65 % (ref 40–73)
NITRITE UR QL STRIP.AUTO: NEGATIVE
PH UR STRIP: 5.5 [PH] (ref 5–8)
PLATELET # BLD AUTO: 244 K/UL (ref 135–420)
PMV BLD AUTO: 10 FL (ref 9.2–11.8)
POTASSIUM SERPL-SCNC: 4.7 MMOL/L (ref 3.5–5.5)
PROT SERPL-MCNC: 7.1 G/DL (ref 6.4–8.2)
PROT UR STRIP-MCNC: NEGATIVE MG/DL
RBC # BLD AUTO: 4.48 M/UL (ref 4.7–5.5)
SODIUM SERPL-SCNC: 142 MMOL/L (ref 136–145)
SP GR UR REFRACTOMETRY: 1.02 (ref 1–1.03)
TRIGL SERPL-MCNC: 133 MG/DL (ref ?–150)
UROBILINOGEN UR QL STRIP.AUTO: 0.2 EU/DL (ref 0.2–1)
VLDLC SERPL CALC-MCNC: 26.6 MG/DL
WBC # BLD AUTO: 6.8 K/UL (ref 4.6–13.2)

## 2020-11-12 PROCEDURE — 81003 URINALYSIS AUTO W/O SCOPE: CPT

## 2020-11-12 PROCEDURE — 83735 ASSAY OF MAGNESIUM: CPT

## 2020-11-12 PROCEDURE — 85025 COMPLETE CBC W/AUTO DIFF WBC: CPT

## 2020-11-12 PROCEDURE — 80053 COMPREHEN METABOLIC PANEL: CPT

## 2020-11-12 PROCEDURE — 80061 LIPID PANEL: CPT

## 2020-11-12 PROCEDURE — 82043 UR ALBUMIN QUANTITATIVE: CPT

## 2020-11-12 PROCEDURE — 82306 VITAMIN D 25 HYDROXY: CPT

## 2020-11-12 PROCEDURE — 36415 COLL VENOUS BLD VENIPUNCTURE: CPT

## 2020-11-19 ENCOUNTER — OFFICE VISIT (OUTPATIENT)
Dept: INTERNAL MEDICINE CLINIC | Age: 80
End: 2020-11-19
Payer: MEDICARE

## 2020-11-19 VITALS
BODY MASS INDEX: 32.85 KG/M2 | WEIGHT: 256 LBS | RESPIRATION RATE: 16 BRPM | OXYGEN SATURATION: 96 % | HEART RATE: 82 BPM | DIASTOLIC BLOOD PRESSURE: 76 MMHG | SYSTOLIC BLOOD PRESSURE: 122 MMHG | TEMPERATURE: 97.5 F | HEIGHT: 74 IN

## 2020-11-19 DIAGNOSIS — E78.5 HYPERLIPIDEMIA, UNSPECIFIED HYPERLIPIDEMIA TYPE: ICD-10-CM

## 2020-11-19 DIAGNOSIS — N32.0 BLADDER NECK CONTRACTURE: ICD-10-CM

## 2020-11-19 DIAGNOSIS — R76.8 POSITIVE ANA (ANTINUCLEAR ANTIBODY): ICD-10-CM

## 2020-11-19 DIAGNOSIS — E55.9 VITAMIN D INSUFFICIENCY: ICD-10-CM

## 2020-11-19 DIAGNOSIS — K22.4 ESOPHAGEAL DYSMOTILITY: ICD-10-CM

## 2020-11-19 DIAGNOSIS — I10 ESSENTIAL HYPERTENSION: ICD-10-CM

## 2020-11-19 DIAGNOSIS — N40.1 BPH WITH OBSTRUCTION/LOWER URINARY TRACT SYMPTOMS: ICD-10-CM

## 2020-11-19 DIAGNOSIS — M15.9 PRIMARY OSTEOARTHRITIS INVOLVING MULTIPLE JOINTS: ICD-10-CM

## 2020-11-19 DIAGNOSIS — E66.09 CLASS 1 OBESITY DUE TO EXCESS CALORIES WITH SERIOUS COMORBIDITY AND BODY MASS INDEX (BMI) OF 32.0 TO 32.9 IN ADULT: ICD-10-CM

## 2020-11-19 DIAGNOSIS — N13.8 BPH WITH OBSTRUCTION/LOWER URINARY TRACT SYMPTOMS: ICD-10-CM

## 2020-11-19 DIAGNOSIS — R49.0 HOARSENESS: ICD-10-CM

## 2020-11-19 DIAGNOSIS — Z23 NEEDS FLU SHOT: Primary | ICD-10-CM

## 2020-11-19 DIAGNOSIS — M35.3 PMR (POLYMYALGIA RHEUMATICA) (HCC): ICD-10-CM

## 2020-11-19 PROCEDURE — 1101F PT FALLS ASSESS-DOCD LE1/YR: CPT | Performed by: INTERNAL MEDICINE

## 2020-11-19 PROCEDURE — 99214 OFFICE O/P EST MOD 30 MIN: CPT | Performed by: INTERNAL MEDICINE

## 2020-11-19 PROCEDURE — 90694 VACC AIIV4 NO PRSRV 0.5ML IM: CPT

## 2020-11-19 PROCEDURE — G8536 NO DOC ELDER MAL SCRN: HCPCS | Performed by: INTERNAL MEDICINE

## 2020-11-19 PROCEDURE — G8752 SYS BP LESS 140: HCPCS | Performed by: INTERNAL MEDICINE

## 2020-11-19 PROCEDURE — G8427 DOCREV CUR MEDS BY ELIG CLIN: HCPCS | Performed by: INTERNAL MEDICINE

## 2020-11-19 PROCEDURE — G8417 CALC BMI ABV UP PARAM F/U: HCPCS | Performed by: INTERNAL MEDICINE

## 2020-11-19 PROCEDURE — G8510 SCR DEP NEG, NO PLAN REQD: HCPCS | Performed by: INTERNAL MEDICINE

## 2020-11-19 PROCEDURE — G0463 HOSPITAL OUTPT CLINIC VISIT: HCPCS | Performed by: INTERNAL MEDICINE

## 2020-11-19 PROCEDURE — G8754 DIAS BP LESS 90: HCPCS | Performed by: INTERNAL MEDICINE

## 2020-11-19 NOTE — PATIENT INSTRUCTIONS
Vaccine Information Statement    Influenza (Flu) Vaccine (Inactivated or Recombinant): What You Need to Know    Many Vaccine Information Statements are available in Japanese and other languages. See www.immunize.org/vis  Hojas de información sobre vacunas están disponibles en español y en muchos otros idiomas. Visite www.immunize.org/vis    1. Why get vaccinated? Influenza vaccine can prevent influenza (flu). Flu is a contagious disease that spreads around the [facility] every year, usually between October and May. Anyone can get the flu, but it is more dangerous for some people. Infants and young children, people 72years of age and older, pregnant women, and people with certain health conditions or a weakened immune system are at greatest risk of flu complications. Pneumonia, bronchitis, sinus infections and ear infections are examples of flu-related complications. If you have a medical condition, such as heart disease, cancer or diabetes, flu can make it worse. Flu can cause fever and chills, sore throat, muscle aches, fatigue, cough, headache, and runny or stuffy nose. Some people may have vomiting and diarrhea, though this is more common in children than adults. Each year thousands of people in the [employer] die from flu, and many more are hospitalized. Flu vaccine prevents millions of illnesses and flu-related visits to the doctor each year. 2. Influenza vaccines     CDC recommends everyone 10months of age and older get vaccinated every flu season. Children 6 months through 6years of age may need 2 doses during a single flu season. Everyone else needs only 1 dose each flu season. It takes about 2 weeks for protection to develop after vaccination. There are many flu viruses, and they are always changing. Each year a new flu vaccine is made to protect against three or four viruses that are likely to cause disease in the upcoming flu season.  Even when the vaccine doesnt exactly match these viruses, it may still provide some protection. Influenza vaccine does not cause flu. Influenza vaccine may be given at the same time as other vaccines. 3. Talk with your health care provider    Tell your vaccine provider if the person getting the vaccine:   Has had an allergic reaction after a previous dose of influenza vaccine, or has any severe, life-threatening allergies.  Has ever had Guillain-Barré Syndrome (also called GBS). In some cases, your health care provider may decide to postpone influenza vaccination to a future visit. People with minor illnesses, such as a cold, may be vaccinated. People who are moderately or severely ill should usually wait until they recover before getting influenza vaccine. Your health care provider can give you more information. 4. Risks of a reaction     Soreness, redness, and swelling where shot is given, fever, muscle aches, and headache can happen after influenza vaccine.  There may be a very small increased risk of Guillain-Barré Syndrome (GBS) after inactivated influenza vaccine (the flu shot). [facility] children who get the flu shot along with pneumococcal vaccine (PCV13), and/or DTaP vaccine at the same time might be slightly more likely to have a seizure caused by fever. Tell your health care provider if a child who is getting flu vaccine has ever had a seizure. People sometimes faint after medical procedures, including vaccination. Tell your provider if you feel dizzy or have vision changes or ringing in the ears. As with any medicine, there is a very remote chance of a vaccine causing a severe allergic reaction, other serious injury, or death. 5. What if there is a serious problem? An allergic reaction could occur after the vaccinated person leaves the clinic.  If you see signs of a severe allergic reaction (hives, swelling of the face and throat, difficulty breathing, a fast heartbeat, dizziness, or weakness), call 9-1-1 and get the person to the nearest hospital.    For other signs that concern you, call your health care provider. Adverse reactions should be reported to the Vaccine Adverse Event Reporting System (VAERS). Your health care provider will usually file this report, or you can do it yourself. Visit the VAERS website at www.vaers. [facility].gov or call 0-756.719.8780. VAERS is only for reporting reactions, and VAERS staff do not give medical advice. 6. The National Vaccine Injury Compensation Program    The [facility] Vaccine Injury Compensation Program (VICP) is a federal program that was created to compensate people who may have been injured by certain vaccines. Visit the VICP website at www.[facility] [place].gov/vaccinecompensation or call 0-798.319.3520 to learn about the program and about filing a claim. There is a time limit to file a claim for compensation. 7. How can I learn more?  Ask your health care provider.  Call your local or state health department.  Contact the Centers for Disease Control and Prevention (CDC):  - Call 1-514.239.6103 (0-050-ZEJ-INFO) or  - Visit CDCs influenza website at www.cdc.gov/flu    Vaccine Information Statement (Interim)  Inactivated Influenza Vaccine   8/15/2019  [id number]   Department of Health and Human Services  Centers for Disease Control and Prevention    Office Use Only         Heart-Healthy Diet: Care Instructions  Your Care Instructions     A heart-healthy diet has lots of vegetables, fruits, nuts, beans, and whole grains, and is low in salt. It limits foods that are high in saturated fat, such as meats, cheeses, and fried foods. It may be hard to change your diet, but even small changes can lower your risk of heart attack and heart disease. Follow-up care is a key part of your treatment and safety. Be sure to make and go to all appointments, and call your doctor if you are having problems.  It's also a good idea to know your test results and keep a list of the medicines you take. How can you care for yourself at home? Watch your portions  · Learn what a serving is. A \"serving\" and a \"portion\" are not always the same thing. Make sure that you are not eating larger portions than are recommended. For example, a serving of pasta is ½ cup. A serving size of meat is 2 to 3 ounces. A 3-ounce serving is about the size of a deck of cards. Measure serving sizes until you are good at [place]" them. Keep in mind that restaurants often serve portions that are 2 or 3 times the size of one serving. · To keep your energy level up and keep you from feeling hungry, eat often but in smaller portions. · Eat only the number of calories you need to stay at a healthy weight. If you need to lose weight, eat fewer calories than your body burns (through exercise and other physical activity). Eat more fruits and vegetables  · Eat a variety of fruit and vegetables every day. Dark green, deep orange, red, or yellow fruits and vegetables are especially good for you. Examples include spinach, carrots, peaches, and berries. · Keep carrots, celery, and other veggies handy for snacks. Buy fruit that is in season and store it where you can see it so that you will be tempted to eat it. · Cook dishes that have a lot of veggies in them, such as stir-fries and soups. Limit saturated and trans fat  · Read food labels, and try to avoid saturated and trans fats. They increase your risk of heart disease. · Use olive or canola oil when you cook. · Bake, broil, grill, or steam foods instead of frying them. · Choose lean meats instead of high-fat meats such as hot dogs and sausages. Cut off all visible fat when you prepare meat. · Eat fish, skinless poultry, and meat alternatives such as soy products instead of high-fat meats. Soy products, such as tofu, may be especially good for your heart. · Choose low-fat or fat-free milk and dairy products.   Eat foods high in fiber  · Eat a variety of grain products every day. Include whole-grain foods that have lots of fiber and nutrients. Examples of whole-grain foods include oats, whole wheat bread, and brown rice. · Buy whole-grain breads and cereals, instead of white bread or pastries. Limit salt and sodium  · Limit how much salt and sodium you eat to help lower your blood pressure. · Taste food before you salt it. Add only a little salt when you think you need it. With time, your taste buds will adjust to less salt. · Eat fewer snack items, fast foods, and other high-salt, processed foods. Check food labels for the amount of sodium in packaged foods. · Choose low-sodium versions of canned goods (such as soups, vegetables, and beans). Limit sugar  · Limit drinks and foods with added sugar. These include candy, desserts, and soda pop. Limit alcohol  · Limit alcohol to no more than 2 drinks a day for men and 1 drink a day for women. Too much alcohol can cause health problems. When should you call for help? Watch closely for changes in your health, and be sure to contact your doctor if:    · You would like help planning heart-healthy meals. Where can you learn more? Go to http://www.silverio.com/  Enter V137 in the search box to learn more about \"Heart-Healthy Diet: Care Instructions. \"  Current as of: August 22, 2019               Content Version: 12.6  © 4184-0296 [employer]. Care instructions adapted under license by [employer] (which disclaims liability or warranty for this information). If you have questions about a medical condition or this instruction, always ask your healthcare professional. [place] [address] any warranty or liability for your use of this information. High Cholesterol: Care Instructions  Your Care Instructions     Cholesterol is a type of fat in your blood. It is needed for many body functions, such as making new cells. Cholesterol is made by your body.  It also comes from food you eat. High cholesterol means that you have too much of the fat in your blood. This raises your risk of a heart attack and stroke. LDL and HDL are part of your total cholesterol. LDL is the \"bad\" cholesterol. High LDL can raise your risk for heart disease, heart attack, and stroke. HDL is the \"good\" cholesterol. It helps clear bad cholesterol from the body. High HDL is linked with a lower risk of heart disease, heart attack, and stroke. Your cholesterol levels help your doctor find out your risk for having a heart attack or stroke. You and your doctor can talk about whether you need to lower your risk and what treatment is best for you. A heart-healthy lifestyle along with medicines can help lower your cholesterol and your risk. The way you choose to lower your risk will depend on how high your risk is for heart attack and stroke. It will also depend on how you feel about taking medicines. Follow-up care is a key part of your treatment and safety. Be sure to make and go to all appointments, and call your doctor if you are having problems. It's also a good idea to know your test results and keep a list of the medicines you take. How can you care for yourself at home? · Eat a variety of foods every day. Good choices include fruits, vegetables, whole grains (like oatmeal), dried beans and peas, nuts and seeds, soy products (like tofu), and fat-free or low-fat dairy products. · Replace butter, margarine, and hydrogenated or partially hydrogenated oils with olive and canola oils. (Canola oil margarine without trans fat is fine.)  · Replace red meat with fish, poultry, and soy protein (like tofu). · Limit processed and packaged foods like chips, crackers, and cookies. · Bake, broil, or steam foods. Don't aquino them. · Be physically active. Get at least 30 minutes of exercise on most days of the week. Walking is a good choice.  You also may want to do other activities, such as running, swimming, cycling, or playing tennis or team sports. · Stay at a healthy weight or lose weight by making the changes in eating and physical activity listed above. Losing just a small amount of weight, even 5 to 10 pounds, can reduce your risk for having a heart attack or stroke. · Do not smoke. When should you call for help? Watch closely for changes in your health, and be sure to contact your doctor if:    · You need help making lifestyle changes.     · You have questions about your medicine. Where can you learn more? Go to http://www.gray.com/  Enter J487 in the search box to learn more about \"High Cholesterol: Care Instructions. \"  Current as of: December 16, 2019               Content Version: 12.6  © 6644-7582 [employer]. Care instructions adapted under license by [employer] (which disclaims liability or warranty for this information). If you have questions about a medical condition or this instruction, always ask your healthcare professional. [address] any warranty or liability for your use of this information.

## 2020-11-19 NOTE — PROGRESS NOTES
[facility] 1940 [facility] who presents for routine immunizations. Patient denies any symptoms , reactions or allergies that would exclude them from being immunized today. Risks and adverse reactions were discussed and the VIS was given to them. All questions were addressed. Order placed for HD FLU,  per Verbal Order from Marcela with read back. Patient was observed for 15 min post injection. There were no reactions observed.     Clyde Shankar LPN

## 2020-11-23 NOTE — PROGRESS NOTES
HPI:   Jazzy Moreau is a very pleasant 79y.o. year old male who presents today for a routine visit. He has a history of hypertension, hyperlipidemia, polymyalgia rheumatica, esophageal hypercontractility, and BPH. He reports that he is doing relatively well. He continues to follow social distancing guidelines, and is walking for exercise. He states that he is noting some mild increase in shoulder discomfort, but is not taking prednisone. He has a follow-up appointment with Dr. Louisa Silva next month. He established care with Dr. Norris Vaca and was started on finasteride for his nocturia. He reports that he had surgical resection of a basal carcinoma from his left face by Dr. Aviva Loomis in 9/2020. He reports that it required a skin graft, and has healed well. He continues to deny any recurrence of dysphagia. He otherwise is without new complaints and feeling generally well. He has a history of polymyalgia rheumatica, diagnosed in 7/2016 when he was seen for bilateral shoulder pain of several months duration. He had been under the care of Dr. James Grady since 3/2016 when he presented with right wrist pain followed by the development of bilateral shoulder pain. He was treated with a Medrol dose pack and Tramadol and upon follow-up on 5/3/2016, reported some improvement in the bilateral shoulder and wrist discomfort, but pain persisted. Bilateral shoulder MRI's (5/11/2016) showed severe degenerative changes on the right at the acromioclavicular joint with moderate to severe or severe tendinopathy of the supraspinatus and infraspinatus; postoperative changes with tendinopathy of the supraspinatus, infraspinatus, and long head of the biceps was seen on the left. In 5/2016, lab data showed ESR 28, C-Reactive Protein 1.1, negative CRISTI and RF. He was treated with naproxen, tramadol and acetaminophen.  He was evaluated on 7/19/2016 for worsening bilateral shoulder pain with bilateral hand pain involving the wrists, MCP and PIP joints. Evaluation included ESR 48; C-reactive protein 1.8; CRISTI positive; anti-RNP 1.4; negative antibodies to Zimmerman, dsDNA, anti-chromatin, RF and anti-CCP. Bilateral hand x-rays showed scattered arthritic changes with scattered small erosions. He was referred to see Dr. Khai Archer who diagnosed polymyalgia rheumatica and started him on prednisone. Since initiating prednisone, he had significant improvement with nearly complete resolution of his symptoms. He has regained full use of his shoulders and has no trouble reaching behind his back or pulling a shirt over his head. He denies any headache, jaw claudication, scalp tenderness, fever, or visual changes. He successfully weaned off prednisone in 3/2019, and has not had a resurgence of symptoms. In 11/2016, he presented with a persistent non productive cough, sore throat, and hoarseness. He was referred to see Dr. Kelly Vega, and patient reports that a laryngoscopy was performed showing evidence of reflux. He was started on Nexium for two weeks with improvement in his cough, but he continued to have pain with swallowing. He was referred to see Dr. Jameson Almendarez, and underwent upper endoscopy (1/5/2017) showing abnormal esophageal motility with spastic and non-propulsive appearing contractions in the mid to distal esophagus (tortuous); mucosa appeared normal ( random biopsies: negative); normal stomach and duodenum. He underwent a barium swallow (1/11/2017) showing a small persistent smoothly marginated filling defect at the piriform sinus suggestive of an extrinsic mass effect. He subsequently had a neck CT scan (1/13/2017) which showed the right piriform sinus appeared larger than the left, but the walls of both were normal without surrounding mass or infiltrating process; nonspecific lymphadenopathy involving level 1 and 2 nodes. He underwent esophageal manometry, which showed a hypercontractile esophagus (\"Jackhammer\" esophagus).  He was started on diltiazem 30 mg three times per day with resolution of symptoms. He discontinued diltiazem in 3/2017 without recurrence of symptoms. He has a history of hypertension, treated with lisinopril. He exercises mainly by walking, and denies any chest pain, shortness of breath at rest or with exertion, palpitations, lightheadedness, or edema. He also has hyperlipidemia, treated with rosuvastatin two days per week. He also has a history of BPH and underwent TURP in 2006. In 2/2011, he had laser vaporization of the prostate to relieve bladder outlet obstructive symptoms. In 3/2011 he had a laser incision performed on the bladder neck contracture. In 5/2012 and 8/2012, he underwent transurethral incision of the bladder neck contracture. Biopsy of the contracture (8/2012) was negative for malignancy. Since that time, he has had annual cystoscopic surveillance of the bladder neck contracture by Dr. Allan Cash. In 8/2015, it was felt that no more surveillance was necessary as it had remained stable. He continues to take Proscar, but reports that he stopped Flomax on his own and began taking Super Beta prostate supplement instead. He being followed by Dr. Allan Cash. He denies difficulty with stream, dysuria, or hematuria. He does report 2-3 episodes of nocturia. He had a screening colonoscopy in 4/2012 by Dr. Fiordaliza Jensen which was normal. Follow-up recommended for 10 years. He had a repeat colonoscopy in 1/2020 by Dr. Federica Avalos for evaluation of rectal bleeding, showing mild diverticulosis in the descending/ sigmoid colon, medium grade/stage 2 internal hemorrhoids with evidence of recent bleeding, and three sessile 4-7 mm polyps in the hepatic flexure (pathology: tubular adenoma), transverse colon (pathology: tubular adenoma), and rectum (pathology: hyperplastic). No further screening felt to be needed. He denies any abdominal pain, nausea, vomiting, melena, hematochezia, or change in bowel movements.       Past Medical History:   Diagnosis Date    Bladder neck contracture     BPH with obstruction/lower urinary tract symptoms     Essential hypertension with goal blood pressure less than 140/90     Family history of colon cancer     Hyperlipidemia     Osteoarthritis     PMR [facility]algia rheumatica) ([facility] [address] 2016     Past Surgical History:   Procedure Laterality Date    CYSTOSCOPY      ENDOSCOPY, COLON, DIAGNOSTIC      HC BERENICE LASER [id number]  3/2/11    Laser incision of bladder neck contracture    HX MOHS PROCEDURES      rt hip repl    HX OTHER SURGICAL      tonsillectomy, back sx,     HX OTHER SURGICAL      basel cell removed from right ear lobe    HX SHOULDER ARTHROSCOPY Left 2010    HX TURP      Laser    WI LASER VAPORIZATION SURGERY PROSTATE, COMPLETE       Current Outpatient Medications   Medication Sig    lisinopriL (PRINIVIL, ZESTRIL) 5 mg tablet TAKE 1 TABLET BY MOUTH DAILY    rosuvastatin (CRESTOR) 5 mg tablet Take 1 Tab by mouth three (3) days a week.  finasteride (PROSCAR) 5 mg tablet TAKE 1 TABLET BY MOUTH ONCE DAILY    DOCUSATE SODIUM (STOOL SOFTENER PO) Take  by mouth.  multivitamin (ONE A DAY) tablet Take 1 Tab by mouth daily.  Cholecalciferol, Vitamin D3, (VITAMIN D3) 1,000 unit cap Take  by mouth daily.  GLUC/ERICA-MSM#1/C/CHARLY/RAMÓN/BOR (OSTEO BI-FLEX PO) Take 1 Tab by mouth daily.  aspirin 81 mg tablet Take 81 mg by mouth. No current facility-administered medications for this visit. Allergies and Intolerances:   No Known Allergies     Family History: His father  from CAD, and his brother has had CABG. His mother  from ovarian cancer. Family History   Problem Relation Age of Onset    Cancer Other     Heart Disease Other      Social History:   He  reports that he has never smoked. He has never used smokeless tobacco. He is  but has been living with his long time girlfriend Delma Rodriguez) for 37 years. He has two step children.  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 Social History     Substance and Sexual Activity   Alcohol Use Yes    Alcohol/week: 7.0 standard drinks    Types: 7 Glasses of wine per week    Comment: wine with dinner 3 times a week     Immunization History:  Immunization History   Administered Date(s) Administered    (RETIRED) Pneumococcal Vaccine (Unspecified Type) 03/20/2008    Influenza High Dose Vaccine PF 11/14/2013, 11/30/2015, 11/10/2016, 09/14/2017    Influenza Vaccine 11/17/2014    Influenza Vaccine (Tri) Adjuvanted (>65 Yrs FLUAD TRI 10626) 09/20/2018, 09/24/2019    Influenza Vaccine Split 11/29/2012    Influenza, Quadrivalent, Adjuvanted (>65 Yrs FLUAD QUAD 39308) 11/19/2020    Pneumococcal Conjugate (PCV-13) 05/29/2015    Pneumococcal Polysaccharide (PPSV-23) 03/20/2008    TDAP Vaccine 04/05/2011    Zoster 04/24/2012    Zoster Recombinant 10/10/2019, 03/17/2020       Review of Systems:   As above included in HPI. Otherwise 11 point review of systems negative including constitutional, skin, HENT, eyes, respiratory, cardiovascular, gastrointestinal, genitourinary, musculoskeletal, endocrine, hematologic, allergy, and neurologic. Physical:   Vitals:   BP: 122/76 134/78  HR: 82  87  WT: 256 lb (116.1 kg)  255 pounds  BMI:   kg/m2    Exam:   Objective:   Vital Signs: (As obtained by patient/caregiver at home)  Visit Vitals  /76 (BP 1 Location: Left arm, BP Patient Position: Sitting)   Pulse 82   Temp 97.5 °F (36.4 °C) (Temporal)   Resp 16   Ht 6' 2\" (1.88 m)   Wt 256 lb (116.1 kg)   SpO2 96%   BMI 32.87 kg/m²      Exam:    Patient appears in no apparent distress. Affect is appropriate. HEENT: PERRLA, anicteric, oropharynx clear, no JVD, adenopathy or thyromegaly. No carotid bruits or radiated murmur. Lungs: clear to auscultation, no wheezes, rhonchi, or rales. Heart: regular rate and rhythm.  No murmur, rubs, gallops  Abdomen: soft, nontender, nondistended, normal bowel sounds, no hepatosplenomegaly or masses. Extremities: without edema. Pulses 1-2+ bilaterally. Review of Data:  Labs:  Hospital Outpatient Visit on 11/12/2020   Component Date Value Ref Range Status    WBC 11/12/2020 6.8  4.6 - 13.2 K/uL Final    RBC 11/12/2020 4.48* 4.70 - 5.50 M/uL Final    HGB 11/12/2020 14.1  13.0 - 16.0 g/dL Final    HCT 11/12/2020 43.2  36.0 - 48.0 % Final    MCV 11/12/2020 96.4  74.0 - 97.0 FL Final    MCH 11/12/2020 31.5  24.0 - 34.0 PG Final    MCHC 11/12/2020 32.6  31.0 - 37.0 g/dL Final    RDW 11/12/2020 13.2  11.6 - 14.5 % Final    PLATELET 46/04/9476 121  135 - 420 K/uL Final    MPV 11/12/2020 10.0  9.2 - 11.8 FL Final    NEUTROPHILS 11/12/2020 65  40 - 73 % Final    LYMPHOCYTES 11/12/2020 25  21 - 52 % Final    MONOCYTES 11/12/2020 9  3 - 10 % Final    EOSINOPHILS 11/12/2020 1  0 - 5 % Final    BASOPHILS 11/12/2020 0  0 - 2 % Final    ABS. NEUTROPHILS 11/12/2020 4.4  1.8 - 8.0 K/UL Final    ABS. LYMPHOCYTES 11/12/2020 1.7  0.9 - 3.6 K/UL Final    ABS. MONOCYTES 11/12/2020 0.6  0.05 - 1.2 K/UL Final    ABS. EOSINOPHILS 11/12/2020 0.1  0.0 - 0.4 K/UL Final    ABS.  BASOPHILS 11/12/2020 0.0  0.0 - 0.1 K/UL Final    DF 11/12/2020 AUTOMATED    Final    LIPID PROFILE 11/12/2020        Final    Cholesterol, total 11/12/2020 175  <200 MG/DL Final    Triglyceride 11/12/2020 133  <150 MG/DL Final    HDL Cholesterol 11/12/2020 40  40 - 60 MG/DL Final    LDL, calculated 11/12/2020 108.4* 0 - 100 MG/DL Final    VLDL, calculated 11/12/2020 26.6  MG/DL Final    CHOL/HDL Ratio 11/12/2020 4.4  0 - 5.0   Final    Magnesium 11/12/2020 2.3  1.6 - 2.6 mg/dL Final    Sodium 11/12/2020 142  136 - 145 mmol/L Final    Potassium 11/12/2020 4.7  3.5 - 5.5 mmol/L Final    Chloride 11/12/2020 108  100 - 111 mmol/L Final    CO2 11/12/2020 27  21 - 32 mmol/L Final    Anion gap 11/12/2020 7  3.0 - 18 mmol/L Final    Glucose 11/12/2020 93  74 - 99 mg/dL Final    BUN 11/12/2020 20* 7.0 - 18 MG/DL Final    Creatinine 11/12/2020 1.21  0.6 - 1.3 MG/DL Final    BUN/Creatinine ratio 11/12/2020 17  12 - 20   Final    GFR est AA 11/12/2020 >60  >60 ml/min/1.73m2 Final    GFR est non-AA 11/12/2020 58* >60 ml/min/1.73m2 Final    Calcium 11/12/2020 9.2  8.5 - 10.1 MG/DL Final    Bilirubin, total 11/12/2020 0.4  0.2 - 1.0 MG/DL Final    ALT (SGPT) 11/12/2020 25  16 - 61 U/L Final    AST (SGOT) 11/12/2020 11  10 - 38 U/L Final    Alk. phosphatase 11/12/2020 82  45 - 117 U/L Final    Protein, total 11/12/2020 7.1  6.4 - 8.2 g/dL Final    Albumin 11/12/2020 4.0  3.4 - 5.0 g/dL Final    Globulin 11/12/2020 3.1  2.0 - 4.0 g/dL Final    A-G Ratio 11/12/2020 1.3  0.8 - 1.7   Final    Microalbumin,urine random 11/12/2020 2.17  0 - 3.0 MG/DL Final    Creatinine, urine 11/12/2020 231.00* 30 - 125 mg/dL Final    Microalbumin/Creat ratio (mg/g cre* 11/12/2020 9  0 - 30 mg/g Final    Vitamin D 25-Hydroxy 11/12/2020 47.2  30 - 100 ng/mL Final    Color 11/12/2020 YELLOW    Final    Appearance 11/12/2020 CLEAR    Final    Specific gravity 11/12/2020 1.024  1.005 - 1.030   Final    pH (UA) 11/12/2020 5.5  5.0 - 8.0   Final    Protein 11/12/2020 Negative  NEG mg/dL Final    Glucose 11/12/2020 Negative  NEG mg/dL Final    Ketone 11/12/2020 Negative  NEG mg/dL Final    Bilirubin 11/12/2020 Negative  NEG   Final    Blood 11/12/2020 Negative  NEG   Final    Urobilinogen 11/12/2020 0.2  0.2 - 1.0 EU/dL Final    Nitrites 11/12/2020 Negative  NEG   Final    Leukocyte Esterase 11/12/2020 Negative  NEG   Final           Health Maintenance:  Screening:    Colorectal: colonoscopy (1/2020) tubular adenomas. Dr. Long Gutiérrezs. No further screening needed.     Depression: none   DM (HbA1c/FPG): FPG 93 (11/2020)/ HbA1c 5.5 (3/2020)   Hepatitis C: negative (7/2016)   Falls: none   DEXA: within normal limits (5/2017)    PSA/HOLA: PSA 0.5 (11/2019); HOLA per Dr. Apurva Bee   Glaucoma: regular eye exams with  [facility] (last 9/2019) Scheduled 12/2020   Smoking: none   Vitamin D: 47.2 (11/2020)   Medicare Wellness: 6/18/2020      Impression:  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    Hyperlipidemia E78.5    Vitamin D insufficiency E55.9    Hearing loss H91.90    Essential hypertension I10    Primary osteoarthritis involving multiple joints M89.49    PM[facility] M35.3    Hoarseness R49.0    Esophageal dysmotility K22.4    Positive CRISTI (antinuclear antibody)/ RNP antibody R76.8    Nodule of finger of right hand R22.31    Class 1 obesity due to excess calories with serious comorbidity and body mass index (BMI) of 32.0 to 32.9 in adult E66.09, Z68.32       Plan:  1. Hypertension. Blood pressure remains well controlled on lisinopril 5 mg daily. Renal function mildly decreased today with creatinine 1.21/ eGFR 58. Encouraged to increase fluid intake and avoid NSAIDS. May be developing chronic renal disease. Will continue to follow closely. 2. Hyperlipidemia. On low intensity dose rosuvastatin, taking 5 mg three days per week, with worsening control with  and HDL 40. Emphasized importance of lifestyle modifications, including diet, exercise, and weight loss. Will consider increasing frequency of dose at next visit if not improved. Follow. 3. Polymyalgia rheumatica. Patient had significantly improved with steroid treatment. Had successfully tapered off prednisone in 3/2019 and released from care by Dr. Bijan Blank. However, reported worsening shoulder symptoms with bilateral pain in 5/2020, and evaluated by Dr. Bijan Blank. Treated with two courses of Sterapred dose packs with some improvement. Known increased risk (10%) of concurrent giant cell arteritis with PMR, but no evidence of GCA. Also with positive CRISTI/ positive RNP Ab, but no evidence of inflammation elsewhere.  Urinalysis continues to be negative for proteinuria. Continue to follow closely. Given chronic steroid use, bone density scan in 5/2017 normal. Reports follow-up scheduled with Dr. Khai Archer next month. 4. Odynophagia. Secondary to esophageal dysmotility. Responded to diltiazem, and now successfully stopped taking it without recurrence of symptoms. Will continue to follow. 5. BPH with bladder neck contracture. Appears to be asymptomatic on current treatment with Proscar. Patient decided to discontinue Flomax on his own, but reported worsening nocturia and started on finasteride by Dr. Ken Jefferson. PVR assessed in 6/2020 and shown to be 38 cc. Continue to follow. 6. Hoarseness. Patient reports that it has improved to baseline. Using Flonase as needed. Follow. 7. Right hand nodule. Appears along a tendon sheath. Unchanged from six months ago. Suspect ganglion cyst although could also represent a tenosynovial giant cell tumor. However, patient wishing to hold off currently on referral to ortho given no significant discomfort. Reports that it is unchanged today and not causing him any difficulty. Will continue to follow. 8. Abnormal fasting glucose. FPG normal today at 93. Will continue to monitor. 9. Basal cell carcinoma, face. Underwent extensive wide excision in 9/2020 by Dr. Janette Wilde and required skin graft. Well healed today. Continue surveillance exams. 10. Obesity. Emphasized importance of lifestyle modifications, including diet, exercise, and weight loss. Will readdress next visit. 11. Health maintenance. Will give influenza vaccine today. Completed 2/2 doses of Shingrix vaccine. Other immunizations up to date. Colonoscopy completed and no further screening needed. Continue regular eye exams with Dr. Alexy Umaña. Vitamin D level normal. Continue maintenance dose supplement. Discussed recommendations regarding aspirin use and primary prevention, but patient wishing to continue.   Medicare wellness visit up to date.    Patient understands recommendations and agrees with plan. Follow-up in 6 months.

## 2021-04-01 ENCOUNTER — TELEPHONE (OUTPATIENT)
Dept: INTERNAL MEDICINE CLINIC | Age: 81
End: 2021-04-01

## 2021-04-01 NOTE — TELEPHONE ENCOUNTER
Pt said he is going to have hip replacement on 05/26 and needs a medical clearance is there a day you can see him before that      Dr Mae St will be doing it

## 2021-04-23 RX ORDER — ROSUVASTATIN CALCIUM 5 MG/1
TABLET, COATED ORAL
Qty: 36 TAB | Refills: 3 | Status: SHIPPED | OUTPATIENT
Start: 2021-04-23 | End: 2022-03-17

## 2021-05-13 ENCOUNTER — OFFICE VISIT (OUTPATIENT)
Dept: INTERNAL MEDICINE CLINIC | Age: 81
End: 2021-05-13
Payer: MEDICARE

## 2021-05-13 VITALS
TEMPERATURE: 97.7 F | DIASTOLIC BLOOD PRESSURE: 78 MMHG | SYSTOLIC BLOOD PRESSURE: 126 MMHG | RESPIRATION RATE: 16 BRPM | OXYGEN SATURATION: 95 % | HEIGHT: 74 IN | BODY MASS INDEX: 32.85 KG/M2 | HEART RATE: 86 BPM | WEIGHT: 256 LBS

## 2021-05-13 DIAGNOSIS — M15.9 PRIMARY OSTEOARTHRITIS INVOLVING MULTIPLE JOINTS: ICD-10-CM

## 2021-05-13 DIAGNOSIS — Z01.818 PREOPERATIVE EVALUATION TO RULE OUT SURGICAL CONTRAINDICATION: Primary | ICD-10-CM

## 2021-05-13 DIAGNOSIS — N40.1 BPH WITH OBSTRUCTION/LOWER URINARY TRACT SYMPTOMS: ICD-10-CM

## 2021-05-13 DIAGNOSIS — E78.5 HYPERLIPIDEMIA, UNSPECIFIED HYPERLIPIDEMIA TYPE: ICD-10-CM

## 2021-05-13 DIAGNOSIS — N13.8 BPH WITH OBSTRUCTION/LOWER URINARY TRACT SYMPTOMS: ICD-10-CM

## 2021-05-13 DIAGNOSIS — I10 ESSENTIAL HYPERTENSION: ICD-10-CM

## 2021-05-13 DIAGNOSIS — E66.09 CLASS 1 OBESITY DUE TO EXCESS CALORIES WITH SERIOUS COMORBIDITY AND BODY MASS INDEX (BMI) OF 32.0 TO 32.9 IN ADULT: ICD-10-CM

## 2021-05-13 DIAGNOSIS — M35.3 PMR (POLYMYALGIA RHEUMATICA) (HCC): ICD-10-CM

## 2021-05-13 DIAGNOSIS — N32.0 BLADDER NECK CONTRACTURE: ICD-10-CM

## 2021-05-13 DIAGNOSIS — R76.8 POSITIVE ANA (ANTINUCLEAR ANTIBODY): ICD-10-CM

## 2021-05-13 PROCEDURE — G0463 HOSPITAL OUTPT CLINIC VISIT: HCPCS | Performed by: INTERNAL MEDICINE

## 2021-05-13 PROCEDURE — G8752 SYS BP LESS 140: HCPCS | Performed by: INTERNAL MEDICINE

## 2021-05-13 PROCEDURE — G8427 DOCREV CUR MEDS BY ELIG CLIN: HCPCS | Performed by: INTERNAL MEDICINE

## 2021-05-13 PROCEDURE — G8536 NO DOC ELDER MAL SCRN: HCPCS | Performed by: INTERNAL MEDICINE

## 2021-05-13 PROCEDURE — G8417 CALC BMI ABV UP PARAM F/U: HCPCS | Performed by: INTERNAL MEDICINE

## 2021-05-13 PROCEDURE — 99214 OFFICE O/P EST MOD 30 MIN: CPT | Performed by: INTERNAL MEDICINE

## 2021-05-13 PROCEDURE — G8510 SCR DEP NEG, NO PLAN REQD: HCPCS | Performed by: INTERNAL MEDICINE

## 2021-05-13 PROCEDURE — G8754 DIAS BP LESS 90: HCPCS | Performed by: INTERNAL MEDICINE

## 2021-05-13 PROCEDURE — 1101F PT FALLS ASSESS-DOCD LE1/YR: CPT | Performed by: INTERNAL MEDICINE

## 2021-05-13 NOTE — PROGRESS NOTES
1. Have you been to the ER, urgent care clinic or hospitalized since your last visit? NO.     2. Have you seen or consulted any other health care providers outside of the [address] [place] [address] since your last visit (Include any pap smears or colon screening)?  NO

## 2021-05-13 NOTE — PATIENT INSTRUCTIONS
Heart-Healthy Diet: Care Instructions  Your Care Instructions     A heart-healthy diet has lots of vegetables, fruits, nuts, beans, and whole grains, and is low in salt. It limits foods that are high in saturated fat, such as meats, cheeses, and fried foods. It may be hard to change your diet, but even small changes can lower your risk of heart attack and heart disease. Follow-up care is a key part of your treatment and safety. Be sure to make and go to all appointments, and call your doctor if you are having problems. It's also a good idea to know your test results and keep a list of the medicines you take. How can you care for yourself at home? Watch your portions  · Learn what a serving is. A \"serving\" and a \"portion\" are not always the same thing. Make sure that you are not eating larger portions than are recommended. For example, a serving of pasta is ½ cup. A serving size of meat is 2 to 3 ounces. A 3-ounce serving is about the size of a deck of cards. Measure serving sizes until you are good at [place]" them. Keep in mind that restaurants often serve portions that are 2 or 3 times the size of one serving. · To keep your energy level up and keep you from feeling hungry, eat often but in smaller portions. · Eat only the number of calories you need to stay at a healthy weight. If you need to lose weight, eat fewer calories than your body burns (through exercise and other physical activity). Eat more fruits and vegetables  · Eat a variety of fruit and vegetables every day. Dark green, deep orange, red, or yellow fruits and vegetables are especially good for you. Examples include spinach, carrots, peaches, and berries. · Keep carrots, celery, and other veggies handy for snacks. Buy fruit that is in season and store it where you can see it so that you will be tempted to eat it. · Cook dishes that have a lot of veggies in them, such as stir-fries and soups.   Limit saturated and trans fat  · Read food labels, and try to avoid saturated and trans fats. They increase your risk of heart disease. · Use olive or canola oil when you cook. · Bake, broil, grill, or steam foods instead of frying them. · Choose lean meats instead of high-fat meats such as hot dogs and sausages. Cut off all visible fat when you prepare meat. · Eat fish, skinless poultry, and meat alternatives such as soy products instead of high-fat meats. Soy products, such as tofu, may be especially good for your heart. · Choose low-fat or fat-free milk and dairy products. Eat foods high in fiber  · Eat a variety of grain products every day. Include whole-grain foods that have lots of fiber and nutrients. Examples of whole-grain foods include oats, whole wheat bread, and brown rice. · Buy whole-grain breads and cereals, instead of white bread or pastries. Limit salt and sodium  · Limit how much salt and sodium you eat to help lower your blood pressure. · Taste food before you salt it. Add only a little salt when you think you need it. With time, your taste buds will adjust to less salt. · Eat fewer snack items, fast foods, and other high-salt, processed foods. Check food labels for the amount of sodium in packaged foods. · Choose low-sodium versions of canned goods (such as soups, vegetables, and beans). Limit sugar  · Limit drinks and foods with added sugar. These include candy, desserts, and soda pop. Limit alcohol  · Limit alcohol to no more than 2 drinks a day for men and 1 drink a day for women. Too much alcohol can cause health problems. When should you call for help? Watch closely for changes in your health, and be sure to contact your doctor if:    · You would like help planning heart-healthy meals. Where can you learn more? Go to http://www.[employer].com/  Enter V137 in the search box to learn more about \"Heart-Healthy Diet: Care Instructions. \"  Current as of: August 22, 2019               Content Version: 12.6  © 1752-4155 [employer]. Care instructions adapted under license by [employer] (which disclaims liability or warranty for this information). If you have questions about a medical condition or this instruction, always ask your healthcare professional. [address] any warranty or liability for your use of this information.

## 2021-05-14 ENCOUNTER — TELEPHONE (OUTPATIENT)
Dept: INTERNAL MEDICINE CLINIC | Age: 81
End: 2021-05-14

## 2021-05-14 NOTE — PROGRESS NOTES
HPI:   Belinda San is a very pleasant 79y.o. year old male who presents today for preoperative evaluation. He has a history of hypertension, hyperlipidemia, polymyalgia rheumatica, esophageal hypercontractility, and BPH. He reports that he is doing relatively well. He reports increased difficulty with left hip pain, and states that is scheduled for a left total hip arthroplasty with Dr. Juany Leal on 5/26/2021 at [facility]. He states that he has been taking meloxicam for the pain and feels that it is helpful. He states that he is remaining active and reports no change in his exercise tolerance. He reports that he has completed the Rodriguez Peter COVID-19 vaccine series. He otherwise is without new complaints and feeling generally well. He has a history of polymyalgia rheumatica, diagnosed in 7/2016 when he was seen for bilateral shoulder pain of several months duration. He had been under the care of Dr. Audi Ventura since 3/2016 when he presented with right wrist pain followed by the development of bilateral shoulder pain. He was treated with a Medrol dose pack and Tramadol and upon follow-up on 5/3/2016, reported some improvement in the bilateral shoulder and wrist discomfort, but pain persisted. Bilateral shoulder MRI's (5/11/2016) showed severe degenerative changes on the right at the acromioclavicular joint with moderate to severe or severe tendinopathy of the supraspinatus and infraspinatus; postoperative changes with tendinopathy of the supraspinatus, infraspinatus, and long head of the biceps was seen on the left. In 5/2016, lab data showed ESR 28, C-Reactive Protein 1.1, negative CRISTI and RF. He was treated with naproxen, tramadol and acetaminophen. He was evaluated on 7/19/2016 for worsening bilateral shoulder pain with bilateral hand pain involving the wrists, MCP and PIP joints.  Evaluation included ESR 48; C-reactive protein 1.8; CRISTI positive; anti-RNP 1.4; negative antibodies to Zimmerman, dsDNA, anti-chromatin, RF and anti-CCP. Bilateral hand x-rays showed scattered arthritic changes with scattered small erosions. He was referred to see Dr. Karla Krishnan who diagnosed polymyalgia rheumatica and started him on prednisone. Since initiating prednisone, he had significant improvement with nearly complete resolution of his symptoms. He has regained full use of his shoulders and has no trouble reaching behind his back or pulling a shirt over his head. He denies any headache, jaw claudication, scalp tenderness, fever, or visual changes. He successfully weaned off prednisone in 3/2019, and has not had a resurgence of symptoms. In 11/2016, he presented with a persistent non productive cough, sore throat, and hoarseness. He was referred to see Dr. Irma Ba, and patient reports that a laryngoscopy was performed showing evidence of reflux. He was started on Nexium for two weeks with improvement in his cough, but he continued to have pain with swallowing. He was referred to see Dr. Tahira Castellano, and underwent upper endoscopy (1/5/2017) showing abnormal esophageal motility with spastic and non-propulsive appearing contractions in the mid to distal esophagus (tortuous); mucosa appeared normal ( random biopsies: negative); normal stomach and duodenum. He underwent a barium swallow (1/11/2017) showing a small persistent smoothly marginated filling defect at the piriform sinus suggestive of an extrinsic mass effect. He subsequently had a neck CT scan (1/13/2017) which showed the right piriform sinus appeared larger than the left, but the walls of both were normal without surrounding mass or infiltrating process; nonspecific lymphadenopathy involving level 1 and 2 nodes. He underwent esophageal manometry, which showed a hypercontractile esophagus (\"Jackhammer\" esophagus). He was started on diltiazem 30 mg three times per day with resolution of symptoms. He discontinued diltiazem in 3/2017 without recurrence of symptoms.      He has a history of hypertension, treated with lisinopril. He exercises mainly by walking, and denies any chest pain, shortness of breath at rest or with exertion, palpitations, lightheadedness, or edema. He also has hyperlipidemia, treated with rosuvastatin two days per week. He also has a history of BPH and underwent TURP in 2006. In 2/2011, he had laser vaporization of the prostate to relieve bladder outlet obstructive symptoms. In 3/2011 he had a laser incision performed on the bladder neck contracture. In 5/2012 and 8/2012, he underwent transurethral incision of the bladder neck contracture. Biopsy of the contracture (8/2012) was negative for malignancy. Since that time, he has had annual cystoscopic surveillance of the bladder neck contracture by Dr. Jeffrey Gonzalez. In 8/2015, it was felt that no more surveillance was necessary as it had remained stable. He continues to take Proscar, but reports that he stopped Flomax on his own and began taking Super Beta prostate supplement instead. He being followed by Dr. Jeffrey Gonzalez. He denies difficulty with stream, dysuria, or hematuria. He does report 2-3 episodes of nocturia. He had a screening colonoscopy in 4/2012 by Dr. Tristan Muse which was normal. Follow-up recommended for 10 years. He had a repeat colonoscopy in 1/2020 by Dr. Arnel Gonzalez for evaluation of rectal bleeding, showing mild diverticulosis in the descending/ sigmoid colon, medium grade/stage 2 internal hemorrhoids with evidence of recent bleeding, and three sessile 4-7 mm polyps in the hepatic flexure (pathology: tubular adenoma), transverse colon (pathology: tubular adenoma), and rectum (pathology: hyperplastic). No further screening felt to be needed. He denies any abdominal pain, nausea, vomiting, melena, hematochezia, or change in bowel movements.       Past Medical History:   Diagnosis Date    Bladder neck contracture     BPH with obstruction/lower urinary tract symptoms     Cancer ([address]     Essential hypertension with goal blood pressure less than 140/90     Family history of colon cancer     History of blood transfusion     with shoulder replacement    Hyperlipidemia     Osteoarthritis     Osteoarthritis of left hip     PMR [facility]yalgia rhe[facility]a) ([facility] [address] 2016    Skin cancer     Urinary retention      Past Surgical History:   Procedure Laterality Date    CYSTOSCOPY      ENDOSCOPY, COLON, DIAGNOSTIC      HC BERENICE LASER [id number]  3/2/11    Laser incision of bladder neck contracture    HX BACK SURGERY      lower    HX MOHS PROCEDURES      rt hip repl    HX ORTHOPAEDIC Right     shoulder replacement    HX OTHER SURGICAL      tonsillectomy, back sx,     HX OTHER SURGICAL      basel cell removed from right ear lobe    HX SHOULDER ARTHROSCOPY Left     HX TURP      Laser    CA LASER VAPORIZATION SURGERY PROSTATE, COMPLETE       Current Outpatient Medications   Medication Sig    tamsulosin (FLOMAX) 0.4 mg capsule Take 1 Cap by mouth daily (after dinner).  predniSONE (DELTASONE) 5 mg tablet Take  by mouth daily.  meloxicam (MOBIC) 15 mg tablet Take 15 mg by mouth daily.  acetaminophen/diphenhydramine (TYLENOL PM PO) Take  by mouth as needed.  rosuvastatin (CRESTOR) 5 mg tablet TAKE 1 TABLET BY MOUTH 3 TIMES WEEKLY    finasteride (PROSCAR) 5 mg tablet TAKE 1 TABLET BY MOUTH ONCE DAILY    lisinopriL (PRINIVIL, ZESTRIL) 5 mg tablet TAKE 1 TABLET BY MOUTH DAILY    DOCUSATE SODIUM (STOOL SOFTENER PO) Take  by mouth.  multivitamin (ONE A DAY) tablet Take 1 Tab by mouth daily.  Cholecalciferol, Vitamin D3, (VITAMIN D3) 1,000 unit cap Take  by mouth daily.  GLUC/ERICA-MSM#1/C/CHARLY/RAMÓN/BOR (OSTEO BI-FLEX PO) Take 1 Tab by mouth daily.  aspirin 81 mg tablet Take 81 mg by mouth. No current facility-administered medications for this visit. Allergies and Intolerances:   No Known Allergies     Family History: His father  from CAD, and his brother has had CABG.  His mother  from ovarian cancer. Family History   Problem Relation Age of Onset    Cancer Other     Heart Disease Other      Social History:   He  reports that he has never smoked. He has never used smokeless tobacco. He is  but has been living with his long time girlfriend Carlos Salomon) for 37 years. He has two step children. 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 Social History     Substance and Sexual Activity   Alcohol Use Yes    Alcohol/week: 7.0 standard drinks    Types: 7 Glasses of wine per week    Comment: wine with dinner 3 times a week     Immunization History:  Immunization History   Administered Date(s) Administered    (RETIRED) Pneumococcal Vaccine (Unspecified Type) 03/20/2008    COVID-19, PFIZER, MRNA, LNP-S, PF, 30MCG/0.3ML DOSE 03/30/2021    Influenza High Dose Vaccine PF 11/14/2013, 11/30/2015, 11/10/2016, 09/14/2017    Influenza Vaccine 11/17/2014, 09/01/2020    Influenza Vaccine (Tri) Adjuvanted (>65 Yrs FLUAD TRI 53336) 09/20/2018, 09/24/2019    Influenza Vaccine Split 11/29/2012    Influenza, Quadrivalent, Adjuvanted (>65 Yrs FLUAD QUAD 61760) 11/19/2020    Pneumococcal Conjugate (PCV-13) 05/29/2015    Pneumococcal Polysaccharide (PPSV-23) 03/20/2008    TDAP Vaccine 04/05/2011    Zoster 04/24/2012    Zoster Recombinant 10/10/2019, 03/17/2020       Review of Systems:   As above included in HPI. Otherwise 11 point review of systems negative including constitutional, skin, HENT, eyes, respiratory, cardiovascular, gastrointestinal, genitourinary, musculoskeletal, endocrine, hematologic, allergy, and neurologic. Physical:   Visit Vitals  /78 (BP 1 Location: Left arm, BP Patient Position: Sitting)   Pulse 86   Temp 97.7 °F (36.5 °C) (Temporal)   Resp 16   Ht 6' 2\" (1.88 m)   Wt 256 lb (116.1 kg)   SpO2 95%   BMI 32.87 kg/m²        Patient appears in no apparent distress. Affect is appropriate.     HEENT: MARÍA anicteric,  no JVD, adenopathy or thyromegaly. No carotid bruits or radiated murmur. Lungs: clear to auscultation, no wheezes, rhonchi, or rales. Heart: regular rate and rhythm. No murmur, rubs, gallops  Abdomen: soft, nontender, nondistended, normal bowel sounds, no hepatosplenomegaly or masses. Extremities: without edema.         Review of Data:  Labs:  Office Visit on 05/10/2021   Component Date Value Ref Range Status    Color (UA POC) 05/10/2021 Yellow   Final    Clarity (UA POC) 05/10/2021 Clear   Final    Glucose (UA POC) 05/10/2021 Negative  Negative Final    Bilirubin (UA POC) 05/10/2021 Negative  Negative Final    Ketones (UA POC) 05/10/2021 Negative  Negative Final    Specific gravity (UA POC) 05/10/2021 1.015  1.001 - 1.035 Final    Blood (UA POC) 05/10/2021 Trace  Negative Final    pH (UA POC) 05/10/2021 5.5  4.6 - 8.0 Final    Protein (UA POC) 05/10/2021 Negative  Negative Final    Urobilinogen (UA POC) 05/10/2021 0.2 mg/dL  0.2 - 1 Final    Nitrites (UA POC) 05/10/2021 Negative  Negative Final    Leukocyte esterase (UA POC) 05/10/2021 Negative  Negative Final    PVR 05/10/2021 0  cc Final   Hospital Outpatient Visit on 04/30/2021   Component Date Value Ref Range Status    WBC 04/30/2021 6.8  4.0 - 11.0 1000/mm3 Final    RBC 04/30/2021 4.30  3.80 - 5.70 M/uL Final    HGB 04/30/2021 13.3  12.4 - 17.2 gm/dl Final    HCT 04/30/2021 41.9  37.0 - 50.0 % Final    MCV 04/30/2021 97.4  80.0 - 98.0 fL Final    MCH 04/30/2021 30.9  23.0 - 34.6 pg Final    MCHC 04/30/2021 31.7  30.0 - 36.0 gm/dl Final    PLATELET 56/56/6240 313  140 - 450 1000/mm3 Final    MPV 04/30/2021 9.8  6.0 - 10.0 fL Final    RDW-SD 04/30/2021 45.8* 35.1 - 43.9   Final    Culture Result 04/30/2021 No Methicillin Resistant Staphylococcus Isolated    Final    Culture Result 04/30/2021 No Growth    Final    Sodium 04/30/2021 140  136 - 145 mEq/L Final    Potassium 04/30/2021 4.5  3.5 - 5.1 mEq/L Final    Chloride 04/30/2021 108* 98 - 107 mEq/L Final    CO2 04/30/2021 31  21 - 32 mEq/L Final    Glucose 04/30/2021 94  74 - 106 mg/dl Final    BUN 04/30/2021 17  7 - 25 mg/dl Final    Creatinine 04/30/2021 1.0  0.6 - 1.3 mg/dl Final    GFR est AA 04/30/2021 >60    Final    GFR est non-AA 04/30/2021 >60    Final    Calcium 04/30/2021 9.0  8.5 - 10.1 mg/dl Final    AST (SGOT) 04/30/2021 11* 15 - 37 U/L Final    ALT (SGPT) 04/30/2021 22  12 - 78 U/L Final    Alk. phosphatase 04/30/2021 88  45 - 117 U/L Final    Bilirubin, total 04/30/2021 0.4  0.2 - 1.0 mg/dl Final    Protein, total 04/30/2021 7.2  6.4 - 8.2 gm/dl Final    Albumin 04/30/2021 3.9  3.4 - 5.0 gm/dl Final    Anion gap 04/30/2021 1* 5 - 15 mmol/L Final    Prothrombin time 04/30/2021 12.5  10.2 - 12.9 seconds Final    INR 04/30/2021 1.1  0.1 - 1.1   Final    aPTT 04/30/2021 28.7  25.1 - 36.5 seconds Final    Color 04/30/2021 YELLOW  YELLOW,STRAW   Final    Appearance 04/30/2021 TURBID* CLEAR   Final    Glucose 04/30/2021 NEGATIVE  NEGATIVE,Negative mg/dl Final    Bilirubin 04/30/2021 NEGATIVE  NEGATIVE,Negative   Final    Ketone 04/30/2021 NEGATIVE  NEGATIVE,Negative mg/dl Final    Specific gravity 04/30/2021 1.025  1.005 - 1.030   Final    Blood 04/30/2021 TRACE* NEGATIVE,Negative   Final    pH (UA) 04/30/2021 5.5  5.0 - 9.0   Final    Protein 04/30/2021 NEGATIVE  NEGATIVE,Negative mg/dl Final    Urobilinogen 04/30/2021 0.2  0.0 - 1.0 mg/dl Final    Nitrites 04/30/2021 NEGATIVE  NEGATIVE,Negative   Final    Leukocyte Esterase 04/30/2021 NEGATIVE  NEGATIVE,Negative   Final    WBC 04/30/2021 0-5  /HPF Final    RBC 04/30/2021 0-5  /HPF Final    Bacteria 04/30/2021 1+  /HPF Final    Amorphous Urates 04/30/2021 4+  /HPF Final           Health Maintenance:  Screening:    Colorectal: colonoscopy (1/2020) tubular adenomas. Dr. Libra Macario. No further screening needed.     Depression: none   DM (HbA1c/FPG): FPG 94 (4/2021)/ HbA1c 5.5 (3/2020)   Hepatitis C: negative (7/2016)   Falls: none   DEXA: within normal limits (5/2017)    PSA/HOLA: PSA 0.5 (11/2019); HOLA per Dr. Geovani Naik   Glaucoma: regular eye exams with Dr. Evelin Jeffries (last 9/2019) Scheduled 12/2020   Smoking: none   Vitamin D: 47.2 (11/2020)   Medicare Wellness: 6/18/2020      Impression:  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    Hyperlipidemia E78.5    Vitamin D insufficiency E55.9    Hearing loss H91.90    Essential hypertension I10    Primary osteoarthritis involving multiple joints M89.49    PMR [facility]algia rheumatica) ([facility] M35.3    Hoarseness R49.0    Esophageal dysmotility K22.4    Positive CRISTI (antinuclear antibody)/ RNP antibody R76.8    Nodule of finger of right hand R22.31    Class 1 obesity due to excess calories with serious comorbidity and body mass index (BMI) of 32.0 to 32.9 in adult E66.09, Z68.32       Plan:  Preoperative risk stratification:  Patient is scheduled to undergo left hip surgery on 5/26/2021. He currently has no symptoms suggestive of angina or decompensated heart failure. He maintains an active lifestyle and has no functional limitations. Last EKG in 9/2020 was stable and unchanged from prior. Preoperative labs and chest x-ray reviewed and unremarkable. He is currently clinically stable and without absolute medical contraindication for surgery. He is low risk for a wyatt-procedural cardiac event. Surgery may proceed as planned at the discretion of Dr. Sharan Felty. Other issues:  1. Hypertension. Blood pressure remains well controlled on lisinopril 5 mg daily. Renal function remains normal with creatinine 1.0/ eGFR >60. Encouraged to increase fluid intake and avoid NSAIDS. Will continue to follow closely. 2. Hyperlipidemia. On low intensity dose rosuvastatin, taking 5 mg three days per week, with worsening control with  and HDL 40.  Emphasized importance of lifestyle modifications, including diet, exercise, and weight loss. Will consider increasing frequency of dose at next visit if not improved. Follow. 3. Polymyalgia rheumatica. Patient had significantly improved with steroid treatment. Had successfully tapered off prednisone in 3/2019 and released from care by Dr. Ronel Frazier. However, reported worsening shoulder symptoms with bilateral pain in 5/2020, and evaluated by Dr. Ronel Frazier. Treated with two courses of Sterapred dose packs with some improvement. Now on prednisone 5 mg daily and advised by Dr. Ronel Frazier to continue at this dose until after his hip surgery. Known increased risk (10%) of concurrent giant cell arteritis with PMR, but no evidence of GCA. Also with positive CRISTI/ positive RNP Ab, but no evidence of inflammation elsewhere. Urinalysis continues to be negative for proteinuria. Continue to follow closely. Given chronic steroid use, bone density scan in 5/2017 normal. Continuing to follow with Dr. Ronel Frazier. 4. Odynophagia. Secondary to esophageal dysmotility. Responded to diltiazem, and now successfully stopped taking it without recurrence of symptoms. Will continue to follow. 5. BPH with bladder neck contracture. Appears to be asymptomatic on current treatment with Proscar. Patient decided to discontinue Flomax on his own, but reported worsening nocturia and started on finasteride by Dr. Johnny Rivera. PVR assessed in 6/2020 and shown to be 38 cc. Prescribed Flomax by Dr. Johnny Rivera to take perioperatively to prevent retention. Continue to follow. 6. Hoarseness. Patient reports that it has improved to baseline. Using Flonase as needed. Follow. 7. Right hand nodule. Appears along a tendon sheath. Unchanged from six months ago. Suspect ganglion cyst although could also represent a tenosynovial giant cell tumor. However, patient wishing to hold off currently on referral to ortho given no significant discomfort.  Reports that it is unchanged today and not causing him any difficulty. Will continue to follow. 8. Abnormal fasting glucose. FPG normal today at 94. Will continue to monitor. 9. Basal cell carcinoma, face. Underwent extensive wide excision in 9/2020 by Dr. Vika Jo and required skin graft. Well healed today. Continuing surveillance exams with Dr. Rah Ayala. 10. Obesity. Emphasized importance of lifestyle modifications, including diet, exercise, and weight loss. Will readdress next visit. 11. Health maintenance. Completed Pfizer COVID 19 vaccine series. Completed 2/2 doses of Shingrix vaccine. Other immunizations up to date. Colonoscopy completed and no further screening needed. Continue regular eye exams with Dr. Aarti Brady. Vitamin D level normal. Continue maintenance dose supplement. Discussed recommendations regarding aspirin use and primary prevention, but patient wishing to continue. Medicare wellness visit up to date. Patient understands recommendations and agrees with plan.   Follow-up in 3 months for physical.

## 2021-05-14 NOTE — TELEPHONE ENCOUNTER
Please fax preoperative note to Dr. Sharma Babinski for left hip surgery on 5/26/2021. Fax # [phone]. Thanks.

## 2021-05-26 PROBLEM — M16.12 OSTEOARTHRITIS OF LEFT HIP: Status: ACTIVE | Noted: 2021-05-26

## 2021-08-19 ENCOUNTER — HOSPITAL ENCOUNTER (OUTPATIENT)
Dept: LAB | Age: 81
Discharge: HOME | End: 2021-08-19
Payer: MEDICARE

## 2021-08-19 ENCOUNTER — APPOINTMENT (OUTPATIENT)
Dept: INTERNAL MEDICINE CLINIC | Age: 81
End: 2021-08-19

## 2021-08-19 DIAGNOSIS — M35.3 PMR (POLYMYALGIA RHEUMATICA) (HCC): ICD-10-CM

## 2021-08-19 DIAGNOSIS — I10 ESSENTIAL HYPERTENSION: ICD-10-CM

## 2021-08-19 DIAGNOSIS — R49.0 HOARSENESS: ICD-10-CM

## 2021-08-19 DIAGNOSIS — E78.5 HYPERLIPIDEMIA, UNSPECIFIED HYPERLIPIDEMIA TYPE: ICD-10-CM

## 2021-08-19 DIAGNOSIS — R76.8 POSITIVE ANA (ANTINUCLEAR ANTIBODY): ICD-10-CM

## 2021-08-19 DIAGNOSIS — E55.9 VITAMIN D INSUFFICIENCY: ICD-10-CM

## 2021-08-19 DIAGNOSIS — E66.09 CLASS 1 OBESITY DUE TO EXCESS CALORIES WITH SERIOUS COMORBIDITY AND BODY MASS INDEX (BMI) OF 32.0 TO 32.9 IN ADULT: ICD-10-CM

## 2021-08-19 DIAGNOSIS — M15.9 PRIMARY OSTEOARTHRITIS INVOLVING MULTIPLE JOINTS: ICD-10-CM

## 2021-08-19 DIAGNOSIS — N40.1 BPH WITH OBSTRUCTION/LOWER URINARY TRACT SYMPTOMS: ICD-10-CM

## 2021-08-19 DIAGNOSIS — N13.8 BPH WITH OBSTRUCTION/LOWER URINARY TRACT SYMPTOMS: ICD-10-CM

## 2021-08-19 DIAGNOSIS — Z23 NEEDS FLU SHOT: ICD-10-CM

## 2021-08-19 DIAGNOSIS — N32.0 BLADDER NECK CONTRACTURE: ICD-10-CM

## 2021-08-19 DIAGNOSIS — K22.4 ESOPHAGEAL DYSMOTILITY: ICD-10-CM

## 2021-08-19 LAB
25(OH)D3 SERPL-MCNC: 48.6 NG/ML (ref 30–100)
ALBUMIN SERPL-MCNC: 3.9 G/DL (ref 3.4–5)
ALBUMIN/GLOB SERPL: 1.4 {RATIO} (ref 0.8–1.7)
ALP SERPL-CCNC: 70 U/L (ref 45–117)
ALT SERPL-CCNC: 23 U/L (ref 16–61)
ANION GAP SERPL CALC-SCNC: 2 MMOL/L (ref 3–18)
APPEARANCE UR: CLEAR
AST SERPL-CCNC: 13 U/L (ref 10–38)
BASOPHILS # BLD: 0 K/UL (ref 0–0.1)
BASOPHILS NFR BLD: 0 % (ref 0–2)
BILIRUB SERPL-MCNC: 0.4 MG/DL (ref 0.2–1)
BILIRUB UR QL: NEGATIVE
BUN SERPL-MCNC: 12 MG/DL (ref 7–18)
BUN/CREAT SERPL: 12 (ref 12–20)
CALCIUM SERPL-MCNC: 8.7 MG/DL (ref 8.5–10.1)
CHLORIDE SERPL-SCNC: 107 MMOL/L (ref 100–111)
CHOLEST SERPL-MCNC: 164 MG/DL
CO2 SERPL-SCNC: 29 MMOL/L (ref 21–32)
COLOR UR: YELLOW
CREAT SERPL-MCNC: 1.03 MG/DL (ref 0.6–1.3)
CREAT UR-MCNC: 140 MG/DL (ref 30–125)
DIFFERENTIAL METHOD BLD: ABNORMAL
EOSINOPHIL # BLD: 0.1 K/UL (ref 0–0.4)
EOSINOPHIL NFR BLD: 2 % (ref 0–5)
ERYTHROCYTE [DISTWIDTH] IN BLOOD BY AUTOMATED COUNT: 12.7 % (ref 11.6–14.5)
GLOBULIN SER CALC-MCNC: 2.8 G/DL (ref 2–4)
GLUCOSE SERPL-MCNC: 96 MG/DL (ref 74–99)
GLUCOSE UR STRIP.AUTO-MCNC: NEGATIVE MG/DL
HCT VFR BLD AUTO: 41.4 % (ref 36–48)
HDLC SERPL-MCNC: 45 MG/DL (ref 40–60)
HDLC SERPL: 3.6 {RATIO} (ref 0–5)
HGB BLD-MCNC: 13.6 G/DL (ref 13–16)
HGB UR QL STRIP: NEGATIVE
KETONES UR QL STRIP.AUTO: NEGATIVE MG/DL
LDLC SERPL CALC-MCNC: 99.2 MG/DL (ref 0–100)
LEUKOCYTE ESTERASE UR QL STRIP.AUTO: NEGATIVE
LIPID PROFILE,FLP: NORMAL
LYMPHOCYTES # BLD: 1.7 K/UL (ref 0.9–3.6)
LYMPHOCYTES NFR BLD: 31 % (ref 21–52)
MAGNESIUM SERPL-MCNC: 2.2 MG/DL (ref 1.6–2.6)
MCH RBC QN AUTO: 31.9 PG (ref 24–34)
MCHC RBC AUTO-ENTMCNC: 32.9 G/DL (ref 31–37)
MCV RBC AUTO: 97 FL (ref 74–97)
MICROALBUMIN UR-MCNC: 1.41 MG/DL (ref 0–3)
MICROALBUMIN/CREAT UR-RTO: 10 MG/G (ref 0–30)
MONOCYTES # BLD: 0.5 K/UL (ref 0.05–1.2)
MONOCYTES NFR BLD: 10 % (ref 3–10)
NEUTS SEG # BLD: 3.2 K/UL (ref 1.8–8)
NEUTS SEG NFR BLD: 58 % (ref 40–73)
NITRITE UR QL STRIP.AUTO: NEGATIVE
PH UR STRIP: 5.5 [PH] (ref 5–8)
PLATELET # BLD AUTO: 249 K/UL (ref 135–420)
PMV BLD AUTO: 9.9 FL (ref 9.2–11.8)
POTASSIUM SERPL-SCNC: 5.1 MMOL/L (ref 3.5–5.5)
PROT SERPL-MCNC: 6.7 G/DL (ref 6.4–8.2)
PROT UR STRIP-MCNC: NEGATIVE MG/DL
RBC # BLD AUTO: 4.27 M/UL (ref 4.35–5.65)
SODIUM SERPL-SCNC: 138 MMOL/L (ref 136–145)
SP GR UR REFRACTOMETRY: 1.02 (ref 1–1.03)
TRIGL SERPL-MCNC: 99 MG/DL (ref ?–150)
TSH SERPL DL<=0.05 MIU/L-ACNC: 1.68 UIU/ML (ref 0.36–3.74)
UROBILINOGEN UR QL STRIP.AUTO: 0.2 EU/DL (ref 0.2–1)
VLDLC SERPL CALC-MCNC: 19.8 MG/DL
WBC # BLD AUTO: 5.6 K/UL (ref 4.6–13.2)

## 2021-08-19 PROCEDURE — 80053 COMPREHEN METABOLIC PANEL: CPT

## 2021-08-19 PROCEDURE — 84443 ASSAY THYROID STIM HORMONE: CPT

## 2021-08-19 PROCEDURE — 81003 URINALYSIS AUTO W/O SCOPE: CPT

## 2021-08-19 PROCEDURE — 85025 COMPLETE CBC W/AUTO DIFF WBC: CPT

## 2021-08-19 PROCEDURE — 82306 VITAMIN D 25 HYDROXY: CPT

## 2021-08-19 PROCEDURE — 83735 ASSAY OF MAGNESIUM: CPT

## 2021-08-19 PROCEDURE — 82043 UR ALBUMIN QUANTITATIVE: CPT

## 2021-08-19 PROCEDURE — 36415 COLL VENOUS BLD VENIPUNCTURE: CPT

## 2021-08-19 PROCEDURE — 80061 LIPID PANEL: CPT

## 2021-08-26 ENCOUNTER — OFFICE VISIT (OUTPATIENT)
Dept: INTERNAL MEDICINE CLINIC | Age: 81
End: 2021-08-26
Payer: MEDICARE

## 2021-08-26 DIAGNOSIS — E78.5 HYPERLIPIDEMIA, UNSPECIFIED HYPERLIPIDEMIA TYPE: ICD-10-CM

## 2021-08-26 DIAGNOSIS — R73.01 ABNORMAL FASTING GLUCOSE: ICD-10-CM

## 2021-08-26 DIAGNOSIS — M35.3 PMR (POLYMYALGIA RHEUMATICA) (HCC): ICD-10-CM

## 2021-08-26 DIAGNOSIS — N40.1 BPH WITH OBSTRUCTION/LOWER URINARY TRACT SYMPTOMS: ICD-10-CM

## 2021-08-26 DIAGNOSIS — Z00.00 MEDICARE ANNUAL WELLNESS VISIT, SUBSEQUENT: ICD-10-CM

## 2021-08-26 DIAGNOSIS — Z13.31 SCREENING FOR DEPRESSION: ICD-10-CM

## 2021-08-26 DIAGNOSIS — R76.8 POSITIVE ANA (ANTINUCLEAR ANTIBODY): ICD-10-CM

## 2021-08-26 DIAGNOSIS — Z71.89 ADVANCED DIRECTIVES, COUNSELING/DISCUSSION: ICD-10-CM

## 2021-08-26 DIAGNOSIS — E66.09 CLASS 1 OBESITY DUE TO EXCESS CALORIES WITH SERIOUS COMORBIDITY AND BODY MASS INDEX (BMI) OF 33.0 TO 33.9 IN ADULT: ICD-10-CM

## 2021-08-26 DIAGNOSIS — Z96.642 STATUS POST TOTAL REPLACEMENT OF LEFT HIP: ICD-10-CM

## 2021-08-26 DIAGNOSIS — M15.9 PRIMARY OSTEOARTHRITIS INVOLVING MULTIPLE JOINTS: ICD-10-CM

## 2021-08-26 DIAGNOSIS — I10 ESSENTIAL HYPERTENSION: Primary | ICD-10-CM

## 2021-08-26 DIAGNOSIS — N13.8 BPH WITH OBSTRUCTION/LOWER URINARY TRACT SYMPTOMS: ICD-10-CM

## 2021-08-26 PROCEDURE — 99497 ADVNCD CARE PLAN 30 MIN: CPT | Performed by: INTERNAL MEDICINE

## 2021-08-26 PROCEDURE — G0463 HOSPITAL OUTPT CLINIC VISIT: HCPCS | Performed by: INTERNAL MEDICINE

## 2021-08-26 PROCEDURE — G8417 CALC BMI ABV UP PARAM F/U: HCPCS | Performed by: INTERNAL MEDICINE

## 2021-08-26 PROCEDURE — G8752 SYS BP LESS 140: HCPCS | Performed by: INTERNAL MEDICINE

## 2021-08-26 PROCEDURE — 99214 OFFICE O/P EST MOD 30 MIN: CPT | Performed by: INTERNAL MEDICINE

## 2021-08-26 PROCEDURE — G0439 PPPS, SUBSEQ VISIT: HCPCS | Performed by: INTERNAL MEDICINE

## 2021-08-26 PROCEDURE — 1101F PT FALLS ASSESS-DOCD LE1/YR: CPT | Performed by: INTERNAL MEDICINE

## 2021-08-26 PROCEDURE — G8536 NO DOC ELDER MAL SCRN: HCPCS | Performed by: INTERNAL MEDICINE

## 2021-08-26 PROCEDURE — G8427 DOCREV CUR MEDS BY ELIG CLIN: HCPCS | Performed by: INTERNAL MEDICINE

## 2021-08-26 PROCEDURE — G8754 DIAS BP LESS 90: HCPCS | Performed by: INTERNAL MEDICINE

## 2021-08-26 PROCEDURE — G8510 SCR DEP NEG, NO PLAN REQD: HCPCS | Performed by: INTERNAL MEDICINE

## 2021-08-26 NOTE — PATIENT INSTRUCTIONS
Heart-Healthy Diet: Care Instructions  Your Care Instructions     A heart-healthy diet has lots of vegetables, fruits, nuts, beans, and whole grains, and is low in salt. It limits foods that are high in saturated fat, such as meats, cheeses, and fried foods. It may be hard to change your diet, but even small changes can lower your risk of heart attack and heart disease. Follow-up care is a key part of your treatment and safety. Be sure to make and go to all appointments, and call your doctor if you are having problems. It's also a good idea to know your test results and keep a list of the medicines you take. How can you care for yourself at home? Watch your portions  · Learn what a serving is. A \"serving\" and a \"portion\" are not always the same thing. Make sure that you are not eating larger portions than are recommended. For example, a serving of pasta is ½ cup. A serving size of meat is 2 to 3 ounces. A 3-ounce serving is about the size of a deck of cards. Measure serving sizes until you are good at [place]" them. Keep in mind that restaurants often serve portions that are 2 or 3 times the size of one serving. · To keep your energy level up and keep you from feeling hungry, eat often but in smaller portions. · Eat only the number of calories you need to stay at a healthy weight. If you need to lose weight, eat fewer calories than your body burns (through exercise and other physical activity). Eat more fruits and vegetables  · Eat a variety of fruit and vegetables every day. Dark green, deep orange, red, or yellow fruits and vegetables are especially good for you. Examples include spinach, carrots, peaches, and berries. · Keep carrots, celery, and other veggies handy for snacks. Buy fruit that is in season and store it where you can see it so that you will be tempted to eat it. · Cook dishes that have a lot of veggies in them, such as stir-fries and soups.   Limit saturated and trans fat  · Read food labels, and try to avoid saturated and trans fats. They increase your risk of heart disease. · Use olive or canola oil when you cook. · Bake, broil, grill, or steam foods instead of frying them. · Choose lean meats instead of high-fat meats such as hot dogs and sausages. Cut off all visible fat when you prepare meat. · Eat fish, skinless poultry, and meat alternatives such as soy products instead of high-fat meats. Soy products, such as tofu, may be especially good for your heart. · Choose low-fat or fat-free milk and dairy products. Eat foods high in fiber  · Eat a variety of grain products every day. Include whole-grain foods that have lots of fiber and nutrients. Examples of whole-grain foods include oats, whole wheat bread, and brown rice. · Buy whole-grain breads and cereals, instead of white bread or pastries. Limit salt and sodium  · Limit how much salt and sodium you eat to help lower your blood pressure. · Taste food before you salt it. Add only a little salt when you think you need it. With time, your taste buds will adjust to less salt. · Eat fewer snack items, fast foods, and other high-salt, processed foods. Check food labels for the amount of sodium in packaged foods. · Choose low-sodium versions of canned goods (such as soups, vegetables, and beans). Limit sugar  · Limit drinks and foods with added sugar. These include candy, desserts, and soda pop. Limit alcohol  · Limit alcohol to no more than 2 drinks a day for men and 1 drink a day for women. Too much alcohol can cause health problems. When should you call for help? Watch closely for changes in your health, and be sure to contact your doctor if:    · You would like help planning heart-healthy meals. Where can you learn more? Go to http://www.[employer].com/  Enter V137 in the search box to learn more about \"Heart-Healthy Diet: Care Instructions. \"  Current as of: August 22, 2019               Content Version: 12.6  © 7422-0927 [employer]. Care instructions adapted under license by [employer] (which disclaims liability or warranty for this information). If you have questions about a medical condition or this instruction, always ask your healthcare professional. [address] any warranty or liability for your use of this information. Learning About Low-Sodium Foods  What foods are low in sodium? The foods you eat contain nutrients, such as vitamins and minerals. Sodium is a nutrient. Your body needs the right amount to stay healthy and work as it should. You can use the list below to help you make choices about which foods to eat. Fruits  · Fresh, frozen, canned, or dried fruit  Vegetables  · Fresh or frozen vegetables, with no added salt  · Canned vegetables, low-sodium or with no added salt  Grains  · Bagels without salted tops  · Cereal with no added salt  · Corn tortillas  · Crackers with no added salt  · Oatmeal, cooked without salt  · Popcorn with no salt  · Pasta and noodles, cooked without salt  · Rice, cooked without salt  · Unsalted pretzels  Dairy and dairy alternatives  · Butter, unsalted  · Cream cheese  · Ice cream  · Milk  · Soy milk  Meats and other protein foods  · Beans and peas, canned with no salt  · Eggs  · Fresh fish (not smoked)  · Fresh meats (not smoked or cured)  · Nuts and nut butter, prepared without salt  · Poultry, not packaged with sodium solution  · Tofu, unseasoned  · Tuna, canned without salt  Seasonings  · Garlic  · Herbs and spices  · Lemon juice  · Mustard  · Olive oil  · Salt-free seasoning mixes  · Vinegar  Work with your doctor to find out how much of this nutrient you need. Depending on your health, you may need more or less of it in your diet. Where can you learn more?   Go to http://www.gray.com/  Enter S460 in the search box to learn more about \"Learning About Low-Sodium Foods.\"  Current as of: August 22, 2019               Content Version: 12.6  © 9826-7480 [employer]. Care instructions adapted under license by [employer] (which disclaims liability or warranty for this information). If you have questions about a medical condition or this instruction, always ask your healthcare professional. [address] any warranty or liability for your use of this information. Low Sodium Diet (2,000 Milligram): Care Instructions  Your Care Instructions     Too much sodium causes your body to hold on to extra water. This can raise your blood pressure and force your heart and kidneys to work harder. In very serious cases, this could cause you to be put in the hospital. It might even be life-threatening. By limiting sodium, you will feel better and lower your risk of serious problems. The most common source of sodium is salt. People get most of the salt in their diet from canned, prepared, and packaged foods. Fast food and restaurant meals also are very high in sodium. Your doctor will probably limit your sodium to less than 2,000 milligrams (mg) a day. This limit counts all the sodium in prepared and packaged foods and any salt you add to your food. Follow-up care is a key part of your treatment and safety. Be sure to make and go to all appointments, and call your doctor if you are having problems. It's also a good idea to know your test results and keep a list of the medicines you take. How can you care for yourself at home? Read food labels  · Read labels on cans and food packages. The labels tell you how much sodium is in each serving. Make sure that you look at the serving size. If you eat more than the serving size, you have eaten more sodium. · Food labels also tell you the Percent Daily Value for sodium. Choose products with low Percent Daily Values for sodium.   · Be aware that sodium can come in forms other than salt, including monosodium glutamate (MSG), sodium citrate, and sodium bicarbonate (baking soda). MSG is often added to Asian food. When you eat out, you can sometimes ask for food without MSG or added salt. Buy low-sodium foods  · Buy foods that are labeled \"unsalted\" (no salt added), \"sodium-free\" (less than 5 mg of sodium per serving), or \"low-sodium\" (less than 140 mg of sodium per serving). Foods labeled \"reduced-sodium\" and \"light sodium\" may still have too much sodium. Be sure to read the label to see how much sodium you are getting. · Buy fresh vegetables, or frozen vegetables without added sauces. Buy low-sodium versions of canned vegetables, soups, and other canned goods. Prepare low-sodium meals  · Cut back on the amount of salt you use in cooking. This will help you adjust to the taste. Do not add salt after cooking. One teaspoon of salt has about 2,300 mg of sodium. · Take the salt shaker off the table. · Flavor your food with garlic, lemon juice, onion, vinegar, herbs, and spices. Do not use soy sauce, lite soy sauce, steak sauce, onion salt, garlic salt, celery salt, mustard, or ketchup on your food. · Use low-sodium salad dressings, sauces, and ketchup. Or make your own salad dressings and sauces without adding salt. · Use less salt (or none) when recipes call for it. You can often use half the salt a recipe calls for without losing flavor. Other foods such as rice, pasta, and grains do not need added salt. · Rinse canned vegetables, and cook them in fresh water. This removes some--but not all--of the salt. · Avoid water that is naturally high in sodium or that has been treated with water softeners, which add sodium. Call your local water company to find out the sodium content of your water supply. If you buy bottled water, read the label and choose a sodium-free brand. Avoid high-sodium foods  · Avoid eating:  ? Smoked, cured, salted, and canned meat, fish, and poultry.   ? Ham, prado, hot dogs, and luncheon meats. ? Regular, hard, and processed cheese and regular peanut butter. ? Crackers with salted tops, and other salted snack foods such as pretzels, chips, and salted popcorn. ? Frozen prepared meals, unless labeled low-sodium. ? Canned and dried soups, broths, and bouillon, unless labeled sodium-free or low-sodium. ? Canned vegetables, unless labeled sodium-free or low-sodium. ? [place] fries, pizza, tacos, and other fast foods. ? Pickles, olives, ketchup, and other condiments, especially soy sauce, unless labeled sodium-free or low-sodium. Where can you learn more? Go to http://www.gray.com/  Enter V843 in the search box to learn more about \"Low Sodium Diet (2,000 Milligram): Care Instructions. \"  Current as of: August 22, 2019               Content Version: 12.6  © 6325-5593 [employer]. Care instructions adapted under license by [employer] (which disclaims liability or warranty for this information). If you have questions about a medical condition or this instruction, always ask your healthcare professional. [place] [address] any warranty or liability for your use of this information. Medicare Wellness Visit, Male    The best way to improve and maintain good health is to have a healthy lifestyle by eating a well-balanced diet, exercising regularly, limiting alcohol and stopping smoking. Regular visits with your physician or non-physician health care provider also support your good health. Preventive screening tests can find health problems before they become diseases or illnesses. Preventive services such as immunizations prevent serious infections. All people over age 72 should have a Pneumovax and a Prevnar-13 shot to prevent potentially life threatening infections with the pneumococcus bacteria, a common cause of pneumonia. These are once in a lifetime unless you and your provider decide differently.     All people over 65 should have a yearly influenza vaccine or \"flu\" shot. This does not prevent infection with cold viruses but has been proven to prevent hospitalization and death from influenza. Although Medicare part B \"regular Medicare\" currently only covers tetanus vaccination in the context of an injury, a tetanus vaccine (Tdap or Td) is recommended every 10 years. A shingles vaccine is recommended once in a lifetime after age 61. The Shingles vaccine is also not covered by Medicare part B. Note, however, that both the Shingles vaccine and Tdap/Td are generally covered by secondary carriers. Please check your coverage and out of pocket expenses. Consider contacting your local health department because it may stock these vaccines for a reasonable charge. We currently have documentation of the following immunization history for you:  Immunization History   Administered Date(s) Administered    (RETIRED) Pneumococcal Vaccine (Unspecified Type) 03/20/2008    Covid-19, MODERNA, Mrna, Lnp-s, Pf, 100mcg/0.5mL 01/30/2021, 02/27/2021    Influenza High Dose Vaccine PF 11/14/2013, 11/30/2015, 11/10/2016, 09/14/2017    Influenza Vaccine 11/17/2014, 09/01/2020    Influenza Vaccine (Tri) Adjuvanted (>65 Yrs FLUAD TRI 19301) 09/20/2018, 09/24/2019    Influenza Vaccine Split 11/29/2012    Influenza, Quadrivalent, Adjuvanted (>65 Yrs FLUAD QUAD [id number]) 11/19/2020    Pneumococcal Conjugate (PCV-13) 05/29/2015    Pneumococcal Polysaccharide (PPSV-23) 03/20/2008    TDAP Vaccine 04/05/2011    Zoster 04/24/2012    Zoster Recombinant 10/10/2019, 03/17/2020       Screening for infection with Hepatitis C is recommended for anyone born between [birth year] through 1964. The table at the bottom of this document indicates the status of this and other screening services. Screening for diabetes mellitus with a blood sugar test (glucose) should be done at least every 3 years until age 79.  You and your health care provider may decide whether to continue screening after age 79. The most recent blood glucose we have on file for you is:   Lab Results   Component Value Date/Time    Glucose 96 08/19/2021 09:10 AM    Glucose (POC) 142 (H) 05/26/2021 04:29 PM       Glaucoma is a disease of the eye due to increased ocular pressure that can lead to blindness. People with risk factors for glaucoma ( race, diabetes, family history) should be screened at least every 2 years by an eye professional. This may be covered annually if indicated as determined by you and your doctor. Cardiovascular screening tests that check for elevated lipids or cholesterol (fatty part of blood) which can lead to heart disease and strokes should be done every 4-6 years through age 79. You and your health care provider may decide whether to continue screening after age 79. The most recent lipid panel we have on file for you is:   Lab Results   Component Value Date/Time    Cholesterol, total 164 08/19/2021 09:10 AM    HDL Cholesterol 45 08/19/2021 09:10 AM    LDL, calculated 99.2 08/19/2021 09:10 AM    VLDL, calculated 19.8 08/19/2021 09:10 AM    Triglyceride 99 08/19/2021 09:10 AM    CHOL/HDL Ratio 3.6 08/19/2021 09:10 AM       Colorectal cancer screening that evaluates for blood or polyps in your colon for people with average risk should be done yearly as a stool test, every five years as a flexible sigmoidoscope or every 10 years as a colonoscopy up to age 76. You and your health care provider may decide whether to continue screening after age 76. Men up to age 76 may elect to screen for prostate cancer with a blood test called a PSA at certain intervals, depending on their personal and family history. This decision is between the patient and his provider.  The most recent PSA values we have on file for you are:  Lab Results   Component Value Date/Time    Prostate Specific Ag 0.5 11/20/2019 11:30 AM    Prostate Specific Ag 0.6 06/12/2019 10:04 AM Prostate Specific Ag 1.0 06/13/2018 09:37 AM       If you have been a smoker or had family history of abdominal aortic aneurysms, you and your provider may decide to schedule an ultrasound test of your aorta. Our records show this was done on:  n/a    People who have smoked the equivalent of 1 pack per day for 30 years or more may benefit from screening for lung cancer with a yearly low dose CT scan until they have been non smokers for 15 years or competing health conditions render this unlikely to be beneficial. Our records show: n/a    Your Medicare Wellness Exam is recommended annually.     Here is a list of your current Health Maintenance items with a due date:  Health Maintenance   Topic Date Due    DTaP/Tdap/Td series (2 - Td or Tdap) 04/05/2021    Flu Vaccine (1) 09/01/2021    Lipid Screen  08/19/2022    Medicare Yearly Exam  08/27/2022    Shingrix Vaccine Age 50>  Completed    COVID-19 Vaccine  Completed    Pneumococcal 65+ years  Completed

## 2021-08-26 NOTE — ACP (ADVANCE CARE PLANNING)
Advance Care Planning     Advance Care Planning (ACP) Physician/NP/PA Conversation      Date of Conversation: 8/26/2021  Conducted with: Patient with [address] Decision Maker:     Primary Decision Maker: Shivam Saenz Partner - [phone]    Secondary Decision Maker: Rebekah Wu - Brother  Click here to complete [employer] including selection of the Healthcare Decision Maker Relationship (ie \"Primary\")      Today we documented Decision Maker(s) consistent with Legal Next of Kin hierarchy. Care Preferences:    Hospitalization: \"If your health worsens and it becomes clear that your chance of recovery is unlikely, what would be your preference regarding hospitalization? \"  The patient would prefer hospitalization. Ventilation: \"If you were unable to breathe on your own and your chance of recovery was unlikely, what would be your preference about the use of a ventilator (breathing machine) if it was available to you? \"   The patient would desire the use of a ventilator. Resuscitation: \"In the event your heart stopped as a result of an underlying serious health condition, would you want attempts to be made to restart your heart, or would you prefer a natural death? \"   Yes, attempt to resuscitate.     Additional topics discussed: treatment goals, benefit/burden of treatment options, ventilation preferences, hospitalization preferences, resuscitation preferences and end of life care preferences (vegetative state/imminent death)    Conversation Outcomes / Follow-Up Plan:   ACP complete - no further action today  Reviewed DNR/DNI and patient elects Full Code (Attempt Resuscitation)     Length of Voluntary ACP Conversation in minutes:  16 minutes    Kristy Shah MD

## 2021-08-26 NOTE — PROGRESS NOTES
This is the Subsequent Medicare Annual Wellness Exam, performed 12 months or more after the Initial AWV or the last Subsequent AWV    I have reviewed the patient's medical history in detail and updated the computerized patient record. Assessment/Plan   Education and counseling provided:  Are appropriate based on today's review and evaluation  End-of-Life planning (with patient's consent)  Influenza Vaccine  Cardiovascular screening blood test  Screening for glaucoma  Diabetes screening test  Tdap vaccine    1. Essential hypertension  -     CBC WITH AUTOMATED DIFF; Future  -     MAGNESIUM; Future  -     URINALYSIS W/MICROSCOPIC; Future  -     METABOLIC PANEL, COMPREHENSIVE; Future  2. Hyperlipidemia, unspecified hyperlipidemia type  -     LIPID PANEL; Future  3. Primary osteoarthritis involving multiple joints  4. Status post total replacement of left hip  5. PMR (polymyalgia rheumatica) (HCC)  -     CBC WITH AUTOMATED DIFF; Future  -     URINALYSIS W/MICROSCOPIC; Future  -     METABOLIC PANEL, COMPREHENSIVE; Future  6. Positive CRISTI (antinuclear antibody)/ RNP antibody  7. Abnormal fasting glucose  -     HEMOGLOBIN A1C WITH EAG; Future  8. Class 1 obesity due to excess calories with serious comorbidity and body mass index (BMI) of 33.0 to 33.9 in adult  9. BPH with obstruction/lower urinary tract symptoms  10. Medicare annual wellness visit, subsequent  -     ADVANCE CARE PLANNING FIRST 30 MINS  11.  Advanced directives, counseling/discussion  -     ADVANCE CARE PLANNING FIRST 27 MINS  12. Screening for depression       Depression Risk Factor Screening     3 most recent PHQ Screens 8/26/2021   Little interest or pleasure in doing things Not at all   Feeling down, depressed, irritable, or hopeless Not at all   Total Score PHQ 2 0       Alcohol Risk Screen    Do you average more than 1 drink per night or more than 7 drinks a week: No    In the past three months have you have had more than 4 drinks containing alcohol on one occasion: No        Functional Ability and Level of Safety    Hearing: Hearing is good. Activities of Daily Living: The home contains: no safety equipment. Patient does total self care      Ambulation: with no difficulty     Fall Risk:  Fall Risk Assessment, last 12 mths 8/26/2021   Able to walk? Yes   Fall in past 12 months? 0   Do you feel unsteady?  0   Are you worried about falling 0      Abuse Screen:  Patient is not abused       Cognitive Screening    Has your family/caregiver stated any concerns about your memory: no     Cognitive Screening: none performed    Health Maintenance Due     Health Maintenance Due   Topic Date Due    DTaP/Tdap/Td series (2 - Td or Tdap) 04/05/2021       Patient Care Team   Patient Care Team:  Sampson Milner MD as PCP - General (Internal Medicine)  Sampson Milner MD as PCP - REHABILITATION [facility] Empaneled Provider  Sapna Virgen MD (Urology)  Branden Flores MD (Dermatology)  Susanna Gómez MD (Gastroenterology)  Ronald Aviles MD (Gastroenterology)  Johana Mcneil MD (Ophthalmology)  Elizabeth Malloy DO (Rheumatology)  Yael Feldman MD (Orthopedic Surgery)    History     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    Hyperlipidemia E78.5    Vitamin D insufficiency E55.9    Hearing loss H91.90    Essential hypertension I10    Primary osteoarthritis involving multiple joints M89.49    PM[facility]a) [facility] M35.3    Hoarseness R49.0    Esophageal dysmotility K22.4    Positive CRISTI (antinuclear antibody)/ RNP antibody R76.8    Nodule of finger of right hand R22.31    Class 1 obesity due to excess calories with serious comorbidity and body mass index (BMI) of 33.0 to 33.9 in adult E66.09, Z68.33    Osteoarthritis of left hip M16.12    Status post total replacement of left hip K82.518     Past Medical History:   Diagnosis Date  Bladder neck contracture     BPH with obstruction/lower urinary tract symptoms     Essential hypertension with goal blood pressure less than 140/90     Family history of colon cancer     History of blood transfusion 2015    with shoulder replacement    Hyperlipidemia     Osteoarthritis     Osteoarthritis of left hip     PMR (polymyalgia rheumatica) ([facility] [address] 7/20/2016    Skin cancer     basal cell on face    Urinary retention       Past Surgical History:   Procedure Laterality Date    CYSTOSCOPY      ENDOSCOPY, COLON, DIAGNOSTIC      HC BERENICE LASER 22810KG  3/2/11    Laser incision of bladder neck contracture    HX BACK SURGERY      lower    HX MOHS PROCEDURES  1/11    rt hip repl    HX ORTHOPAEDIC Right 2015    shoulder replacement    HX OTHER SURGICAL      tonsillectomy, back sx,     HX OTHER SURGICAL      basel cell removed from right ear lobe    HX SHOULDER ARTHROSCOPY Left 2010    HX TURP      Laser    [facility] LASER VAPORIZATION SURGERY PROSTATE, COMPLETE       Current Outpatient Medications   Medication Sig Dispense Refill    acetaminophen (TYLENOL) 500 mg tablet Take 2 Tablets by mouth every six (6) hours.  meloxicam (MOBIC) 15 mg tablet Take 15 mg by mouth daily.  rosuvastatin (CRESTOR) 5 mg tablet TAKE 1 TABLET BY MOUTH 3 TIMES WEEKLY 36 Tab 3    finasteride (PROSCAR) 5 mg tablet TAKE 1 TABLET BY MOUTH ONCE DAILY 90 Tab 3    lisinopriL (PRINIVIL, ZESTRIL) 5 mg tablet TAKE 1 TABLET BY MOUTH DAILY (Patient taking differently: every Monday, Wednesday, Friday.) 90 Tab 3    DOCUSATE SODIUM (STOOL SOFTENER PO) Take  by mouth daily.  multivitamin (ONE A DAY) tablet Take 1 Tab by mouth daily.  Cholecalciferol, Vitamin D3, (VITAMIN D3) 1,000 unit cap Take  by mouth daily.  GLUC/ERICA-MSM#1/C/CHARLY/RAMÓN/BOR (OSTEO BI-FLEX PO) Take 1 Tab by mouth daily.          No Known Allergies    Family History   Problem Relation Age of Onset    Cancer Other     Heart Disease Other Social History     Tobacco Use    Smoking status: Never Smoker    Smokeless tobacco: Never Used   Substance Use Topics    Alcohol use:  Yes     Alcohol/week: 7.0 standard drinks     Types: 7 Glasses of wine per week     Comment: wine with dinner 3 times a week         Sharad Casas MD

## 2021-08-29 VITALS
RESPIRATION RATE: 16 BRPM | SYSTOLIC BLOOD PRESSURE: 124 MMHG | WEIGHT: 257 LBS | HEIGHT: 74 IN | OXYGEN SATURATION: 94 % | TEMPERATURE: 97.5 F | BODY MASS INDEX: 32.98 KG/M2 | DIASTOLIC BLOOD PRESSURE: 74 MMHG | HEART RATE: 86 BPM

## 2021-08-29 PROBLEM — Z96.642 STATUS POST TOTAL REPLACEMENT OF LEFT HIP: Status: ACTIVE | Noted: 2021-08-29

## 2021-08-29 NOTE — PROGRESS NOTES
HPI:   Fabiola Anderson is a very pleasant [de-identified]y.o. year old male who presents today for a physical exam. He has a history of hypertension, hyperlipidemia, polymyalgia rheumatica, esophageal hypercontractility, and BPH. He completed the Moderna COVID-19 vaccine series. He reports that he is doing relatively well. He underwent left total hip arthroplasty with Dr. Larry Shields on 5/26/2021 at [facility], and did well without complications. He states that he has completed physical therapy and is no longer requiring any pain medication. He does report some locking of his right knee with walking and is planning to address at his follow-up visit with Dr. Larry Shields. He states that he has successfully weaned off prednisone 5 mg following his left hip surgery without any increase in PMR symptoms. He otherwise is without new complaints and feeling generally well. Summary of prior hospitalizations and medical history:  He has a history of polymyalgia rheumatica, diagnosed in 7/2016 when he was seen for bilateral shoulder pain of several months duration. He had been under the care of Dr. Vangie Hawkins since 3/2016 when he presented with right wrist pain followed by the development of bilateral shoulder pain. He was treated with a Medrol dose pack and Tramadol and upon follow-up on 5/3/2016, reported some improvement in the bilateral shoulder and wrist discomfort, but pain persisted. Bilateral shoulder MRI's (5/11/2016) showed severe degenerative changes on the right at the acromioclavicular joint with moderate to severe or severe tendinopathy of the supraspinatus and infraspinatus; postoperative changes with tendinopathy of the supraspinatus, infraspinatus, and long head of the biceps was seen on the left. In 5/2016, lab data showed ESR 28, C-Reactive Protein 1.1, negative CRISTI and RF. He was treated with naproxen, tramadol and acetaminophen.  He was evaluated on 7/19/2016 for worsening bilateral shoulder pain with bilateral hand pain involving the wrists, MCP and PIP joints. Evaluation included ESR 48; C-reactive protein 1.8; CRISTI positive; anti-RNP 1.4; negative antibodies to Zimmerman, dsDNA, anti-chromatin, RF and anti-CCP. Bilateral hand x-rays showed scattered arthritic changes with scattered small erosions. He was referred to see Dr. Amando Mendoza who diagnosed polymyalgia rheumatica and started him on prednisone. Since initiating prednisone, he had significant improvement with nearly complete resolution of his symptoms. He has regained full use of his shoulders and has no trouble reaching behind his back or pulling a shirt over his head. He denies any headache, jaw claudication, scalp tenderness, fever, or visual changes. He successfully weaned off prednisone in 3/2019, although has had short periods requiring restarting steroids for resurgence of symptoms. In 11/2016, he presented with a persistent non productive cough, sore throat, and hoarseness. He was referred to see Dr. Geronimo Díaz, and patient reports that a laryngoscopy was performed showing evidence of reflux. He was started on Nexium for two weeks with improvement in his cough, but he continued to have pain with swallowing. He was referred to see Dr. Shalom Sunshine, and underwent upper endoscopy (1/5/2017) showing abnormal esophageal motility with spastic and non-propulsive appearing contractions in the mid to distal esophagus (tortuous); mucosa appeared normal ( random biopsies: negative); normal stomach and duodenum. He underwent a barium swallow (1/11/2017) showing a small persistent smoothly marginated filling defect at the piriform sinus suggestive of an extrinsic mass effect. He subsequently had a neck CT scan (1/13/2017) which showed the right piriform sinus appeared larger than the left, but the walls of both were normal without surrounding mass or infiltrating process; nonspecific lymphadenopathy involving level 1 and 2 nodes.  He underwent esophageal manometry, which showed a hypercontractile esophagus (\"Jackhammer\" esophagus). He was started on diltiazem 30 mg three times per day with resolution of symptoms. He discontinued diltiazem in 3/2017 without recurrence of symptoms. He has a history of hypertension, treated with lisinopril. He exercises mainly by walking, and denies any chest pain, shortness of breath at rest or with exertion, palpitations, lightheadedness, or edema. He also has hyperlipidemia, treated with rosuvastatin two days per week. He also has a history of BPH and underwent TURP in 2006. In 2/2011, he had laser vaporization of the prostate to relieve bladder outlet obstructive symptoms. In 3/2011 he had a laser incision performed on the bladder neck contracture. In 5/2012 and 8/2012, he underwent transurethral incision of the bladder neck contracture. Biopsy of the contracture (8/2012) was negative for malignancy. Since that time, he has had annual cystoscopic surveillance of the bladder neck contracture by Dr. Dakota Flores. In 8/2015, it was felt that no more surveillance was necessary as it had remained stable. He continues to take Proscar, but reports that he stopped Flomax on his own and began taking Super Beta prostate supplement instead. He being followed by Dr. Dakota Flores. He denies difficulty with stream, dysuria, or hematuria. He does report 2-3 episodes of nocturia. He had a screening colonoscopy in 4/2012 by Dr. Michelle Mackay which was normal. Follow-up recommended for 10 years. He had a repeat colonoscopy in 1/2020 by Dr. Roberto Magallanes for evaluation of rectal bleeding, showing mild diverticulosis in the descending/ sigmoid colon, medium grade/stage 2 internal hemorrhoids with evidence of recent bleeding, and three sessile 4-7 mm polyps in the hepatic flexure (pathology: tubular adenoma), transverse colon (pathology: tubular adenoma), and rectum (pathology: hyperplastic). No further screening felt to be needed.  He denies any abdominal pain, nausea, vomiting, melena, hematochezia, or change in bowel movements. Past Medical History:   Diagnosis Date    Bladder neck contracture     BPH with obstruction/lower urinary tract symptoms     Essential hypertension with goal blood pressure less than 140/90     Family history of colon cancer     History of blood transfusion 2015    with shoulder replacement    Hyperlipidemia     Osteoarthritis     Osteoarthritis of left hip     PMR (polymyalgia rheumatica) ([facility] [address] 7/20/2016    Skin cancer     basal cell on face    Urinary retention      Past Surgical History:   Procedure Laterality Date    CYSTOSCOPY      ENDOSCOPY, COLON, DIAGNOSTIC      HC BERENICE LASER 15133DH  3/2/11    Laser incision of bladder neck contracture    HX BACK SURGERY      lower    HX MOHS PROCEDURES  1/11    rt hip repl    HX ORTHOPAEDIC Right 2015    shoulder replacement    HX OTHER SURGICAL      tonsillectomy, back sx,     HX OTHER SURGICAL      basel cell removed from right ear lobe    HX SHOULDER ARTHROSCOPY Left 2010    HX TURP      Laser    CT LASER VAPORIZATION SURGERY PROSTATE, COMPLETE       Current Outpatient Medications   Medication Sig    acetaminophen (TYLENOL) 500 mg tablet Take 2 Tablets by mouth every six (6) hours.  meloxicam (MOBIC) 15 mg tablet Take 15 mg by mouth daily.  rosuvastatin (CRESTOR) 5 mg tablet TAKE 1 TABLET BY MOUTH 3 TIMES WEEKLY    finasteride (PROSCAR) 5 mg tablet TAKE 1 TABLET BY MOUTH ONCE DAILY    lisinopriL (PRINIVIL, ZESTRIL) 5 mg tablet TAKE 1 TABLET BY MOUTH DAILY (Patient taking differently: every Monday, Wednesday, Friday.)    DOCUSATE SODIUM (STOOL SOFTENER PO) Take  by mouth daily.  multivitamin (ONE A DAY) tablet Take 1 Tab by mouth daily.  Cholecalciferol, Vitamin D3, (VITAMIN D3) 1,000 unit cap Take  by mouth daily.  GLUC/ERICA-MSM#1/C/CHARLY/RAMÓN/BOR (OSTEO BI-FLEX PO) Take 1 Tab by mouth daily. No current facility-administered medications for this visit.      Allergies and Intolerances:   No Known Allergies     Family History: His father  from CAD, and his brother has had CABG. His mother  from ovarian cancer. Family History   Problem Relation Age of Onset    Cancer Other     Heart Disease Other      Social History:   He  reports that he has never smoked. He has never used smokeless tobacco. He is  but has been living with his long time girlfriend Antony Patel) for 37 years. He has two step children. 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 Social History     Substance and Sexual Activity   Alcohol Use Yes    Alcohol/week: 7.0 standard drinks    Types: 7 Glasses of wine per week    Comment: wine with dinner 3 times a week     Immunization History:  Immunization History   Administered Date(s) Administered    (RETIRED) Pneumococcal Vaccine (Unspecified Type) 2008    Covid-19, MODERNA, Mrna, Lnp-s, Pf, 100mcg/0.5mL 2021, 2021    Influenza High Dose Vaccine PF 2013, 2015, 11/10/2016, 2017    Influenza Vaccine 2014, 2020    Influenza Vaccine (Tri) Adjuvanted (>65 Yrs FLUAD TRI 97221) 2018, 2019    Influenza Vaccine Split 2012    Influenza, Quadrivalent, Adjuvanted (>65 Yrs FLUAD QUAD [id number]) 2020    Pneumococcal Conjugate (PCV-13) 2015    Pneumococcal Polysaccharide (PPSV-23) 2008    TDAP Vaccine 2011    Zoster 2012    Zoster Recombinant 10/10/2019, 2020       Review of Systems:   As above included in HPI. Otherwise 11 point review of systems negative including constitutional, skin, HENT, eyes, respiratory, cardiovascular, gastrointestinal, genitourinary, musculoskeletal, endocrine, hematologic, allergy, and neurologic.     Physical:   Visit Vitals  /74   Pulse 86   Temp 97.5 °F (36.4 °C) (Temporal)   Resp 16   Ht 6' 2\" (1.88 m)   Wt 257 lb (116.6 kg)   SpO2 94%   BMI 33.00 kg/m² Patient appears in no apparent distress. Affect is appropriate. HEENT --Anicteric sclerae, tympanic membranes normal,  ear canals normal.  PERRL, EOMI, conjunctiva and lids normal.  No cervical lymphadenopathy. No thyromegaly, JVD, or bruits. Carotid pulses 2+ with normal upstroke. Lungs --Clear to auscultation. No wheezing or rales. Heart --Regular rate and rhythm, no murmurs, rubs, gallops, or clicks. Abdomen -- Soft and nontender, no hepatosplenomegaly or masses. Extremities -- Without cyanosis, clubbing, edema. Normal looking digits, ROM intact  Neuro -- CN 2-12 intact, strength 5/5 with intact soft touch in all extremities  Derm - no obvious abnormalities noted, no rash      Review of Data:  Labs:  Hospital Outpatient Visit on 08/19/2021   Component Date Value Ref Range Status    WBC 08/19/2021 5.6  4.6 - 13.2 K/uL Final    RBC 08/19/2021 4.27* 4.35 - 5.65 M/uL Final    HGB 08/19/2021 13.6  13.0 - 16.0 g/dL Final    HCT 08/19/2021 41.4  36.0 - 48.0 % Final    MCV 08/19/2021 97.0  74.0 - 97.0 FL Final    MCH 08/19/2021 31.9  24.0 - 34.0 PG Final    MCHC 08/19/2021 32.9  31.0 - 37.0 g/dL Final    RDW 08/19/2021 12.7  11.6 - 14.5 % Final    PLATELET 67/24/1782 215  135 - 420 K/uL Final    MPV 08/19/2021 9.9  9.2 - 11.8 FL Final    NEUTROPHILS 08/19/2021 58  40 - 73 % Final    LYMPHOCYTES 08/19/2021 31  21 - 52 % Final    MONOCYTES 08/19/2021 10  3 - 10 % Final    EOSINOPHILS 08/19/2021 2  0 - 5 % Final    BASOPHILS 08/19/2021 0  0 - 2 % Final    ABS. NEUTROPHILS 08/19/2021 3.2  1.8 - 8.0 K/UL Final    ABS. LYMPHOCYTES 08/19/2021 1.7  0.9 - 3.6 K/UL Final    ABS. MONOCYTES 08/19/2021 0.5  0.05 - 1.2 K/UL Final    ABS. EOSINOPHILS 08/19/2021 0.1  0.0 - 0.4 K/UL Final    ABS.  BASOPHILS 08/19/2021 0.0  0.0 - 0.1 K/UL Final    DF 08/19/2021 AUTOMATED    Final    LIPID PROFILE 08/19/2021        Final    Cholesterol, total 08/19/2021 164  <200 MG/DL Final    Triglyceride 08/19/2021 99 <150 MG/DL Final    HDL Cholesterol 08/19/2021 45  40 - 60 MG/DL Final    LDL, calculated 08/19/2021 99.2  0 - 100 MG/DL Final    VLDL, calculated 08/19/2021 19.8  MG/DL Final    CHOL/HDL Ratio 08/19/2021 3.6  0 - 5.0   Final    Magnesium 08/19/2021 2.2  1.6 - 2.6 mg/dL Final    Sodium 08/19/2021 138  136 - 145 mmol/L Final    Potassium 08/19/2021 5.1  3.5 - 5.5 mmol/L Final    Chloride 08/19/2021 107  100 - 111 mmol/L Final    CO2 08/19/2021 29  21 - 32 mmol/L Final    Anion gap 08/19/2021 2* 3.0 - 18 mmol/L Final    Glucose 08/19/2021 96  74 - 99 mg/dL Final    BUN 08/19/2021 12  7.0 - 18 MG/DL Final    Creatinine 08/19/2021 1.03  0.6 - 1.3 MG/DL Final    BUN/Creatinine ratio 08/19/2021 12  12 - 20   Final    GFR est AA 08/19/2021 >60  >60 ml/min/1.73m2 Final    GFR est non-AA 08/19/2021 >60  >60 ml/min/1.73m2 Final    Calcium 08/19/2021 8.7  8.5 - 10.1 MG/DL Final    Bilirubin, total 08/19/2021 0.4  0.2 - 1.0 MG/DL Final    ALT (SGPT) 08/19/2021 23  16 - 61 U/L Final    AST (SGOT) 08/19/2021 13  10 - 38 U/L Final    Alk.  phosphatase 08/19/2021 70  45 - 117 U/L Final    Protein, total 08/19/2021 6.7  6.4 - 8.2 g/dL Final    Albumin 08/19/2021 3.9  3.4 - 5.0 g/dL Final    Globulin 08/19/2021 2.8  2.0 - 4.0 g/dL Final    A-G Ratio 08/19/2021 1.4  0.8 - 1.7   Final    Microalbumin,urine random 08/19/2021 1.41  0 - 3.0 MG/DL Final    Creatinine, urine 08/19/2021 140.00* 30 - 125 mg/dL Final    Microalbumin/Creat ratio (mg/g cre* 08/19/2021 10  0 - 30 mg/g Final    TSH 08/19/2021 1.68  0.36 - 3.74 uIU/mL Final    Vitamin D 25-Hydroxy 08/19/2021 48.6  30 - 100 ng/mL Final    Color 08/19/2021 YELLOW    Final    Appearance 08/19/2021 CLEAR    Final    Specific gravity 08/19/2021 1.016  1.005 - 1.030   Final    pH (UA) 08/19/2021 5.5  5.0 - 8.0   Final    Protein 08/19/2021 Negative  NEG mg/dL Final    Glucose 08/19/2021 Negative  NEG mg/dL Final    Ketone 08/19/2021 Negative  NEG mg/dL Final    Bilirubin 08/19/2021 Negative  NEG   Final    Blood 08/19/2021 Negative  NEG   Final    Urobilinogen 08/19/2021 0.2  0.2 - 1.0 EU/dL Final    Nitrites 08/19/2021 Negative  NEG   Final    Leukocyte Esterase 08/19/2021 Negative  NEG   Final           Health Maintenance:  Screening:    Colorectal: colonoscopy (1/2020) tubular adenomas. Dr. Popeye Goodson. No further screening needed. Depression: none   DM (HbA1c/FPG): FPG 96 (8/2021)/ HbA1c 5.5 (3/2020)   Hepatitis C: negative (7/2016)   Falls: none   DEXA: within normal limits (5/2017)    PSA/HOLA: PSA 0.5 (11/2019); HOLA per Dr. Sharri Rivera   Glaucoma: regular eye exams with Dr. Ellen Pelletier (last 12/2020)    Smoking: none   Vitamin D: 48.6 (8/2021)   Medicare Wellness: today      Impression:  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    Hyperlipidemia E78.5    Vitamin D insufficiency E55.9    Hearing loss H91.90    Essential hypertension I10    Primary osteoarthritis involving multiple joints M89.49    PMR (polymyalgia rheumatica) (HCC) M35.3    Hoarseness R49.0    Esophageal dysmotility K22.4    Positive CRISTI (antinuclear antibody)/ RNP antibody R76.8    Nodule of finger of right hand R22.31    Class 1 obesity due to excess calories with serious comorbidity and body mass index (BMI) of 32.0 to 32.9 in adult E66.09, Z68.32    Osteoarthritis of left hip M16.12       Plan:  1. Hypertension. Blood pressure remains well controlled on lisinopril 5 mg daily. Renal function remains normal with creatinine 1.03/ eGFR >60. Encouraged to increase fluid intake and avoid NSAIDS. Will continue to follow closely. 2. Hyperlipidemia. On low intensity dose rosuvastatin, taking 5 mg three days per week, with LDL 99 and HDL 45, indicative of reasonable control. . Emphasized importance of lifestyle modifications, including diet, exercise, and weight loss.      3. Polymyalgia rheumatica. Patient had significantly improved with steroid treatment. Had successfully tapered off prednisone in 3/2019 and released from care by Dr. Amando Mendoza. However, reported worsening shoulder symptoms with bilateral pain in 5/2020, and evaluated by Dr. Amando Mendoza. Treated with two courses of Sterapred dose packs with some improvement. Restarted on prednisone 5 mg daily and advised by Dr. Amando Mendoza to continue at this dose until after his hip surgery. Reports today that is no longer taking and tapered it off following his surgery without increase in symptoms. Known increased risk (10%) of concurrent giant cell arteritis with PMR, but no evidence of GCA. Also with positive CRISTI/ positive RNP Ab, but no evidence of inflammation elsewhere. Urinalysis continues to be negative for proteinuria. Continue to follow closely. Given intermittent chronic steroid use, bone density scan in 5/2017 normal. Continuing to follow with Dr. Amando Mendoza. 4.  Osteoarthritis, left hip s/p left KARLENE. Underwent left total hip arthroplasty with Dr. Arturo Claire on 0/35/6460 without complications. Reports that he has done well and has regained significant movement without pain. Completed home physical therapy. Now with some difficulty with locking of his right knee, but will address at his follow-up visit with Dr. Arturo Claire. Complaining of some postoperative constipation, and already using Metamucil and stool softeners. Advised to increase fluid intake and use MiraLAX. Advised to call if no improvement. 5. BPH with bladder neck contracture. Appears to be asymptomatic on current treatment with Proscar. Patient decided to discontinue Flomax on his own, but reported worsening nocturia and started on finasteride by Dr. Marina Camejo. PVR assessed in 6/2020 and shown to be 38 cc. Prescribed Flomax by Dr. Marina Camejo to take perioperatively to prevent retention. Did not need and reports not taking today.   Continuing to follow with  annually. 6. Odynophagia. Secondary to esophageal dysmotility. Responded to diltiazem, and now successfully stopped taking it without recurrence of symptoms. Will continue to follow. Hoarseness now at baseline. Reportedly using Flonase as needed. 7. Right hand nodule. Appears along a tendon sheath. Unchanged from six months ago. Suspect ganglion cyst although could also represent a tenosynovial giant cell tumor. However, patient wishing to hold off currently on referral to ortho given no significant discomfort. Will continue to follow. 8.  Abnormal fasting glucose. FPG normal today at 96. Will reassess HbA1c at next visit. 9.  History ofbasal cell carcinoma, face. Underwent extensive wide excision in 9/2020 by Dr. Claudene Langdon and required skin graft. Well healed today. Continuing surveillance exams with Dr. Dakota Álvarez. 10. Obesity. Emphasized importance of lifestyle modifications, including diet, exercise, and weight loss. Will readdress next visit. 11. Health maintenance. Completed Moderna COVID 19 vaccine series. Completed 2/2 doses of Shingrix vaccine. Other immunizations up to date. Colonoscopy completed and no further screening needed. Continue regular eye exams with Dr. Ellen Pelletier. Vitamin D level normal. Continue maintenance dose supplement. Discussed recommendations regarding aspirin use and primary prevention, but patient wishing to continue. In addition, an annual Medicare wellness visit was done today. Patient understands recommendations and agrees with plan. Follow-up in 6 months.

## 2021-09-20 RX ORDER — LISINOPRIL 5 MG/1
TABLET ORAL
Qty: 90 TABLET | Refills: 3 | Status: SHIPPED | OUTPATIENT
Start: 2021-09-20 | End: 2022-09-24

## 2021-09-27 NOTE — PATIENT INSTRUCTIONS
ERP at bedside    Vaccine Information Statement    Influenza (Flu) Vaccine (Inactivated or Recombinant): What You Need to Know    Many Vaccine Information Statements are available in Azeri and other languages. See www.immunize.org/vis  Hojas de información sobre vacunas están disponibles en español y en muchos otros idiomas. Visite www.immunize.org/vis    1. Why get vaccinated? Influenza vaccine can prevent influenza (flu). Flu is a contagious disease that spreads around the [facility] every year, usually between October and May. Anyone can get the flu, but it is more dangerous for some people. Infants and young children, people 72years of age and older, pregnant women, and people with certain health conditions or a weakened immune system are at greatest risk of flu complications. Pneumonia, bronchitis, sinus infections and ear infections are examples of flu-related complications. If you have a medical condition, such as heart disease, cancer or diabetes, flu can make it worse. Flu can cause fever and chills, sore throat, muscle aches, fatigue, cough, headache, and runny or stuffy nose. Some people may have vomiting and diarrhea, though this is more common in children than adults. Each year thousands of people in the [facility] die from flu, and many more are hospitalized. Flu vaccine prevents millions of illnesses and flu-related visits to the doctor each year. 2. Influenza vaccines     CDC recommends everyone 10months of age and older get vaccinated every flu season. Children 6 months through 6years of age may need 2 doses during a single flu season. Everyone else needs only 1 dose each flu season. It takes about 2 weeks for protection to develop after vaccination. There are many flu viruses, and they are always changing. Each year a new flu vaccine is made to protect against three or four viruses that are likely to cause disease in the upcoming flu season.  Even when the vaccine doesnt exactly match these viruses, it may still provide some protection. Influenza vaccine does not cause flu. Influenza vaccine may be given at the same time as other vaccines. 3. Talk with your health care provider    Tell your vaccine provider if the person getting the vaccine:   Has had an allergic reaction after a previous dose of influenza vaccine, or has any severe, life-threatening allergies.  Has ever had Guillain-Barré Syndrome (also called GBS). In some cases, your health care provider may decide to postpone influenza vaccination to a future visit. People with minor illnesses, such as a cold, may be vaccinated. People who are moderately or severely ill should usually wait until they recover before getting influenza vaccine. Your health care provider can give you more information. 4. Risks of a reaction     Soreness, redness, and swelling where shot is given, fever, muscle aches, and headache can happen after influenza vaccine.  There may be a very small increased risk of Guillain-Barré Syndrome (GBS) after inactivated influenza vaccine (the flu shot). [place] children who get the flu shot along with pneumococcal vaccine (PCV13), and/or DTaP vaccine at the same time might be slightly more likely to have a seizure caused by fever. Tell your health care provider if a child who is getting flu vaccine has ever had a seizure. People sometimes faint after medical procedures, including vaccination. Tell your provider if you feel dizzy or have vision changes or ringing in the ears. As with any medicine, there is a very remote chance of a vaccine causing a severe allergic reaction, other serious injury, or death. 5. What if there is a serious problem? An allergic reaction could occur after the vaccinated person leaves the clinic.  If you see signs of a severe allergic reaction (hives, swelling of the face and throat, difficulty breathing, a fast heartbeat, dizziness, or weakness), call 9-1-1 and get the person to the nearest hospital.    For other signs that concern you, call your health care provider. Adverse reactions should be reported to the Vaccine Adverse Event Reporting System (VAERS). Your health care provider will usually file this report, or you can do it yourself. Visit the VAERS website at www.vaers. [facility].gov or call 4-182.286.5232. VAERS is only for reporting reactions, and VAERS staff do not give medical advice. 6. The National Vaccine Injury Compensation Program    The [facility] Vaccine Injury Compensation Program (VICP) is a federal program that was created to compensate people who may have been injured by certain vaccines. Visit the VICP website at www.[facility].gov/vaccinecompensation or call 1-125.488.3496 to learn about the program and about filing a claim. There is a time limit to file a claim for compensation. 7. How can I learn more?  Ask your health care provider.  Call your local or state health department.  Contact the Centers for Disease Control and Prevention (CDC):  - Call 5-233.219.1389 (8-202-DCI-INFO) or  - Visit CDCs influenza website at www.cdc.gov/flu    Vaccine Information Statement (Interim)  Inactivated Influenza Vaccine   8/15/2019  43 JUANITO Elder [id number]   Department of Health and Human Services  Centers for Disease Control and Prevention    Office Use Only         High Cholesterol: Care Instructions  Your Care Instructions    Cholesterol is a type of fat in your blood. It is needed for many body functions, such as making new cells. Cholesterol is made by your body. It also comes from food you eat. High cholesterol means that you have too much of the fat in your blood. This raises your risk of a heart attack and stroke. LDL and HDL are part of your total cholesterol. LDL is the \"bad\" cholesterol. High LDL can raise your risk for heart disease, heart attack, and stroke. HDL is the \"good\" cholesterol. It helps clear bad cholesterol from the body. High HDL is linked with a lower risk of heart disease, heart attack, and stroke. Your cholesterol levels help your doctor find out your risk for having a heart attack or stroke. You and your doctor can talk about whether you need to lower your risk and what treatment is best for you. A heart-healthy lifestyle along with medicines can help lower your cholesterol and your risk. The way you choose to lower your risk will depend on how high your risk is for heart attack and stroke. It will also depend on how you feel about taking medicines. Follow-up care is a key part of your treatment and safety. Be sure to make and go to all appointments, and call your doctor if you are having problems. It's also a good idea to know your test results and keep a list of the medicines you take. How can you care for yourself at home? · Eat a variety of foods every day. Good choices include fruits, vegetables, whole grains (like oatmeal), dried beans and peas, nuts and seeds, soy products (like tofu), and fat-free or low-fat dairy products. · Replace butter, margarine, and hydrogenated or partially hydrogenated oils with olive and canola oils. (Canola oil margarine without trans fat is fine.)  · Replace red meat with fish, poultry, and soy protein (like tofu). · Limit processed and packaged foods like chips, crackers, and cookies. · Bake, broil, or steam foods. Don't aquino them. · Be physically active. Get at least 30 minutes of exercise on most days of the week. Walking is a good choice. You also may want to do other activities, such as running, swimming, cycling, or playing tennis or team sports. · Stay at a healthy weight or lose weight by making the changes in eating and physical activity listed above. Losing just a small amount of weight, even 5 to 10 pounds, can reduce your risk for having a heart attack or stroke. · Do not smoke. When should you call for help?   Watch closely for changes in your health, and be sure to contact your doctor if:    · You need help making lifestyle changes.     · You have questions about your medicine. Where can you learn more? Go to http://felicia-myles.info/. Enter N550 in the search box to learn more about \"High Cholesterol: Care Instructions. \"  Current as of: April 9, 2019  Content Version: 12.2  © 7699-2373 [employer]. Care instructions adapted under license by [employer] (which disclaims liability or warranty for this information). If you have questions about a medical condition or this instruction, always ask your healthcare professional. [address] any warranty or liability for your use of this information. Body Mass Index: Care Instructions  Your Care Instructions    Body mass index (BMI) can help you see if your weight is raising your risk for health problems. It uses a formula to compare how much you weigh with how tall you are. · A BMI lower than 18.5 is considered underweight. · A BMI between 18.5 and 24.9 is considered healthy. · A BMI between 25 and 29.9 is considered overweight. A BMI of 30 or higher is considered obese. If your BMI is in the normal range, it means that you have a lower risk for weight-related health problems. If your BMI is in the overweight or obese range, you may be at increased risk for weight-related health problems, such as high blood pressure, heart disease, stroke, arthritis or joint pain, and diabetes. If your BMI is in the underweight range, you may be at increased risk for health problems such as fatigue, lower protection (immunity) against illness, muscle loss, bone loss, hair loss, and hormone problems. BMI is just one measure of your risk for weight-related health problems. You may be at higher risk for health problems if you are not active, you eat an unhealthy diet, or you drink too much alcohol or use tobacco products.   Follow-up care is a key part of your treatment and safety. Be sure to make and go to all appointments, and call your doctor if you are having problems. It's also a good idea to know your test results and keep a list of the medicines you take. How can you care for yourself at home? · Practice healthy eating habits. This includes eating plenty of fruits, vegetables, whole grains, lean protein, and low-fat dairy. · If your doctor recommends it, get more exercise. Walking is a good choice. Bit by bit, increase the amount you walk every day. Try for at least 30 minutes on most days of the week. · Do not smoke. Smoking can increase your risk for health problems. If you need help quitting, talk to your doctor about stop-smoking programs and medicines. These can increase your chances of quitting for good. · Limit alcohol to 2 drinks a day for men and 1 drink a day for women. Too much alcohol can cause health problems. If you have a BMI higher than 25  · Your doctor may do other tests to check your risk for weight-related health problems. This may include measuring the distance around your waist. A waist measurement of more than 40 inches in men or 35 inches in women can increase the risk of weight-related health problems. · Talk with your doctor about steps you can take to stay healthy or improve your health. You may need to make lifestyle changes to lose weight and stay healthy, such as changing your diet and getting regular exercise. If you have a BMI lower than 18.5  · Your doctor may do other tests to check your risk for health problems. · Talk with your doctor about steps you can take to stay healthy or improve your health. You may need to make lifestyle changes to gain or maintain weight and stay healthy, such as getting more healthy foods in your diet and doing exercises to build muscle. Where can you learn more? Go to http://felicia-myles.info/.   Enter S176 in the search box to learn more about \"Body Mass Index: Care Instructions. \"  Current as of: March 28, 2019  Content Version: 12.2  © 2475-7544 [employer]. Care instructions adapted under license by [employer] (which disclaims liability or warranty for this information). If you have questions about a medical condition or this instruction, always ask your healthcare professional. [place] [address] any warranty or liability for your use of this information.

## 2022-02-28 ENCOUNTER — APPOINTMENT (OUTPATIENT)
Dept: INTERNAL MEDICINE CLINIC | Age: 82
End: 2022-02-28

## 2022-02-28 ENCOUNTER — HOSPITAL ENCOUNTER (OUTPATIENT)
Dept: LAB | Age: 82
Discharge: HOME | End: 2022-02-28
Payer: MEDICARE

## 2022-02-28 DIAGNOSIS — E78.5 HYPERLIPIDEMIA, UNSPECIFIED HYPERLIPIDEMIA TYPE: ICD-10-CM

## 2022-02-28 DIAGNOSIS — M35.3 PMR (POLYMYALGIA RHEUMATICA) (HCC): ICD-10-CM

## 2022-02-28 DIAGNOSIS — I10 ESSENTIAL HYPERTENSION: ICD-10-CM

## 2022-02-28 DIAGNOSIS — R73.01 ABNORMAL FASTING GLUCOSE: ICD-10-CM

## 2022-02-28 LAB
ALBUMIN SERPL-MCNC: 3.8 G/DL (ref 3.4–5)
ALBUMIN/GLOB SERPL: 1.3 {RATIO} (ref 0.8–1.7)
ALP SERPL-CCNC: 68 U/L (ref 45–117)
ALT SERPL-CCNC: 26 U/L (ref 16–61)
ANION GAP SERPL CALC-SCNC: 1 MMOL/L (ref 3–18)
APPEARANCE UR: CLEAR
AST SERPL-CCNC: 14 U/L (ref 10–38)
BASOPHILS # BLD: 0 K/UL (ref 0–0.1)
BASOPHILS NFR BLD: 0 % (ref 0–2)
BILIRUB SERPL-MCNC: 0.4 MG/DL (ref 0.2–1)
BILIRUB UR QL: NEGATIVE
BUN SERPL-MCNC: 12 MG/DL (ref 7–18)
BUN/CREAT SERPL: 11 (ref 12–20)
CALCIUM SERPL-MCNC: 9 MG/DL (ref 8.5–10.1)
CHLORIDE SERPL-SCNC: 106 MMOL/L (ref 100–111)
CHOLEST SERPL-MCNC: 184 MG/DL
CO2 SERPL-SCNC: 32 MMOL/L (ref 21–32)
COLOR UR: YELLOW
CREAT SERPL-MCNC: 1.06 MG/DL (ref 0.6–1.3)
DIFFERENTIAL METHOD BLD: NORMAL
EOSINOPHIL # BLD: 0.1 K/UL (ref 0–0.4)
EOSINOPHIL NFR BLD: 1 % (ref 0–5)
ERYTHROCYTE [DISTWIDTH] IN BLOOD BY AUTOMATED COUNT: 13 % (ref 11.6–14.5)
EST. AVERAGE GLUCOSE BLD GHB EST-MCNC: 108 MG/DL
GLOBULIN SER CALC-MCNC: 3 G/DL (ref 2–4)
GLUCOSE SERPL-MCNC: 97 MG/DL (ref 74–99)
GLUCOSE UR STRIP.AUTO-MCNC: NEGATIVE MG/DL
HBA1C MFR BLD: 5.4 % (ref 4.2–5.6)
HCT VFR BLD AUTO: 43.4 % (ref 36–48)
HDLC SERPL-MCNC: 55 MG/DL (ref 40–60)
HDLC SERPL: 3.3 {RATIO} (ref 0–5)
HGB BLD-MCNC: 14 G/DL (ref 13–16)
HGB UR QL STRIP: NEGATIVE
IMM GRANULOCYTES # BLD AUTO: 0 K/UL (ref 0–0.04)
IMM GRANULOCYTES NFR BLD AUTO: 0 % (ref 0–0.5)
KETONES UR QL STRIP.AUTO: NEGATIVE MG/DL
LDLC SERPL CALC-MCNC: 109 MG/DL (ref 0–100)
LEUKOCYTE ESTERASE UR QL STRIP.AUTO: NEGATIVE
LIPID PROFILE,FLP: ABNORMAL
LYMPHOCYTES # BLD: 2.2 K/UL (ref 0.9–3.6)
LYMPHOCYTES NFR BLD: 30 % (ref 21–52)
MAGNESIUM SERPL-MCNC: 2.2 MG/DL (ref 1.6–2.6)
MCH RBC QN AUTO: 32 PG (ref 24–34)
MCHC RBC AUTO-ENTMCNC: 32.3 G/DL (ref 31–37)
MCV RBC AUTO: 99.1 FL (ref 78–100)
MONOCYTES # BLD: 0.7 K/UL (ref 0.05–1.2)
MONOCYTES NFR BLD: 9 % (ref 3–10)
NEUTS SEG # BLD: 4.3 K/UL (ref 1.8–8)
NEUTS SEG NFR BLD: 59 % (ref 40–73)
NITRITE UR QL STRIP.AUTO: NEGATIVE
NRBC # BLD: 0 K/UL (ref 0–0.01)
NRBC BLD-RTO: 0 PER 100 WBC
PH UR STRIP: 6 [PH] (ref 5–8)
PLATELET # BLD AUTO: 242 K/UL (ref 135–420)
PMV BLD AUTO: 9.7 FL (ref 9.2–11.8)
POTASSIUM SERPL-SCNC: 4.3 MMOL/L (ref 3.5–5.5)
PROT SERPL-MCNC: 6.8 G/DL (ref 6.4–8.2)
PROT UR STRIP-MCNC: NEGATIVE MG/DL
RBC # BLD AUTO: 4.38 M/UL (ref 4.35–5.65)
SODIUM SERPL-SCNC: 139 MMOL/L (ref 136–145)
SP GR UR REFRACTOMETRY: 1.02 (ref 1–1.03)
TRIGL SERPL-MCNC: 100 MG/DL (ref ?–150)
UROBILINOGEN UR QL STRIP.AUTO: 0.2 EU/DL (ref 0.2–1)
VLDLC SERPL CALC-MCNC: 20 MG/DL
WBC # BLD AUTO: 7.3 K/UL (ref 4.6–13.2)

## 2022-02-28 PROCEDURE — 81003 URINALYSIS AUTO W/O SCOPE: CPT

## 2022-02-28 PROCEDURE — 83036 HEMOGLOBIN GLYCOSYLATED A1C: CPT

## 2022-02-28 PROCEDURE — 80053 COMPREHEN METABOLIC PANEL: CPT

## 2022-02-28 PROCEDURE — 83735 ASSAY OF MAGNESIUM: CPT

## 2022-02-28 PROCEDURE — 36415 COLL VENOUS BLD VENIPUNCTURE: CPT

## 2022-02-28 PROCEDURE — 80061 LIPID PANEL: CPT

## 2022-02-28 PROCEDURE — 85025 COMPLETE CBC W/AUTO DIFF WBC: CPT

## 2022-03-17 ENCOUNTER — OFFICE VISIT (OUTPATIENT)
Dept: INTERNAL MEDICINE CLINIC | Age: 82
End: 2022-03-17
Payer: MEDICARE

## 2022-03-17 VITALS
SYSTOLIC BLOOD PRESSURE: 116 MMHG | TEMPERATURE: 97.7 F | OXYGEN SATURATION: 97 % | RESPIRATION RATE: 16 BRPM | HEIGHT: 74 IN | BODY MASS INDEX: 32.21 KG/M2 | HEART RATE: 82 BPM | WEIGHT: 251 LBS | DIASTOLIC BLOOD PRESSURE: 68 MMHG

## 2022-03-17 DIAGNOSIS — I10 ESSENTIAL HYPERTENSION: Primary | ICD-10-CM

## 2022-03-17 DIAGNOSIS — E66.09 CLASS 1 OBESITY DUE TO EXCESS CALORIES WITH SERIOUS COMORBIDITY AND BODY MASS INDEX (BMI) OF 32.0 TO 32.9 IN ADULT: ICD-10-CM

## 2022-03-17 DIAGNOSIS — E78.5 HYPERLIPIDEMIA, UNSPECIFIED HYPERLIPIDEMIA TYPE: ICD-10-CM

## 2022-03-17 DIAGNOSIS — R73.01 ABNORMAL FASTING GLUCOSE: ICD-10-CM

## 2022-03-17 DIAGNOSIS — N13.8 BPH WITH OBSTRUCTION/LOWER URINARY TRACT SYMPTOMS: ICD-10-CM

## 2022-03-17 DIAGNOSIS — E55.9 VITAMIN D INSUFFICIENCY: ICD-10-CM

## 2022-03-17 DIAGNOSIS — M35.3 PMR (POLYMYALGIA RHEUMATICA) (HCC): ICD-10-CM

## 2022-03-17 DIAGNOSIS — N40.1 BPH WITH OBSTRUCTION/LOWER URINARY TRACT SYMPTOMS: ICD-10-CM

## 2022-03-17 DIAGNOSIS — M15.9 PRIMARY OSTEOARTHRITIS INVOLVING MULTIPLE JOINTS: ICD-10-CM

## 2022-03-17 DIAGNOSIS — R76.8 POSITIVE ANA (ANTINUCLEAR ANTIBODY): ICD-10-CM

## 2022-03-17 PROCEDURE — G8536 NO DOC ELDER MAL SCRN: HCPCS | Performed by: INTERNAL MEDICINE

## 2022-03-17 PROCEDURE — G8510 SCR DEP NEG, NO PLAN REQD: HCPCS | Performed by: INTERNAL MEDICINE

## 2022-03-17 PROCEDURE — G8752 SYS BP LESS 140: HCPCS | Performed by: INTERNAL MEDICINE

## 2022-03-17 PROCEDURE — 1101F PT FALLS ASSESS-DOCD LE1/YR: CPT | Performed by: INTERNAL MEDICINE

## 2022-03-17 PROCEDURE — G0463 HOSPITAL OUTPT CLINIC VISIT: HCPCS | Performed by: INTERNAL MEDICINE

## 2022-03-17 PROCEDURE — G8754 DIAS BP LESS 90: HCPCS | Performed by: INTERNAL MEDICINE

## 2022-03-17 PROCEDURE — 99214 OFFICE O/P EST MOD 30 MIN: CPT | Performed by: INTERNAL MEDICINE

## 2022-03-17 PROCEDURE — G8417 CALC BMI ABV UP PARAM F/U: HCPCS | Performed by: INTERNAL MEDICINE

## 2022-03-17 PROCEDURE — G8427 DOCREV CUR MEDS BY ELIG CLIN: HCPCS | Performed by: INTERNAL MEDICINE

## 2022-03-17 RX ORDER — ROSUVASTATIN CALCIUM 5 MG/1
5 TABLET, COATED ORAL DAILY
Qty: 90 TABLET | Refills: 3 | Status: SHIPPED | OUTPATIENT
Start: 2022-03-17

## 2022-03-17 NOTE — PROGRESS NOTES
1. \"Have you been to the ER, urgent care clinic since your last visit? Hospitalized since your last visit? \" yes patients first 2 week ago cyst removal right side    2. \"Have you seen or consulted any other health care providers outside of the [address] [place] [address] since your last visit? \" No     3. For patients aged 39-70: Has the patient had a colonoscopy / FIT/ Cologuard? NA - based on age      If the patient is female:    4. For patients aged 41-77: Has the patient had a mammogram within the past 2 years? NA - based on age or sex      11. For patients aged 21-65: Has the patient had a pap smear?  NA - based on age or sex

## 2022-03-17 NOTE — PATIENT INSTRUCTIONS
Heart-Healthy Diet: Care Instructions  Your Care Instructions     A heart-healthy diet has lots of vegetables, fruits, nuts, beans, and whole grains, and is low in salt. It limits foods that are high in saturated fat, such as meats, cheeses, and fried foods. It may be hard to change your diet, but even small changes can lower your risk of heart attack and heart disease. Follow-up care is a key part of your treatment and safety. Be sure to make and go to all appointments, and call your doctor if you are having problems. It's also a good idea to know your test results and keep a list of the medicines you take. How can you care for yourself at home? Watch your portions  · Learn what a serving is. A \"serving\" and a \"portion\" are not always the same thing. Make sure that you are not eating larger portions than are recommended. For example, a serving of pasta is ½ cup. A serving size of meat is 2 to 3 ounces. A 3-ounce serving is about the size of a deck of cards. Measure serving sizes until you are good at [place]" them. Keep in mind that restaurants often serve portions that are 2 or 3 times the size of one serving. · To keep your energy level up and keep you from feeling hungry, eat often but in smaller portions. · Eat only the number of calories you need to stay at a healthy weight. If you need to lose weight, eat fewer calories than your body burns (through exercise and other physical activity). Eat more fruits and vegetables  · Eat a variety of fruit and vegetables every day. Dark green, deep orange, red, or yellow fruits and vegetables are especially good for you. Examples include spinach, carrots, peaches, and berries. · Keep carrots, celery, and other veggies handy for snacks. Buy fruit that is in season and store it where you can see it so that you will be tempted to eat it. · Cook dishes that have a lot of veggies in them, such as stir-fries and soups.   Limit saturated and trans fat  · Read food labels, and try to avoid saturated and trans fats. They increase your risk of heart disease. · Use olive or canola oil when you cook. · Bake, broil, grill, or steam foods instead of frying them. · Choose lean meats instead of high-fat meats such as hot dogs and sausages. Cut off all visible fat when you prepare meat. · Eat fish, skinless poultry, and meat alternatives such as soy products instead of high-fat meats. Soy products, such as tofu, may be especially good for your heart. · Choose low-fat or fat-free milk and dairy products. Eat foods high in fiber  · Eat a variety of grain products every day. Include whole-grain foods that have lots of fiber and nutrients. Examples of whole-grain foods include oats, whole wheat bread, and brown rice. · Buy whole-grain breads and cereals, instead of white bread or pastries. Limit salt and sodium  · Limit how much salt and sodium you eat to help lower your blood pressure. · Taste food before you salt it. Add only a little salt when you think you need it. With time, your taste buds will adjust to less salt. · Eat fewer snack items, fast foods, and other high-salt, processed foods. Check food labels for the amount of sodium in packaged foods. · Choose low-sodium versions of canned goods (such as soups, vegetables, and beans). Limit sugar  · Limit drinks and foods with added sugar. These include candy, desserts, and soda pop. Limit alcohol  · Limit alcohol to no more than 2 drinks a day for men and 1 drink a day for women. Too much alcohol can cause health problems. When should you call for help? Watch closely for changes in your health, and be sure to contact your doctor if:    · You would like help planning heart-healthy meals. Where can you learn more? Go to http://www.[employer].com/  Enter V137 in the search box to learn more about \"Heart-Healthy Diet: Care Instructions. \"  Current as of: August 22, 2019               Content Version: 12.6  © 4684-3700 [employer]. Care instructions adapted under license by [employer] (which disclaims liability or warranty for this information). If you have questions about a medical condition or this instruction, always ask your healthcare professional. [address] any warranty or liability for your use of this information. High Cholesterol: Care Instructions  Your Care Instructions     Cholesterol is a type of fat in your blood. It is needed for many body functions, such as making new cells. Cholesterol is made by your body. It also comes from food you eat. High cholesterol means that you have too much of the fat in your blood. This raises your risk of a heart attack and stroke. LDL and HDL are part of your total cholesterol. LDL is the \"bad\" cholesterol. High LDL can raise your risk for heart disease, heart attack, and stroke. HDL is the \"good\" cholesterol. It helps clear bad cholesterol from the body. High HDL is linked with a lower risk of heart disease, heart attack, and stroke. Your cholesterol levels help your doctor find out your risk for having a heart attack or stroke. You and your doctor can talk about whether you need to lower your risk and what treatment is best for you. A heart-healthy lifestyle along with medicines can help lower your cholesterol and your risk. The way you choose to lower your risk will depend on how high your risk is for heart attack and stroke. It will also depend on how you feel about taking medicines. Follow-up care is a key part of your treatment and safety. Be sure to make and go to all appointments, and call your doctor if you are having problems. It's also a good idea to know your test results and keep a list of the medicines you take. How can you care for yourself at home? · Eat a variety of foods every day.  Good choices include fruits, vegetables, whole grains (like oatmeal), dried beans and peas, nuts and seeds, soy products (like tofu), and fat-free or low-fat dairy products. · Replace butter, margarine, and hydrogenated or partially hydrogenated oils with olive and canola oils. (Canola oil margarine without trans fat is fine.)  · Replace red meat with fish, poultry, and soy protein (like tofu). · Limit processed and packaged foods like chips, crackers, and cookies. · Bake, broil, or steam foods. Don't aquino them. · Be physically active. Get at least 30 minutes of exercise on most days of the week. Walking is a good choice. You also may want to do other activities, such as running, swimming, cycling, or playing tennis or team sports. · Stay at a healthy weight or lose weight by making the changes in eating and physical activity listed above. Losing just a small amount of weight, even 5 to 10 pounds, can reduce your risk for having a heart attack or stroke. · Do not smoke. When should you call for help? Watch closely for changes in your health, and be sure to contact your doctor if:    · You need help making lifestyle changes. · You have questions about your medicine. Where can you learn more? Go to http://www.gray.com/  Enter K540 in the search box to learn more about \"High Cholesterol: Care Instructions. \"  Current as of: December 16, 2019               Content Version: 12.6  © 1125-7958 [employer]. Care instructions adapted under license by [employer] (which disclaims liability or warranty for this information). If you have questions about a medical condition or this instruction, always ask your healthcare professional. [place] [address] any warranty or liability for your use of this information. Former smoker

## 2022-03-18 PROBLEM — R76.8 POSITIVE ANA (ANTINUCLEAR ANTIBODY): Status: ACTIVE | Noted: 2017-09-17

## 2022-03-18 PROBLEM — E66.811 CLASS 1 OBESITY DUE TO EXCESS CALORIES WITH SERIOUS COMORBIDITY AND BODY MASS INDEX (BMI) OF 33.0 TO 33.9 IN ADULT: Status: ACTIVE | Noted: 2018-03-25

## 2022-03-18 PROBLEM — E66.09 CLASS 1 OBESITY DUE TO EXCESS CALORIES WITH SERIOUS COMORBIDITY AND BODY MASS INDEX (BMI) OF 33.0 TO 33.9 IN ADULT: Status: ACTIVE | Noted: 2018-03-25

## 2022-03-19 PROBLEM — M16.12 OSTEOARTHRITIS OF LEFT HIP: Status: ACTIVE | Noted: 2021-05-26

## 2022-03-19 PROBLEM — R22.31 NODULE OF FINGER OF RIGHT HAND: Status: ACTIVE | Noted: 2018-03-25

## 2022-03-19 PROBLEM — Z96.642 STATUS POST TOTAL REPLACEMENT OF LEFT HIP: Status: ACTIVE | Noted: 2021-08-29

## 2022-03-20 PROBLEM — K22.4 ESOPHAGEAL DYSMOTILITY: Status: ACTIVE | Noted: 2017-02-10

## 2022-03-21 NOTE — PROGRESS NOTES
HPI:   Ki Restrepo is a very pleasant [de-identified]y.o. year old male who presents today for a routine visit. He has a history of hypertension, hyperlipidemia, polymyalgia rheumatica, esophageal hypercontractility, and BPH. He completed the Moderna COVID-19 vaccine series and received the Moderna booster dose. He reports that he is doing relatively well. He reports that he presented to [facility] 2 weeks ago for a painful cyst on his right upper back. Incision and drainage was performed and he was placed on antibiotics. He reports that the epidermal cyst has completely healed and he is no longer experiencing pain. He does report increasing difficulty with bilateral knee pain and reports that he is receiving cortisone injections intermittently from  [facility]. He otherwise is without new complaints and feeling generally well. Summary of prior hospitalizations and medical history:  He has a history of polymyalgia rheumatica, diagnosed in 7/2016 when he was seen for bilateral shoulder pain of several months duration. He had been under the care of Dr. Jeffrey Walsh since 3/2016 when he presented with right wrist pain followed by the development of bilateral shoulder pain. He was treated with a Medrol dose pack and Tramadol and upon follow-up on 5/3/2016, reported some improvement in the bilateral shoulder and wrist discomfort, but pain persisted. Bilateral shoulder MRI's (5/11/2016) showed severe degenerative changes on the right at the acromioclavicular joint with moderate to severe or severe tendinopathy of the supraspinatus and infraspinatus; postoperative changes with tendinopathy of the supraspinatus, infraspinatus, and long head of the biceps was seen on the left. In 5/2016, lab data showed ESR 28, C-Reactive Protein 1.1, negative CRISTI and RF. He was treated with naproxen, tramadol and acetaminophen.  He was evaluated on 7/19/2016 for worsening bilateral shoulder pain with bilateral hand pain involving the wrists, MCP and PIP joints. Evaluation included ESR 48; C-reactive protein 1.8; CRISTI positive; anti-RNP 1.4; negative antibodies to Zimmerman, dsDNA, anti-chromatin, RF and anti-CCP. Bilateral hand x-rays showed scattered arthritic changes with scattered small erosions. He was referred to see Dr. Rupesh Tejada who diagnosed polymyalgia rheumatica and started him on prednisone. Since initiating prednisone, he had significant improvement with nearly complete resolution of his symptoms. He has regained full use of his shoulders and has no trouble reaching behind his back or pulling a shirt over his head. He denies any headache, jaw claudication, scalp tenderness, fever, or visual changes. He successfully weaned off prednisone in 3/2019, although has had short periods requiring restarting steroids for resurgence of symptoms. In 11/2016, he presented with a persistent non productive cough, sore throat, and hoarseness. He was referred to see Dr. Kimberly Treviño, and patient reports that a laryngoscopy was performed showing evidence of reflux. He was started on Nexium for two weeks with improvement in his cough, but he continued to have pain with swallowing. He was referred to see Dr. Peri Alba, and underwent upper endoscopy (1/5/2017) showing abnormal esophageal motility with spastic and non-propulsive appearing contractions in the mid to distal esophagus (tortuous); mucosa appeared normal ( random biopsies: negative); normal stomach and duodenum. He underwent a barium swallow (1/11/2017) showing a small persistent smoothly marginated filling defect at the piriform sinus suggestive of an extrinsic mass effect. He subsequently had a neck CT scan (1/13/2017) which showed the right piriform sinus appeared larger than the left, but the walls of both were normal without surrounding mass or infiltrating process; nonspecific lymphadenopathy involving level 1 and 2 nodes.  He underwent esophageal manometry, which showed a hypercontractile esophagus (\"Jackhammer\" esophagus). He was started on diltiazem 30 mg three times per day with resolution of symptoms. He discontinued diltiazem in 3/2017 without recurrence of symptoms. He has a history of hypertension, treated with lisinopril. He exercises mainly by walking, and denies any chest pain, shortness of breath at rest or with exertion, palpitations, lightheadedness, or edema. He also has hyperlipidemia, treated with rosuvastatin two days per week. He has a history of osteoarthritis, and underwent left total hip arthroplasty with Dr. Kathleen Kelley on 5/26/2021 at [facility]. He did well without complications. He states that he has completed physical therapy and is no longer experiencing any pain. He also has a history of BPH and underwent TURP in 2006. In 2/2011, he had laser vaporization of the prostate to relieve bladder outlet obstructive symptoms. In 3/2011 he had a laser incision performed on the bladder neck contracture. In 5/2012 and 8/2012, he underwent transurethral incision of the bladder neck contracture. Biopsy of the contracture (8/2012) was negative for malignancy. Since that time, he has had annual cystoscopic surveillance of the bladder neck contracture by Dr. Didier Lopez. In 8/2015, it was felt that no more surveillance was necessary as it had remained stable. He continues to take Proscar, but reports that he stopped Flomax on his own and began taking Super Beta prostate supplement instead. He being followed by Dr. Didier Lopez. He denies difficulty with stream, dysuria, or hematuria. He does report 2-3 episodes of nocturia. He had a screening colonoscopy in 4/2012 by Dr. Sonia Monae which was normal. Follow-up recommended for 10 years.  He had a repeat colonoscopy in 1/2020 by Dr. Aliya Spence for evaluation of rectal bleeding, showing mild diverticulosis in the descending/ sigmoid colon, medium grade/stage 2 internal hemorrhoids with evidence of recent bleeding, and three sessile 4-7 mm polyps in the hepatic flexure (pathology: tubular adenoma), transverse colon (pathology: tubular adenoma), and rectum (pathology: hyperplastic). No further screening felt to be needed. He denies any abdominal pain, nausea, vomiting, melena, hematochezia, or change in bowel movements. Past Medical History:   Diagnosis Date    Bladder neck contracture     BPH with obstruction/lower urinary tract symptoms     Essential hypertension with goal blood pressure less than 140/90     Family history of colon cancer     History of blood transfusion 2015    with shoulder replacement    Hyperlipidemia     Osteoarthritis     Osteoarthritis of left hip     PMR (polymyalgia rheumatica) ([facility] [address] 7/20/2016    Skin cancer     basal cell on face    Urinary retention      Past Surgical History:   Procedure Laterality Date    CYSTOSCOPY      ENDOSCOPY, COLON, DIAGNOSTIC      HC BERENICE LASER [id number]  3/2/11    Laser incision of bladder neck contracture    HX BACK SURGERY      lower    HX MOHS PROCEDURES  1/11    rt hip repl    HX ORTHOPAEDIC Right 2015    shoulder replacement    HX OTHER SURGICAL      tonsillectomy, back sx,     HX OTHER SURGICAL      basel cell removed from right ear lobe    HX SHOULDER ARTHROSCOPY Left 2010    HX TURP      Laser    MD LASER VAPORIZATION SURGERY PROSTATE, COMPLETE       Current Outpatient Medications   Medication Sig    rosuvastatin (CRESTOR) 5 mg tablet Take 1 Tablet by mouth daily.  finasteride (PROSCAR) 5 mg tablet TAKE 1 TABLET BY MOUTH ONCE DAILY    lisinopriL (PRINIVIL, ZESTRIL) 5 mg tablet TAKE 1 TABLET BY MOUTH DAILY    acetaminophen (TYLENOL) 500 mg tablet Take 2 Tablets by mouth every six (6) hours.  DOCUSATE SODIUM (STOOL SOFTENER PO) Take  by mouth daily.  multivitamin (ONE A DAY) tablet Take 1 Tab by mouth daily.  Cholecalciferol, Vitamin D3, (VITAMIN D3) 1,000 unit cap Take  by mouth daily.  GLUC/ERICA-MSM#1/C/CHARLY/RAMÓN/BOR (OSTEO BI-FLEX PO) Take 1 Tab by mouth daily.        No current facility-administered medications for this visit. Allergies and Intolerances:   No Known Allergies     Family History: His father  from CAD, and his brother has had CABG. His mother  from ovarian cancer. Family History   Problem Relation Age of Onset    Cancer Other     Heart Disease Other      Social History:   He  reports that he has never smoked. He has never used smokeless tobacco. He is  but has been living with his long time girlfriend Nahum Pascual) for 37 years. He has two step children. He is a retired , and worked on the [employer] force part-time. He retired in 2020. Social History     Substance and Sexual Activity   Alcohol Use Yes    Alcohol/week: 7.0 standard drinks    Types: 7 Glasses of wine per week    Comment: wine with dinner 3 times a week     Immunization History:  Immunization History   Administered Date(s) Administered    (RETIRED) Pneumococcal Vaccine (Unspecified Type) 2008    COVID-19, Moderna Booster, PF, 0.25mL Dose 2021    COVID-19, Moderna, Primary or Immunocompromised Series, MRNA, PF, 100mcg/0.5mL 2021, 2021    Influenza High Dose Vaccine PF 2013, 2015, 11/10/2016, 2017    Influenza Vaccine 2014, 2020    Influenza Vaccine (Tri) Adjuvanted (>65 Yrs FLUAD TRI [id number]) 2018, 2019    Influenza Vaccine Split 2012    Influenza, High-dose, Quadrivalent (>65 Yrs Fluzone High Dose Quad [id number]) 2021    Influenza, Quadrivalent, Adjuvanted (>65 Yrs FLUAD QUAD [id number]) 2020    Pneumococcal Conjugate (PCV-13) 2015    Pneumococcal Polysaccharide (PPSV-23) 2008    TDAP Vaccine 2011    Zoster 2012    Zoster Recombinant 10/10/2019, 2020       Review of Systems:   As above included in HPI.   Otherwise 11 point review of systems negative including constitutional, skin, HENT, eyes, respiratory, cardiovascular, gastrointestinal, genitourinary, musculoskeletal, endocrine, hematologic, allergy, and neurologic. Physical:   Visit Vitals  /68 (BP 1 Location: Left arm, BP Patient Position: Sitting)   Pulse 82   Temp 97.7 °F (36.5 °C) (Temporal)   Resp 16   Ht 6' 2\" (1.88 m)   Wt 251 lb (113.9 kg)   SpO2 97%   BMI 32.23 kg/m²        Patient appears in no apparent distress. Affect is appropriate. HEENT: PERRLA, anicteric, no JVD, adenopathy or thyromegaly. No carotid bruits or radiated murmur. Lungs: clear to auscultation, no wheezes, rhonchi, or rales. Heart: regular rate and rhythm. No murmur, rubs, gallops  Abdomen: soft, nontender, nondistended, normal bowel sounds, no hepatosplenomegaly or masses. Extremities: without edema. Review of Data:  Labs:  Hospital Outpatient Visit on 02/28/2022   Component Date Value Ref Range Status    WBC 02/28/2022 7.3  4.6 - 13.2 K/uL Final    RBC 02/28/2022 4.38  4.35 - 5.65 M/uL Final    HGB 02/28/2022 14.0  13.0 - 16.0 g/dL Final    HCT 02/28/2022 43.4  36.0 - 48.0 % Final    MCV 02/28/2022 99.1  78.0 - 100.0 FL Final    MCH 02/28/2022 32.0  24.0 - 34.0 PG Final    MCHC 02/28/2022 32.3  31.0 - 37.0 g/dL Final    RDW 02/28/2022 13.0  11.6 - 14.5 % Final    PLATELET 40/54/2405 375  135 - 420 K/uL Final    MPV 02/28/2022 9.7  9.2 - 11.8 FL Final    NRBC 02/28/2022 0.0  0  WBC Final    ABSOLUTE NRBC 02/28/2022 0.00  0.00 - 0.01 K/uL Final    NEUTROPHILS 02/28/2022 59  40 - 73 % Final    LYMPHOCYTES 02/28/2022 30  21 - 52 % Final    MONOCYTES 02/28/2022 9  3 - 10 % Final    EOSINOPHILS 02/28/2022 1  0 - 5 % Final    BASOPHILS 02/28/2022 0  0 - 2 % Final    IMMATURE GRANULOCYTES 02/28/2022 0  0.0 - 0.5 % Final    ABS. NEUTROPHILS 02/28/2022 4.3  1.8 - 8.0 K/UL Final    ABS. LYMPHOCYTES 02/28/2022 2.2  0.9 - 3.6 K/UL Final    ABS. MONOCYTES 02/28/2022 0.7  0.05 - 1.2 K/UL Final    ABS. EOSINOPHILS 02/28/2022 0.1  0.0 - 0.4 K/UL Final    ABS.  BASOPHILS 02/28/2022 0.0  0.0 - 0.1 K/UL Final    ABS. IMM. GRANS. 02/28/2022 0.0  0.00 - 0.04 K/UL Final    DF 02/28/2022 AUTOMATED    Final    LIPID PROFILE 02/28/2022        Final    Cholesterol, total 02/28/2022 184  <200 MG/DL Final    Triglyceride 02/28/2022 100  <150 MG/DL Final    HDL Cholesterol 02/28/2022 55  40 - 60 MG/DL Final    LDL, calculated 02/28/2022 109* 0 - 100 MG/DL Final    VLDL, calculated 02/28/2022 20  MG/DL Final    CHOL/HDL Ratio 02/28/2022 3.3  0 - 5.0   Final    Hemoglobin A1c 02/28/2022 5.4  4.2 - 5.6 % Final    Est. average glucose 02/28/2022 108  mg/dL Final    Magnesium 02/28/2022 2.2  1.6 - 2.6 mg/dL Final    Sodium 02/28/2022 139  136 - 145 mmol/L Final    Potassium 02/28/2022 4.3  3.5 - 5.5 mmol/L Final    Chloride 02/28/2022 106  100 - 111 mmol/L Final    CO2 02/28/2022 32  21 - 32 mmol/L Final    Anion gap 02/28/2022 1* 3.0 - 18 mmol/L Final    Glucose 02/28/2022 97  74 - 99 mg/dL Final    BUN 02/28/2022 12  7.0 - 18 MG/DL Final    Creatinine 02/28/2022 1.06  0.6 - 1.3 MG/DL Final    BUN/Creatinine ratio 02/28/2022 11* 12 - 20   Final    GFR est AA 02/28/2022 >60  >60 ml/min/1.73m2 Final    GFR est non-AA 02/28/2022 >60  >60 ml/min/1.73m2 Final    Calcium 02/28/2022 9.0  8.5 - 10.1 MG/DL Final    Bilirubin, total 02/28/2022 0.4  0.2 - 1.0 MG/DL Final    ALT (SGPT) 02/28/2022 26  16 - 61 U/L Final    AST (SGOT) 02/28/2022 14  10 - 38 U/L Final    Alk.  phosphatase 02/28/2022 68  45 - 117 U/L Final    Protein, total 02/28/2022 6.8  6.4 - 8.2 g/dL Final    Albumin 02/28/2022 3.8  3.4 - 5.0 g/dL Final    Globulin 02/28/2022 3.0  2.0 - 4.0 g/dL Final    A-G Ratio 02/28/2022 1.3  0.8 - 1.7   Final    Color 02/28/2022 YELLOW    Final    Appearance 02/28/2022 CLEAR    Final    Specific gravity 02/28/2022 1.017  1.005 - 1.030   Final    pH (UA) 02/28/2022 6.0  5.0 - 8.0   Final    Protein 02/28/2022 Negative  NEG mg/dL Final    Glucose 02/28/2022 Negative  NEG mg/dL Final  Ketone 02/28/2022 Negative  NEG mg/dL Final    Bilirubin 02/28/2022 Negative  NEG   Final    Blood 02/28/2022 Negative  NEG   Final    Urobilinogen 02/28/2022 0.2  0.2 - 1.0 EU/dL Final    Nitrites 02/28/2022 Negative  NEG   Final    Leukocyte Esterase 02/28/2022 Negative  NEG   Final           Health Maintenance:  Screening:    Colorectal: colonoscopy (1/2020) tubular adenomas. Dr. Db Damian. No further screening needed. Depression: none   DM (HbA1c/FPG): FPG 97/ HbA1c 5.4 (3/2022)   Hepatitis C: negative (7/2016)   Falls: none   DEXA: within normal limits (5/2017)    PSA/HOLA: PSA 0.5 (11/2019); HOLA per Dr. Grace Dudley   Glaucoma: regular eye exams with Dr. Kamran Bro (last 12/2020)    Smoking: none   Vitamin D: 48.6 (8/2021)   Medicare Wellness: 8/26/2021      Impression:  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    Hyperlipidemia E78.5    Vitamin D insufficiency E55.9    Hearing loss H91.90    Essential hypertension I10    Primary osteoarthritis involving multiple joints M89.49    PMR (polymyalgia rheumatica) (HCC) M35.3    Hoarseness R49.0    Esophageal dysmotility K22.4    Positive CRISTI (antinuclear antibody)/ RNP antibody R76.8    Nodule of finger of right hand R22.31    Class 1 obesity due to excess calories with serious comorbidity and body mass index (BMI) of 33.0 to 33.9 in adult E66.09, Z68.33    Osteoarthritis of left hip M16.12    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       Plan:  1. Hypertension. Blood pressure remains well controlled on lisinopril 5 mg daily. Renal function remains normal with creatinine 1.06/ eGFR >60. Encouraged to increase fluid intake and avoid NSAIDS. Will continue to follow closely. 2. Hyperlipidemia. On low intensity dose rosuvastatin, taking 5 mg three days per week, with  and HDL 55, indicative of only fair control. advised to increase dose of rosuvastatin to 5 mg daily and will reassess lipid panel at next visit. Emphasized importance of lifestyle modifications, including diet, exercise, and weight loss. 3. Polymyalgia rheumatica. Patient had significantly improved with steroid treatment. Had successfully tapered off prednisone in 3/2019 and released from care by Dr. Mina Olvera. However, reported worsening shoulder symptoms with bilateral pain in 5/2020, and evaluated by Dr. Mina Olvera. Treated with two courses of Sterapred dose packs with some improvement. Restarted on prednisone 5 mg daily and advised by Dr. Mina Olvera to continue at this dose until after his hip surgery. Successfully tapered following surgery without increase in symptoms. Known increased risk (10%) of concurrent giant cell arteritis with PMR, but no evidence of GCA. Also with positive CRISTI/ positive RNP Ab, but no evidence of inflammation elsewhere. Urinalysis continues to be negative for proteinuria. Continue to follow closely. Given intermittent chronic steroid use, bone density scan in 5/2017 normal. Continuing to follow with Dr. Mina Olvera. 4.  Osteoarthritis, left hip s/p left KARLENE. Underwent left total hip arthroplasty with Dr. Spike Day on 8/82/4367 without complications. Reports that he has done well and has regained significant movement without pain. Completed home physical therapy. Now with difficulty due to bilateral knee pain. Reports he is receiving cortisone injections from Dr. Spike Day. 5. BPH with bladder neck contracture. Appears to be asymptomatic on current treatment with Proscar. Patient decided to discontinue Flomax on his own, but reported worsening nocturia and started on finasteride by Dr. Grace Dudley. PVR assessed in 6/2020 and shown to be 38 cc. Continuing to follow with Jenny Nance annually. 6. Odynophagia. Secondary to esophageal dysmotility.  Responded to diltiazem, and now successfully stopped taking it without recurrence of symptoms. Will continue to follow. Hoarseness now at baseline. Reportedly using Flonase as needed. 7. Epidermal cyst, s/p incision and drainage. Patient reports painful epidermal cyst on his right upper back. Presented to [facility] and I&D performed. He was also treated with antibiotics and reports complete resolution today. 8. Abnormal fasting glucose. FPG normal today at 97 and HbA1c also normal at 5.4. Will continue to monitor. 9. History of basal cell carcinoma, face. Underwent extensive wide excision in 9/2020 by Dr. Jaz Chicas and required skin graft. Well healed today. Continuing surveillance exams with Dr. Shanon Byers. 10. Obesity. Emphasized importance of lifestyle modifications, including heart healthy diet, regular exercise, and weight loss. Will readdress next visit. 11. Health maintenance. Completed Moderna COVID 19 vaccine series and received the Moderna booster dose. Completed 2/2 doses of Shingrix vaccine. Due for Tdap and provided with prescription today. Other immunizations up to date. Colonoscopy completed and no further screening needed. Continue regular eye exams with Dr. Donna Dutton. Vitamin D level has been normal on maintenance dose supplement. Discussed recommendations regarding aspirin use and primary prevention, but patient wishing to continue. Medicare wellness visit up-to-date. Patient understands recommendations and agrees with plan. Follow-up in 6 months.

## 2022-03-24 PROBLEM — E66.09 CLASS 1 OBESITY DUE TO EXCESS CALORIES WITH SERIOUS COMORBIDITY IN ADULT: Status: ACTIVE | Noted: 2018-03-25

## 2022-03-24 PROBLEM — E66.811 CLASS 1 OBESITY DUE TO EXCESS CALORIES WITH SERIOUS COMORBIDITY IN ADULT: Status: ACTIVE | Noted: 2018-03-25

## 2022-04-25 DIAGNOSIS — M25.562 LEFT KNEE PAIN, UNSPECIFIED CHRONICITY: ICD-10-CM

## 2022-04-25 DIAGNOSIS — M25.561 RIGHT KNEE PAIN, UNSPECIFIED CHRONICITY: Primary | ICD-10-CM

## 2022-04-28 DIAGNOSIS — M25.561 RIGHT KNEE PAIN, UNSPECIFIED CHRONICITY: ICD-10-CM

## 2022-04-28 DIAGNOSIS — M25.562 LEFT KNEE PAIN, UNSPECIFIED CHRONICITY: ICD-10-CM

## 2022-04-28 PROCEDURE — 73562 X-RAY EXAM OF KNEE 3: CPT | Performed by: ORTHOPAEDIC SURGERY

## 2022-04-29 ENCOUNTER — OFFICE VISIT (OUTPATIENT)
Dept: ORTHOPEDIC SURGERY | Age: 82
End: 2022-04-29
Payer: MEDICARE

## 2022-04-29 VITALS — WEIGHT: 250 LBS | BODY MASS INDEX: 32.08 KG/M2 | HEIGHT: 74 IN

## 2022-04-29 DIAGNOSIS — M17.0 OSTEOARTHRITIS OF BOTH KNEES, UNSPECIFIED OSTEOARTHRITIS TYPE: ICD-10-CM

## 2022-04-29 DIAGNOSIS — M25.561 PAIN IN BOTH KNEES, UNSPECIFIED CHRONICITY: Primary | ICD-10-CM

## 2022-04-29 DIAGNOSIS — M25.561 PAIN IN BOTH KNEES, UNSPECIFIED CHRONICITY: ICD-10-CM

## 2022-04-29 DIAGNOSIS — M25.562 PAIN IN BOTH KNEES, UNSPECIFIED CHRONICITY: ICD-10-CM

## 2022-04-29 DIAGNOSIS — M25.562 PAIN IN BOTH KNEES, UNSPECIFIED CHRONICITY: Primary | ICD-10-CM

## 2022-04-29 PROCEDURE — 99024 POSTOP FOLLOW-UP VISIT: CPT | Performed by: ORTHOPAEDIC SURGERY

## 2022-04-29 NOTE — LETTER
5/4/2022    Patient: Nav Curry   YOB: 1940   Date of Visit: 4/29/2022     Kateryna Riggins, [address]  [facility] [address]  [facility]    Dear Kateryna Riggins MD,      Thank you for referring Mr. Dana Abarca to [address] [place] [address] for evaluation. My notes for this consultation are attached. If you have questions, please do not hesitate to call me. I look forward to following your patient along with you.       Sincerely,    Renny Mckeon MD

## 2022-04-29 NOTE — PATIENT INSTRUCTIONS
Knee Arthritis: Care Instructions  Your Care Instructions     Knee arthritis is a breakdown of the cartilage that cushions your knee joint. When the cartilage wears down, your bones rub against each other. This causes pain and stiffness. Knee arthritis tends to get worse with time. Treatment for knee arthritis involves reducing pain, making the leg muscles stronger, and staying at a healthy body weight. The treatment usually does not improve the health of the cartilage, but it can reduce pain and improve how well your knee works. You can take simple measures to protect your knee joints, ease your pain, and help you stay active. Follow-up care is a key part of your treatment and safety. Be sure to make and go to all appointments, and call your doctor if you are having problems. It's also a good idea to know your test results and keep a list of the medicines you take. How can you care for yourself at home? · Know that knee arthritis will cause more pain on some days than on others. · Stay at a healthy weight. Lose weight if you are overweight. When you stand up, the pressure on your knees from every pound of body weight is multiplied four times. So if you lose 10 pounds, you will reduce the pressure on your knees by 40 pounds. · Talk to your doctor or physical therapist about exercises that will help ease joint pain. ? Stretch to help prevent stiffness and to prevent injury before you exercise. You may enjoy gentle forms of yoga to help keep your knee joints and muscles flexible. ? Walk instead of jog.  ? Ride a bike. This makes your thigh muscles stronger and takes pressure off your knee. ? Wear well-fitting and comfortable shoes. ? Exercise in chest-deep water. This can help you exercise longer with less pain. ? Avoid exercises that include squatting or kneeling. They can put a lot of strain on your knees.   ? Talk to your doctor to make sure that the exercise you do is not making the arthritis worse.  · Do not sit for long periods of time. Try to walk once in a while to keep your knee from getting stiff. · Ask your doctor or physical therapist whether shoe inserts may reduce your arthritis pain. · If you can afford it, get new athletic shoes at least every year. This can help reduce the strain on your knees. · Use a device to help you do everyday activities. ? A cane or walking stick can help you keep your balance when you walk. Hold the cane or walking stick in the hand opposite the painful knee. ? If you feel like you may fall when you walk, try using crutches or a front-wheeled walker. These can prevent falls that could cause more damage to your knee. ? A knee brace may help keep your knee stable and prevent pain. ? You also can use other things to make life easier, such as a higher toilet seat and handrails in the bathtub or shower. · Take pain medicines exactly as directed. ? Do not wait until you are in severe pain. You will get better results if you take it sooner. ? If you are not taking a prescription pain medicine, take an over-the-counter medicine such as acetaminophen (Tylenol), ibuprofen (Advil, Motrin), or naproxen (Aleve). Read and follow all instructions on the label. ? Do not take two or more pain medicines at the same time unless the doctor told you to. Many pain medicines have acetaminophen, which is Tylenol. Too much acetaminophen (Tylenol) can be harmful. ? Tell your doctor if you take a blood thinner, have diabetes, or have allergies to shellfish. · Ask your doctor if you might benefit from a shot of steroid medicine into your knee. This may provide pain relief for several months. · Many people take the supplements glucosamine and chondroitin for osteoarthritis. Some people feel they help, but the medical research does not show that they work. Talk to your doctor before you take these supplements. When should you call for help?    Call your doctor now or seek immediate medical care if:    · You have sudden swelling, warmth, or pain in your knee.     · You have knee pain and a fever or rash.     · You have such bad pain that you cannot use your knee. Watch closely for changes in your health, and be sure to contact your doctor if you have any problems. Where can you learn more? Go to http://www.gray.com/  Enter W187 in the search box to learn more about \"Knee Arthritis: Care Instructions. \"  Current as of: December 20, 2021               Content Version: 13.2  © 2999-1063 [employer]. Care instructions adapted under license by [employer] (which disclaims liability or warranty for this information). If you have questions about a medical condition or this instruction, always ask your healthcare professional. [address] any warranty or liability for your use of this information.

## 2022-04-29 NOTE — PROGRESS NOTES
Name: Sam Pena    : 1940     Service Dept: [address] and Sports Medicine    Chief Complaint   Patient presents with    Knee Pain        Visit Vitals  Ht 6' 2\" (1.88 m)   Wt 250 lb (113.4 kg)   BMI 32.10 kg/m²        No Known Allergies     Current Outpatient Medications   Medication Sig Dispense Refill    rosuvastatin (CRESTOR) 5 mg tablet Take 1 Tablet by mouth daily. 90 Tablet 3    finasteride (PROSCAR) 5 mg tablet TAKE 1 TABLET BY MOUTH ONCE DAILY 90 Tablet 3    lisinopriL (PRINIVIL, ZESTRIL) 5 mg tablet TAKE 1 TABLET BY MOUTH DAILY 90 Tablet 3    acetaminophen (TYLENOL) 500 mg tablet Take 2 Tablets by mouth every six (6) hours.  DOCUSATE SODIUM (STOOL SOFTENER PO) Take  by mouth daily.  multivitamin (ONE A DAY) tablet Take 1 Tab by mouth daily.  Cholecalciferol, Vitamin D3, (VITAMIN D3) 1,000 unit cap Take  by mouth daily.  GLUC/ERICA-MSM#1/C/CHARLY/RAMÓN/BOR (OSTEO BI-FLEX PO) Take 1 Tab by mouth daily.           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    Hyperlipidemia E78.5    Vitamin D insufficiency E55.9    Hearing loss H91.90    Essential hypertension I10    Primary osteoarthritis involving multiple joints M15.9    PMR (polymyalgia rheumatica) ([facility]) M35.3    Hoarseness R49.0    Esophageal dysmotility K22.4    Positive CRISTI (antinuclear antibody)/ RNP antibody R76.8    Nodule of finger of right hand R22.31    Class 1 obesity due to excess calories with serious comorbidity in adult E66.09    Osteoarthritis of left hip M16.12    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      Family History   Problem Relation Age of Onset    Cancer Other     Heart Disease Other       Social History     Socioeconomic History    Marital status: SINGLE   Tobacco Use    Smoking status: Never Smoker    Smokeless tobacco: Never Used Substance and Sexual Activity    Alcohol use: Yes     Alcohol/week: 7.0 standard drinks     Types: 7 Glasses of wine per week     Comment: wine with dinner 3 times a week    Drug use: No    Sexual activity: Yes     Partners: Female      Past Surgical History:   Procedure Laterality Date    CYSTOSCOPY      ENDOSCOPY, COLON, DIAGNOSTIC      HC BERENICE LASER [id number]  3/2/11    Laser incision of bladder neck contracture    HX BACK SURGERY      lower    HX MOHS PROCEDURES  1/11    rt hip repl    HX ORTHOPAEDIC Right 2015    shoulder replacement    HX OTHER SURGICAL      tonsillectomy, back sx,     HX OTHER SURGICAL      basel cell removed from right ear lobe    HX SHOULDER ARTHROSCOPY Left 2010    HX TURP      Laser    ND LASER VAPORIZATION SURGERY PROSTATE, COMPLETE        Past Medical History:   Diagnosis Date    Bladder neck contracture     BPH with obstruction/lower urinary tract symptoms     Essential hypertension with goal blood pressure less than 140/90     Family history of colon cancer     History of blood transfusion 2015    with shoulder replacement    Hyperlipidemia     Osteoarthritis     Osteoarthritis of left hip     PMR (polymyalgia rheumatica) ([facility] [address] 7/20/2016    Skin cancer     basal cell on face    Urinary retention         I have reviewed and agree with PFSH and ROS and intake form in chart and the record furthermore I have reviewed prior medical record(s) regarding this patients care during this appointment. Review of Systems:   Patient is a pleasant appearing individual, appropriately dressed, well hydrated, well nourished, who is alert, appropriately oriented for age, and in no acute distress with a normal gait and normal affect who does not appear to be in any significant pain.    Physical Exam:  Left Knee -Decrease range of motion with flexion, Knee arc of greater than 50 degrees, Some crepitation, Grossly neurovascularly intact, Good cap refill, No skin lesion, Moderate swelling, No gross instability, Some quadriceps weakness Kellgren and Hussein at least grade 3    Right Knee -Decrease range of motion with flexion, Some crepitation, Grossly neurovascularly intact, Good cap refill, No skin lesion, Moderate swelling, No gross instability, Some quadriceps weaknessKellgren and Hussein at least grade 3   Encounter Diagnoses     ICD-10-CM ICD-9-CM   1. Pain in both knees, unspecified chronicity  M25.561 719.46    M25.562    2. Osteoarthritis of both knees, unspecified osteoarthritis type  M17.0 715.96       HPI:  The patient is complaining about right knee pain. He has been having it for a while. Bilateral knee pain progressively getting worse. Continues to have difficulty. Failed conservative treatment. No history of blood clots and does not take any blood thinners. X-rays done at [facility] are positive for severe OA bilateral knees. Assessment/Plan:  Plan will be for right total knee replacement on October 6th. Left total knee replacement December 5th. General medical clearance, outpatient surgery versus inpatient and go from there. As part of continued conservative pain management options the patient was advised to utilize Tylenol or OTC NSAIDS as long as it is not medically contraindicated. Return to Office: Follow-up and Dispositions    · Return for schedule for surgery. Scribed by Ginger English LPN as dictated by [facility] GEORGE Smith MD.  Documentation True and Accepted Joe Smith MD

## 2022-05-15 LAB — SARS-COV-2, NAA: POSITIVE

## 2022-07-12 ENCOUNTER — TRANSCRIBE ORDER (OUTPATIENT)
Dept: SCHEDULING | Age: 82
End: 2022-07-12

## 2022-07-12 DIAGNOSIS — M54.2 NECK PAIN: Primary | ICD-10-CM

## 2022-07-15 ENCOUNTER — HOSPITAL ENCOUNTER (OUTPATIENT)
Dept: MRI IMAGING | Age: 82
Discharge: HOME | End: 2022-07-15
Attending: INTERNAL MEDICINE
Payer: MEDICARE

## 2022-07-15 DIAGNOSIS — M54.2 NECK PAIN: ICD-10-CM

## 2022-07-15 PROCEDURE — 72141 MRI NECK SPINE W/O DYE: CPT

## 2022-08-11 DIAGNOSIS — M17.0 OSTEOARTHRITIS OF BOTH KNEES, UNSPECIFIED OSTEOARTHRITIS TYPE: ICD-10-CM

## 2022-08-11 DIAGNOSIS — M25.561 PAIN IN BOTH KNEES, UNSPECIFIED CHRONICITY: ICD-10-CM

## 2022-08-11 DIAGNOSIS — M25.562 PAIN IN BOTH KNEES, UNSPECIFIED CHRONICITY: ICD-10-CM

## 2022-09-15 ENCOUNTER — APPOINTMENT (OUTPATIENT)
Dept: INTERNAL MEDICINE CLINIC | Age: 82
End: 2022-09-15

## 2022-09-15 ENCOUNTER — HOSPITAL ENCOUNTER (OUTPATIENT)
Dept: LAB | Age: 82
Discharge: HOME | End: 2022-09-15
Payer: MEDICARE

## 2022-09-15 DIAGNOSIS — M35.3 PMR (POLYMYALGIA RHEUMATICA) (HCC): ICD-10-CM

## 2022-09-15 DIAGNOSIS — E55.9 VITAMIN D INSUFFICIENCY: ICD-10-CM

## 2022-09-15 DIAGNOSIS — I10 ESSENTIAL HYPERTENSION: ICD-10-CM

## 2022-09-15 DIAGNOSIS — R73.01 ABNORMAL FASTING GLUCOSE: ICD-10-CM

## 2022-09-15 DIAGNOSIS — E78.5 HYPERLIPIDEMIA, UNSPECIFIED HYPERLIPIDEMIA TYPE: ICD-10-CM

## 2022-09-15 LAB
25(OH)D3 SERPL-MCNC: 54.9 NG/ML (ref 30–100)
ALBUMIN SERPL-MCNC: 4.1 G/DL (ref 3.4–5)
ALBUMIN/GLOB SERPL: 1.5 {RATIO} (ref 0.8–1.7)
ALP SERPL-CCNC: 68 U/L (ref 45–117)
ALT SERPL-CCNC: 25 U/L (ref 16–61)
ANION GAP SERPL CALC-SCNC: 4 MMOL/L (ref 3–18)
APPEARANCE UR: CLEAR
AST SERPL-CCNC: 10 U/L (ref 10–38)
BASOPHILS # BLD: 0 K/UL (ref 0–0.1)
BASOPHILS NFR BLD: 0 % (ref 0–2)
BILIRUB SERPL-MCNC: 0.5 MG/DL (ref 0.2–1)
BILIRUB UR QL: NEGATIVE
BUN SERPL-MCNC: 13 MG/DL (ref 7–18)
BUN/CREAT SERPL: 12 (ref 12–20)
CALCIUM SERPL-MCNC: 8.8 MG/DL (ref 8.5–10.1)
CHLORIDE SERPL-SCNC: 106 MMOL/L (ref 100–111)
CHOLEST SERPL-MCNC: 147 MG/DL
CO2 SERPL-SCNC: 29 MMOL/L (ref 21–32)
COLOR UR: YELLOW
CREAT SERPL-MCNC: 1.07 MG/DL (ref 0.6–1.3)
DIFFERENTIAL METHOD BLD: ABNORMAL
EOSINOPHIL # BLD: 0.1 K/UL (ref 0–0.4)
EOSINOPHIL NFR BLD: 1 % (ref 0–5)
ERYTHROCYTE [DISTWIDTH] IN BLOOD BY AUTOMATED COUNT: 12.7 % (ref 11.6–14.5)
EST. AVERAGE GLUCOSE BLD GHB EST-MCNC: 105 MG/DL
GLOBULIN SER CALC-MCNC: 2.8 G/DL (ref 2–4)
GLUCOSE SERPL-MCNC: 93 MG/DL (ref 74–99)
GLUCOSE UR STRIP.AUTO-MCNC: NEGATIVE MG/DL
HBA1C MFR BLD: 5.3 % (ref 4.2–5.6)
HCT VFR BLD AUTO: 42.5 % (ref 36–48)
HDLC SERPL-MCNC: 44 MG/DL (ref 40–60)
HDLC SERPL: 3.3 {RATIO} (ref 0–5)
HGB BLD-MCNC: 13.8 G/DL (ref 13–16)
HGB UR QL STRIP: NEGATIVE
IMM GRANULOCYTES # BLD AUTO: 0 K/UL (ref 0–0.04)
IMM GRANULOCYTES NFR BLD AUTO: 0 % (ref 0–0.5)
KETONES UR QL STRIP.AUTO: NEGATIVE MG/DL
LDLC SERPL CALC-MCNC: 78.8 MG/DL (ref 0–100)
LEUKOCYTE ESTERASE UR QL STRIP.AUTO: NEGATIVE
LIPID PROFILE,FLP: NORMAL
LYMPHOCYTES # BLD: 1.7 K/UL (ref 0.9–3.6)
LYMPHOCYTES NFR BLD: 29 % (ref 21–52)
MAGNESIUM SERPL-MCNC: 2.3 MG/DL (ref 1.6–2.6)
MCH RBC QN AUTO: 31.9 PG (ref 24–34)
MCHC RBC AUTO-ENTMCNC: 32.5 G/DL (ref 31–37)
MCV RBC AUTO: 98.2 FL (ref 78–100)
MONOCYTES # BLD: 0.5 K/UL (ref 0.05–1.2)
MONOCYTES NFR BLD: 9 % (ref 3–10)
NEUTS SEG # BLD: 3.6 K/UL (ref 1.8–8)
NEUTS SEG NFR BLD: 61 % (ref 40–73)
NITRITE UR QL STRIP.AUTO: NEGATIVE
NRBC # BLD: 0 K/UL (ref 0–0.01)
NRBC BLD-RTO: 0 PER 100 WBC
PH UR STRIP: 5.5 [PH] (ref 5–8)
PLATELET # BLD AUTO: 253 K/UL (ref 135–420)
PMV BLD AUTO: 9.8 FL (ref 9.2–11.8)
POTASSIUM SERPL-SCNC: 4.7 MMOL/L (ref 3.5–5.5)
PROT SERPL-MCNC: 6.9 G/DL (ref 6.4–8.2)
PROT UR STRIP-MCNC: NEGATIVE MG/DL
RBC # BLD AUTO: 4.33 M/UL (ref 4.35–5.65)
SODIUM SERPL-SCNC: 139 MMOL/L (ref 136–145)
SP GR UR REFRACTOMETRY: 1.02 (ref 1–1.03)
TRIGL SERPL-MCNC: 121 MG/DL (ref ?–150)
TSH SERPL DL<=0.05 MIU/L-ACNC: 1.94 UIU/ML (ref 0.36–3.74)
UROBILINOGEN UR QL STRIP.AUTO: 0.2 EU/DL (ref 0.2–1)
VLDLC SERPL CALC-MCNC: 24.2 MG/DL
WBC # BLD AUTO: 6 K/UL (ref 4.6–13.2)

## 2022-09-15 PROCEDURE — 83735 ASSAY OF MAGNESIUM: CPT

## 2022-09-15 PROCEDURE — 84443 ASSAY THYROID STIM HORMONE: CPT

## 2022-09-15 PROCEDURE — 80053 COMPREHEN METABOLIC PANEL: CPT

## 2022-09-15 PROCEDURE — 36415 COLL VENOUS BLD VENIPUNCTURE: CPT

## 2022-09-15 PROCEDURE — 85025 COMPLETE CBC W/AUTO DIFF WBC: CPT

## 2022-09-15 PROCEDURE — 83036 HEMOGLOBIN GLYCOSYLATED A1C: CPT

## 2022-09-15 PROCEDURE — 82306 VITAMIN D 25 HYDROXY: CPT

## 2022-09-15 PROCEDURE — 80061 LIPID PANEL: CPT

## 2022-09-15 PROCEDURE — 81003 URINALYSIS AUTO W/O SCOPE: CPT

## 2022-09-22 ENCOUNTER — OFFICE VISIT (OUTPATIENT)
Dept: INTERNAL MEDICINE CLINIC | Age: 82
End: 2022-09-22
Payer: MEDICARE

## 2022-09-22 VITALS
HEART RATE: 70 BPM | SYSTOLIC BLOOD PRESSURE: 106 MMHG | BODY MASS INDEX: 32.08 KG/M2 | WEIGHT: 250 LBS | OXYGEN SATURATION: 97 % | DIASTOLIC BLOOD PRESSURE: 66 MMHG | TEMPERATURE: 97 F | HEIGHT: 74 IN | RESPIRATION RATE: 16 BRPM

## 2022-09-22 DIAGNOSIS — M35.3 PMR (POLYMYALGIA RHEUMATICA) (HCC): ICD-10-CM

## 2022-09-22 DIAGNOSIS — M54.50 CHRONIC BILATERAL LOW BACK PAIN WITHOUT SCIATICA: ICD-10-CM

## 2022-09-22 DIAGNOSIS — N40.1 BPH WITH OBSTRUCTION/LOWER URINARY TRACT SYMPTOMS: ICD-10-CM

## 2022-09-22 DIAGNOSIS — I10 ESSENTIAL HYPERTENSION: Primary | ICD-10-CM

## 2022-09-22 DIAGNOSIS — R76.8 POSITIVE ANA (ANTINUCLEAR ANTIBODY): ICD-10-CM

## 2022-09-22 DIAGNOSIS — N13.8 BPH WITH OBSTRUCTION/LOWER URINARY TRACT SYMPTOMS: ICD-10-CM

## 2022-09-22 DIAGNOSIS — Z23 NEEDS FLU SHOT: ICD-10-CM

## 2022-09-22 DIAGNOSIS — E78.5 HYPERLIPIDEMIA, UNSPECIFIED HYPERLIPIDEMIA TYPE: ICD-10-CM

## 2022-09-22 DIAGNOSIS — G89.29 CHRONIC BILATERAL LOW BACK PAIN WITHOUT SCIATICA: ICD-10-CM

## 2022-09-22 DIAGNOSIS — E66.09 CLASS 1 OBESITY DUE TO EXCESS CALORIES WITH SERIOUS COMORBIDITY AND BODY MASS INDEX (BMI) OF 32.0 TO 32.9 IN ADULT: ICD-10-CM

## 2022-09-22 DIAGNOSIS — Z00.00 MEDICARE ANNUAL WELLNESS VISIT, SUBSEQUENT: ICD-10-CM

## 2022-09-22 DIAGNOSIS — M54.2 NECK PAIN: ICD-10-CM

## 2022-09-22 DIAGNOSIS — M47.812 CERVICAL SPONDYLOSIS: ICD-10-CM

## 2022-09-22 DIAGNOSIS — Z13.31 SCREENING FOR DEPRESSION: ICD-10-CM

## 2022-09-22 DIAGNOSIS — Z71.89 ADVANCED DIRECTIVES, COUNSELING/DISCUSSION: ICD-10-CM

## 2022-09-22 DIAGNOSIS — M17.0 PRIMARY OSTEOARTHRITIS OF BOTH KNEES: ICD-10-CM

## 2022-09-22 DIAGNOSIS — M47.816 LUMBAR SPONDYLOSIS: ICD-10-CM

## 2022-09-22 PROCEDURE — G8536 NO DOC ELDER MAL SCRN: HCPCS | Performed by: INTERNAL MEDICINE

## 2022-09-22 PROCEDURE — G8427 DOCREV CUR MEDS BY ELIG CLIN: HCPCS | Performed by: INTERNAL MEDICINE

## 2022-09-22 PROCEDURE — G8510 SCR DEP NEG, NO PLAN REQD: HCPCS | Performed by: INTERNAL MEDICINE

## 2022-09-22 PROCEDURE — 1123F ACP DISCUSS/DSCN MKR DOCD: CPT | Performed by: INTERNAL MEDICINE

## 2022-09-22 PROCEDURE — G8754 DIAS BP LESS 90: HCPCS | Performed by: INTERNAL MEDICINE

## 2022-09-22 PROCEDURE — G8752 SYS BP LESS 140: HCPCS | Performed by: INTERNAL MEDICINE

## 2022-09-22 PROCEDURE — 90471 IMMUNIZATION ADMIN: CPT | Performed by: INTERNAL MEDICINE

## 2022-09-22 PROCEDURE — G0463 HOSPITAL OUTPT CLINIC VISIT: HCPCS | Performed by: INTERNAL MEDICINE

## 2022-09-22 PROCEDURE — 99497 ADVNCD CARE PLAN 30 MIN: CPT | Performed by: INTERNAL MEDICINE

## 2022-09-22 PROCEDURE — G0439 PPPS, SUBSEQ VISIT: HCPCS | Performed by: INTERNAL MEDICINE

## 2022-09-22 PROCEDURE — G8417 CALC BMI ABV UP PARAM F/U: HCPCS | Performed by: INTERNAL MEDICINE

## 2022-09-22 PROCEDURE — 99214 OFFICE O/P EST MOD 30 MIN: CPT | Performed by: INTERNAL MEDICINE

## 2022-09-22 PROCEDURE — 1101F PT FALLS ASSESS-DOCD LE1/YR: CPT | Performed by: INTERNAL MEDICINE

## 2022-09-22 RX ORDER — METHOCARBAMOL 500 MG/1
500 TABLET, FILM COATED ORAL 4 TIMES DAILY
COMMUNITY
Start: 2022-09-01

## 2022-09-22 NOTE — PROGRESS NOTES
[facility] 1940 [facility] who presents for routine immunizations. Patient denies any symptoms , reactions or allergies that would exclude them from being immunized today. Risks and adverse reactions were discussed and the VIS was given to them. All questions were addressed. Order placed for HD Influenza,  per Verbal Order from  with read back. Patient was observed for 15 min post injection. There were no reactions observed. Rogerio Grande LPN  1. \"Have you been to the ER, urgent care clinic since your last visit? Hospitalized since your last visit? \" No    2. \"Have you seen or consulted any other health care providers outside of the [address] [place] [address] since your last visit? \" No     3. For patients aged 39-70: Has the patient had a colonoscopy / FIT/ Cologuard? NA - based on age      If the patient is female:    4. For patients aged 41-77: Has the patient had a mammogram within the past 2 years? NA - based on age or sex      11. For patients aged 21-65: Has the patient had a pap smear?  NA - based on age or sex

## 2022-09-22 NOTE — PROGRESS NOTES
This is the Subsequent Medicare Annual Wellness Exam, performed 12 months or more after the Initial AWV or the last Subsequent AWV    I have reviewed the patient's medical history in detail and updated the computerized patient record. Assessment/Plan   Education and counseling provided:  Are appropriate based on today's review and evaluation  End-of-Life planning (with patient's consent)  Influenza Vaccine  Cardiovascular screening blood test  Screening for glaucoma  Diabetes screening test  Updated COVID-19 booster dose    1. Essential hypertension  -     CBC WITH AUTOMATED DIFF; Future  -     MAGNESIUM; Future  -     METABOLIC PANEL, COMPREHENSIVE; Future  2. Hyperlipidemia, unspecified hyperlipidemia type  -     LIPID PANEL; Future  -     MAGNESIUM; Future  -     METABOLIC PANEL, COMPREHENSIVE; Future  3. Primary osteoarthritis of both knees  4. Cervical spondylosis  5. Neck pain  6. Lumbar spondylosis  7. Chronic bilateral low back pain without sciatica  8. BPH with obstruction/lower urinary tract symptoms  9. PMR (polymyalgia rheumatica) (HCC)  10. Positive CRISTI (antinuclear antibody)/ RNP antibody  11. Class 1 obesity due to excess calories with serious comorbidity and body mass index (BMI) of 32.0 to 32.9 in adult  12. Medicare annual wellness visit, subsequent  -     ADVANCE CARE PLANNING FIRST 30 MINS  13. Advanced directives, counseling/discussion  -     ADVANCE CARE PLANNING FIRST 27 MINS  14. Screening for depression  15.  Needs flu shot  -     INFLUENZA, FLUAD, (AGE 72 Y+), IM, PF, 0.5 ML       Depression Risk Factor Screening     3 most recent PHQ Screens 9/22/2022   Little interest or pleasure in doing things Not at all   Feeling down, depressed, irritable, or hopeless Not at all   Total Score PHQ 2 0       Alcohol & Drug Abuse Risk Screen    Do you average more than 1 drink per night or more than 7 drinks a week: No, patient reports checking 2-3 glasses of wine approximately 3 days/week    In the past three months have you have had more than 4 drinks containing alcohol on one occasion: No          Functional Ability and Level of Safety    Hearing: The patient needs further evaluation. Activities of Daily Living: The home contains: no safety equipment. Patient does total self care      Ambulation: with mild difficulty due to bilateral knee pain     Fall Risk:  Fall Risk Assessment, last 12 mths 9/22/2022   Able to walk? Yes   Fall in past 12 months? 0   Do you feel unsteady?  0   Are you worried about falling 0      Abuse Screen:  Patient is not abused       Cognitive Screening    Has your family/caregiver stated any concerns about your memory: no     Cognitive Screening: None performed today    Health Maintenance Due     Health Maintenance Due   Topic Date Due    COVID-19 Vaccine (4 - Booster for Cornelious Landsman series) 04/01/2022       Patient Care Team   Patient Care Team:  Sam Emerson MD as PCP - General (Internal Medicine Physician)  Sam Emerson MD as PCP - [facility]  Vance Reece MD (Urology)  Miguel Ángel Nielson MD (Dermatology Physician)  Driss Sherwood MD (Gastroenterology)  Tammi Navarro MD (Gastroenterology)  Kelsie Sky MD (Ophthalmology)  Bernadette Mello DO (Rheumatology Internal Medicine)  Nolan Romero MD (Orthopedic Surgery)  Shahbaz Luo MD (Orthopedic Surgery)    History     Patient Active Problem List   Diagnosis Code    BPH with obstruction/lower urinary tract symptoms N40.1, N13.8    Bladder neck contracture N32.0    Hyperlipidemia E78.5    Vitamin D insufficiency E55.9    Hearing loss H91.90    Essential hypertension I10    Primary osteoarthritis M19.91    PMR (polymyalgia rheumatica) ([facility] [address] M35.3    Hoarseness R49.0    Esophageal dysmotility K22.4    Positive CRISTI (antinuclear antibody)/ RNP antibody R76.8    Nodule of finger of right hand R22.31    Class 1 obesity due to excess calories with serious comorbidity in adult E66.09    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    Cervical spondylosis M47.812    Lumbar spondylosis M47.816    Neck pain M54.2    Chronic bilateral low back pain without sciatica M54.50, G89.29     Past Medical History:   Diagnosis Date    Bladder neck contracture     BPH with obstruction/lower urinary tract symptoms     Essential hypertension with goal blood pressure less than 140/90     Family history of colon cancer     History of blood transfusion 2015    with shoulder replacement    Hyperlipidemia     Osteoarthritis     Osteoarthritis of left hip     PMR (polymyalgia rheumatica) ([facility] [address] 7/20/2016    Skin cancer     basal cell on face    Urinary retention       Past Surgical History:   Procedure Laterality Date    CYSTOSCOPY      ENDOSCOPY, COLON, DIAGNOSTIC      HC BERENICE LASER [id number]  3/2/11    Laser incision of bladder neck contracture    HX BACK SURGERY      lower    HX MOHS PROCEDURES  1/11    rt hip repl    HX ORTHOPAEDIC Right 2015    shoulder replacement    HX OTHER SURGICAL      tonsillectomy, back sx,     HX OTHER SURGICAL      basel cell removed from right ear lobe    HX SHOULDER ARTHROSCOPY Left 2010    HX TURP      Laser    CO LASER VAPORIZATION SURGERY PROSTATE, COMPLETE       Current Outpatient Medications   Medication Sig Dispense Refill    methocarbamoL (ROBAXIN) 500 mg tablet Take 500 mg by mouth four (4) times daily. finasteride (PROSCAR) 5 mg tablet Take 1 Tablet by mouth daily. 90 Tablet 3    tamsulosin (FLOMAX) 0.4 mg capsule Take 1 Capsule by mouth daily (after dinner). 90 Capsule 3    rosuvastatin (CRESTOR) 5 mg tablet Take 1 Tablet by mouth daily. 90 Tablet 3    acetaminophen (TYLENOL) 500 mg tablet Take 2 Tablets by mouth every six (6) hours. DOCUSATE SODIUM (STOOL SOFTENER PO) Take  by mouth daily. multivitamin (ONE A DAY) tablet Take 1 Tab by mouth daily. cholecalciferol (VITAMIN D3) 25 mcg (1,000 unit) cap Take  by mouth daily.       GLUC/ERICA-MSM#1/C/CHARLY/RAMÓN/BOR (OSTEO BI-FLEX PO) Take 1 Tab by mouth daily. lisinopriL (PRINIVIL, ZESTRIL) 5 mg tablet TAKE 1 TABLET BY MOUTH DAILY 90 Tablet 3     No Known Allergies    Family History   Problem Relation Age of Onset    Cancer Other     Heart Disease Other      Social History     Tobacco Use    Smoking status: Never    Smokeless tobacco: Never   Substance Use Topics    Alcohol use:  Yes     Alcohol/week: 7.0 standard drinks     Types: 7 Glasses of wine per week     Comment: wine with dinner 3 times a week         Marciano Saucedo MD

## 2022-09-22 NOTE — ACP (ADVANCE CARE PLANNING)
Advance Care Planning     Advance Care Planning (ACP) Physician/NP/PA Conversation      Date of Conversation: 9/22/2022  Conducted with: Patient with Decision Making Capacity    Healthcare Decision Maker:     Primary Decision Maker: Shivam Saenz [facility] - [phone]    Secondary Decision Maker: BirdiezulayLuther moreira - Brother  Click here to complete [address] including selection of the Healthcare Decision Maker Relationship (ie \"Primary\")      Today we documented Decision Maker(s) consistent with ACP documents on file. Care Preferences:    Hospitalization: \"If your health worsens and it becomes clear that your chance of recovery is unlikely, what would be your preference regarding hospitalization? \"  The patient would prefer hospitalization. Ventilation: \"If you were unable to breathe on your own and your chance of recovery was unlikely, what would be your preference about the use of a ventilator (breathing machine) if it was available to you? \"   The patient would desire the use of a ventilator. Resuscitation: \"In the event your heart stopped as a result of an underlying serious health condition, would you want attempts to be made to restart your heart, or would you prefer a natural death? \"   Yes, attempt to resuscitate.     Additional topics discussed: treatment goals, benefit/burden of treatment options, ventilation preferences, hospitalization preferences, resuscitation preferences, and end of life care preferences (vegetative state/imminent death)    Conversation Outcomes / Follow-Up Plan:   ACP complete - no further action today  Reviewed DNR/DNI and patient elects Full Code (Attempt Resuscitation)     Length of Voluntary ACP Conversation in minutes:  16 minutes    Meri Loredo MD

## 2022-09-22 NOTE — PATIENT INSTRUCTIONS
Vaccine Information Statement    Influenza (Flu) Vaccine (Inactivated or Recombinant): What You Need to Know    Many vaccine information statements are available in Kiswahili and other languages. See www.immunize.org/vis. Hojas de información sobre vacunas están disponibles en español y en muchos otros idiomas. Visite www.immunize.org/vis. 1. Why get vaccinated? Influenza vaccine can prevent influenza (flu). Flu is a contagious disease that spreads around the [facility] every year, usually between October and May. Anyone can get the flu, but it is more dangerous for some people. Infants and young children, people 72 years and older, pregnant people, and people with certain health conditions or a weakened immune system are at greatest risk of flu complications. Pneumonia, bronchitis, sinus infections, and ear infections are examples of flu-related complications. If you have a medical condition, such as heart disease, cancer, or diabetes, flu can make it worse. Flu can cause fever and chills, sore throat, muscle aches, fatigue, cough, headache, and runny or stuffy nose. Some people may have vomiting and diarrhea, though this is more common in children than adults. In an average year, thousands of people in the [facility] die from flu, and many more are hospitalized. Flu vaccine prevents millions of illnesses and flu-related visits to the doctor each year. 2. Influenza vaccines     CDC recommends everyone 6 months and older get vaccinated every flu season. Children 6 months through 6years of age may need 2 doses during a single flu season. Everyone else needs only 1 dose each flu season. It takes about 2 weeks for protection to develop after vaccination. There are many flu viruses, and they are always changing. Each year a new flu vaccine is made to protect against the influenza viruses believed to be likely to cause disease in the upcoming flu season.  Even when the vaccine doesnt exactly match these viruses, it may still provide some protection. Influenza vaccine does not cause flu. Influenza vaccine may be given at the same time as other vaccines. 3. Talk with your health care provider    Tell your vaccination provider if the person getting the vaccine:  Has had an allergic reaction after a previous dose of influenza vaccine, or has any severe, life-threatening allergies   Has ever had Guillain-Barré Syndrome (also called GBS)    In some cases, your health care provider may decide to postpone influenza vaccination until a future visit. Influenza vaccine can be administered at any time during pregnancy. People who are or will be pregnant during influenza season should receive inactivated influenza vaccine. People with minor illnesses, such as a cold, may be vaccinated. People who are moderately or severely ill should usually wait until they recover before getting influenza vaccine. Your health care provider can give you more information. 4. Risks of a vaccine reaction    Soreness, redness, and swelling where the shot is given, fever, muscle aches, and headache can happen after influenza vaccination. There may be a very small increased risk of Guillain-Barré Syndrome (GBS) after inactivated influenza vaccine (the flu shot). Hildy Paddy children who get the flu shot along with pneumococcal vaccine (PCV13) and/or DTaP vaccine at the same time might be slightly more likely to have a seizure caused by fever. Tell your health care provider if a child who is getting flu vaccine has ever had a seizure. People sometimes faint after medical procedures, including vaccination. Tell your provider if you feel dizzy or have vision changes or ringing in the ears. As with any medicine, there is a very remote chance of a vaccine causing a severe allergic reaction, other serious injury, or death. 5. What if there is a serious problem?     An allergic reaction could occur after the vaccinated person leaves the clinic. If you see signs of a severe allergic reaction (hives, swelling of the face and throat, difficulty breathing, a fast heartbeat, dizziness, or weakness), call 9-1-1 and get the person to the nearest hospital.    For other signs that concern you, call your health care provider. Adverse reactions should be reported to the Vaccine Adverse Event Reporting System (VAERS). Your health care provider will usually file this report, or you can do it yourself. Visit the VAERS website at www.vaers. [facility].gov or call 5-616.375.9884. VAERS is only for reporting reactions, and VAERS staff members do not give medical advice. 6. The National Vaccine Injury Compensation Program    The [facility] Vaccine Injury Compensation Program (VICP) is a federal program that was created to compensate people who may have been injured by certain vaccines. Claims regarding alleged injury or death due to vaccination have a time limit for filing, which may be as short as two years. Visit the VICP website at www.[facility].gov/vaccinecompensation or call 0-734.911.8692 to learn about the program and about filing a claim. 7. How can I learn more? Ask your health care provider. Call your local or state health department. Visit the website of the Food and Drug Administration (FDA) for vaccine package inserts and additional information at www.fda.gov/vaccines-blood-biologics/vaccines. Contact the Centers for Disease Control and Prevention (CDC): Call 0-937.680.5673 (9-336-SSA-INFO) or  Visit CDCs influenza website at www.cdc.gov/flu. Vaccine Information Statement   Inactivated Influenza Vaccine   8/6/2021  42 JUANITO Black [id number]     Department of Health and Human Services  Centers for Disease Control and Prevention    Office Use Only         Heart-Healthy Diet: Care Instructions  Your Care Instructions     A heart-healthy diet has lots of vegetables, fruits, nuts, beans, and whole grains, and is low in salt. It limits foods that are high in saturated fat, such as meats, cheeses, and fried foods. It may be hard to change your diet, but even small changes can lower your risk of heart attack and heart disease. Follow-up care is a key part of your treatment and safety. Be sure to make and go to all appointments, and call your doctor if you are having problems. It's also a good idea to know your test results and keep a list of the medicines you take. How can you care for yourself at home? Watch your portions  Learn what a serving is. A \"serving\" and a \"portion\" are not always the same thing. Make sure that you are not eating larger portions than are recommended. For example, a serving of pasta is ½ cup. A serving size of meat is 2 to 3 ounces. A 3-ounce serving is about the size of a deck of cards. Measure serving sizes until you are good at [place]" them. Keep in mind that restaurants often serve portions that are 2 or 3 times the size of one serving. To keep your energy level up and keep you from feeling hungry, eat often but in smaller portions. Eat only the number of calories you need to stay at a healthy weight. If you need to lose weight, eat fewer calories than your body burns (through exercise and other physical activity). Eat more fruits and vegetables  Eat a variety of fruit and vegetables every day. Dark green, deep orange, red, or yellow fruits and vegetables are especially good for you. Examples include spinach, carrots, peaches, and berries. Keep carrots, celery, and other veggies handy for snacks. Buy fruit that is in season and store it where you can see it so that you will be tempted to eat it. Cook dishes that have a lot of veggies in them, such as stir-fries and soups. Limit saturated and trans fat  Read food labels, and try to avoid saturated and trans fats. They increase your risk of heart disease. Use olive or canola oil when you cook.   Bake, broil, grill, or steam foods instead of frying them.  Choose lean meats instead of high-fat meats such as hot dogs and sausages. Cut off all visible fat when you prepare meat. Eat fish, skinless poultry, and meat alternatives such as soy products instead of high-fat meats. Soy products, such as tofu, may be especially good for your heart. Choose low-fat or fat-free milk and dairy products. Eat foods high in fiber  Eat a variety of grain products every day. Include whole-grain foods that have lots of fiber and nutrients. Examples of whole-grain foods include oats, whole wheat bread, and brown rice. Buy whole-grain breads and cereals, instead of white bread or pastries. Limit salt and sodium  Limit how much salt and sodium you eat to help lower your blood pressure. Taste food before you salt it. Add only a little salt when you think you need it. With time, your taste buds will adjust to less salt. Eat fewer snack items, fast foods, and other high-salt, processed foods. Check food labels for the amount of sodium in packaged foods. Choose low-sodium versions of canned goods (such as soups, vegetables, and beans). Limit sugar  Limit drinks and foods with added sugar. These include candy, desserts, and soda pop. Limit alcohol  Limit alcohol to no more than 2 drinks a day for men and 1 drink a day for women. Too much alcohol can cause health problems. When should you call for help? Watch closely for changes in your health, and be sure to contact your doctor if:    You would like help planning heart-healthy meals. Where can you learn more? Go to http://felicia-myles.info/  Enter V137 in the search box to learn more about \"Heart-Healthy Diet: Care Instructions. \"  Current as of: August 22, 2019               Content Version: 12.6  © 1986-0997 [employer]. Care instructions adapted under license by [employer] (which disclaims liability or warranty for this information).  If you have questions about a medical condition or this instruction, always ask your healthcare professional. [address] any warranty or liability for your use of this information. High Cholesterol: Care Instructions  Your Care Instructions     Cholesterol is a type of fat in your blood. It is needed for many body functions, such as making new cells. Cholesterol is made by your body. It also comes from food you eat. High cholesterol means that you have too much of the fat in your blood. This raises your risk of a heart attack and stroke. LDL and HDL are part of your total cholesterol. LDL is the \"bad\" cholesterol. High LDL can raise your risk for heart disease, heart attack, and stroke. HDL is the \"good\" cholesterol. It helps clear bad cholesterol from the body. High HDL is linked with a lower risk of heart disease, heart attack, and stroke. Your cholesterol levels help your doctor find out your risk for having a heart attack or stroke. You and your doctor can talk about whether you need to lower your risk and what treatment is best for you. A heart-healthy lifestyle along with medicines can help lower your cholesterol and your risk. The way you choose to lower your risk will depend on how high your risk is for heart attack and stroke. It will also depend on how you feel about taking medicines. Follow-up care is a key part of your treatment and safety. Be sure to make and go to all appointments, and call your doctor if you are having problems. It's also a good idea to know your test results and keep a list of the medicines you take. How can you care for yourself at home? Eat a variety of foods every day. Good choices include fruits, vegetables, whole grains (like oatmeal), dried beans and peas, nuts and seeds, soy products (like tofu), and fat-free or low-fat dairy products. Replace butter, margarine, and hydrogenated or partially hydrogenated oils with olive and canola oils.  (Canola oil margarine without trans fat is fine.)  Replace red meat with fish, poultry, and soy protein (like tofu). Limit processed and packaged foods like chips, crackers, and cookies. Bake, broil, or steam foods. Don't aquino them. Be physically active. Get at least 30 minutes of exercise on most days of the week. Walking is a good choice. You also may want to do other activities, such as running, swimming, cycling, or playing tennis or team sports. Stay at a healthy weight or lose weight by making the changes in eating and physical activity listed above. Losing just a small amount of weight, even 5 to 10 pounds, can reduce your risk for having a heart attack or stroke. Do not smoke. When should you call for help? Watch closely for changes in your health, and be sure to contact your doctor if:    You need help making lifestyle changes. You have questions about your medicine. Where can you learn more? Go to http://www.gray.com/  Enter J247 in the search box to learn more about \"High Cholesterol: Care Instructions. \"  Current as of: December 16, 2019               Content Version: 12.6  © 3297-2256 [employer]. Care instructions adapted under license by [employer] (which disclaims liability or warranty for this information). If you have questions about a medical condition or this instruction, always ask your healthcare professional. [address] any warranty or liability for your use of this information. Medicare Wellness Visit, Male    The best way to improve and maintain good health is to have a healthy lifestyle by eating a well-balanced diet, exercising regularly, limiting alcohol and stopping smoking. Regular visits with your physician or non-physician health care provider also support your good health. Preventive screening tests can find health problems before they become diseases or illnesses.      Preventive services such as immunizations prevent serious infections. All people over age 72 should have a Pneumovax and a Prevnar-13 shot to prevent potentially life threatening infections with the pneumococcus bacteria, a common cause of pneumonia. These are once in a lifetime unless you and your provider decide differently. All people over 65 should have a yearly influenza vaccine or \"flu\" shot. This does not prevent infection with cold viruses but has been proven to prevent hospitalization and death from influenza. Although Medicare part B \"regular Medicare\" currently only covers tetanus vaccination in the context of an injury, a tetanus vaccine (Tdap or Td) is recommended every 10 years. A shingles vaccine is recommended once in a lifetime after age 61. The Shingles vaccine is also not covered by Medicare part B. Note, however, that both the Shingles vaccine and Tdap/Td are generally covered by secondary carriers. Please check your coverage and out of pocket expenses. Consider contacting your local health department because it may stock these vaccines for a reasonable charge.     We currently have documentation of the following immunization history for you:  Immunization History   Administered Date(s) Administered     Influenza, FLUZONE (age 72 y+), High Dose 11/04/2021    (RETIRED) Pneumococcal Vaccine (Unspecified Type) 03/20/2008    COVID-19, MODERNA BLUE border, Primary or Immunocompromised, (age 18y+), IM, 100 mcg/0.5mL 01/30/2021, 02/27/2021    COVID-19, MODERNA Booster BLUE border, (age 18y+), IM, 50mcg/0.25mL 12/01/2021    Influenza High Dose Vaccine PF 11/14/2013, 11/30/2015, 11/10/2016, 09/14/2017    Influenza Vaccine 11/17/2014, 09/01/2020    Influenza Vaccine (Tri) Adjuvanted (>65 Yrs FLUAD TRI [id number]) 09/20/2018, 09/24/2019    Influenza Vaccine Split 11/29/2012    Influenza, FLUAD, (age 72 y+), Adjuvanted 11/19/2020, 09/22/2022    Pneumococcal Conjugate (PCV-13) 05/29/2015    Pneumococcal Polysaccharide (PPSV-23) 03/20/2008    TDAP Vaccine 04/05/2011    Tdap 05/10/2022    Zoster 04/24/2012    Zoster Recombinant 10/10/2019, 03/17/2020       Screening for infection with Hepatitis C is recommended for anyone born between [birth year] through 1964. The table at the bottom of this document indicates the status of this and other screening services. Screening for diabetes mellitus with a blood sugar test (glucose) should be done at least every 3 years until age 79. You and your health care provider may decide whether to continue screening after age 79. The most recent blood glucose we have on file for you is:   Lab Results   Component Value Date/Time    Glucose 93 09/15/2022 09:11 AM    Glucose (POC) 142 (H) 05/26/2021 04:29 PM       Glaucoma is a disease of the eye due to increased ocular pressure that can lead to blindness. People with risk factors for glaucoma ( race, diabetes, family history) should be screened at least every 2 years by an eye professional. This may be covered annually if indicated as determined by you and your doctor. Cardiovascular screening tests that check for elevated lipids or cholesterol (fatty part of blood) which can lead to heart disease and strokes should be done every 4-6 years through age 79. You and your health care provider may decide whether to continue screening after age 79. The most recent lipid panel we have on file for you is:   Lab Results   Component Value Date/Time    Cholesterol, total 147 09/15/2022 09:11 AM    HDL Cholesterol 44 09/15/2022 09:11 AM    LDL, calculated 78.8 09/15/2022 09:11 AM    VLDL, calculated 24.2 09/15/2022 09:11 AM    Triglyceride 121 09/15/2022 09:11 AM    CHOL/HDL Ratio 3.3 09/15/2022 09:11 AM       Colorectal cancer screening that evaluates for blood or polyps in your colon for people with average risk should be done yearly as a stool test, every five years as a flexible sigmoidoscope or every 10 years as a colonoscopy up to age 76.  You and your health care provider may decide whether to continue screening after age 76. Men up to age 76 may elect to screen for prostate cancer with a blood test called a PSA at certain intervals, depending on their personal and family history. This decision is between the patient and his provider. The most recent PSA values we have on file for you are:  Lab Results   Component Value Date/Time    Prostate Specific Ag 0.5 11/20/2019 11:30 AM    Prostate Specific Ag 0.6 06/12/2019 10:04 AM    Prostate Specific Ag 1.0 06/13/2018 09:37 AM       If you have been a smoker or had family history of abdominal aortic aneurysms, you and your provider may decide to schedule an ultrasound test of your aorta. Our records show this was done on:  n/a    People who have smoked the equivalent of 1 pack per day for 30 years or more may benefit from screening for lung cancer with a yearly low dose CT scan until they have been non smokers for 15 years or competing health conditions render this unlikely to be beneficial. Our records show: n/a    Your Medicare Wellness Exam is recommended annually.     Here is a list of your current Health Maintenance items with a due date:  Health Maintenance   Topic Date Due    COVID-19 Vaccine (4 - Booster for Moderna series) 04/01/2022    Depression Screen  04/29/2023    Lipid Screen  09/15/2023    Medicare Yearly Exam  09/23/2023    DTaP/Tdap/Td series (3 - Td or Tdap) 05/10/2032    Shingrix Vaccine Age 50>  Completed    Flu Vaccine  Completed    Pneumococcal 65+ years  Completed

## 2022-09-24 PROBLEM — M47.816 LUMBAR SPONDYLOSIS: Status: ACTIVE | Noted: 2022-09-24

## 2022-09-24 PROBLEM — M16.12 OSTEOARTHRITIS OF LEFT HIP: Status: RESOLVED | Noted: 2021-05-26 | Resolved: 2022-09-24

## 2022-09-24 PROBLEM — M47.812 CERVICAL SPONDYLOSIS: Status: ACTIVE | Noted: 2022-09-24

## 2022-09-24 PROBLEM — G89.29 CHRONIC BILATERAL LOW BACK PAIN WITHOUT SCIATICA: Status: ACTIVE | Noted: 2022-09-24

## 2022-09-24 PROBLEM — M54.2 NECK PAIN: Status: ACTIVE | Noted: 2022-09-24

## 2022-09-24 PROBLEM — M54.50 CHRONIC BILATERAL LOW BACK PAIN WITHOUT SCIATICA: Status: ACTIVE | Noted: 2022-09-24

## 2022-09-24 RX ORDER — LISINOPRIL 5 MG/1
TABLET ORAL
Qty: 90 TABLET | Refills: 3 | Status: SHIPPED | OUTPATIENT
Start: 2022-09-24

## 2022-09-24 NOTE — PROGRESS NOTES
HPI:   Star Carranza is an 81y.o. year old male who presents today for a physical exam. He has a history of hypertension, hyperlipidemia, polymyalgia rheumatica, esophageal hypercontractility, and BPH. He completed the Moderna COVID-19 vaccine series and received one Moderna booster dose. He reports that he is doing relatively well. He does report increasing difficulty with bilateral knee pain and and is now scheduled for sequential knee replacements with Dr. Gladys Tran. He also reports increasing difficulty with neck and low back pain and underwent evaluation by Dr. Shaquille Tejada on 9/1/2022. He had a cervical MRI performed (7/15/2022) which showed multiple level cervical spondylosis with relative central canal stenosis from C5-C7 of multifactorial basis with moderate to advanced degenerative foraminal stenosis. He reports that he has been referred to pain management for his neck pain and is now scheduled for a lumbar MRI to evaluate his low back pain. He reports low back surgery approximately 50 years ago for a herniated disc. He is also currently undergoing physical therapy. He otherwise is without new complaints and feeling generally well. Summary of prior hospitalizations and medical history:  He has a history of polymyalgia rheumatica, diagnosed in 7/2016 when he was seen for bilateral shoulder pain of several months duration. He had been under the care of Dr. Demond Badillo since 3/2016 when he presented with right wrist pain followed by the development of bilateral shoulder pain. He was treated with a Medrol dose pack and Tramadol and upon follow-up on 5/3/2016, reported some improvement in the bilateral shoulder and wrist discomfort, but pain persisted.  Bilateral shoulder MRI's (5/11/2016) showed severe degenerative changes on the right at the acromioclavicular joint with moderate to severe or severe tendinopathy of the supraspinatus and infraspinatus; postoperative changes with tendinopathy of the supraspinatus, infraspinatus, and long head of the biceps was seen on the left. In 5/2016, lab data showed ESR 28, C-Reactive Protein 1.1, negative CRISTI and RF. He was treated with naproxen, tramadol and acetaminophen. He was evaluated on 7/19/2016 for worsening bilateral shoulder pain with bilateral hand pain involving the wrists, MCP and PIP joints. Evaluation included ESR 48; C-reactive protein 1.8; CRISTI positive; anti-RNP 1.4; negative antibodies to Zimmerman, dsDNA, anti-chromatin, RF and anti-CCP. Bilateral hand x-rays showed scattered arthritic changes with scattered small erosions. He was referred to see Dr. Julisa Kang who diagnosed polymyalgia rheumatica and started him on prednisone. Since initiating prednisone, he had significant improvement with nearly complete resolution of his symptoms. He has regained full use of his shoulders and has no trouble reaching behind his back or pulling a shirt over his head. He denies any headache, jaw claudication, scalp tenderness, fever, or visual changes. He successfully weaned off prednisone in 3/2019, although has had short periods requiring restarting steroids for resurgence of symptoms. In 11/2016, he presented with a persistent non productive cough, sore throat, and hoarseness. He was referred to see Dr. Felecia Leos, and patient reports that a laryngoscopy was performed showing evidence of reflux. He was started on Nexium for two weeks with improvement in his cough, but he continued to have pain with swallowing. He was referred to see Dr. Dale Juares, and underwent upper endoscopy (1/5/2017) showing abnormal esophageal motility with spastic and non-propulsive appearing contractions in the mid to distal esophagus (tortuous); mucosa appeared normal ( random biopsies: negative); normal stomach and duodenum. He underwent a barium swallow (1/11/2017) showing a small persistent smoothly marginated filling defect at the piriform sinus suggestive of an extrinsic mass effect.  He subsequently had a neck CT scan (1/13/2017) which showed the right piriform sinus appeared larger than the left, but the walls of both were normal without surrounding mass or infiltrating process; nonspecific lymphadenopathy involving level 1 and 2 nodes. He underwent esophageal manometry, which showed a hypercontractile esophagus (\"Jackhammer\" esophagus). He was started on diltiazem 30 mg three times per day with resolution of symptoms. He discontinued diltiazem in 3/2017 without recurrence of symptoms. He has a history of hypertension, treated with lisinopril. He exercises mainly by walking, and denies any chest pain, shortness of breath at rest or with exertion, palpitations, lightheadedness, or edema. He also has hyperlipidemia, treated with rosuvastatin two days per week. He has a history of osteoarthritis, and underwent left total hip arthroplasty with Dr. Jaya Vanegas on 5/26/2021 at [address] [place] [address]. He did well without complications. He states that he has completed physical therapy and is no longer experiencing any pain. He also has a history of BPH and underwent TURP in 2006. In 2/2011, he had laser vaporization of the prostate to relieve bladder outlet obstructive symptoms. In 3/2011 he had a laser incision performed on the bladder neck contracture. In 5/2012 and 8/2012, he underwent transurethral incision of the bladder neck contracture. Biopsy of the contracture (8/2012) was negative for malignancy. Since that time, he has had annual cystoscopic surveillance of the bladder neck contracture by Dr. Gene Magallon. In 8/2015, it was felt that no more surveillance was necessary as it had remained stable. He continues to take Proscar, but reports that he stopped Flomax on his own and began taking Super Beta prostate supplement instead. He being followed by Dr. Gene Magallon. He denies difficulty with stream, dysuria, or hematuria. He does report 2-3 episodes of nocturia.     He had a screening colonoscopy in 4/2012 by Dr. Shari Colby which was normal. Follow-up recommended for 10 years. He had a repeat colonoscopy in 1/2020 by Dr. Uday Godfrey for evaluation of rectal bleeding, showing mild diverticulosis in the descending/ sigmoid colon, medium grade/stage 2 internal hemorrhoids with evidence of recent bleeding, and three sessile 4-7 mm polyps in the hepatic flexure (pathology: tubular adenoma), transverse colon (pathology: tubular adenoma), and rectum (pathology: hyperplastic). No further screening felt to be needed. He denies any abdominal pain, nausea, vomiting, melena, hematochezia, or change in bowel movements. Past Medical History:   Diagnosis Date    Bladder neck contracture     BPH with obstruction/lower urinary tract symptoms     Essential hypertension with goal blood pressure less than 140/90     Family history of colon cancer     History of blood transfusion 2015    with shoulder replacement    Hyperlipidemia     Osteoarthritis     Osteoarthritis of left hip     PMR (polymyalgia rheumatica) ([facility] [address] 7/20/2016    Skin cancer     basal cell on face    Urinary retention      Past Surgical History:   Procedure Laterality Date    CYSTOSCOPY      ENDOSCOPY, COLON, DIAGNOSTIC      [facility] BERENICE LASER [id number]  3/2/11    Laser incision of bladder neck contracture    HX BACK SURGERY      lower    HX MOHS PROCEDURES  1/11    rt hip repl    HX ORTHOPAEDIC Right 2015    shoulder replacement    HX OTHER SURGICAL      tonsillectomy, back sx,     HX OTHER SURGICAL      basel cell removed from right ear lobe    HX SHOULDER ARTHROSCOPY Left 2010    HX TURP      Laser    GA LASER VAPORIZATION SURGERY PROSTATE, COMPLETE       Current Outpatient Medications   Medication Sig    methocarbamoL (ROBAXIN) 500 mg tablet Take 500 mg by mouth four (4) times daily. finasteride (PROSCAR) 5 mg tablet Take 1 Tablet by mouth daily. tamsulosin (FLOMAX) 0.4 mg capsule Take 1 Capsule by mouth daily (after dinner).     rosuvastatin (CRESTOR) 5 mg tablet Take 1 Tablet by mouth daily.    acetaminophen (TYLENOL) 500 mg tablet Take 2 Tablets by mouth every six (6) hours. DOCUSATE SODIUM (STOOL SOFTENER PO) Take  by mouth daily. multivitamin (ONE A DAY) tablet Take 1 Tab by mouth daily. cholecalciferol (VITAMIN D3) 25 mcg (1,000 unit) cap Take  by mouth daily. GLUC/ERICA-MSM#1/C/CHARLY/RAMÓN/BOR (OSTEO BI-FLEX PO) Take 1 Tab by mouth daily. lisinopriL (PRINIVIL, ZESTRIL) 5 mg tablet TAKE 1 TABLET BY MOUTH DAILY     No current facility-administered medications for this visit. Allergies and Intolerances:   No Known Allergies     Family History: His father  from CAD, and his brother has had CABG. His mother  from ovarian cancer. Family History   Problem Relation Age of Onset    Cancer Other     Heart Disease Other      Social History:   He  reports that he has never smoked. He has never used smokeless tobacco. He is  but has been living with his long time girlfriend Jose Singh) for 37 years. He has two step children. 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      Social History     Substance and Sexual Activity   Alcohol Use Yes    Alcohol/week: 7.0 standard drinks    Types: 7 Glasses of wine per week    Comment: wine with dinner 3 times a week     Immunization History:  Immunization History   Administered Date(s) Administered     Influenza, FLUZONE (age 72 y+), High Dose 2021    (RETIRED) Pneumococcal Vaccine (Unspecified Type) 2008    COVID-19, MODERNA BLUE border, Primary or Immunocompromised, (age 18y+), IM, 100 mcg/0.5mL 2021, 2021    COVID-19, MODERNA Booster BLUE border, (age 18y+), IM, 50mcg/0.25mL 2021    Influenza High Dose Vaccine PF 2013, 2015, 11/10/2016, 2017    Influenza Vaccine 2014, 2020    Influenza Vaccine (Tri) Adjuvanted (>65 Yrs FLUAD TRI 92506) 2018, 2019    Influenza Vaccine Split 2012    Influenza, FLUAD, (age 72 y+), Adjuvanted 11/19/2020, 09/22/2022    Pneumococcal Conjugate (PCV-13) 05/29/2015    Pneumococcal Polysaccharide (PPSV-23) 03/20/2008    TDAP Vaccine 04/05/2011    Tdap 05/10/2022    Zoster 04/24/2012    Zoster Recombinant 10/10/2019, 03/17/2020       Review of Systems:   As above included in HPI. Otherwise 11 point review of systems negative including constitutional, skin, HENT, eyes, respiratory, cardiovascular, gastrointestinal, genitourinary, musculoskeletal, endocrine, hematologic, allergy, and neurologic. Physical:   Visit Vitals  /66 (BP 1 Location: Left upper arm, BP Patient Position: Sitting)   Pulse 70   Temp 97 °F (36.1 °C) (Temporal)   Resp 16   Ht 6' 2\" (1.88 m)   Wt 250 lb (113.4 kg)   SpO2 97%   BMI 32.10 kg/m²        Patient appears in no apparent distress. Affect is appropriate. HEENT --Anicteric sclerae, tympanic membranes normal,  ear canals normal.  PERRL, EOMI, conjunctiva and lids normal.  No cervical lymphadenopathy. No thyromegaly, JVD, or bruits. Carotid pulses 2+ with normal upstroke. Lungs --Clear to auscultation. No wheezing or rales. Heart --Regular rate and rhythm, no murmurs, rubs, gallops, or clicks. Abdomen -- Soft and nontender, no hepatosplenomegaly or masses. Extremities -- Without cyanosis, clubbing, edema.  Normal looking digits, ROM intact  Neuro -- CN 2-12 intact, strength 5/5 with intact soft touch in all extremities  Derm - no obvious abnormalities noted, no rash          Review of Data:  Labs:  Hospital Outpatient Visit on 09/15/2022   Component Date Value Ref Range Status    WBC 09/15/2022 6.0  4.6 - 13.2 K/uL Final    RBC 09/15/2022 4.33 (A)  4.35 - 5.65 M/uL Final    HGB 09/15/2022 13.8  13.0 - 16.0 g/dL Final    HCT 09/15/2022 42.5  36.0 - 48.0 % Final    MCV 09/15/2022 98.2  78.0 - 100.0 FL Final    MCH 09/15/2022 31.9  24.0 - 34.0 PG Final    MCHC 09/15/2022 32.5  31.0 - 37.0 g/dL Final    RDW 09/15/2022 12.7  11.6 - 14.5 % Final PLATELET 36/61/6108 455  135 - 420 K/uL Final    MPV 09/15/2022 9.8  9.2 - 11.8 FL Final    NRBC 09/15/2022 0.0  0  WBC Final    ABSOLUTE NRBC 09/15/2022 0.00  0.00 - 0.01 K/uL Final    NEUTROPHILS 09/15/2022 61  40 - 73 % Final    LYMPHOCYTES 09/15/2022 29  21 - 52 % Final    MONOCYTES 09/15/2022 9  3 - 10 % Final    EOSINOPHILS 09/15/2022 1  0 - 5 % Final    BASOPHILS 09/15/2022 0  0 - 2 % Final    IMMATURE GRANULOCYTES 09/15/2022 0  0.0 - 0.5 % Final    ABS. NEUTROPHILS 09/15/2022 3.6  1.8 - 8.0 K/UL Final    ABS. LYMPHOCYTES 09/15/2022 1.7  0.9 - 3.6 K/UL Final    ABS. MONOCYTES 09/15/2022 0.5  0.05 - 1.2 K/UL Final    ABS. EOSINOPHILS 09/15/2022 0.1  0.0 - 0.4 K/UL Final    ABS. BASOPHILS 09/15/2022 0.0  0.0 - 0.1 K/UL Final    ABS. IMM.  GRANS. 09/15/2022 0.0  0.00 - 0.04 K/UL Final    DF 09/15/2022 AUTOMATED    Final    Hemoglobin A1c 09/15/2022 5.3  4.2 - 5.6 % Final    Est. average glucose 09/15/2022 105  mg/dL Final    LIPID PROFILE 09/15/2022        Final    Cholesterol, total 09/15/2022 147  <200 MG/DL Final    Triglyceride 09/15/2022 121  <150 MG/DL Final    HDL Cholesterol 09/15/2022 44  40 - 60 MG/DL Final    LDL, calculated 09/15/2022 78.8  0 - 100 MG/DL Final    VLDL, calculated 09/15/2022 24.2  MG/DL Final    CHOL/HDL Ratio 09/15/2022 3.3  0 - 5.0   Final    Magnesium 09/15/2022 2.3  1.6 - 2.6 mg/dL Final    Sodium 09/15/2022 139  136 - 145 mmol/L Final    Potassium 09/15/2022 4.7  3.5 - 5.5 mmol/L Final    Chloride 09/15/2022 106  100 - 111 mmol/L Final    CO2 09/15/2022 29  21 - 32 mmol/L Final    Anion gap 09/15/2022 4  3.0 - 18 mmol/L Final    Glucose 09/15/2022 93  74 - 99 mg/dL Final    BUN 09/15/2022 13  7.0 - 18 MG/DL Final    Creatinine 09/15/2022 1.07  0.6 - 1.3 MG/DL Final    BUN/Creatinine ratio 09/15/2022 12  12 - 20   Final    GFR est AA 09/15/2022 >60  >60 ml/min/1.73m2 Final    GFR est non-AA 09/15/2022 >60  >60 ml/min/1.73m2 Final    Calcium 09/15/2022 8.8  8.5 - 10.1 MG/DL Final    Bilirubin, total 09/15/2022 0.5  0.2 - 1.0 MG/DL Final    ALT (SGPT) 09/15/2022 25  16 - 61 U/L Final    AST (SGOT) 09/15/2022 10  10 - 38 U/L Final    Alk. phosphatase 09/15/2022 68  45 - 117 U/L Final    Protein, total 09/15/2022 6.9  6.4 - 8.2 g/dL Final    Albumin 09/15/2022 4.1  3.4 - 5.0 g/dL Final    Globulin 09/15/2022 2.8  2.0 - 4.0 g/dL Final    A-G Ratio 09/15/2022 1.5  0.8 - 1.7   Final    TSH 09/15/2022 1.94  0.36 - 3.74 uIU/mL Final    Vitamin D 25-Hydroxy 09/15/2022 54.9  30 - 100 ng/mL Final    Color 09/15/2022 YELLOW    Final    Appearance 09/15/2022 CLEAR    Final    Specific gravity 09/15/2022 1.016  1.005 - 1.030   Final    pH (UA) 09/15/2022 5.5  5.0 - 8.0   Final    Protein 09/15/2022 Negative  NEG mg/dL Final    Glucose 09/15/2022 Negative  NEG mg/dL Final    Ketone 09/15/2022 Negative  NEG mg/dL Final    Bilirubin 09/15/2022 Negative  NEG   Final    Blood 09/15/2022 Negative  NEG   Final    Urobilinogen 09/15/2022 0.2  0.2 - 1.0 EU/dL Final    Nitrites 09/15/2022 Negative  NEG   Final    Leukocyte Esterase 09/15/2022 Negative  NEG   Final   Office Visit on 09/12/2022   Component Date Value Ref Range Status    Color (UA POC) 09/12/2022 Yellow   Final    Clarity (UA POC) 09/12/2022 Clear   Final    Glucose (UA POC) 09/12/2022 Negative  Negative Final    Bilirubin (UA POC) 09/12/2022 Negative  Negative Final    Ketones (UA POC) 09/12/2022 Negative  Negative Final    Specific gravity (UA POC) 09/12/2022 1.020  1.001 - 1.035 Final    Blood (UA POC) 09/12/2022 Trace  Negative Final    pH (UA POC) 09/12/2022 6.0  4.6 - 8.0 Final    Protein (UA POC) 09/12/2022 Negative  Negative Final    Urobilinogen (UA POC) 09/12/2022 0.2 mg/dL  0.2 - 1 Final    Nitrites (UA POC) 09/12/2022 Negative  Negative Final    Leukocyte esterase (UA POC) 09/12/2022 Negative  Negative Final           Health Maintenance:  Screening:    Colorectal: colonoscopy (1/2020) tubular adenomas. Dr. Nisreen Concepcion.  No further screening needed. Depression: none   DM (HbA1c/FPG): FPG 93/ HbA1c 5.3 (9/2022)   Hepatitis C: negative (7/2016)   Falls: none   DEXA: within normal limits (5/2017)    PSA/HOLA: PSA 0.5 (11/2019); HOLA per Dr. Bella Epps   Glaucoma: regular eye exams with Dr. Contreras Waite (last 12/2021)    Smoking: none   Vitamin D: 54.9 (9/2022)   Medicare Wellness: today      Impression:  Patient Active Problem List   Diagnosis Code    BPH with obstruction/lower urinary tract symptoms N40.1, N13.8    Bladder neck contracture N32.0    Hyperlipidemia E78.5    Vitamin D insufficiency E55.9    Hearing loss H91.90    Essential hypertension I10    Primary osteoarthritis M19.91    PMR (polymyalgia rheumatica) ([facility]) M35.3    Hoarseness R49.0    Esophageal dysmotility K22.4    Positive CRISIT (antinuclear antibody)/ RNP antibody R76.8    Nodule of finger of right hand R22.31    Class 1 obesity due to excess calories with serious comorbidity in adult E66.09    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    Cervical spondylosis M47.812    Lumbar spondylosis M47.816    Neck pain M54.2    Chronic bilateral low back pain without sciatica M54.50, G89.29       Plan:  1. Hypertension. Blood pressure remains well controlled on lisinopril 5 mg daily. Renal function remains normal with creatinine 1.07/ eGFR >60. Encouraged to increase fluid intake and avoid NSAIDS. Will continue to follow closely. 2. Hyperlipidemia. On low intensity dose rosuvastatin and increased frequency of dosing to 5 mg daily at last visit with improvement in lipid panel today to LDL 78 and HDL 44. Emphasized importance of continued attempts at lifestyle modifications, including heart healthy diet, regular exercise, and weight loss. 3. Polymyalgia rheumatica. Patient had significantly improved with steroid treatment. Had successfully tapered off prednisone in 3/2019 and released from care by Dr. Francoise Prado.  However, reported worsening shoulder symptoms with bilateral pain in 5/2020, and evaluated by Dr. Dennis Gutierrez. Treated with two courses of Sterapred dose packs with some improvement. Restarted on prednisone 5 mg daily and advised by Dr. Dennis Gutierrez to continue at this dose until after his hip surgery. Successfully tapered following surgery without increase in symptoms. Known increased risk (10%) of concurrent giant cell arteritis with PMR, but no evidence of GCA. Also with positive CRISTI/ positive RNP Ab, but no evidence of inflammation elsewhere. Urinalysis continues to be negative for proteinuria. Given intermittent chronic steroid use, bone density scan in 5/2017 normal. Reports no current PMR symptoms but difficulty with neck and low back pain as well as bilateral knee pain related to osteoarthritis. Continuing to follow with Dr. Dennis Gutierrez. 4. Osteoarthritis, left hip s/p left KARLENE. Underwent left total hip arthroplasty with Dr. Anna Singh on 3/00/8300 without complications. Now with difficulty due to bilateral knee pain and scheduled for sequential knee replacements with Dr. Dariana Hope on 12/5/2022 (right) and 4/5/2023 (left). Reports continued difficulty ambulating and looking forward to surgery. 5. Neck and low back pain. Referred to Dr. Carmen Espitia and now receiving physical therapy. Completed cervical MRI (7/15/2022) which showed multiple level cervical spondylosis with relative central canal stenosis from C5-C7 of multifactorial basis with moderate to advanced degenerative foraminal stenosis. Referred to pain management to help with neck pain. Scheduled for a lumbar MRI to evaluate persistent low back symptoms. 6. BPH with bladder neck contracture. Appears to be asymptomatic on current treatment with Proscar. Patient decided to discontinue Flomax on his own, but reported worsening nocturia and started on finasteride by Dr. Ramiro Flynn. PVR assessed at last visit on 9/12/2022 and was 0 cc. Restarted on Flomax 0.4 mg and continuing finasteride.  Continuing to follow with  annually. 7. Odynophagia. Secondary to esophageal dysmotility. Responded to diltiazem and now successfully stopped taking it without recurrence of symptoms. Will continue to follow. Hoarseness now at baseline. Reportedly using Flonase as needed. 8. Abnormal fasting glucose. FPG normal today at 93 and HbA1c remains normal at 5.3. Will continue to monitor. 9. History of basal cell carcinoma, face. Underwent extensive wide excision in 9/2020 by Dr. Gonzalo Fenton and required skin graft. Continuing surveillance exams with Dr. Neeru Sorto annually. 10. Obesity. Weight remains overall stable today. Emphasized importance of lifestyle modifications, including heart healthy diet, regular exercise, and weight loss. Will readdress next visit. 11. Health maintenance. Completed Moderna COVID 19 vaccine series and received one Moderna booster dose. Advised to proceed with the updated booster dose. Will give influenza vaccine today. Completed 2/2 doses of Shingrix vaccine. Other immunizations up to date. Colonoscopy completed and no further screening needed. Continue regular eye exams with Dr. Shanthi Smyth. Vitamin D level remains normal on maintenance dose supplement. In addition, an annual Medicare wellness visit was done today. Patient understands recommendations and agrees with plan. Follow-up in 6 months. Future orders:    ICD-10-CM ICD-9-CM    1. Essential hypertension  I10 401.9 CBC WITH AUTOMATED DIFF      MAGNESIUM      METABOLIC PANEL, COMPREHENSIVE      2. Hyperlipidemia, unspecified hyperlipidemia type  E78.5 272.4 LIPID PANEL      MAGNESIUM      METABOLIC PANEL, COMPREHENSIVE      3. Primary osteoarthritis of both knees  M17.0 715.16       4. Cervical spondylosis  M47.812 721.0       5. Neck pain  M54.2 723.1       6. Lumbar spondylosis  M47.816 721.3       7. Chronic bilateral low back pain without sciatica  M54.50 724.2     G89.29 338.29       8.  BPH with obstruction/lower urinary tract symptoms  N40.1 600.01     N13.8 599.69       9. PMR (polymyalgia rheumatica) ([facility])  M35.3 725       10. Positive CRISTI (antinuclear antibody)/ RNP antibody  R76.8 795.79       11. Class 1 obesity due to excess calories with serious comorbidity and body mass index (BMI) of 32.0 to 32.9 in adult  E66.09 278.00     Z68.32 V85.32       12. Medicare annual wellness visit, subsequent  Z00.00 V70.0 ADVANCE CARE PLANNING FIRST 30 MINS      13. Advanced directives, counseling/discussion  Z71.89 V65.49 ADVANCE CARE PLANNING FIRST 30 MINS      14. Screening for depression  Z13.31 V79.0       15.  Needs flu shot  Z23 V04.81 INFLUENZA, FLUAD, (AGE 65 Y+), IM, PF, 0.5 ML

## 2022-11-08 ENCOUNTER — OFFICE VISIT (OUTPATIENT)
Dept: INTERNAL MEDICINE CLINIC | Age: 82
End: 2022-11-08
Payer: MEDICARE

## 2022-11-08 VITALS
TEMPERATURE: 97.8 F | BODY MASS INDEX: 31.83 KG/M2 | DIASTOLIC BLOOD PRESSURE: 70 MMHG | RESPIRATION RATE: 16 BRPM | OXYGEN SATURATION: 97 % | HEIGHT: 74 IN | WEIGHT: 248 LBS | HEART RATE: 87 BPM | SYSTOLIC BLOOD PRESSURE: 122 MMHG

## 2022-11-08 DIAGNOSIS — N13.8 BPH WITH OBSTRUCTION/LOWER URINARY TRACT SYMPTOMS: ICD-10-CM

## 2022-11-08 DIAGNOSIS — M17.0 PRIMARY OSTEOARTHRITIS OF BOTH KNEES: ICD-10-CM

## 2022-11-08 DIAGNOSIS — M54.2 NECK PAIN: ICD-10-CM

## 2022-11-08 DIAGNOSIS — I10 ESSENTIAL HYPERTENSION: ICD-10-CM

## 2022-11-08 DIAGNOSIS — M54.50 CHRONIC BILATERAL LOW BACK PAIN WITHOUT SCIATICA: ICD-10-CM

## 2022-11-08 DIAGNOSIS — E66.09 CLASS 1 OBESITY DUE TO EXCESS CALORIES WITH SERIOUS COMORBIDITY AND BODY MASS INDEX (BMI) OF 31.0 TO 31.9 IN ADULT: ICD-10-CM

## 2022-11-08 DIAGNOSIS — G89.29 CHRONIC BILATERAL LOW BACK PAIN WITHOUT SCIATICA: ICD-10-CM

## 2022-11-08 DIAGNOSIS — M47.812 CERVICAL SPONDYLOSIS: ICD-10-CM

## 2022-11-08 DIAGNOSIS — M47.816 LUMBAR SPONDYLOSIS: ICD-10-CM

## 2022-11-08 DIAGNOSIS — N40.1 BPH WITH OBSTRUCTION/LOWER URINARY TRACT SYMPTOMS: ICD-10-CM

## 2022-11-08 DIAGNOSIS — Z01.818 PREOPERATIVE EVALUATION TO RULE OUT SURGICAL CONTRAINDICATION: Primary | ICD-10-CM

## 2022-11-08 DIAGNOSIS — E78.5 HYPERLIPIDEMIA, UNSPECIFIED HYPERLIPIDEMIA TYPE: ICD-10-CM

## 2022-11-08 PROCEDURE — G8432 DEP SCR NOT DOC, RNG: HCPCS | Performed by: INTERNAL MEDICINE

## 2022-11-08 PROCEDURE — 99214 OFFICE O/P EST MOD 30 MIN: CPT | Performed by: INTERNAL MEDICINE

## 2022-11-08 PROCEDURE — 3078F DIAST BP <80 MM HG: CPT | Performed by: INTERNAL MEDICINE

## 2022-11-08 PROCEDURE — 1123F ACP DISCUSS/DSCN MKR DOCD: CPT | Performed by: INTERNAL MEDICINE

## 2022-11-08 PROCEDURE — G8752 SYS BP LESS 140: HCPCS | Performed by: INTERNAL MEDICINE

## 2022-11-08 PROCEDURE — G8417 CALC BMI ABV UP PARAM F/U: HCPCS | Performed by: INTERNAL MEDICINE

## 2022-11-08 PROCEDURE — 1101F PT FALLS ASSESS-DOCD LE1/YR: CPT | Performed by: INTERNAL MEDICINE

## 2022-11-08 PROCEDURE — G8754 DIAS BP LESS 90: HCPCS | Performed by: INTERNAL MEDICINE

## 2022-11-08 PROCEDURE — G8427 DOCREV CUR MEDS BY ELIG CLIN: HCPCS | Performed by: INTERNAL MEDICINE

## 2022-11-08 PROCEDURE — G0463 HOSPITAL OUTPT CLINIC VISIT: HCPCS | Performed by: INTERNAL MEDICINE

## 2022-11-08 PROCEDURE — 3074F SYST BP LT 130 MM HG: CPT | Performed by: INTERNAL MEDICINE

## 2022-11-08 PROCEDURE — G8536 NO DOC ELDER MAL SCRN: HCPCS | Performed by: INTERNAL MEDICINE

## 2022-11-08 NOTE — PATIENT INSTRUCTIONS
Heart-Healthy Diet: Care Instructions  Your Care Instructions     A heart-healthy diet has lots of vegetables, fruits, nuts, beans, and whole grains, and is low in salt. It limits foods that are high in saturated fat, such as meats, cheeses, and fried foods. It may be hard to change your diet, but even small changes can lower your risk of heart attack and heart disease. Follow-up care is a key part of your treatment and safety. Be sure to make and go to all appointments, and call your doctor if you are having problems. It's also a good idea to know your test results and keep a list of the medicines you take. How can you care for yourself at home? Watch your portions  Learn what a serving is. A \"serving\" and a \"portion\" are not always the same thing. Make sure that you are not eating larger portions than are recommended. For example, a serving of pasta is ½ cup. A serving size of meat is 2 to 3 ounces. A 3-ounce serving is about the size of a deck of cards. Measure serving sizes until you are good at [place]" them. Keep in mind that restaurants often serve portions that are 2 or 3 times the size of one serving. To keep your energy level up and keep you from feeling hungry, eat often but in smaller portions. Eat only the number of calories you need to stay at a healthy weight. If you need to lose weight, eat fewer calories than your body burns (through exercise and other physical activity). Eat more fruits and vegetables  Eat a variety of fruit and vegetables every day. Dark green, deep orange, red, or yellow fruits and vegetables are especially good for you. Examples include spinach, carrots, peaches, and berries. Keep carrots, celery, and other veggies handy for snacks. Buy fruit that is in season and store it where you can see it so that you will be tempted to eat it. Cook dishes that have a lot of veggies in them, such as stir-fries and soups.   Limit saturated and trans fat  Read food labels, and try to avoid saturated and trans fats. They increase your risk of heart disease. Use olive or canola oil when you cook. Bake, broil, grill, or steam foods instead of frying them. Choose lean meats instead of high-fat meats such as hot dogs and sausages. Cut off all visible fat when you prepare meat. Eat fish, skinless poultry, and meat alternatives such as soy products instead of high-fat meats. Soy products, such as tofu, may be especially good for your heart. Choose low-fat or fat-free milk and dairy products. Eat foods high in fiber  Eat a variety of grain products every day. Include whole-grain foods that have lots of fiber and nutrients. Examples of whole-grain foods include oats, whole wheat bread, and brown rice. Buy whole-grain breads and cereals, instead of white bread or pastries. Limit salt and sodium  Limit how much salt and sodium you eat to help lower your blood pressure. Taste food before you salt it. Add only a little salt when you think you need it. With time, your taste buds will adjust to less salt. Eat fewer snack items, fast foods, and other high-salt, processed foods. Check food labels for the amount of sodium in packaged foods. Choose low-sodium versions of canned goods (such as soups, vegetables, and beans). Limit sugar  Limit drinks and foods with added sugar. These include candy, desserts, and soda pop. Limit alcohol  Limit alcohol to no more than 2 drinks a day for men and 1 drink a day for women. Too much alcohol can cause health problems. When should you call for help? Watch closely for changes in your health, and be sure to contact your doctor if:    You would like help planning heart-healthy meals. Where can you learn more? Go to http://www.silverio.com/  Enter V137 in the search box to learn more about \"Heart-Healthy Diet: Care Instructions. \"  Current as of: August 22, 2019               Content Version: 12.6  © 3844-5520 [employer], [employer]. Care instructions adapted under license by [employer] (which disclaims liability or warranty for this information). If you have questions about a medical condition or this instruction, always ask your healthcare professional. [address] any warranty or liability for your use of this information. Low Back Pain: Exercises  Introduction  Here are some examples of exercises for you to try. The exercises may be suggested for a condition or for rehabilitation. Start each exercise slowly. Ease off the exercises if you start to have pain. You will be told when to start these exercises and which ones will work best for you. How to do the exercises  Press-up   Lie on your stomach, supporting your body with your forearms. Press your elbows down into the floor to raise your upper back. As you do this, relax your stomach muscles and allow your back to arch without using your back muscles. As your press up, do not let your hips or pelvis come off the floor. Hold for 15 to 30 seconds, then relax. Repeat 2 to 4 times. Alternate arm and leg (bird dog) exercise   Do this exercise slowly. Try to keep your body straight at all times, and do not let one hip drop lower than the other. Start on the floor, on your hands and knees. Tighten your belly muscles. Raise one leg off the floor, and hold it straight out behind you. Be careful not to let your hip drop down, because that will twist your trunk. Hold for about 6 seconds, then lower your leg and switch to the other leg. Repeat 8 to 12 times on each leg. Over time, work up to holding for 10 to 30 seconds each time. If you feel stable and secure with your leg raised, try raising the opposite arm straight out in front of you at the same time. Knee-to-chest exercise   Lie on your back with your knees bent and your feet flat on the floor.   Bring one knee to your chest, keeping the other foot flat on the floor (or keeping the other leg straight, whichever feels better on your lower back). Keep your lower back pressed to the floor. Hold for at least 15 to 30 seconds. Relax, and lower the knee to the starting position. Repeat with the other leg. Repeat 2 to 4 times with each leg. To get more stretch, put your other leg flat on the floor while pulling your knee to your chest.    Curl-ups   Lie on the floor on your back with your knees bent at a 90-degree angle. Your feet should be flat on the floor, about 12 inches from your buttocks. Cross your arms over your chest. If this bothers your neck, try putting your hands behind your neck (not your head), with your elbows spread apart. Slowly tighten your belly muscles and raise your shoulder blades off the floor. Keep your head in line with your body, and do not press your chin to your chest.  Hold this position for 1 or 2 seconds, then slowly lower yourself back down to the floor. Repeat 8 to 12 times. Pelvic tilt exercise   Lie on your back with your knees bent. \"Brace\" your stomach. This means to tighten your muscles by pulling in and imagining your belly button moving toward your spine. You should feel like your back is pressing to the floor and your hips and pelvis are rocking back. Hold for about 6 seconds while you breathe smoothly. Repeat 8 to 12 times. Heel dig bridging   Lie on your back with both knees bent and your ankles bent so that only your heels are digging into the floor. Your knees should be bent about 90 degrees. Then push your heels into the floor, squeeze your buttocks, and lift your hips off the floor until your shoulders, hips, and knees are all in a straight line. Hold for about 6 seconds as you continue to breathe normally, and then slowly lower your hips back down to the floor and rest for up to 10 seconds. Do 8 to 12 repetitions. Hamstring stretch in doorway   Lie on your back in a doorway, with one leg through the open door.   Slide your leg up the wall to straighten your knee. You should feel a gentle stretch down the back of your leg. Hold the stretch for at least 15 to 30 seconds. Do not arch your back, point your toes, or bend either knee. Keep one heel touching the floor and the other heel touching the wall. Repeat with your other leg. Do 2 to 4 times for each leg. Hip flexor stretch   Kneel on the floor with one knee bent and one leg behind you. Place your forward knee over your foot. Keep your other knee touching the floor. Slowly push your hips forward until you feel a stretch in the upper thigh of your rear leg. Hold the stretch for at least 15 to 30 seconds. Repeat with your other leg. Do 2 to 4 times on each side. Wall sit   Stand with your back 10 to 12 inches away from a wall. Lean into the wall until your back is flat against it. Slowly slide down until your knees are slightly bent, pressing your lower back into the wall. Hold for about 6 seconds, then slide back up the wall. Repeat 8 to 12 times. Follow-up care is a key part of your treatment and safety. Be sure to make and go to all appointments, and call your doctor if you are having problems. It's also a good idea to know your test results and keep a list of the medicines you take. Where can you learn more? Go to http://www.gray.com/  Enter B284 in the search box to learn more about \"Low Back Pain: Exercises. \"  Current as of: March 2, 2020               Content Version: 12.6  © 8251-5637 Healthwise, Incorporated. Care instructions adapted under license by [facility] (which disclaims liability or warranty for this information). If you have questions about a medical condition or this instruction, always ask your healthcare professional. [place] [address] any warranty or liability for your use of this information. Learning About How to Have a Healthy Back  What causes back pain?      Back pain is often caused by overuse, strain, or injury. For example, people often hurt their backs playing sports or working in the yard, being jolted in a car accident, or lifting something too heavy. Aging plays a part too. Your bones and muscles tend to lose strength as you age, which makes injury more likely. The spongy discs between the bones of the spine (vertebrae) may suffer from wear and tear and no longer provide enough cushion between the bones. A disc that bulges or breaks open (herniated disc) can press on nerves, causing back pain. In some people, back pain is the result of arthritis, broken vertebrae caused by bone loss (osteoporosis), illness, or a spine problem. Although most people have back pain at one time or another, there are steps you can take to make it less likely. How can you have a healthy back? Reduce stress on your back through good posture   Slumping or slouching alone may not cause low back pain. But after the back has been strained or injured, bad posture can make pain worse. Sleep in a position that maintains your back's normal curves and on a mattress that feels comfortable. Sleep on your side with a pillow between your knees, or sleep on your back with a pillow under your knees. These positions can reduce strain on your back. Stand and sit up straight. \"Good posture\" generally means your ears, shoulders, and hips are in a straight line. If you must stand for a long time, put one foot on a stool, ledge, or box. Switch feet every now and then. Sit in a chair that is low enough to let you place both feet flat on the floor with both knees nearly level with your hips. If your chair or desk is too high, use a footrest to raise your knees. Place a small pillow, a rolled-up towel, or a lumbar roll in the curve of your back if you need extra support. Try a kneeling chair, which helps tilt your hips forward. This takes pressure off your lower back. Try sitting on an exercise ball.  It can rock from side to side, which helps keep your back loose. When driving, keep your knees nearly level with your hips. Sit straight, and drive with both hands on the steering wheel. Your arms should be in a slightly bent position. Reduce stress on your back through careful lifting   Squat down, bending at the hips and knees only. If you need to, put one knee to the floor and extend your other knee in front of you, bent at a right angle (half kneeling). Press your chest straight forward. This helps keep your upper back straight while keeping a slight arch in your low back. Hold the load as close to your body as possible, at the level of your belly button (navel). Use your feet to change direction, taking small steps. Lead with your hips as you change direction. Keep your shoulders in line with your hips as you move. Set down your load carefully, squatting with your knees and hips only. Exercise and stretch your back   Do some exercise on most days of the week, if your doctor says it is okay. You can walk, run, swim, or cycle. Stretch your back muscles. Here are a few exercises to try:  Lie on your back, and gently pull one bent knee to your chest. Put that foot back on the floor, and then pull the other knee to your chest.  Do pelvic tilts. Lie on your back with your knees bent. Tighten your stomach muscles. Pull your belly button (navel) in and up toward your ribs. You should feel like your back is pressing to the floor and your hips and pelvis are slightly lifting off the floor. Hold for 6 seconds while breathing smoothly. Sit with your back flat against a wall. Keep your core muscles strong. The muscles of your back, belly (abdomen), and buttocks support your spine. Pull in your belly and imagine pulling your navel toward your spine. Hold this for 6 seconds, then relax. Remember to keep breathing normally as you tense your muscles. Do curl-ups. Always do them with your knees bent.  Keep your low back on the floor, and curl your shoulders toward your knees using a smooth, slow motion. Keep your arms folded across your chest. If this bothers your neck, try putting your hands behind your neck (not your head), with your elbows spread apart. Lie on your back with your knees bent and your feet flat on the floor. Tighten your belly muscles, and then push with your feet and raise your buttocks up a few inches. Hold this position 6 seconds as you continue to breathe normally, then lower yourself slowly to the floor. Repeat 8 to 12 times. If you like group exercise, try Pilates or yoga. These classes have poses that strengthen the core muscles. Lead a healthy lifestyle   Stay at a healthy weight to avoid strain on your back. Do not smoke. Smoking increases the risk of osteoporosis, which weakens the spine. If you need help quitting, talk to your doctor about stop-smoking programs and medicines. These can increase your chances of quitting for good. Where can you learn more? Go to http://www.Medius.com/  Enter L315 in the search box to learn more about \"Learning About How to Have a Healthy Back. \"  Current as of: March 2, 2020               Content Version: 12.6  © 2239-7354 [employer]. Care instructions adapted under license by [employer] (which disclaims liability or warranty for this information). If you have questions about a medical condition or this instruction, always ask your healthcare professional. [address] any warranty or liability for your use of this information.

## 2022-11-08 NOTE — PROGRESS NOTES
1. \"Have you been to the ER, urgent care clinic since your last visit? Hospitalized since your last visit? \" No    2. \"Have you seen or consulted any other health care providers outside of the [address] [place] [address] since your last visit? \" No     3. For patients aged 39-70: Has the patient had a colonoscopy / FIT/ Cologuard? NA - based on age      If the patient is female:    4. For patients aged 41-77: Has the patient had a mammogram within the past 2 years? NA - based on age or sex      11. For patients aged 21-65: Has the patient had a pap smear?  NA - based on age or sex

## 2022-11-10 DIAGNOSIS — Z96.651 STATUS POST TOTAL RIGHT KNEE REPLACEMENT: Primary | ICD-10-CM

## 2022-11-13 NOTE — PROGRESS NOTES
HPI:   Vivien Ruiz is an 81y.o. year old male who presents today for preoperative evaluation. He has a history of hypertension, hyperlipidemia, polymyalgia rheumatica, esophageal hypercontractility, and BPH. He completed the Moderna COVID-19 vaccine series and received one Moderna booster dose. He reports that he is doing relatively well. He ports increasing difficulty with bilateral knee pain and and is now scheduled for sequential knee replacements with Dr. Gretel Parks. He also reports that he has been experiencing increasing difficulty with neck and low back pain and underwent evaluation by Dr. Garrison Beatty on 9/1/2022. He had a cervical MRI performed (7/15/2022) which showed multiple level cervical spondylosis with relative central canal stenosis from C5-C7 of multifactorial basis with moderate to advanced degenerative foraminal stenosis. He also had a lumbar MRI (10/13/2022) which showed marked lumbar hypolordosis with moderate degenerative facet arthropathy, low-grade central canal stenosis, and lateral recess stenosis high-grade bilateral L4/L5 and moderate bilateral L3/L4. He was not felt to have any abnormalities that could be addressed with surgery, and did not find physical therapy to be helpful. He was referred to Dr. Janina Quiñonez of pain management, and received an initial fluoroscopic guided bilateral medial branch blocks at C3, C4, and C5 on 10/18/2022. He states that he is scheduled to receive a series of repeat injections in his neck and lower back to help manage his pain. He states that he wishes to wait until after completion of his neck and back injections before proceeding with his knee surgery. He denies any change in exercise tolerance. He otherwise is without new complaints and feeling generally well.        Summary of prior hospitalizations and medical history:  He has a history of polymyalgia rheumatica, diagnosed in 7/2016 when he was seen for bilateral shoulder pain of several months duration. He had been under the care of Dr. Olive Dunn since 3/2016 when he presented with right wrist pain followed by the development of bilateral shoulder pain. He was treated with a Medrol dose pack and Tramadol and upon follow-up on 5/3/2016, reported some improvement in the bilateral shoulder and wrist discomfort, but pain persisted. Bilateral shoulder MRI's (5/11/2016) showed severe degenerative changes on the right at the acromioclavicular joint with moderate to severe or severe tendinopathy of the supraspinatus and infraspinatus; postoperative changes with tendinopathy of the supraspinatus, infraspinatus, and long head of the biceps was seen on the left. In 5/2016, lab data showed ESR 28, C-Reactive Protein 1.1, negative CRISTI and RF. He was treated with naproxen, tramadol and acetaminophen. He was evaluated on 7/19/2016 for worsening bilateral shoulder pain with bilateral hand pain involving the wrists, MCP and PIP joints. Evaluation included ESR 48; C-reactive protein 1.8; CRISTI positive; anti-RNP 1.4; negative antibodies to Zimmerman, dsDNA, anti-chromatin, RF and anti-CCP. Bilateral hand x-rays showed scattered arthritic changes with scattered small erosions. He was referred to see Dr. Neo Jesus who diagnosed polymyalgia rheumatica and started him on prednisone. Since initiating prednisone, he had significant improvement with nearly complete resolution of his symptoms. He has regained full use of his shoulders and has no trouble reaching behind his back or pulling a shirt over his head. He denies any headache, jaw claudication, scalp tenderness, fever, or visual changes. He successfully weaned off prednisone in 3/2019, although has had short periods requiring restarting steroids for resurgence of symptoms. In 11/2016, he presented with a persistent non productive cough, sore throat, and hoarseness.  He was referred to see Dr. Fela Deras, and patient reports that a laryngoscopy was performed showing evidence of reflux. He was started on Nexium for two weeks with improvement in his cough, but he continued to have pain with swallowing. He was referred to see Dr. Juana Pierre, and underwent upper endoscopy (1/5/2017) showing abnormal esophageal motility with spastic and non-propulsive appearing contractions in the mid to distal esophagus (tortuous); mucosa appeared normal ( random biopsies: negative); normal stomach and duodenum. He underwent a barium swallow (1/11/2017) showing a small persistent smoothly marginated filling defect at the piriform sinus suggestive of an extrinsic mass effect. He subsequently had a neck CT scan (1/13/2017) which showed the right piriform sinus appeared larger than the left, but the walls of both were normal without surrounding mass or infiltrating process; nonspecific lymphadenopathy involving level 1 and 2 nodes. He underwent esophageal manometry, which showed a hypercontractile esophagus (\"Jackhammer\" esophagus). He was started on diltiazem 30 mg three times per day with resolution of symptoms. He discontinued diltiazem in 3/2017 without recurrence of symptoms. He has a history of hypertension, treated with lisinopril. He exercises mainly by walking, and denies any chest pain, shortness of breath at rest or with exertion, palpitations, lightheadedness, or edema. He also has hyperlipidemia, treated with rosuvastatin two days per week. He has a history of osteoarthritis, and underwent left total hip arthroplasty with Dr. Rachael Knight on 5/26/2021 at [facility]. He did well without complications. He states that he has completed physical therapy and is no longer experiencing any pain. He also has a history of BPH and underwent TURP in 2006. In 2/2011, he had laser vaporization of the prostate to relieve bladder outlet obstructive symptoms. In 3/2011 he had a laser incision performed on the bladder neck contracture.  In 5/2012 and 8/2012, he underwent transurethral incision of the bladder neck contracture. Biopsy of the contracture (8/2012) was negative for malignancy. Since that time, he has had annual cystoscopic surveillance of the bladder neck contracture by Dr. Benny Albert. In 8/2015, it was felt that no more surveillance was necessary as it had remained stable. He continues to take Proscar, but reports that he stopped Flomax on his own and began taking Super Beta prostate supplement instead. He being followed by Dr. Benny Albert. He denies difficulty with stream, dysuria, or hematuria. He does report 2-3 episodes of nocturia. He had a screening colonoscopy in 4/2012 by Dr. Garfield Modi which was normal. Follow-up recommended for 10 years. He had a repeat colonoscopy in 1/2020 by Dr. Juno Olea for evaluation of rectal bleeding, showing mild diverticulosis in the descending/ sigmoid colon, medium grade/stage 2 internal hemorrhoids with evidence of recent bleeding, and three sessile 4-7 mm polyps in the hepatic flexure (pathology: tubular adenoma), transverse colon (pathology: tubular adenoma), and rectum (pathology: hyperplastic). No further screening felt to be needed. He denies any abdominal pain, nausea, vomiting, melena, hematochezia, or change in bowel movements.       Past Medical History:   Diagnosis Date    Bladder neck contracture     BPH with obstruction/lower urinary tract symptoms     Essential hypertension with goal blood pressure less than 140/90     Family history of colon cancer     History of blood transfusion 2015    with shoulder replacement    Hyperlipidemia     Osteoarthritis     Osteoarthritis of left hip     PMR (polymyalgia rheumatica) ([address] 7/20/2016    Skin cancer     basal cell on face    Urinary retention      Past Surgical History:   Procedure Laterality Date    CYSTOSCOPY      ENDOSCOPY, COLON, DIAGNOSTIC      [facility]EN[facility] LASER [id number]  3/2/11    Laser incision of bladder neck contracture    HX BACK SURGERY      lower    HX MOHS PROCEDURES  1/11    rt hip repl    HX ORTHOPAEDIC Right 2015 shoulder replacement    HX OTHER SURGICAL      tonsillectomy, back sx,     HX OTHER SURGICAL      basel cell removed from right ear lobe    HX SHOULDER ARTHROSCOPY Left 2010    HX TURP      Laser    [place] LASER VAPORIZATION SURGERY PROSTATE, COMPLETE       Current Outpatient Medications   Medication Sig    lisinopriL (PRINIVIL, ZESTRIL) 5 mg tablet TAKE 1 TABLET BY MOUTH DAILY    methocarbamoL (ROBAXIN) 500 mg tablet Take 500 mg by mouth four (4) times daily. finasteride (PROSCAR) 5 mg tablet Take 1 Tablet by mouth daily. tamsulosin (FLOMAX) 0.4 mg capsule Take 1 Capsule by mouth daily (after dinner). rosuvastatin (CRESTOR) 5 mg tablet Take 1 Tablet by mouth daily. acetaminophen (TYLENOL) 500 mg tablet Take 2 Tablets by mouth every six (6) hours. DOCUSATE SODIUM (STOOL SOFTENER PO) Take  by mouth daily. multivitamin (ONE A DAY) tablet Take 1 Tab by mouth daily. cholecalciferol (VITAMIN D3) 25 mcg (1,000 unit) cap Take  by mouth daily. GLUC/ERICA-MSM#1/C/CHARLY/RAMÓN/BOR (OSTEO BI-FLEX PO) Take 1 Tab by mouth daily. No current facility-administered medications for this visit. Allergies and Intolerances:   No Known Allergies     Family History: His father  from CAD, and his brother has had CABG. His mother  from ovarian cancer. Family History   Problem Relation Age of Onset    Cancer Other     Heart Disease Other      Social History:   He  reports that he has never smoked. He has never used smokeless tobacco. He is  but has been living with his long time girlfriend Danielle Carlson) for 37 years. He has two step children. 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      Social History     Substance and Sexual Activity   Alcohol Use Yes    Alcohol/week: 7.0 standard drinks    Types: 7 Glasses of wine per week    Comment: wine with dinner 3 times a week     Immunization History:  Immunization History   Administered Date(s) Administered     Influenza, [address] (age 72 y+), High Dose 11/04/2021    (RETIRED) Pneumococcal Vaccine (Unspecified Type) 03/20/2008    COVID-19, MODERNA BLUE border, Primary or Immunocompromised, (age 18y+), IM, 100 mcg/0.5mL 01/30/2021, 02/27/2021    COVID-19, MODERNA Booster BLUE border, (age 18y+), IM, 50mcg/0.25mL 12/01/2021    Influenza High Dose Vaccine PF 11/14/2013, 11/30/2015, 11/10/2016, 09/14/2017    Influenza Vaccine 11/17/2014, 09/01/2020    Influenza Vaccine (Tri) Adjuvanted (>65 Yrs FLUAD TRI [id number]) 09/20/2018, 09/24/2019    Influenza Vaccine Split 11/29/2012    Influenza, FLUAD, (age 72 y+), Adjuvanted 11/19/2020, 09/22/2022    Pneumococcal Conjugate (PCV-13) 05/29/2015    Pneumococcal Polysaccharide (PPSV-23) 03/20/2008    TDAP Vaccine 04/05/2011    Tdap 05/10/2022    Zoster 04/24/2012    Zoster Recombinant 10/10/2019, 03/17/2020       Review of Systems:   As above included in HPI. Otherwise 11 point review of systems negative including constitutional, skin, HENT, eyes, respiratory, cardiovascular, gastrointestinal, genitourinary, musculoskeletal, endocrine, hematologic, allergy, and neurologic. Physical:   Visit Vitals  /70 (BP 1 Location: Left upper arm, BP Patient Position: Sitting)   Pulse 87   Temp 97.8 °F (36.6 °C) (Temporal)   Resp 16   Ht 6' 2\" (1.88 m)   Wt 248 lb (112.5 kg)   SpO2 97%   BMI 31.84 kg/m²        Patient appears in no apparent distress. Affect is appropriate. HEENT: PERRLA, anicteric, oropharynx clear, no JVD, adenopathy or thyromegaly. No carotid bruits or radiated murmur. Lungs: clear to auscultation, no wheezes, rhonchi, or rales. Heart: regular rate and rhythm. No murmur, rubs, gallops  Abdomen: soft, nontender, nondistended, normal bowel sounds, no hepatosplenomegaly or masses. Extremities: without edema. Review of Data:  Labs:  No visits with results within 1 Month(s) from this visit.    Latest known visit with results is:   Hospital Outpatient Visit on 09/15/2022   Component Date Value Ref Range Status    WBC 09/15/2022 6.0  4.6 - 13.2 K/uL Final    RBC 09/15/2022 4.33 (A)  4.35 - 5.65 M/uL Final    HGB 09/15/2022 13.8  13.0 - 16.0 g/dL Final    HCT 09/15/2022 42.5  36.0 - 48.0 % Final    MCV 09/15/2022 98.2  78.0 - 100.0 FL Final    MCH 09/15/2022 31.9  24.0 - 34.0 PG Final    MCHC 09/15/2022 32.5  31.0 - 37.0 g/dL Final    RDW 09/15/2022 12.7  11.6 - 14.5 % Final    PLATELET 32/06/5457 420  135 - 420 K/uL Final    MPV 09/15/2022 9.8  9.2 - 11.8 FL Final    NRBC 09/15/2022 0.0  0  WBC Final    ABSOLUTE NRBC 09/15/2022 0.00  0.00 - 0.01 K/uL Final    NEUTROPHILS 09/15/2022 61  40 - 73 % Final    LYMPHOCYTES 09/15/2022 29  21 - 52 % Final    MONOCYTES 09/15/2022 9  3 - 10 % Final    EOSINOPHILS 09/15/2022 1  0 - 5 % Final    BASOPHILS 09/15/2022 0  0 - 2 % Final    IMMATURE GRANULOCYTES 09/15/2022 0  0.0 - 0.5 % Final    ABS. NEUTROPHILS 09/15/2022 3.6  1.8 - 8.0 K/UL Final    ABS. LYMPHOCYTES 09/15/2022 1.7  0.9 - 3.6 K/UL Final    ABS. MONOCYTES 09/15/2022 0.5  0.05 - 1.2 K/UL Final    ABS. EOSINOPHILS 09/15/2022 0.1  0.0 - 0.4 K/UL Final    ABS. BASOPHILS 09/15/2022 0.0  0.0 - 0.1 K/UL Final    ABS. IMM.  GRANS. 09/15/2022 0.0  0.00 - 0.04 K/UL Final    DF 09/15/2022 AUTOMATED    Final    Hemoglobin A1c 09/15/2022 5.3  4.2 - 5.6 % Final    Est. average glucose 09/15/2022 105  mg/dL Final    LIPID PROFILE 09/15/2022        Final    Cholesterol, total 09/15/2022 147  <200 MG/DL Final    Triglyceride 09/15/2022 121  <150 MG/DL Final    HDL Cholesterol 09/15/2022 44  40 - 60 MG/DL Final    LDL, calculated 09/15/2022 78.8  0 - 100 MG/DL Final    VLDL, calculated 09/15/2022 24.2  MG/DL Final    CHOL/HDL Ratio 09/15/2022 3.3  0 - 5.0   Final    Magnesium 09/15/2022 2.3  1.6 - 2.6 mg/dL Final    Sodium 09/15/2022 139  136 - 145 mmol/L Final    Potassium 09/15/2022 4.7  3.5 - 5.5 mmol/L Final    Chloride 09/15/2022 106  100 - 111 mmol/L Final    CO2 09/15/2022 29  21 - 32 mmol/L Final    Anion gap 09/15/2022 4  3.0 - 18 mmol/L Final    Glucose 09/15/2022 93  74 - 99 mg/dL Final    BUN 09/15/2022 13  7.0 - 18 MG/DL Final    Creatinine 09/15/2022 1.07  0.6 - 1.3 MG/DL Final    BUN/Creatinine ratio 09/15/2022 12  12 - 20   Final    GFR est AA 09/15/2022 >60  >60 ml/min/1.73m2 Final    GFR est non-AA 09/15/2022 >60  >60 ml/min/1.73m2 Final    Calcium 09/15/2022 8.8  8.5 - 10.1 MG/DL Final    Bilirubin, total 09/15/2022 0.5  0.2 - 1.0 MG/DL Final    ALT (SGPT) 09/15/2022 25  16 - 61 U/L Final    AST (SGOT) 09/15/2022 10  10 - 38 U/L Final    Alk. phosphatase 09/15/2022 68  45 - 117 U/L Final    Protein, total 09/15/2022 6.9  6.4 - 8.2 g/dL Final    Albumin 09/15/2022 4.1  3.4 - 5.0 g/dL Final    Globulin 09/15/2022 2.8  2.0 - 4.0 g/dL Final    A-G Ratio 09/15/2022 1.5  0.8 - 1.7   Final    TSH 09/15/2022 1.94  0.36 - 3.74 uIU/mL Final    Vitamin D 25-Hydroxy 09/15/2022 54.9  30 - 100 ng/mL Final    Color 09/15/2022 YELLOW    Final    Appearance 09/15/2022 CLEAR    Final    Specific gravity 09/15/2022 1.016  1.005 - 1.030   Final    pH (UA) 09/15/2022 5.5  5.0 - 8.0   Final    Protein 09/15/2022 Negative  NEG mg/dL Final    Glucose 09/15/2022 Negative  NEG mg/dL Final    Ketone 09/15/2022 Negative  NEG mg/dL Final    Bilirubin 09/15/2022 Negative  NEG   Final    Blood 09/15/2022 Negative  NEG   Final    Urobilinogen 09/15/2022 0.2  0.2 - 1.0 EU/dL Final    Nitrites 09/15/2022 Negative  NEG   Final    Leukocyte Esterase 09/15/2022 Negative  NEG   Final     EKG (8/9/2022) sinus rhythm at 84 bpm, incomplete RBBB; unchanged from prior in 9/2020. Chest x-ray (8/9/2022) negative      Health Maintenance:  Screening:    Colorectal: colonoscopy (1/2020) tubular adenomas. Dr. Hina Shaw. No further screening needed.     Depression: none   DM (HbA1c/FPG): FPG 93/ HbA1c 5.3 (9/2022)   Hepatitis C: negative (7/2016)   Falls: none   DEXA: within normal limits (5/2017)    PSA/HOLA: PSA 0.5 (11/2019); HOLA per Dr. Bhavani Beauchamp   Glaucoma: regular eye exams with Dr. Jus Grant (last 12/2021)    Smoking: none   Vitamin D: 54.9 (9/2022)   Medicare Wellness: 9/22/2022      Impression:  Patient Active Problem List   Diagnosis Code    BPH with obstruction/lower urinary tract symptoms N40.1, N13.8    Bladder neck contracture N32.0    Hyperlipidemia E78.5    Vitamin D insufficiency E55.9    Hearing loss H91.90    Essential hypertension I10    Primary osteoarthritis M19.91    PMR (polymyalgia rheumatica) ([facility]) M35.3    Hoarseness R49.0    Esophageal dysmotility K22.4    Positive CRISTI (antinuclear antibody)/ RNP antibody R76.8    Nodule of finger of right hand R22.31    Class 1 obesity due to excess calories with serious comorbidity in adult E66.09    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    Cervical spondylosis M47.812    Lumbar spondylosis M47.816    Neck pain M54.2    Chronic bilateral low back pain without sciatica M54.50, G89.29       Plan:  Preoperative risk stratification:  Patient is scheduled to undergo bilateral sequential knee replacement surgery on 1/16/2023 (right) and 4/5/2023 (left). He currently has no symptoms suggestive of angina or decompensated heart failure. He maintains an active lifestyle and has no functional limitations. He had preoperative labs, chest x-ray, and ECG on 8/9/2022 which were unremarkable and unchanged. He is currently clinically stable and without absolute medical contraindication for surgery. He is low risk for a wyatt-procedural cardiac event. Surgery may proceed as planned at the discretion of Dr. Carmen Massey. Chronic issues:  1. Hypertension. Blood pressure remains well controlled on lisinopril 5 mg daily. Renal function remains normal with creatinine 1.07/ eGFR >60. Encouraged to increase fluid intake and avoid NSAIDS. Will continue to follow closely. 2. Hyperlipidemia.  On low intensity dose rosuvastatin and increased frequency of dosing to 5 mg daily at last visit with improvement in lipid panel to LDL 78 and HDL 44 (9/2022). Emphasized importance of continued attempts at lifestyle modifications, including heart healthy diet, regular exercise, and weight loss. 3. Polymyalgia rheumatica. Patient had significantly improved with steroid treatment. Had successfully tapered off prednisone in 3/2019 and released from care by Dr. Jaquelin Méndez. However, reported worsening shoulder symptoms with bilateral pain in 5/2020, and evaluated by Dr. Jaquelin Méndez. Treated with two courses of Sterapred dose packs with some improvement. Restarted on prednisone 5 mg daily and advised by Dr. Jaquelin Méndez to continue at this dose until after his hip surgery. Successfully tapered following surgery without increase in symptoms. Known increased risk (10%) of concurrent giant cell arteritis with PMR, but no evidence of GCA. Also with positive CRISTI/ positive RNP Ab, but no evidence of inflammation elsewhere. Urinalysis continues to be negative for proteinuria. Given intermittent chronic steroid use, bone density scan in 5/2017 normal. Reports no current PMR symptoms but chronic difficulty with neck and low back pain as well as bilateral knee pain related to osteoarthritis. Continuing to follow with Dr. Jaquelin Méndez. 4. Osteoarthritis, left hip s/p left KARLENE. Underwent left total hip arthroplasty with Dr. Wilder Jarquin on 2/99/6796 without complications. Now with difficulty due to bilateral knee pain and scheduled for sequential knee replacements with Dr. Patterson Hatchet on 1/16/2023 (right) and 4/5/2023 (left). However, right knee replacement was originally scheduled on 12/5/2022, but postponed until 1/2023 so that may complete spinal injections given increase in neck and back pain. 5. Cervical spondylosis. Reported increasing neck pain with referral to Dr. Dagoberto Sarabia and received physical therapy without significant improvement. .  Completed cervical MRI (7/15/2022) which showed multiple level cervical spondylosis with relative central canal stenosis from C5-C7 of multifactorial basis with moderate to advanced degenerative foraminal stenosis. Referred to Dr. Malika Tripathi of pain management and underwent fluoroscopy guided bilateral medial branch blocks C3, C4, and C5. Reports planning to receive two more injections 4 weeks apart to help manage pain. 6. Lumbar spondylosis. Experiencing increasing low back pain with referral to Dr. Zoey Barboza and received physical therapy without significant improvement. Underwent lumbar MRI (10/13/2022) which showed marked lumbar hypolordosis with moderate degenerative facet arthropathy, low-grade central canal stenosis, and lateral recess stenosis high-grade bilateral L4/L5 and moderate bilateral L3/L4. Referred to Dr. Malika Tripathi of pain management and reports scheduled to receive series of injections to help manage pain. 7. BPH with bladder neck contracture. Appears to be asymptomatic on current treatment with Proscar. Patient decided to discontinue Flomax on his own, but reported worsening nocturia and started on finasteride by Dr. Grace Dudley. PVR assessed at last visit on 9/12/2022 and was 0 cc. Restarted on Flomax 0.4 mg and continuing finasteride. Continuing to follow with Jenny Nance annually. 8. Odynophagia. Secondary to esophageal dysmotility. Responded to diltiazem previously but no longer taking it without recurrence of symptoms. Hoarseness now at baseline. Reportedly using Flonase as needed. 9. Abnormal fasting glucose. FPG normal today (9/2022) and HbA1c remains normal at 5.3. Will continue to monitor. 10. History of basal cell carcinoma, face. Underwent extensive wide excision in 9/2020 by Dr. Medina Carney and required skin graft. Continuing surveillance exams with Dr. Argie Holter annually. 11. Obesity. Weight remains overall stable today. Emphasized importance of lifestyle modifications, including heart healthy diet, regular exercise, and weight loss. Will readdress next visit. 12. Health maintenance. Completed Moderna COVID 19 vaccine series and received one Moderna booster dose. Advised to proceed with the updated bivalent booster dose and reports already scheduled. Already received the influenza vaccine. Completed 2/2 doses of Shingrix vaccine. Other immunizations up to date. Colonoscopy completed and no further screening needed. Continue regular eye exams with Dr. Dm Herrera. Vitamin D level has been normal on maintenance dose supplement. Medicare wellness visit up-to-date. Patient understands recommendations and agrees with plan. Follow-up in 6 months. Future orders:    ICD-10-CM ICD-9-CM    1. Preoperative evaluation to rule out surgical contraindication  Z01.818 V72.83       2. Essential hypertension  I10 401.9       3. Hyperlipidemia, unspecified hyperlipidemia type  E78.5 272.4       4. Primary osteoarthritis of both knees  M17.0 715.16       5. Cervical spondylosis  M47.812 721.0       6. Lumbar spondylosis  M47.816 721.3       7. Neck pain  M54.2 723.1       8. Chronic bilateral low back pain without sciatica  M54.50 724.2     G89.29 338.29       9. BPH with obstruction/lower urinary tract symptoms  N40.1 600.01     N13.8 599.69       10.  Class 1 obesity due to excess calories with serious comorbidity and body mass index (BMI) of 31.0 to 31.9 in adult  E66.09 278.00     Z68.31 V85.31

## 2022-12-06 ENCOUNTER — APPOINTMENT (OUTPATIENT)
Dept: PHYSICAL THERAPY | Age: 82
End: 2022-12-06

## 2022-12-07 DIAGNOSIS — Z96.651 STATUS POST TOTAL RIGHT KNEE REPLACEMENT: Primary | ICD-10-CM

## 2022-12-14 ENCOUNTER — APPOINTMENT (OUTPATIENT)
Dept: PHYSICAL THERAPY | Age: 82
End: 2022-12-14

## 2023-01-05 ENCOUNTER — HOSPITAL ENCOUNTER (OUTPATIENT)
Dept: PREADMISSION TESTING | Age: 83
Discharge: HOME | End: 2023-01-05

## 2023-01-05 ENCOUNTER — OFFICE VISIT (OUTPATIENT)
Dept: ORTHOPEDIC SURGERY | Age: 83
End: 2023-01-05
Payer: MEDICARE

## 2023-01-05 DIAGNOSIS — M25.562 PAIN IN BOTH KNEES, UNSPECIFIED CHRONICITY: Primary | ICD-10-CM

## 2023-01-05 DIAGNOSIS — M25.561 PAIN IN BOTH KNEES, UNSPECIFIED CHRONICITY: Primary | ICD-10-CM

## 2023-01-05 DIAGNOSIS — M17.0 OSTEOARTHRITIS OF BOTH KNEES, UNSPECIFIED OSTEOARTHRITIS TYPE: ICD-10-CM

## 2023-01-05 RX ORDER — ONDANSETRON 4 MG/1
4 TABLET, ORALLY DISINTEGRATING ORAL
Qty: 20 TABLET | Refills: 0 | Status: SHIPPED | OUTPATIENT
Start: 2023-01-05

## 2023-01-05 RX ORDER — OXYCODONE AND ACETAMINOPHEN 5; 325 MG/1; MG/1
1 TABLET ORAL
Qty: 30 TABLET | Refills: 0 | Status: SHIPPED | OUTPATIENT
Start: 2023-01-05 | End: 2023-01-19

## 2023-01-05 RX ORDER — CEPHALEXIN 500 MG/1
500 CAPSULE ORAL EVERY 8 HOURS
Qty: 9 CAPSULE | Refills: 0 | Status: SHIPPED | OUTPATIENT
Start: 2023-01-05 | End: 2023-01-08

## 2023-01-05 NOTE — PROGRESS NOTES
Name: Timoteo Whittington    : 1940     Service Dept: [address] [place] [address] and Sports Medicine    Chief Complaint   Patient presents with    Knee Pain    Pre-op Exam        There were no vitals taken for this visit. No Known Allergies     Current Outpatient Medications   Medication Sig Dispense Refill    lisinopriL (PRINIVIL, ZESTRIL) 5 mg tablet TAKE 1 TABLET BY MOUTH DAILY 90 Tablet 3    methocarbamoL (ROBAXIN) 500 mg tablet Take 500 mg by mouth four (4) times daily. finasteride (PROSCAR) 5 mg tablet Take 1 Tablet by mouth daily. 90 Tablet 3    rosuvastatin (CRESTOR) 5 mg tablet Take 1 Tablet by mouth daily. 90 Tablet 3    acetaminophen (TYLENOL) 500 mg tablet Take 2 Tablets by mouth every six (6) hours. DOCUSATE SODIUM (STOOL SOFTENER PO) Take  by mouth daily. multivitamin (ONE A DAY) tablet Take 1 Tab by mouth daily. cholecalciferol (VITAMIN D3) 25 mcg (1,000 unit) cap Take  by mouth daily. GLUC/ERICA-MSM#1/C/CHARLY/RAMÓN/BOR (OSTEO BI-FLEX PO) Take 1 Tab by mouth daily.           Patient Active Problem List   Diagnosis Code    BPH with obstruction/lower urinary tract symptoms N40.1, N13.8    Bladder neck contracture N32.0    Hyperlipidemia E78.5    Vitamin D insufficiency E55.9    Hearing loss H91.90    Essential hypertension I10    Primary osteoarthritis M19.91    PMR (polymyalgia rheumatica) ([facility]) M35.3    Hoarseness R49.0    Esophageal dysmotility K22.4    Positive CRISTI (antinuclear antibody)/ RNP antibody R76.8    Nodule of finger of right hand R22.31    Class 1 obesity due to excess calories with serious comorbidity in adult E66.09    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    Cervical spondylosis M47.812    Lumbar spondylosis M47.816    Neck pain M54.2    Chronic bilateral low back pain without sciatica M54.50, G89.29      Family History   Problem Relation Age of Onset    Cancer Other     Heart Disease Other       Social History     Socioeconomic History    Marital status: SINGLE   Tobacco Use    Smoking status: Never    Smokeless tobacco: Never   Vaping Use    Vaping Use: Never used   Substance and Sexual Activity    Alcohol use: Yes     Alcohol/week: 7.0 standard drinks     Types: 7 Glasses of wine per week     Comment: wine with dinner 3 times a week    Drug use: No    Sexual activity: Yes     Partners: Female      Past Surgical History:   Procedure Laterality Date    CYSTOSCOPY      ENDOSCOPY, COLON, DIAGNOSTIC      [facility] BERENICE LASER [id number]  3/2/11    Laser incision of bladder neck contracture    HX BACK SURGERY      lower    HX MOHS PROCEDURES  1/11    rt hip repl    HX ORTHOPAEDIC Right 2015    shoulder replacement    HX OTHER SURGICAL      tonsillectomy, back sx,     HX OTHER SURGICAL      basel cell removed from right ear lobe    HX SHOULDER ARTHROSCOPY Left 2010    HX TURP      Laser    [place] LASER VAPORIZATION OF PROSTATE FOR URINE FLOW        Past Medical History:   Diagnosis Date    Bladder neck contracture     BPH with obstruction/lower urinary tract symptoms     Essential hypertension with goal blood pressure less than 140/90     Family history of colon cancer     History of blood transfusion 2015    with shoulder replacement    Hyperlipidemia     Osteoarthritis     Osteoarthritis of left hip     PMR (polymyalgia rheumatica) ([facility] [address] 7/20/2016    Skin cancer     basal cell on face    Urinary retention         I have reviewed and agree with PFSH and ROS and intake form in chart and the record furthermore I have reviewed prior medical record(s) regarding this patients care during this appointment. Review of Systems:   Patient is a pleasant appearing individual, appropriately dressed, well hydrated, well nourished, who is alert, appropriately oriented for age, and in no acute distress with a normal gait and normal affect who does not appear to be in any significant pain.     Physical Exam:  Right Knee -Decrease range of motion with flexion, Knee arc of greater than 50 degrees, Some crepitation, Grossly neurovascularly intact, Good cap refill, No skin lesion, Moderate swelling, some gross instability, Some quadriceps weakness, Kellgren and Hussein at least grade 3    Left Knee - Full Range of Motion, No crepitation, Grossly neurovascularly intact, Good cap refill, No skin lesion, No swelling, No gross instability, No quadriceps weakness     Inpatient status: The patient has admitted to severe pain in the affected knee and due to such pain they are unable to complete activities of daily living at home and/or work on a regular basis where conservative treatments have failed. After extensive discussion with the patient, they have chosen to receive a total knee replacement with the expectation of inpatient procedure. Their dependent functional status (i.e. lack of capable support and safety at home, pain management, comorbities, or difficulty ambulating with assistive walking devices) would deem them a candidate for an inpatient stay. The patient acknowledges and understand the plan. The risks of surgery were explained to the patient which include but not limited to infection, nerve injury, artery injury, tendon injury, poor result, poor wound healing, unforeseen incidence, bleeding, infection, nerve damage, failure to improve, worsening of symptoms, morbidity, and mortality risks were explained. All questions were answered. Patient was told of no guarantees. Patient accepts all risks and benefits. A consent for surgery will be documented and signed by the patient or a legal guardian. All questions were answered. The procedure was explained in detail. The patient was counseled about the risks of renee Covid-19 during their perioperative period and any recovery window from their procedure. The patient was made aware that renee Covid-19 may worsen their prognosis for recovering from their procedure and lend to a higher morbidity and/or mortality risk. All material risks, benefits, and reasonable alternatives including postponing the procedure were discussed. The patient DOES wish to proceed with their procedure at this time. Encounter Diagnoses     ICD-10-CM ICD-9-CM   1. Pain in both knees, unspecified chronicity  M25.561 719.46    M25.562    2. Osteoarthritis of both knees, unspecified osteoarthritis type  M17.0 715.96       HPI:  The patient is here with a chief complaint of right knee pain, throbbing, burning pain, diagnosed with osteoarthritis. Pain is 5/10. He is medically cleared. X-rays of the right knee are positive for severe OA. Assessment/Plan:  Plan will be for right total knee replacement. No history of blood clots, does not take any blood thinners. He will be an inpatient stay for 1 night. Percocet, Keflex, Zofran and aspirin for DVT prophylaxis and go from there. As part of continued conservative pain management options the patient was advised to utilize Tylenol or OTC NSAIDS as long as it is not medically contraindicated. Return to Office: Follow-up and Dispositions    Return for already scheduled for surgery. Scribed by Kris Rodriguez LPN as dictated by [employer] - [facility] [place] GEORGE Mendez MD.  Documentation, performed by, True and Accepted Joe Mendez MD

## 2023-01-05 NOTE — PATIENT INSTRUCTIONS
Knee Arthritis: Care Instructions  Overview     Knee arthritis is a breakdown of the cartilage that cushions your knee joint. When the cartilage wears down, your bones rub against each other. This causes pain and stiffness. Knee arthritis tends to get worse with time. Treatment for knee arthritis involves reducing pain, making the leg muscles stronger, and staying at a healthy body weight. The treatment usually does not improve the health of the cartilage, but it can reduce pain and improve how well your knee works. You can take simple measures to protect your knee joints, ease your pain, and help you stay active. Follow-up care is a key part of your treatment and safety. Be sure to make and go to all appointments, and call your doctor if you are having problems. It's also a good idea to know your test results and keep a list of the medicines you take. How can you care for yourself at home? Know that knee arthritis will cause more pain on some days than on others. Stay at a healthy weight. Lose weight if you are overweight. When you stand up, the pressure on your knees from every pound of body weight is multiplied four times. So if you lose 10 pounds, you will reduce the pressure on your knees by 40 pounds. Talk to your doctor or physical therapist about exercises that will help ease joint pain. Stretch to help prevent stiffness and to prevent injury before you exercise. You may enjoy gentle forms of yoga to help keep your knee joints and muscles flexible. Walk instead of jog. Ride a bike. This makes your thigh muscles stronger and takes pressure off your knee. Wear well-fitting and comfortable shoes. Exercise in chest-deep water. This can help you exercise longer with less pain. Avoid exercises that include squatting or kneeling. They can put a lot of strain on your knees. Talk to your doctor to make sure that the exercise you do is not making the arthritis worse.   Do not sit for long periods of time. Try to walk once in a while to keep your knee from getting stiff. Ask your doctor or physical therapist whether shoe inserts may reduce your arthritis pain. If you can afford it, get new athletic shoes at least every year. This can help reduce the strain on your knees. Use a device to help you do everyday activities. A cane or walking stick can help you keep your balance when you walk. Hold the cane or walking stick in the hand opposite the painful knee. If you feel like you may fall when you walk, try using crutches or a front-wheeled walker. These can prevent falls that could cause more damage to your knee. A knee brace may help keep your knee stable and prevent pain. You also can use other things to make life easier, such as a higher toilet seat and handrails in the bathtub or shower. Take pain medicines exactly as directed. Do not wait until you are in severe pain. You will get better results if you take it sooner. If you are not taking a prescription pain medicine, take an over-the-counter medicine such as acetaminophen (Tylenol), ibuprofen (Advil, Motrin), or naproxen (Aleve). Read and follow all instructions on the label. Do not take two or more pain medicines at the same time unless the doctor told you to. Many pain medicines have acetaminophen, which is Tylenol. Too much acetaminophen (Tylenol) can be harmful. Tell your doctor if you take a blood thinner, have diabetes, or have allergies to shellfish. Ask your doctor if you might benefit from a shot of steroid medicine into your knee. This may provide pain relief for several months. Many people take the supplements glucosamine and chondroitin for osteoarthritis. Some people feel they help, but the medical research does not show that they work. Talk to your doctor before you take these supplements. When should you call for help?    Call your doctor now or seek immediate medical care if:    You have sudden swelling, warmth, or pain in your knee.     You have knee pain and a fever or rash. You have such bad pain that you cannot use your knee. Watch closely for changes in your health, and be sure to contact your doctor if you have any problems. Where can you learn more? Go to http://www.gray.com/  Enter W187 in the search box to learn more about \"Knee Arthritis: Care Instructions. \"  Current as of: March 9, 2022               Content Version: 13.4  © 2006-2022 [employer]. Care instructions adapted under license by [employer] (which disclaims liability or warranty for this information). If you have questions about a medical condition or this instruction, always ask your healthcare professional. [address] any warranty or liability for your use of this information.

## 2023-01-05 NOTE — H&P (VIEW-ONLY)
Name: Alexa Perez    : 1940     Service Dept: [address] [place] [address] Sports Medicine    Chief Complaint   Patient presents with    Knee Pain    Pre-op Exam        There were no vitals taken for this visit. No Known Allergies     Current Outpatient Medications   Medication Sig Dispense Refill    lisinopriL (PRINIVIL, ZESTRIL) 5 mg tablet TAKE 1 TABLET BY MOUTH DAILY 90 Tablet 3    methocarbamoL (ROBAXIN) 500 mg tablet Take 500 mg by mouth four (4) times daily. finasteride (PROSCAR) 5 mg tablet Take 1 Tablet by mouth daily. 90 Tablet 3    rosuvastatin (CRESTOR) 5 mg tablet Take 1 Tablet by mouth daily. 90 Tablet 3    acetaminophen (TYLENOL) 500 mg tablet Take 2 Tablets by mouth every six (6) hours. DOCUSATE SODIUM (STOOL SOFTENER PO) Take  by mouth daily. multivitamin (ONE A DAY) tablet Take 1 Tab by mouth daily. cholecalciferol (VITAMIN D3) 25 mcg (1,000 unit) cap Take  by mouth daily. GLUC/ERICA-MSM#1/C/CHARLY/RAMÓN/BOR (OSTEO BI-FLEX PO) Take 1 Tab by mouth daily.           Patient Active Problem List   Diagnosis Code    BPH with obstruction/lower urinary tract symptoms N40.1, N13.8    Bladder neck contracture N32.0    Hyperlipidemia E78.5    Vitamin D insufficiency E55.9    Hearing loss H91.90    Essential hypertension I10    Primary osteoarthritis M19.91    PMR (polymyalgia rheumatica) ([facility]) M35.3    Hoarseness R49.0    Esophageal dysmotility K22.4    Positive CRISTI (antinuclear antibody)/ RNP antibody R76.8    Nodule of finger of right hand R22.31    Class 1 obesity due to excess calories with serious comorbidity in adult E66.09    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    Cervical spondylosis M47.812    Lumbar spondylosis M47.816    Neck pain M54.2    Chronic bilateral low back pain without sciatica M54.50, G89.29      Family History   Problem Relation Age of Onset    Cancer Other     Heart Disease Other       Social History     Socioeconomic History    Marital status: SINGLE   Tobacco Use    Smoking status: Never    Smokeless tobacco: Never   Vaping Use    Vaping Use: Never used   Substance and Sexual Activity    Alcohol use: Yes     Alcohol/week: 7.0 standard drinks     Types: 7 Glasses of wine per week     Comment: wine with dinner 3 times a week    Drug use: No    Sexual activity: Yes     Partners: Female      Past Surgical History:   Procedure Laterality Date    CYSTOSCOPY      ENDOSCOPY, COLON, DIAGNOSTIC      HC BERENICE LASER [id number]  3/2/11    Laser incision of bladder neck contracture    HX BACK SURGERY      lower    HX MOHS PROCEDURES  1/11    rt hip repl    HX ORTHOPAEDIC Right 2015    shoulder replacement    HX OTHER SURGICAL      tonsillectomy, back sx,     HX OTHER SURGICAL      basel cell removed from right ear lobe    HX SHOULDER ARTHROSCOPY Left 2010    HX TURP      Laser    [place] LASER VAPORIZATION OF PROSTATE FOR URINE FLOW        Past Medical History:   Diagnosis Date    Bladder neck contracture     BPH with obstruction/lower urinary tract symptoms     Essential hypertension with goal blood pressure less than 140/90     Family history of colon cancer     History of blood transfusion 2015    with shoulder replacement    Hyperlipidemia     Osteoarthritis     Osteoarthritis of left hip     PMR (polymyalgia rheumatica) ([facility] [address] 7/20/2016    Skin cancer     basal cell on face    Urinary retention         I have reviewed and agree with PFSH and ROS and intake form in chart and the record furthermore I have reviewed prior medical record(s) regarding this patients care during this appointment. Review of Systems:   Patient is a pleasant appearing individual, appropriately dressed, well hydrated, well nourished, who is alert, appropriately oriented for age, and in no acute distress with a normal gait and normal affect who does not appear to be in any significant pain.     Physical Exam:  Right Knee -Decrease range of motion with flexion, Knee arc of greater than 50 degrees, Some crepitation, Grossly neurovascularly intact, Good cap refill, No skin lesion, Moderate swelling, some gross instability, Some quadriceps weakness, Kellgren and Hussein at least grade 3    Left Knee - Full Range of Motion, No crepitation, Grossly neurovascularly intact, Good cap refill, No skin lesion, No swelling, No gross instability, No quadriceps weakness     Inpatient status: The patient has admitted to severe pain in the affected knee and due to such pain they are unable to complete activities of daily living at home and/or work on a regular basis where conservative treatments have failed. After extensive discussion with the patient, they have chosen to receive a total knee replacement with the expectation of inpatient procedure. Their dependent functional status (i.e. lack of capable support and safety at home, pain management, comorbities, or difficulty ambulating with assistive walking devices) would deem them a candidate for an inpatient stay. The patient acknowledges and understand the plan. The risks of surgery were explained to the patient which include but not limited to infection, nerve injury, artery injury, tendon injury, poor result, poor wound healing, unforeseen incidence, bleeding, infection, nerve damage, failure to improve, worsening of symptoms, morbidity, and mortality risks were explained. All questions were answered. Patient was told of no guarantees. Patient accepts all risks and benefits. A consent for surgery will be documented and signed by the patient or a legal guardian. All questions were answered. The procedure was explained in detail. The patient was counseled about the risks of renee Covid-19 during their perioperative period and any recovery window from their procedure. The patient was made aware that renee Covid-19 may worsen their prognosis for recovering from their procedure and lend to a higher morbidity and/or mortality risk. All material risks, benefits, and reasonable alternatives including postponing the procedure were discussed. The patient DOES wish to proceed with their procedure at this time. Encounter Diagnoses     ICD-10-CM ICD-9-CM   1. Pain in both knees, unspecified chronicity  M25.561 719.46    M25.562    2. Osteoarthritis of both knees, unspecified osteoarthritis type  M17.0 715.96       HPI:  The patient is here with a chief complaint of right knee pain, throbbing, burning pain, diagnosed with osteoarthritis. Pain is 5/10. He is medically cleared. X-rays of the right knee are positive for severe OA. Assessment/Plan:  Plan will be for right total knee replacement. No history of blood clots, does not take any blood thinners. He will be an inpatient stay for 1 night. Percocet, Keflex, Zofran and aspirin for DVT prophylaxis and go from there. As part of continued conservative pain management options the patient was advised to utilize Tylenol or OTC NSAIDS as long as it is not medically contraindicated. Return to Office: Follow-up and Dispositions    Return for already scheduled for surgery. Scribed by [place] Rowland LPN as dictated by [employer] [facility] [place] GEORGE Freeman MD.  Documentation, performed by, True and Accepted Joe Freeman MD

## 2023-01-09 ENCOUNTER — ANESTHESIA EVENT (OUTPATIENT)
Dept: SURGERY | Age: 83
End: 2023-01-09
Payer: MEDICARE

## 2023-01-09 RX ORDER — DEXTROSE 50 % IN WATER (D50W) INTRAVENOUS SYRINGE
25-50 AS NEEDED
Status: CANCELLED | OUTPATIENT
Start: 2023-01-09

## 2023-01-09 RX ORDER — SODIUM CHLORIDE 0.9 % (FLUSH) 0.9 %
5-40 SYRINGE (ML) INJECTION EVERY 8 HOURS
Status: CANCELLED | OUTPATIENT
Start: 2023-01-09

## 2023-01-09 RX ORDER — IBUPROFEN 200 MG
4 TABLET ORAL AS NEEDED
Status: CANCELLED | OUTPATIENT
Start: 2023-01-09

## 2023-01-09 RX ORDER — SODIUM CHLORIDE, SODIUM LACTATE, POTASSIUM CHLORIDE, CALCIUM CHLORIDE 600; 310; 30; 20 MG/100ML; MG/100ML; MG/100ML; MG/100ML
25 INJECTION, SOLUTION INTRAVENOUS CONTINUOUS
Status: CANCELLED | OUTPATIENT
Start: 2023-01-09 | End: 2023-01-10

## 2023-01-09 RX ORDER — SODIUM CHLORIDE 0.9 % (FLUSH) 0.9 %
5-40 SYRINGE (ML) INJECTION AS NEEDED
Status: CANCELLED | OUTPATIENT
Start: 2023-01-09

## 2023-01-16 ENCOUNTER — APPOINTMENT (OUTPATIENT)
Dept: GENERAL RADIOLOGY | Age: 83
End: 2023-01-16
Attending: NURSE PRACTITIONER
Payer: MEDICARE

## 2023-01-16 ENCOUNTER — ANESTHESIA (OUTPATIENT)
Dept: SURGERY | Age: 83
End: 2023-01-16
Payer: MEDICARE

## 2023-01-16 ENCOUNTER — HOSPITAL ENCOUNTER (OUTPATIENT)
Age: 83
Setting detail: OBSERVATION
Discharge: HOME | End: 2023-01-18
Attending: ORTHOPAEDIC SURGERY | Admitting: INTERNAL MEDICINE
Payer: MEDICARE

## 2023-01-16 PROBLEM — Z96.651 S/P TOTAL KNEE REPLACEMENT, RIGHT: Status: ACTIVE | Noted: 2023-01-16

## 2023-01-16 PROBLEM — M17.9 OA (OSTEOARTHRITIS) OF KNEE: Status: ACTIVE | Noted: 2023-01-16

## 2023-01-16 PROCEDURE — 77030013708 HC HNDPC SUC IRR PULS STRY –B: Performed by: ORTHOPAEDIC SURGERY

## 2023-01-16 PROCEDURE — 64447 NJX AA&/STRD FEMORAL NRV IMG: CPT

## 2023-01-16 PROCEDURE — 77030029372 HC ADH SKN CLSR PRINEO J&J -C: Performed by: ORTHOPAEDIC SURGERY

## 2023-01-16 PROCEDURE — 74011250637 HC RX REV CODE- 250/637: Performed by: NURSE ANESTHETIST, CERTIFIED REGISTERED

## 2023-01-16 PROCEDURE — 94761 N-INVAS EAR/PLS OXIMETRY MLT: CPT

## 2023-01-16 PROCEDURE — 76210000063 HC OR PH I REC FIRST 0.5 HR: Performed by: ORTHOPAEDIC SURGERY

## 2023-01-16 PROCEDURE — 77030040393 HC DRSG OPTIFOAM GENT MDII -B: Performed by: ORTHOPAEDIC SURGERY

## 2023-01-16 PROCEDURE — 74011000258 HC RX REV CODE- 258: Performed by: NURSE ANESTHETIST, CERTIFIED REGISTERED

## 2023-01-16 PROCEDURE — 2709999900 HC NON-CHARGEABLE SUPPLY: Performed by: ORTHOPAEDIC SURGERY

## 2023-01-16 PROCEDURE — 76010000131 HC OR TIME 2 TO 2.5 HR: Performed by: ORTHOPAEDIC SURGERY

## 2023-01-16 PROCEDURE — 77030006835 HC BLD SAW SAG STRY -B: Performed by: ORTHOPAEDIC SURGERY

## 2023-01-16 PROCEDURE — 74011000250 HC RX REV CODE- 250: Performed by: NURSE PRACTITIONER

## 2023-01-16 PROCEDURE — 77030003601 HC NDL NRV BLK BBMI -A: Performed by: NURSE ANESTHETIST, CERTIFIED REGISTERED

## 2023-01-16 PROCEDURE — C1713 ANCHOR/SCREW BN/BN,TIS/BN: HCPCS | Performed by: ORTHOPAEDIC SURGERY

## 2023-01-16 PROCEDURE — 77030006812 HC BLD SAW RECIP STRY -B: Performed by: ORTHOPAEDIC SURGERY

## 2023-01-16 PROCEDURE — 97161 PT EVAL LOW COMPLEX 20 MIN: CPT

## 2023-01-16 PROCEDURE — 74011000258 HC RX REV CODE- 258: Performed by: NURSE PRACTITIONER

## 2023-01-16 PROCEDURE — C1776 JOINT DEVICE (IMPLANTABLE): HCPCS | Performed by: ORTHOPAEDIC SURGERY

## 2023-01-16 PROCEDURE — 77030041690 HC SYS PINNING KN JNJ -D: Performed by: ORTHOPAEDIC SURGERY

## 2023-01-16 PROCEDURE — 77030038692 HC WND DEB SYS IRMX -B: Performed by: ORTHOPAEDIC SURGERY

## 2023-01-16 PROCEDURE — 76942 ECHO GUIDE FOR BIOPSY: CPT | Performed by: NURSE ANESTHETIST, CERTIFIED REGISTERED

## 2023-01-16 PROCEDURE — 77010033678 HC OXYGEN DAILY

## 2023-01-16 PROCEDURE — 77030031139 HC SUT VCRL2 J&J -A: Performed by: ORTHOPAEDIC SURGERY

## 2023-01-16 PROCEDURE — 73560 X-RAY EXAM OF KNEE 1 OR 2: CPT

## 2023-01-16 PROCEDURE — 74011250636 HC RX REV CODE- 250/636: Performed by: NURSE ANESTHETIST, CERTIFIED REGISTERED

## 2023-01-16 PROCEDURE — 77030011266 HC ELECTRD BLD INSL COVD -A: Performed by: ORTHOPAEDIC SURGERY

## 2023-01-16 PROCEDURE — 74011250637 HC RX REV CODE- 250/637: Performed by: NURSE PRACTITIONER

## 2023-01-16 PROCEDURE — 64450 NJX AA&/STRD OTHER PN/BRANCH: CPT | Performed by: NURSE ANESTHETIST, CERTIFIED REGISTERED

## 2023-01-16 PROCEDURE — 74011000250 HC RX REV CODE- 250: Performed by: ORTHOPAEDIC SURGERY

## 2023-01-16 PROCEDURE — 77030000032 HC CUF TRNQT ZIMM -B: Performed by: ORTHOPAEDIC SURGERY

## 2023-01-16 PROCEDURE — 76060000035 HC ANESTHESIA 2 TO 2.5 HR: Performed by: ORTHOPAEDIC SURGERY

## 2023-01-16 PROCEDURE — 74011000272 HC RX REV CODE- 272: Performed by: ORTHOPAEDIC SURGERY

## 2023-01-16 PROCEDURE — 77030039147 HC PWDR HEMSTS SURGICEL JNJ -D: Performed by: ORTHOPAEDIC SURGERY

## 2023-01-16 PROCEDURE — 77030013079 HC BLNKT BAIR HGGR 3M -A: Performed by: NURSE ANESTHETIST, CERTIFIED REGISTERED

## 2023-01-16 PROCEDURE — 74011250636 HC RX REV CODE- 250/636: Performed by: NURSE PRACTITIONER

## 2023-01-16 PROCEDURE — 77030031140 HC SUT VCRL3 J&J -A: Performed by: ORTHOPAEDIC SURGERY

## 2023-01-16 PROCEDURE — 77030007866 HC KT SPN ANES BBMI -B: Performed by: NURSE ANESTHETIST, CERTIFIED REGISTERED

## 2023-01-16 PROCEDURE — 74011000250 HC RX REV CODE- 250: Performed by: NURSE ANESTHETIST, CERTIFIED REGISTERED

## 2023-01-16 PROCEDURE — G0378 HOSPITAL OBSERVATION PER HR: HCPCS

## 2023-01-16 PROCEDURE — 77030040361 HC SLV COMPR DVT MDII -B: Performed by: ORTHOPAEDIC SURGERY

## 2023-01-16 PROCEDURE — 77030018673: Performed by: ORTHOPAEDIC SURGERY

## 2023-01-16 DEVICE — ATTUNE KNEE SYSTEM FEMORAL POSTERIOR STABILIZED SIZE 9 RIGHT CEMENTED
Type: IMPLANTABLE DEVICE | Site: KNEE | Status: FUNCTIONAL
Brand: ATTUNE

## 2023-01-16 DEVICE — KNEE K1 TOT HEMI STD CEM IMPL CAPPED SYNTHES: Type: IMPLANTABLE DEVICE | Site: KNEE | Status: FUNCTIONAL

## 2023-01-16 DEVICE — CEMENT BONE 40 GM W/ GENTMYCN HI VISC PALACOS R+G: Type: IMPLANTABLE DEVICE | Site: KNEE | Status: FUNCTIONAL

## 2023-01-16 DEVICE — ATTUNE KNEE SYSTEM TIBIAL BASE ROTATING PLATFORM SIZE 9 CEMENTED
Type: IMPLANTABLE DEVICE | Site: KNEE | Status: FUNCTIONAL
Brand: ATTUNE

## 2023-01-16 DEVICE — ATTUNE PATELLA MEDIALIZED DOME 41MM CEMENTED AOX
Type: IMPLANTABLE DEVICE | Site: KNEE | Status: FUNCTIONAL
Brand: ATTUNE

## 2023-01-16 DEVICE — ATTUNE KNEE SYSTEM TIBIAL INSERT ROTATING PLATFORM POSTERIOR STABILIZED SIZE 9 5MM AOX
Type: IMPLANTABLE DEVICE | Site: KNEE | Status: FUNCTIONAL
Brand: ATTUNE

## 2023-01-16 RX ORDER — BUPIVACAINE HYDROCHLORIDE 7.5 MG/ML
INJECTION, SOLUTION EPIDURAL; RETROBULBAR
Status: SHIPPED | OUTPATIENT
Start: 2023-01-16 | End: 2023-01-16

## 2023-01-16 RX ORDER — BUPIVACAINE HYDROCHLORIDE AND EPINEPHRINE 2.5; 5 MG/ML; UG/ML
INJECTION, SOLUTION EPIDURAL; INFILTRATION; INTRACAUDAL; PERINEURAL AS NEEDED
Status: DISCONTINUED | OUTPATIENT
Start: 2023-01-16 | End: 2023-01-16 | Stop reason: HOSPADM

## 2023-01-16 RX ORDER — SODIUM CHLORIDE 0.9 % (FLUSH) 0.9 %
5-40 SYRINGE (ML) INJECTION EVERY 8 HOURS
Status: DISCONTINUED | OUTPATIENT
Start: 2023-01-16 | End: 2023-01-18 | Stop reason: HOSPADM

## 2023-01-16 RX ORDER — BUPIVACAINE HYDROCHLORIDE 5 MG/ML
INJECTION, SOLUTION EPIDURAL; INTRACAUDAL
Status: SHIPPED | OUTPATIENT
Start: 2023-01-16 | End: 2023-01-16

## 2023-01-16 RX ORDER — SODIUM CHLORIDE, SODIUM LACTATE, POTASSIUM CHLORIDE, CALCIUM CHLORIDE 600; 310; 30; 20 MG/100ML; MG/100ML; MG/100ML; MG/100ML
25 INJECTION, SOLUTION INTRAVENOUS CONTINUOUS
Status: DISCONTINUED | OUTPATIENT
Start: 2023-01-16 | End: 2023-01-16 | Stop reason: HOSPADM

## 2023-01-16 RX ORDER — MIDAZOLAM HYDROCHLORIDE 1 MG/ML
INJECTION, SOLUTION INTRAMUSCULAR; INTRAVENOUS
Status: SHIPPED | OUTPATIENT
Start: 2023-01-16 | End: 2023-01-16

## 2023-01-16 RX ORDER — SODIUM CHLORIDE 0.9 % (FLUSH) 0.9 %
5-40 SYRINGE (ML) INJECTION EVERY 8 HOURS
Status: DISCONTINUED | OUTPATIENT
Start: 2023-01-16 | End: 2023-01-16 | Stop reason: HOSPADM

## 2023-01-16 RX ORDER — FACIAL-BODY WIPES
10 EACH TOPICAL DAILY PRN
Status: DISCONTINUED | OUTPATIENT
Start: 2023-01-16 | End: 2023-01-18 | Stop reason: HOSPADM

## 2023-01-16 RX ORDER — GABAPENTIN 300 MG/1
300 CAPSULE ORAL ONCE
Status: COMPLETED | OUTPATIENT
Start: 2023-01-16 | End: 2023-01-16

## 2023-01-16 RX ORDER — FENTANYL CITRATE 50 UG/ML
50 INJECTION, SOLUTION INTRAMUSCULAR; INTRAVENOUS
Status: DISCONTINUED | OUTPATIENT
Start: 2023-01-16 | End: 2023-01-16 | Stop reason: HOSPADM

## 2023-01-16 RX ORDER — CELECOXIB 200 MG/1
400 CAPSULE ORAL
Status: COMPLETED | OUTPATIENT
Start: 2023-01-16 | End: 2023-01-16

## 2023-01-16 RX ORDER — KETOROLAC TROMETHAMINE 30 MG/ML
15 INJECTION, SOLUTION INTRAMUSCULAR; INTRAVENOUS
Status: ACTIVE | OUTPATIENT
Start: 2023-01-16 | End: 2023-01-17

## 2023-01-16 RX ORDER — SODIUM CHLORIDE 0.9 % (FLUSH) 0.9 %
5-40 SYRINGE (ML) INJECTION AS NEEDED
Status: DISCONTINUED | OUTPATIENT
Start: 2023-01-16 | End: 2023-01-18 | Stop reason: HOSPADM

## 2023-01-16 RX ORDER — OXYCODONE AND ACETAMINOPHEN 5; 325 MG/1; MG/1
2 TABLET ORAL
Status: DISCONTINUED | OUTPATIENT
Start: 2023-01-16 | End: 2023-01-18 | Stop reason: HOSPADM

## 2023-01-16 RX ORDER — ONDANSETRON 2 MG/ML
4 INJECTION INTRAMUSCULAR; INTRAVENOUS
Status: DISCONTINUED | OUTPATIENT
Start: 2023-01-16 | End: 2023-01-18 | Stop reason: HOSPADM

## 2023-01-16 RX ORDER — ACETAMINOPHEN 325 MG/1
650 TABLET ORAL
Status: DISCONTINUED | OUTPATIENT
Start: 2023-01-16 | End: 2023-01-18 | Stop reason: HOSPADM

## 2023-01-16 RX ORDER — OXYCODONE AND ACETAMINOPHEN 10; 325 MG/1; MG/1
1 TABLET ORAL
Status: DISCONTINUED | OUTPATIENT
Start: 2023-01-16 | End: 2023-01-18 | Stop reason: HOSPADM

## 2023-01-16 RX ORDER — DEXTROSE 50 % IN WATER (D50W) INTRAVENOUS SYRINGE
25-50 AS NEEDED
Status: DISCONTINUED | OUTPATIENT
Start: 2023-01-16 | End: 2023-01-16 | Stop reason: HOSPADM

## 2023-01-16 RX ORDER — ONDANSETRON 2 MG/ML
4 INJECTION INTRAMUSCULAR; INTRAVENOUS ONCE
Status: DISCONTINUED | OUTPATIENT
Start: 2023-01-16 | End: 2023-01-16 | Stop reason: HOSPADM

## 2023-01-16 RX ORDER — SENNOSIDES 8.6 MG/1
1 TABLET ORAL 2 TIMES DAILY
Status: DISCONTINUED | OUTPATIENT
Start: 2023-01-16 | End: 2023-01-18 | Stop reason: HOSPADM

## 2023-01-16 RX ORDER — FLUMAZENIL 0.1 MG/ML
0.2 INJECTION INTRAVENOUS
Status: DISCONTINUED | OUTPATIENT
Start: 2023-01-16 | End: 2023-01-16 | Stop reason: HOSPADM

## 2023-01-16 RX ORDER — DIPHENHYDRAMINE HYDROCHLORIDE 50 MG/ML
12.5 INJECTION, SOLUTION INTRAMUSCULAR; INTRAVENOUS
Status: DISCONTINUED | OUTPATIENT
Start: 2023-01-16 | End: 2023-01-18 | Stop reason: HOSPADM

## 2023-01-16 RX ORDER — NALOXONE HYDROCHLORIDE 0.4 MG/ML
0.4 INJECTION, SOLUTION INTRAMUSCULAR; INTRAVENOUS; SUBCUTANEOUS AS NEEDED
Status: DISCONTINUED | OUTPATIENT
Start: 2023-01-16 | End: 2023-01-18 | Stop reason: HOSPADM

## 2023-01-16 RX ORDER — DIPHENHYDRAMINE HYDROCHLORIDE 50 MG/ML
12.5 INJECTION, SOLUTION INTRAMUSCULAR; INTRAVENOUS
Status: DISCONTINUED | OUTPATIENT
Start: 2023-01-16 | End: 2023-01-16 | Stop reason: HOSPADM

## 2023-01-16 RX ORDER — PROPOFOL 10 MG/ML
VIAL (ML) INTRAVENOUS
Status: DISCONTINUED | OUTPATIENT
Start: 2023-01-16 | End: 2023-01-16 | Stop reason: HOSPADM

## 2023-01-16 RX ORDER — SODIUM CHLORIDE 0.9 % (FLUSH) 0.9 %
5-40 SYRINGE (ML) INJECTION AS NEEDED
Status: DISCONTINUED | OUTPATIENT
Start: 2023-01-16 | End: 2023-01-16 | Stop reason: HOSPADM

## 2023-01-16 RX ORDER — IBUPROFEN 200 MG
4 TABLET ORAL AS NEEDED
Status: DISCONTINUED | OUTPATIENT
Start: 2023-01-16 | End: 2023-01-16 | Stop reason: HOSPADM

## 2023-01-16 RX ORDER — ONDANSETRON 2 MG/ML
INJECTION INTRAMUSCULAR; INTRAVENOUS AS NEEDED
Status: DISCONTINUED | OUTPATIENT
Start: 2023-01-16 | End: 2023-01-16 | Stop reason: HOSPADM

## 2023-01-16 RX ORDER — DEXAMETHASONE SODIUM PHOSPHATE 4 MG/ML
INJECTION, SOLUTION INTRA-ARTICULAR; INTRALESIONAL; INTRAMUSCULAR; INTRAVENOUS; SOFT TISSUE
Status: SHIPPED | OUTPATIENT
Start: 2023-01-16 | End: 2023-01-16

## 2023-01-16 RX ORDER — ALBUTEROL SULFATE 0.83 MG/ML
2.5 SOLUTION RESPIRATORY (INHALATION)
Status: DISCONTINUED | OUTPATIENT
Start: 2023-01-16 | End: 2023-01-16 | Stop reason: HOSPADM

## 2023-01-16 RX ORDER — FENTANYL CITRATE 50 UG/ML
50 INJECTION, SOLUTION INTRAMUSCULAR; INTRAVENOUS AS NEEDED
Status: DISCONTINUED | OUTPATIENT
Start: 2023-01-16 | End: 2023-01-16 | Stop reason: HOSPADM

## 2023-01-16 RX ORDER — NALOXONE HYDROCHLORIDE 0.4 MG/ML
0.2 INJECTION, SOLUTION INTRAMUSCULAR; INTRAVENOUS; SUBCUTANEOUS AS NEEDED
Status: DISCONTINUED | OUTPATIENT
Start: 2023-01-16 | End: 2023-01-16 | Stop reason: HOSPADM

## 2023-01-16 RX ORDER — ASPIRIN 325 MG
325 TABLET, DELAYED RELEASE (ENTERIC COATED) ORAL 2 TIMES DAILY
Status: DISCONTINUED | OUTPATIENT
Start: 2023-01-17 | End: 2023-01-18 | Stop reason: HOSPADM

## 2023-01-16 RX ADMIN — OXYCODONE AND ACETAMINOPHEN 1 TABLET: 10; 325 TABLET ORAL at 18:32

## 2023-01-16 RX ADMIN — OXYCODONE AND ACETAMINOPHEN 2 TABLET: 5; 325 TABLET ORAL at 23:53

## 2023-01-16 RX ADMIN — TRANEXAMIC ACID 1 G: 1 INJECTION, SOLUTION INTRAVENOUS at 13:55

## 2023-01-16 RX ADMIN — MIDAZOLAM HYDROCHLORIDE 4 MG: 2 INJECTION, SOLUTION INTRAMUSCULAR; INTRAVENOUS at 13:16

## 2023-01-16 RX ADMIN — DEXAMETHASONE SODIUM PHOSPHATE 4 MG: 4 INJECTION, SOLUTION INTRAMUSCULAR; INTRAVENOUS at 13:16

## 2023-01-16 RX ADMIN — ONDANSETRON HYDROCHLORIDE 4 MG: 2 INJECTION, SOLUTION INTRAMUSCULAR; INTRAVENOUS at 13:55

## 2023-01-16 RX ADMIN — SODIUM CHLORIDE, PRESERVATIVE FREE 10 ML: 5 INJECTION INTRAVENOUS at 23:57

## 2023-01-16 RX ADMIN — BUPIVACAINE HYDROCHLORIDE 12 MG: 7.5 INJECTION, SOLUTION EPIDURAL; RETROBULBAR at 13:50

## 2023-01-16 RX ADMIN — CEFAZOLIN 2 G: 2 INJECTION, POWDER, FOR SOLUTION INTRAMUSCULAR; INTRAVENOUS at 13:55

## 2023-01-16 RX ADMIN — PROPOFOL 31.01 MCG/KG/MIN: 10 INJECTION, EMULSION INTRAVENOUS at 13:55

## 2023-01-16 RX ADMIN — BUPIVACAINE HYDROCHLORIDE 25 ML: 5 INJECTION, SOLUTION EPIDURAL; INTRACAUDAL; PERINEURAL at 13:16

## 2023-01-16 RX ADMIN — CELECOXIB 400 MG: 200 CAPSULE ORAL at 10:25

## 2023-01-16 RX ADMIN — SODIUM CHLORIDE, POTASSIUM CHLORIDE, SODIUM LACTATE AND CALCIUM CHLORIDE 25 ML/HR: 600; 310; 30; 20 INJECTION, SOLUTION INTRAVENOUS at 10:27

## 2023-01-16 RX ADMIN — GABAPENTIN 300 MG: 300 CAPSULE ORAL at 10:25

## 2023-01-16 NOTE — ANESTHESIA PREPROCEDURE EVALUATION
Relevant Problems   No relevant active problems       Anesthetic History   No history of anesthetic complications            Review of Systems / Medical History  Patient summary reviewed, nursing notes reviewed and pertinent labs reviewed    Pulmonary  Within defined limits                 Neuro/Psych   Within defined limits           Cardiovascular    Hypertension: well controlled              Exercise tolerance: >4 METS     GI/Hepatic/Renal  Within defined limits              Endo/Other        Arthritis  Pertinent negatives: No morbid obesity   Other Findings              Physical Exam    Airway  Mallampati: II  TM Distance: 4 - 6 cm  Neck ROM: normal range of motion   Mouth opening: Normal     Cardiovascular  Regular rate and rhythm,  S1 and S2 normal,  no murmur, click, rub, or gallop  Rhythm: regular  Rate: normal         Dental  No notable dental hx       Pulmonary  Breath sounds clear to auscultation               Abdominal  GI exam deferred       Other Findings            Anesthetic Plan    ASA: 2  Anesthesia type: regional, MAC and spinal - saphenous block      Post-op pain plan if not by surgeon: peripheral nerve block single    Induction: Intravenous  Anesthetic plan and risks discussed with: Patient

## 2023-01-16 NOTE — ANESTHESIA PROCEDURE NOTES
Peripheral Block    Start time: 1/16/2023 1:12 PM  End time: 1/16/2023 1:16 PM  Performed by: Marcos Ryder CRNA  Authorized by: Marcos Ryder CRNA       Pre-procedure: Indications: at surgeon's request and post-op pain management    Preanesthetic Checklist: patient identified, risks and benefits discussed, site marked, timeout performed, anesthesia consent given, patient being monitored and fire risk safety assessment completed and verbalized    Timeout Time: 13:12 EST Robbi Frankel RN)      Block Type:   Block Type:   Adductor canal block  Laterality:  Right  Monitoring:  Standard ASA monitoring, responsive to questions, continuous pulse ox, frequent vital sign checks, heart rate and oxygen  Injection Technique:  Single shot  Procedures: ultrasound guided    Patient Position: supine  Prep: chlorhexidine    Location:  Mid thigh  Needle Type:  Ultraplex  Needle Gauge:  20 G  Medication Injected:  Midazolam (VERSED) injection - IntraVENous   4 mg - 1/16/2023 1:16:00 PM  bupivacaine (PF) (MARCAINE) 0.5% injection - Peripheral Nerve Block   25 mL - 1/16/2023 1:16:00 PM  dexamethasone (DECADRON) 4 mg/mL injection - Peripheral Nerve Block   4 mg - 1/16/2023 1:16:00 PM  Med Admin Time: 1/16/2023 1:16 PM    Assessment:  Number of attempts:  1  Injection Assessment:  Incremental injection every 5 mL, local visualized surrounding nerve on ultrasound, negative aspiration for blood, no paresthesia, no intravascular symptoms and ultrasound image on chart  Patient tolerance:  Patient tolerated the procedure well with no immediate complications

## 2023-01-16 NOTE — PROGRESS NOTES
[phone]- patient arrived to room 257 via stretcher, vss, dinner provided     1805- PT in to see patient    1815- patient ambulating in hallway with PT

## 2023-01-16 NOTE — DISCHARGE INSTRUCTIONS
TOTAL KNEE REPLACEMENT DISCHARGE INFORMATION    You have undergone a Total Knee Replacement. The following list is to provide you with some expectations over the next week upon your discharge from the hospital.     Please begin Aspirin 325mg every 12 hours (twice daily) starting tomorrow as directed until Dr. Tyshawn Weaver instructs you to discontinue it. If you are not sure which blood thinner to take please contact Dr. Thompson Pitch office next business day for clarification. Please be sure to continue your thigh-high compression stockings on both sides until instructed to discontinue them. Over the course of the next week, you should continue thigh high stockings on the operative leg, DO NOT GET THE INCISION WET until instructed to do so. Please make sure the stockings on the operative leg are pulled up all the way to the thigh to prevent any creases which may result in abrasions or creases in the skin. If the stockings are creating creases resulting in abrasions or blistering on the operative leg please remove the stockings. You may take the stockings off on the nonoperative leg once you arrive home. You may notice some bruising on your thigh and it may extend all the way to the ankles. That is perfectly normal early on. You may experience a clicking noise in your knee and that is normal because of the artificial knee. It is important to remember if you have any surgical procedure including dental procedures which may result in bleeding that an antibiotic 1 hour before the procedure will be required. Please let the provider performing the procedure know that you have artificial joint. If an antibiotic is not given by them please call our office and give us at least 5 business days to get you the appropriate antibiotics if needed. This rule applies indefinitely. If an Ace wrap is placed on your knee you may remove the Ace wrap only 48 hours after your surgery. We will leave the stockings on.   During the course of your  over the next week, should you experience fevers of 101.5 F, a white drainage from the incision, extreme redness around the incision, or the incision begins to have a pungent smell; Please call our office or page Dr. Margarita Gray whose numbers are provided in your discharge paperwork. To Page Dr. Margarita Gray please call [phone] and dial 0. Have the  page whomever is on call for Orthopedics. These are signs of infection and it should be addressed immediately. Please do not drive until instructed to do so. If you need a refill on pain medication please allow at least 2 business days notice for any refills. Immediate refill request may not be possible. Medication refill requests will not be addressed during non-business hours. Please do not page the on-call provider for pain medication refills after hours. It is very important for you to begin your Outpatient Physical Therapy within a couple days of the day of your discharge and your appointment should have been set up. If your physical therapy has not been set up please call our office the next business day for assistance. Details provided in a separate sheet. Remove ace wrap in 48 hrs after surgery but keep stockings on. You may remove the stocking and keep the stocking off on the nonoperative leg. Finish all antibiotics, start the antibiotics as soon as you go home if you have prescribed antibiotics. 14.  You should perform your daily home exercises at least 4 times a day 30 minutes each time. Perform foot pumps on both feet at least 10 times every 15 minutes while awake. This helps prevent swelling in the leg and can help prevent blood clots in the leg. On the operative leg if you have significant swelling you can also lay down flat and put 3 pillows under the heel so the heel is above the heart level and then perform foot pumps 4 times a day for 10 minutes to help bring the swelling down.   15.  Do not place anything under your knee while sleeping at night. Elevate your heel so your  is straight while sleeping at night. 16.  Perform deep breathing exercises 10 times every hour while awake. 17.  If you had a nerve block and you are not having pain the day of the surgery, at nighttime it is okay to take 1 pain medication before going to sleep to help prevent excruciating pain when the nerve block wears off. 18.  You may be given an ice pack machine use that to help prevent swelling. Do not apply heat to the incision area. 19.  While you are awake at least 10 times every 30 minutes move your foot up and down as if you are pumping gas from both feet to help prevent swelling and to promote blood circulation in the calf. 20.  If you develop sudden onset of shortness of breath or severe calf pain please go to closest emergency room. 21. Your pain medicine is a Narcotic and may cause constipation. You may take an over the counter stool softener while taking pain medicine. 25.  You will get surveys either via text message or email after your surgery on a periodic basis. Please participate in the surveys as it helps to track your progress. ICE THERAPY WRAP:    Keep ice therapy wrap on when resting. DO not wear when moving or walking. Ice packs are reusable. Ice Therapy wrap holds two ice packs at a time. Things to watch for:             Increased swelling of the surgical site             Spreading of redness around the incision site             Drainage of pus from the incision site             Developing a fever of 101.5 °F or higher             If any of these symptoms occur you have any questions please contact our office at [phone]. If you need to talk to Dr. Cristina Louis or his staff after hours please call the office and have the on-call service get in touch with the provider on call that day. Please note pain medications are not refilled after hours or on weekends.   If Dr. Cristina Louis or his staff do not call you back within 30 minutes. Please tell the  to try again. Phone: [phone]  [email or web address]

## 2023-01-16 NOTE — H&P
History and Physical    Subjective:     Ruy Barragan is a 81 y.o. male with a past med hx sig for HTN, HLP, BPH and OA s/p elective right TKA today by dr Makr Carbajal. Hospitalist was asked to watch overnight due to age. Pt was seen at bedside in company of spouse, and denies any acute complaints. States he has been in his healthy state prior to todat's surgery. States knee pain is in good control. He is hungry and wants some food. He is pending PT eval.       Past Medical History:   Diagnosis Date    Bladder neck contracture     BPH with obstruction/lower urinary tract symptoms     Essential hypertension with goal blood pressure less than 140/90     Family history of colon cancer     History of blood transfusion 2015    with shoulder replacement    Hyperlipidemia     Osteoarthritis     Osteoarthritis of left hip     PMR (polymyalgia rheumatica) ([facility] [address] 7/20/2016    Skin cancer     basal cell on face    Urinary retention       Past Surgical History:   Procedure Laterality Date    CYSTOSCOPY      ENDOSCOPY, COLON, DIAGNOSTIC      HC BERENICE LASER [id number]  3/2/11    Laser incision of bladder neck contracture    HX BACK SURGERY      lower    HX MOHS PROCEDURES  1/11    rt hip repl    HX ORTHOPAEDIC Right 2015    shoulder replacement    HX OTHER SURGICAL      tonsillectomy, back sx,     HX OTHER SURGICAL      basel cell removed from right ear lobe    HX SHOULDER ARTHROSCOPY Left 2010    HX TURP      Laser    PA LASER VAPORIZATION OF PROSTATE FOR URINE FLOW       Family History   Problem Relation Age of Onset    Cancer Other     Heart Disease Other       Social History     Tobacco Use    Smoking status: Never    Smokeless tobacco: Never   Substance Use Topics    Alcohol use: Yes     Alcohol/week: 7.0 standard drinks     Types: 7 Glasses of wine per week     Comment: wine with dinner 3 times a week       Prior to Admission medications    Medication Sig Start Date End Date Taking?  Authorizing Provider   lisinopriL (PRINIVIL, ZESTRIL) 5 mg tablet TAKE 1 TABLET BY MOUTH DAILY 9/24/22  Yes James Small MD   methocarbamoL (ROBAXIN) 500 mg tablet Take 500 mg by mouth four (4) times daily. 9/1/22  Yes Provider, Historical   finasteride (PROSCAR) 5 mg tablet Take 1 Tablet by mouth daily. 9/12/22  Yes Steven Alex PA   rosuvastatin (CRESTOR) 5 mg tablet Take 1 Tablet by mouth daily. 3/17/22  Yes James Small MD   acetaminophen (TYLENOL) 500 mg tablet Take 2 Tablets by mouth every six (6) hours. 5/28/21  Yes Fam Krueger MD   DOCUSATE SODIUM (STOOL SOFTENER PO) Take  by mouth daily. Yes Provider, Historical   multivitamin (ONE A DAY) tablet Take 1 Tab by mouth daily. Yes Provider, Historical   cholecalciferol (VITAMIN D3) 25 mcg (1,000 unit) cap Take  by mouth daily. Yes Provider, Historical   GLUC/ERICA-MSM#1/C/CHARLY/RAMÓN/BOR (OSTEO BI-FLEX PO) Take 1 Tab by mouth daily. Yes Provider, Historical   oxyCODONE-acetaminophen (Percocet) 5-325 mg per tablet Take 1 Tablet by mouth every four to six (4-6) hours as needed for Pain for up to 14 days. Max Daily Amount: 6 Tablets. Patient not taking: Reported on 1/16/2023 1/5/23 1/19/23  VANESSA Talbot   ondansetron (ZOFRAN ODT) 4 mg disintegrating tablet Take 1 Tablet by mouth every eight (8) hours as needed for Nausea, Vomiting or Nausea or Vomiting for up to 20 doses. Patient not taking: Reported on 1/16/2023 1/5/23   VANESSA Talbot     No Known Allergies     Review of Systems:  Review of Systems   Constitutional: Negative. HENT: Negative. Eyes: Negative. Respiratory: Negative. Cardiovascular: Negative. Gastrointestinal: Negative. Endocrine: Negative. Genitourinary: Negative. Musculoskeletal: Negative. Skin: Negative. Allergic/Immunologic: Negative. Neurological: Negative. Hematological: Negative. Psychiatric/Behavioral: Negative.           Objective:     Vitals:  Visit Vitals  /77   Pulse 83   Temp 98 °F (36.7 °C)   Resp 16   Wt 107.5 kg (237 lb)   SpO2 96%   BMI 30.43 kg/m²       Physical Exam:  General: Alert, awake, orients x 4, cooperative, no distress. On room air. Head:Normocephalic, without obvious abnormality, atraumatic. Eyes:Conjunctivae/corneas clear. Pupils equal, round, reactive to light. Extraocular movements intact. No icterus. Lungs: Clear to auscultation bilaterally, no wheezes, no crackles. Chest wall: No tenderness or deformity. Heart: Regular rate and rhythm, S1, S2 normal, no murmur, click, rub, or gallop. Abdomen: Soft, rounded, non-tender to palpation, no palpable masses, bowel sounds active. Back: No spine tenderness to palpation  Extremities:s/p right tka, intact neurovasc exam, no edema or cyanosis. Pulses: 2+ and Symmetric all extremities. Skin:overall skin color, texture, turgor normal.   Neurologic: Awake and oriented, x 4. Non-focal.       Labs:  No results found for this or any previous visit (from the past 24 hour(s)). Imaging:  XR KNEE RT MAX 2 VWS    Result Date: 1/16/2023  EXAM:  RIGHT KNEE, TWO VIEWS CLINICAL INDICATION/HISTORY: Right knee pain and advanced osteoarthropathy, postoperative followup COMPARISON: None. TECHNIQUE: AP and crosstable lateral portable views of the right knee were obtained.  _______________ FINDINGS: Surgical changes and hardware of total right knee arthroplasty. No lucency adjacent to hardware other findings to suggest complication. Expected postsurgical soft tissue swelling, joint effusion, and emphysema.  _______________     Interval total right knee arthroplasty, without complication. Assessment & Plan:     #1: s/p elective right tka  -further management per ortho  Pt/Ot eval and tx    #2: Hypertension:  -controlled. cont lisinopril     #3: Hyperlipidemia:  -cont rosuvastatin. #4: BPH:  -cont finasteride. VTE prophylaxis: aspirin per ortho  Code status: full code  Total time spent: 35 minutes.

## 2023-01-16 NOTE — ANESTHESIA POSTPROCEDURE EVALUATION
Procedure(s):  RIGHT TKA (INPATIENT). regional, spinal, MAC    Anesthesia Post Evaluation      Multimodal analgesia: multimodal analgesia used between 6 hours prior to anesthesia start to PACU discharge  Patient location during evaluation: bedside  Patient participation: complete - patient cannot participate  Level of consciousness: awake and alert  Pain management: adequate  Airway patency: patent  Anesthetic complications: no  Cardiovascular status: stable  Respiratory status: acceptable  Hydration status: acceptable  Comments: DC when criteria met. Post anesthesia nausea and vomiting:  none  Final Post Anesthesia Temperature Assessment:  Normothermia (36.0-37.5 degrees C)      INITIAL Post-op Vital signs: No vitals data found for the desired time range.

## 2023-01-16 NOTE — INTERVAL H&P NOTE
Update History & Physical    The Patient's History and Physical was reviewed with the patient. The patient was examined. There was no change. The surgical site was confirmed by the patient and me. Patient understands and wants to proceed with the procedure. If applicable, I have discussed with the patient / power of  the rationale for blood component transfusion; its benefits in treating or preventing fatigue, organ damage, or death; and its risk which includes mild transfusion reactions, rare risk of blood borne infection, or more serious but rare reactions. I have discussed the alternatives to transfusion, including the risk and consequences of not receiving transfusion. The patient / Lucila Fat of  had an opportunity to ask questions and had agreed to proceed with transfusion of blood components. Plan:  The risk, benefits, expected outcome, and alternative to the recommended procedure have been discussed with the patient.       Electronically signed by VANESSA Schaefer on 1/16/2023 at 10:59 AM

## 2023-01-16 NOTE — ANESTHESIA PROCEDURE NOTES
Spinal Block    Start time: 1/16/2023 1:40 PM  End time: 1/16/2023 1:56 PM  Performed by: Yessica gNuyen CRNA  Authorized by: Yessica Nguyen CRNA     Pre-procedure: Indications: at surgeon's request and primary anesthetic  Preanesthetic Checklist: patient identified, risks and benefits discussed, anesthesia consent, site marked, patient being monitored, timeout performed and fire risk safety assessment completed and verbalized    Timeout Time: 13:40 EST (FREDDY Oconnell RN)      Spinal Block:   Patient Position:  Seated  Prep Region:  Lumbar  Prep: Betadine and patient draped      Location:  L3-4  Technique:  Single shot  Local: bupivacaine (PF) (MARCAINE) 0.75 % (7.5 mg/mL) Intrathecal - Intrathecal   12 mg - 1/16/2023 1:50:00 PM    Med Admin Time: 1/16/2023 1:50 PM    Needle:   Needle Type:   Jennifer  Needle Gauge:  22 G  Attempts:  1      Events: CSF confirmed, no blood with aspiration and no paresthesia        Assessment:  Insertion:  Uncomplicated  Patient tolerance:  Patient tolerated the procedure well with no immediate complications

## 2023-01-16 NOTE — PERIOP NOTES
TRANSFER - OUT REPORT:    Verbal report given to OLIVER Mcgrath RN(name) on Jeramie Mei  being transferred to [facility](unit) for routine post - op       Report consisted of patients Situation, Background, Assessment and   Recommendations(SBAR). Information from the following report(s) SBAR, OR Summary, Procedure Summary, Intake/Output, MAR, Med Rec Status, and Quality Measures was reviewed with the receiving nurse. Lines:   Peripheral IV 01/16/23 Left;Posterior Hand (Active)   Site Assessment Clean, dry, & intact 01/16/23 1604   Phlebitis Assessment 0 01/16/23 1604   Infiltration Assessment 0 01/16/23 1604   Dressing Status Clean, dry, & intact 01/16/23 1604   Dressing Type Transparent 01/16/23 1604   Hub Color/Line Status Patent;Pink 01/16/23 1604   Alcohol Cap Used No 01/16/23 1604        Opportunity for questions and clarification was provided.       Patient transported with:   Registered Nurse

## 2023-01-16 NOTE — OP NOTES
Operative Note    Patient: Bhavana King MRN: [id number]  Surgery Date: 1/16/2023  [unfilled]          Procedure  Primary Surgeon    RIGHT TKA (INPATIENT)  Jill Zamora MD    * Panel 2 does not exist *  * Panel 2 does not exist *    * Panel 3 does not exist *  * Panel 3 does not exist *     Surgeon(s) and Role:     * Jill Zamora MD - Primary    Other OR Staff/Assistants:  Circ-1: Harry Adams RN  Circ-Relief: Miri London  Scrub Tech-1: Dariana Cedillo  Scrub Tech-2: Dewayne Geoffrey  Surg Asst-1: Dalia Rucker    1st Assistant Tasks:  Closing    Pre-operative Diagnosis:  Osteoarthritis of right knee, unspecified osteoarthritis type [M17.11]    Post-operative Diagnosis: same as preop diagnosis    Anesthesia Type: Spinal     Findings: djd    Complications: No    EBL: 50 cc    Body mass index is 30.43 kg/m².     Specimens: None    Implants       Joint Component    Shell Acetab Clusterhole 58mm Code F - [id number] - Implanted   (Left) Hip      Inventory item: SHELL ACETABULAR CLUSTERHOLE TRIDENT II SZ F 58MM Model/Cat number: [id number]    : [employer] Lot number: [id number]      As of 5/26/2021       Status: Implanted                      Insert Poly Trident 0 Deg 36mm - [id number] - Implanted   (Left) Hip      Inventory item: INSERT POLY TRIDENT SZ F 36MM Model/Cat number: [id number]    : [employer] Lot number: [id number]      As of 5/26/2021       Status: Implanted                      Stem Femoral Cementless Tapered Wedge Size 8 51mm - [id number] - Implanted   (Left) Hip      Inventory item: STEM FEMORAL ACCOLADE II SZ 8 127 DEG Model/Cat number: [id number]    : [employer] Lot number: [id number]      As of 5/26/2021       Status: Implanted                      Head Femoral 36mm +0mm Ceramic - [id number] - Implanted   (Left) Hip      Inventory item: HEAD FEMORAL BIOLOX DELTA 36MM +0MM Model/Cat number: [id number] : Mitzi Honeycutt [employer] Lot number: [id number]      As of 5/26/2021       Status: Implanted                      Screw    Screw Hex Low Profile 6.5x25mm - [id number] - Implanted   (Left) Hip      Inventory item: SCREW HEX LOW PROFILE 6.5X25MM Model/Cat number: [id number]    : [employer] HOWM Lot number: [id number]      As of 5/26/2021       Status: Implanted                      Screw Hex Low Profile 6.5x20mm - [id number] - Implanted   (Left) Hip      Inventory item: SCREW HEX LOW PROFILE 6.5X20MM Model/Cat number: [id number]    : [employer] HOW[employer] Lot number: [id number]      As of 5/26/2021       Status: Implanted                      Synthetics    Hex [place] Plug - [id number] - Implanted   (Left) Hip      Inventory item: HEX DOME HOLE PLUG Model/Cat number: [id number]    : [employer] Lot number: [id number]      As of 5/26/2021       Status: Implanted                      Type Not Specified    Cement Bone 40 Gm W/ Gentmycn Hi Visc Palacos R+G - [id number] - Implanted   (Right) Knee      Inventory item: CEMENT BONE 40 GM W/ GENTMYCN HI VISC PALACOS R+G Model/Cat number: [id number]    : [employer] Lot number: [id number]      As of 1/16/2023       Status: Implanted                      Attune Rp Tib Base Sz 9 Jp - [id number] - Implanted   (Right) Knee      Inventory item: ATTUNE RP TIB BASE SZ 9 JP Model/Cat number: [id number]    : [employer] Lot number: [id number]    Device identifier: [id number] Device identifier type: GS1      GUDID Information       Request status Successful        Brand name: ATTUNE Version/Model: 1506-    Company name: [employer] ([employer]) MRI safety info as of 1/16/23: Labeling does not contain MRI Safety Information    Contains dry or latex rubber: No      GMDN P.T. name: Uncoated knee tibia prosthesis, metallic                As of 1/16/2023       Status: Implanted Insert Tibial Attune Ps Rp Insrt Sz9 5mm - [id number] - Implanted   (Right) Knee      Inventory item: INSERT TIBIAL ATTUNE PS RP INSRT SZ9 5MM Model/Cat number: [id number]    : [employer] Lot number: [id number]    Device identifier: [id number] Device identifier type: GS1      GUDID Information       Request status Successful        Brand name: ATTUNE Version/Model: 4375-    Company name: [employer] ([employer]) MRI safety info as of 1/16/23: Labeling does not contain MRI Safety Information    Contains dry or latex rubber: No      GMDN P.T. name: Tibial insert                As of 1/16/2023       Status: Implanted                      Component Fem Sz 9 R Knee Post Stbl Fernie Attune - [id number] - Implanted   (Right) Knee      Inventory item: COMPONENT FEM SZ 9 R KNEE POST STBL FERNIE ATTUNE Model/Cat number: [id number]    : [employer] Lot number: [id number]    Device identifier: [id number] Device identifier type: GS1      SparkWordsDID Information       Request status Successful        Brand name: ATTGlass & Marker Version/Model: 1504-    Company name: Sheila Blake ([employer]) MRI safety info as of 1/16/23: Labeling does not contain MRI Safety Information    Contains dry or latex rubber: No      GMDN P.T. name: Uncoated knee femur prosthesis, metallic                As of 1/16/2023       Status: Implanted                      Component Pat Efe14ei Polyeth Dome Fernie Medialized Attune - [id number] - Implanted   (Right) Knee      Inventory item: COMPONENT PAT PMI16SY POLYETH DOME FERNIE MEDIALIZED ATTUNE Model/Cat number: [id number]    : [employer] Lot number: [id number]    Device identifier: [id number] Device identifier type: GS1      GUDID Information       Request status Successful        Brand name: ATTGlass & Marker Version/Model: 1518-    Company name: Sheila Blake ([employer]) MRI safety info as of 1/16/23: Labeling does not contain MRI Safety Information    Contains dry or latex rubber: No      GMDN P.T. name: Polyethylene patella prosthesis                As of 1/16/2023       Status: Implanted                               Operative procedure: Total knee replacement    OPERATIVE PROCEDURE:  Please note the first assistant role was to help in patient positioning and draping of the extremity in a sterile fashion. Also during the surgery the assistant's responsibilities included but not limited to extremity positioning during critical portions of the surgery. Assisting in using and placement of retractors during surgery. Lower extremity was prepped and draped in a sterile fashion. After adequate anesthesia was given, the patient was placed in a well-padded supine position. Subvastus arthrotomy from the tibial tubercle to the superior pole of the patella was made. Knee was hyperflexed. Intramedullary reaming of distal femur and proximal tibia was performed. 10 mm of distal femur was cut. Anterior-posterior sizing guide was used. Anterior, posterior, chamfer cuts, and box cuts were made next. Proximal tibial cut and preparation performed. Posterior osteophyte meniscal remnants were removed, and also patella was everted. Free-hand cut of the patella was made. Trial components were placed. The patient was found to have excellent range of motion and stability with all trial components. All the trial components removed. Copious irrigation performed. Distal femur, proximal tibia, and patella were impacted in place. Excessive cement was removed. After the cement was hard, Subvastus arthrotomy closed with Vicryl stitch. Compressive dressing was applied. The patient was taken to PACU in stable condition. Please note due to the patient's BMI of greater than 30 significant surgical effort was required compared to the standard patient with a BMI lower than 30.   Surgical time increased approximately 30% from the normal surgical time due to the patient's high BMI. Because of the high BMI patient's knee would be considered a complex total knee replacement rather than a standard total knee replacement.       Muna Kowalski MD

## 2023-01-16 NOTE — PROGRESS NOTES
Problem: Falls - Risk of  Goal: *Absence of Falls  Description: Document Valentine Travis Fall Risk and appropriate interventions in the flowsheet.   Outcome: Progressing Towards Goal  Note: Fall Risk Interventions:                                Problem: Patient Education: Go to Patient Education Activity  Goal: Patient/Family Education  Outcome: Progressing Towards Goal     Problem: Pain  Goal: *Control of Pain  Outcome: Progressing Towards Goal  Goal: *PALLIATIVE CARE:  Alleviation of Pain  Outcome: Progressing Towards Goal     Problem: Patient Education: Go to Patient Education Activity  Goal: Patient/Family Education  Outcome: Progressing Towards Goal     Problem: Patient Education: Go to Patient Education Activity  Goal: Patient/Family Education  Outcome: Progressing Towards Goal     Problem: Knee Replacement: Day of Surgery/Unit  Goal: Off Pathway (Use only if patient is Off Pathway)  Outcome: Progressing Towards Goal  Goal: Activity/Safety  Outcome: Progressing Towards Goal  Goal: Consults, if ordered  Outcome: Progressing Towards Goal  Goal: Diagnostic Test/Procedures  Outcome: Progressing Towards Goal  Goal: Nutrition/Diet  Outcome: Progressing Towards Goal  Goal: Medications  Outcome: Progressing Towards Goal  Goal: Respiratory  Outcome: Progressing Towards Goal  Goal: Treatments/Interventions/Procedures  Outcome: Progressing Towards Goal  Goal: Psychosocial  Outcome: Progressing Towards Goal  Goal: *Initiate mobility  Outcome: Progressing Towards Goal  Goal: *Optimal pain control at patient's stated goal  Outcome: Progressing Towards Goal  Goal: *Hemodynamically stable  Outcome: Progressing Towards Goal     Problem: Knee Replacement: Post-Op Day 1  Goal: Off Pathway (Use only if patient is Off Pathway)  Outcome: Progressing Towards Goal  Goal: Activity/Safety  Outcome: Progressing Towards Goal  Goal: Diagnostic Test/Procedures  Outcome: Progressing Towards Goal  Goal: Nutrition/Diet  Outcome: Progressing Towards Goal  Goal: Medications  Outcome: Progressing Towards Goal  Goal: Respiratory  Outcome: Progressing Towards Goal  Goal: Treatments/Interventions/Procedures  Outcome: Progressing Towards Goal  Goal: Psychosocial  Outcome: Progressing Towards Goal  Goal: Discharge Planning  Outcome: Progressing Towards Goal  Goal: *Demonstrates progressive activity  Outcome: Progressing Towards Goal  Goal: *Optimal pain control at patient's stated goal  Outcome: Progressing Towards Goal  Goal: *Hemodynamically stable  Outcome: Progressing Towards Goal  Goal: *Discharge plan identified  Outcome: Progressing Towards Goal     Problem: Knee Replacement: Post-Op Day 2  Goal: Off Pathway (Use only if patient is Off Pathway)  Outcome: Progressing Towards Goal  Goal: Activity/Safety  Outcome: Progressing Towards Goal  Goal: Diagnostic Test/Procedures  Outcome: Progressing Towards Goal  Goal: Medications  Outcome: Progressing Towards Goal  Goal: Respiratory  Outcome: Progressing Towards Goal  Goal: Treatments/Interventions/Procedures  Outcome: Progressing Towards Goal  Goal: Psychosocial  Outcome: Progressing Towards Goal  Goal: *Met physical therapy criteria for discharge to the next level of care  Outcome: Progressing Towards Goal  Goal: *Optimal pain control with oral analgesia  Outcome: Progressing Towards Goal  Goal: *Hemodynamically stable  Outcome: Progressing Towards Goal  Goal: *Tolerating diet  Outcome: Progressing Towards Goal  Goal: *Patient verbalizes understanding of discharge instructions  Outcome: Progressing Towards Goal

## 2023-01-17 ENCOUNTER — APPOINTMENT (OUTPATIENT)
Dept: GENERAL RADIOLOGY | Age: 83
End: 2023-01-17
Attending: INTERNAL MEDICINE
Payer: MEDICARE

## 2023-01-17 ENCOUNTER — APPOINTMENT (OUTPATIENT)
Dept: NON INVASIVE DIAGNOSTICS | Age: 83
End: 2023-01-17
Attending: INTERNAL MEDICINE
Payer: MEDICARE

## 2023-01-17 LAB
ANION GAP SERPL CALC-SCNC: 9 MMOL/L (ref 3–18)
ATRIAL RATE: 73 BPM
BASOPHILS # BLD: 0 K/UL (ref 0–0.1)
BASOPHILS NFR BLD: 0 % (ref 0–2)
BUN SERPL-MCNC: 19 MG/DL (ref 7–18)
BUN/CREAT SERPL: 15 (ref 12–20)
CA-I BLD-MCNC: 9 MG/DL (ref 8.5–10.1)
CALCULATED P AXIS, ECG09: 49 DEGREES
CALCULATED R AXIS, ECG10: 54 DEGREES
CALCULATED T AXIS, ECG11: 35 DEGREES
CHLORIDE SERPL-SCNC: 103 MMOL/L (ref 100–111)
CO2 SERPL-SCNC: 25 MMOL/L (ref 21–32)
CREAT SERPL-MCNC: 1.23 MG/DL (ref 0.6–1.3)
DIAGNOSIS, 93000: NORMAL
DIFFERENTIAL METHOD BLD: ABNORMAL
ECHO AV MEAN GRADIENT: 2 MMHG
ECHO AV MEAN VELOCITY: 0.7 M/S
ECHO AV PEAK GRADIENT: 4 MMHG
ECHO AV PEAK VELOCITY: 1 M/S
ECHO AV VELOCITY RATIO: 0.8
ECHO AV VTI: 20.8 CM
ECHO LA VOL 2C: 55 ML (ref 18–58)
ECHO LA VOL 4C: 55 ML (ref 18–58)
ECHO LA VOL BP: 55 ML (ref 18–58)
ECHO LA VOL/BSA BIPLANE: 24 ML/M2 (ref 16–34)
ECHO LA VOLUME INDEX A2C: 24 ML/M2 (ref 16–34)
ECHO LA VOLUME INDEX A4C: 24 ML/M2 (ref 16–34)
ECHO LV E' LATERAL VELOCITY: 6 CM/S
ECHO LV E' SEPTAL VELOCITY: 7 CM/S
ECHO LVOT AV VTI INDEX: 0.83
ECHO LVOT MEAN GRADIENT: 2 MMHG
ECHO LVOT PEAK GRADIENT: 3 MMHG
ECHO LVOT PEAK VELOCITY: 0.8 M/S
ECHO LVOT VTI: 17.3 CM
ECHO MV A VELOCITY: 0.85 M/S
ECHO MV E DECELERATION TIME (DT): 244.6 MS
ECHO MV E VELOCITY: 0.51 M/S
ECHO MV E/A RATIO: 0.6
ECHO MV E/E' LATERAL: 8.5
ECHO MV E/E' RATIO (AVERAGED): 7.89
ECHO MV E/E' SEPTAL: 7.29
ECHO MV LVOT VTI INDEX: 1.31
ECHO MV MAX VELOCITY: 0.9 M/S
ECHO MV MEAN GRADIENT: 1 MMHG
ECHO MV MEAN VELOCITY: 0.5 M/S
ECHO MV PEAK GRADIENT: 3 MMHG
ECHO MV VTI: 22.7 CM
EOSINOPHIL # BLD: 0 K/UL (ref 0–0.4)
EOSINOPHIL NFR BLD: 0 % (ref 0–5)
ERYTHROCYTE [DISTWIDTH] IN BLOOD BY AUTOMATED COUNT: 12.7 % (ref 11.6–14.5)
GLUCOSE BLD STRIP.AUTO-MCNC: 148 MG/DL (ref 70–110)
GLUCOSE SERPL-MCNC: 143 MG/DL (ref 74–99)
HCT VFR BLD AUTO: 37.4 % (ref 36–48)
HGB BLD-MCNC: 12.4 G/DL (ref 13–16)
IMM GRANULOCYTES # BLD AUTO: 0.1 K/UL (ref 0–0.04)
IMM GRANULOCYTES NFR BLD AUTO: 1 % (ref 0–0.5)
LYMPHOCYTES # BLD: 1.2 K/UL (ref 0.9–3.6)
LYMPHOCYTES NFR BLD: 8 % (ref 21–52)
MCH RBC QN AUTO: 31.6 PG (ref 24–34)
MCHC RBC AUTO-ENTMCNC: 33.2 G/DL (ref 31–37)
MCV RBC AUTO: 95.4 FL (ref 78–100)
MONOCYTES # BLD: 1.3 K/UL (ref 0.05–1.2)
MONOCYTES NFR BLD: 8 % (ref 3–10)
NEUTS SEG # BLD: 13.3 K/UL (ref 1.8–8)
NEUTS SEG NFR BLD: 83 % (ref 40–73)
NRBC # BLD: 0 K/UL (ref 0–0.01)
NRBC BLD-RTO: 0 PER 100 WBC
P-R INTERVAL, ECG05: 207 MS
PERFORMED BY, TECHID: ABNORMAL
PLATELET # BLD AUTO: 256 K/UL (ref 135–420)
PMV BLD AUTO: 9.3 FL (ref 9.2–11.8)
POTASSIUM SERPL-SCNC: 4.8 MMOL/L (ref 3.5–5.5)
Q-T INTERVAL, ECG07: 387 MS
QRS DURATION, ECG06: 100 MS
QTC CALCULATION (BEZET), ECG08: 427 MS
RBC # BLD AUTO: 3.92 M/UL (ref 4.35–5.65)
SODIUM SERPL-SCNC: 137 MMOL/L (ref 136–145)
TROPONIN-HIGH SENSITIVITY: 6 NG/L (ref 0–78)
VENTRICULAR RATE, ECG03: 73 BPM
WBC # BLD AUTO: 15.9 K/UL (ref 4.6–13.2)

## 2023-01-17 PROCEDURE — 97116 GAIT TRAINING THERAPY: CPT

## 2023-01-17 PROCEDURE — 74011000250 HC RX REV CODE- 250: Performed by: NURSE PRACTITIONER

## 2023-01-17 PROCEDURE — 97530 THERAPEUTIC ACTIVITIES: CPT

## 2023-01-17 PROCEDURE — 93306 TTE W/DOPPLER COMPLETE: CPT

## 2023-01-17 PROCEDURE — 74011250637 HC RX REV CODE- 250/637: Performed by: NURSE PRACTITIONER

## 2023-01-17 PROCEDURE — 36415 COLL VENOUS BLD VENIPUNCTURE: CPT

## 2023-01-17 PROCEDURE — 94761 N-INVAS EAR/PLS OXIMETRY MLT: CPT

## 2023-01-17 PROCEDURE — 82962 GLUCOSE BLOOD TEST: CPT

## 2023-01-17 PROCEDURE — 84484 ASSAY OF TROPONIN QUANT: CPT

## 2023-01-17 PROCEDURE — 77010033678 HC OXYGEN DAILY

## 2023-01-17 PROCEDURE — 71046 X-RAY EXAM CHEST 2 VIEWS: CPT

## 2023-01-17 PROCEDURE — G0378 HOSPITAL OBSERVATION PER HR: HCPCS

## 2023-01-17 PROCEDURE — 74011250636 HC RX REV CODE- 250/636: Performed by: INTERNAL MEDICINE

## 2023-01-17 PROCEDURE — 85025 COMPLETE CBC W/AUTO DIFF WBC: CPT

## 2023-01-17 PROCEDURE — 93005 ELECTROCARDIOGRAM TRACING: CPT

## 2023-01-17 PROCEDURE — 80048 BASIC METABOLIC PNL TOTAL CA: CPT

## 2023-01-17 RX ADMIN — SENNOSIDES 8.6 MG: 8.6 TABLET, COATED ORAL at 17:37

## 2023-01-17 RX ADMIN — SODIUM CHLORIDE 1000 ML: 9 INJECTION, SOLUTION INTRAVENOUS at 09:45

## 2023-01-17 RX ADMIN — ACETAMINOPHEN 650 MG: 325 TABLET ORAL at 12:12

## 2023-01-17 RX ADMIN — SODIUM CHLORIDE, PRESERVATIVE FREE 10 ML: 5 INJECTION INTRAVENOUS at 22:12

## 2023-01-17 RX ADMIN — SODIUM CHLORIDE, PRESERVATIVE FREE 10 ML: 5 INJECTION INTRAVENOUS at 13:39

## 2023-01-17 RX ADMIN — SODIUM CHLORIDE 1000 ML: 9 INJECTION, SOLUTION INTRAVENOUS at 10:50

## 2023-01-17 RX ADMIN — ASPIRIN 325 MG: 325 TABLET, COATED ORAL at 08:41

## 2023-01-17 RX ADMIN — OXYCODONE AND ACETAMINOPHEN 2 TABLET: 5; 325 TABLET ORAL at 08:41

## 2023-01-17 RX ADMIN — ASPIRIN 325 MG: 325 TABLET, COATED ORAL at 17:38

## 2023-01-17 RX ADMIN — ACETAMINOPHEN 650 MG: 325 TABLET ORAL at 22:12

## 2023-01-17 RX ADMIN — SENNOSIDES 8.6 MG: 8.6 TABLET, COATED ORAL at 08:41

## 2023-01-17 RX ADMIN — SODIUM CHLORIDE, PRESERVATIVE FREE 10 ML: 5 INJECTION INTRAVENOUS at 07:55

## 2023-01-17 NOTE — PROGRESS NOTES
Problem: Mobility Impaired (Adult and Pediatric)  Goal: *Acute Goals and Plan of Care (Insert Text)  Description: Physical Therapy Goals  Initiated 1/16/2023 and to be accomplished within 1 day(s)  1. Patient will move from supine to sit and sit to supine , scoot up and down, and roll side to side in bed with modified independence. 2.  Patient will transfer from bed to chair and chair to bed with modified independence using the least restrictive device. 3.  Patient will perform sit to stand with modified independence. 4.  Patient will ambulate with modified independence for 200 feet with the least restrictive device. 5.  Patient will ascend/descend 4 stairs with 1 handrail(s) with modified independence. PLOF: Community ambulator, no aD, (I) ADLs. Outcome: Progressing Towards Goal   PHYSICAL THERAPY TREATMENT    Patient: Lizet Cash (19 y.o. male)  Date: 1/17/2023   Start Time: 0912   Stop Time: 0945        Primary Diagnosis: Osteoarthritis of right knee, unspecified osteoarthritis type [M17.11]  OA (osteoarthritis) of knee [M17.9]  S/P total knee replacement, right [Z96.651]  Procedure(s) (LRB):  RIGHT TKA (INPATIENT) (Right) 1 Day Post-Op   Precautions:  WBAT    ASSESSMENT :  Pt is eager to participate with PT. Performed mobility task without difficulty. Pt is mod I with transfers. Pt performed one bout of ambulation using RW followed by step negotiation 2 bouts. Upon completion Pt reported feeling weak this therapist asked PT to sit in chair BP checked and unable to get BP measurement. Pt is clammy and pale. Nursing notified and Pt transfers to W/C to return to room. During transport Pt has a brief LOC then awakens to transfer to bed. Left Pt with nursing to recover Pt. See vitals flow for details. See below for treatment details. Progression toward goals: All goals met.    [x]      Improving appropriately and progressing toward goals  []      Improving slowly and progressing toward goals  []      Not making progress toward goals and plan of care will be adjusted          PLAN :  Patient continues to benefit from skilled intervention to address the above impairments. Continue treatment per established plan of care. Recommendations and Planned Interventions:   Patient to be seen 1-2x/day, 4-7 days/wk. Discharge Recommendations: Outpatient  AM-PAC: 20/24  Further Equipment Recommendations for Discharge: N/A     SUBJECTIVE:   Patient reported increased pain today R knee. Pt also stated \"I feel weak\"     OBJECTIVE DATA SUMMARY:     Functional Mobility Training:  Bed Mobility:  Rolling: Modified independent  Supine to Sit: Modified independent     Scooting: Modified independent         Transfers:  Sit to Stand: Stand-by assistance  Stand to Sit: Stand-by assistance  Balance:  Sitting: Intact  Standing: Intact     Ambulation/Gait Training:  Distance (ft): 500 Feet (ft)  Assistive Device: Walker, rolling  Ambulation - Level of Assistance: Stand-by assistance     Gait Description (WDL): Exceptions to WDL  Gait Abnormalities: Antalgic  Right Side Weight Bearing: As tolerated  Stairs:  Number of Stairs Trained: 8  Stairs - Level of Assistance: Stand-by assistance  Rail Use: Both       Pain:  Pain level pre-treatment: 8/10   Pain level post-treatment: 8/10  Pain Location: R knee  Pain Intervention(s): Medication (see MAR); Rest, Ice, Repositioning   Response to intervention: Nurse notified, See doc flow    Activity Tolerance:   Good  Please refer to the flowsheet for vital signs taken during this treatment.   After treatment:   []         Patient left in no apparent distress sitting up in chair  [x]         Patient left in bed with nursing to recover from syncope episode  []         Call bell left within reach  []         Nursing notified  []         Caregiver present  []         Bed alarm activated  []         SCDs applied    COMMUNICATION/EDUCATION:   [x]         Role of Physical Therapy in the acute care setting. [x]         Fall prevention education was provided and the patient/caregiver indicated understanding. [x]         Patient/family have participated as able in goal setting and plan of care. [x]         Patient/family agree to work toward stated goals and plan of care. []         Patient understands intent and goals of therapy, but is neutral about his/her participation. []         Patient is unable to participate in goal setting/plan of care: ongoing with therapy staff.  []         Other:     Thank you for this referral.  PRICILLA Rodriguez, COREYA   Time Calculation: 33 mins

## 2023-01-17 NOTE — PROGRESS NOTES
HOSPITALIST PROGRESS NOTE  Subhash Shannon MD, [address]          Daily Progress Note: 1/17/2023      Subjective:     Responded to MRT called around 9:40 AM.    Patient currently alert and oriented x4 although feeling quite dizzy. Immediately after significant physical therapy with going up and down stairs as well as around the [place] patient was syncopal in bed and unarousable. Patient denies any chest pain, shortness of breath, nausea/vomiting but states that he has significant weakness and dizziness noted. Patient also noted to have low blood pressures. Patient now requiring 2 L NC O2 which is new. Counseled on extending hospital stay for an additional day along with aggressive IV fluid resuscitation and monitoring overnight along with continued physical therapy. Will also obtain echocardiogram as well as troponin to rule out ACS.     Medications reviewed  Current Facility-Administered Medications   Medication Dose Route Frequency    sodium chloride (NS) flush 5-40 mL  5-40 mL IntraVENous Q8H    sodium chloride (NS) flush 5-40 mL  5-40 mL IntraVENous PRN    acetaminophen (TYLENOL) tablet 650 mg  650 mg Oral Q4H PRN    oxyCODONE-acetaminophen (PERCOCET) 5-325 mg per tablet 2 Tablet  2 Tablet Oral Q4H PRN    oxyCODONE-acetaminophen (PERCOCET 10)  mg per tablet 1 Tablet  1 Tablet Oral Q4H PRN    ketorolac (TORADOL) injection 15 mg  15 mg IntraVENous Q6H PRN    naloxone (NARCAN) injection 0.4 mg  0.4 mg IntraVENous PRN    aspirin delayed-release tablet 325 mg  325 mg Oral BID    ondansetron (ZOFRAN) injection 4 mg  4 mg IntraVENous Q4H PRN    diphenhydrAMINE (BENADRYL) injection 12.5 mg  12.5 mg IntraVENous Q4H PRN    bisacodyL (DULCOLAX) suppository 10 mg  10 mg Rectal DAILY PRN    senna (SENOKOT) tablet 8.6 mg  1 Tablet Oral BID       Review of Systems:   A comprehensive review of systems was negative except for that written in the HPI. Objective:   Physical Exam:     Visit Vitals  /74   Pulse 80   Temp 96.9 °F (36.1 °C)   Resp 17   Ht 6' 2\" (1.88 m)   Wt 107.5 kg (237 lb)   SpO2 94%   BMI 30.43 kg/m²    O2 Flow Rate (L/min): 4 l/min O2 Device: Nasal cannula (4L)  Patient Vitals for the past 8 hrs:   Temp Pulse Resp BP SpO2   23 1411 -- -- -- 123/74 --   23 1146 96.9 °F (36.1 °C) 80 17 99/60 94 %   23 1106 -- -- -- (!) 90/56 --   23 1050 -- -- -- (!) 99/53 --   23 1028 -- -- -- -- 96 %   23 0955 -- -- -- (!) 90/43 --   23 0945 -- -- -- (!) 73/50 --   23 0812 97.2 °F (36.2 °C) 85 17 111/60 92 %          Temp (24hrs), Av.5 °F (36.4 °C), Min:96.9 °F (36.1 °C), Max:98.1 °F (36.7 °C)    No intake/output data recorded. 01/15 1901 -  0700  In: 860 [I.V.:860]  Out: 100     General:  Alert, cooperative, no distress, appears stated age. Lungs:   Clear to auscultation bilaterally. Chest wall:  No tenderness or deformity. Heart:  Regular rate and rhythm, S1, S2 normal, no murmur, click, rub or gallop. Abdomen:   Soft, non-tender. Bowel sounds normal. No masses,  No organomegaly. Extremities: Extremities normal, atraumatic, no cyanosis or edema. Pulses: 2+ and symmetric all extremities. Skin: Skin color, texture, turgor normal. No rashes or lesions   Neurologic: CNII-XII intact. No gross sensory or motor deficits     Data Review:       Recent Days:  Recent Labs     23  0355   WBC 15.9*   HGB 12.4*   HCT 37.4        Recent Labs     23  0355      K 4.8      CO2 25   *   BUN 19*   CREA 1.23   CA 9.0     No results for input(s): PH, PCO2, PO2, HCO3, FIO2 in the last 72 hours.     24 Hour Results:  Recent Results (from the past 24 hour(s))   METABOLIC PANEL, BASIC    Collection Time: 23  3:55 AM   Result Value Ref Range    Sodium 137 136 - 145 mmol/L    Potassium 4.8 3.5 - 5.5 mmol/L    Chloride 103 100 - 111 mmol/L    CO2 25 21 - 32 mmol/L    Anion gap 9 3.0 - 18.0 mmol/L    Glucose 143 (H) 74 - 99 mg/dL    BUN 19 (H) 7 - 18 mg/dL    Creatinine 1.23 0.60 - 1.30 mg/dL    BUN/Creatinine ratio 15 12 - 20      eGFR 59 (L) >60 ml/min/1.73m2    Calcium 9.0 8.5 - 10.1 mg/dL   CBC WITH AUTOMATED DIFF    Collection Time: 01/17/23  3:55 AM   Result Value Ref Range    WBC 15.9 (H) 4.6 - 13.2 K/uL    RBC 3.92 (L) 4.35 - 5.65 M/uL    HGB 12.4 (L) 13.0 - 16.0 g/dL    HCT 37.4 36.0 - 48.0 %    MCV 95.4 78.0 - 100.0 FL    MCH 31.6 24.0 - 34.0 PG    MCHC 33.2 31.0 - 37.0 g/dL    RDW 12.7 11.6 - 14.5 %    PLATELET 815 327 - 232 K/uL    MPV 9.3 9.2 - 11.8 FL    NRBC 0.0 0.0  WBC    ABSOLUTE NRBC 0.00 0.00 - 0.01 K/uL    NEUTROPHILS 83 (H) 40 - 73 %    LYMPHOCYTES 8 (L) 21 - 52 %    MONOCYTES 8 3 - 10 %    EOSINOPHILS 0 0 - 5 %    BASOPHILS 0 0 - 2 %    IMMATURE GRANULOCYTES 1 (H) 0 - 0.5 %    ABS. NEUTROPHILS 13.3 (H) 1.8 - 8.0 K/UL    ABS. LYMPHOCYTES 1.2 0.9 - 3.6 K/UL    ABS. MONOCYTES 1.3 (H) 0.05 - 1.2 K/UL    ABS. EOSINOPHILS 0.0 0.0 - 0.4 K/UL    ABS. BASOPHILS 0.0 0.0 - 0.1 K/UL    ABS. IMM.  GRANS. 0.1 (H) 0.00 - 0.04 K/UL    DF AUTOMATED     GLUCOSE, POC    Collection Time: 01/17/23  9:58 AM   Result Value Ref Range    Glucose (POC) 148 (H) 70 - 110 mg/dL    Performed by Rafy Noland    TROPONIN-HIGH SENSITIVITY    Collection Time: 01/17/23 10:00 AM   Result Value Ref Range    Troponin-High Sensitivity 6 0 - 78 ng/L   EKG, 12 LEAD, SUBSEQUENT    Collection Time: 01/17/23 10:06 AM   Result Value Ref Range    Ventricular Rate 73 BPM    Atrial Rate 73 BPM    P-R Interval 207 ms    QRS Duration 100 ms    Q-T Interval 387 ms    QTC Calculation (Bezet) 427 ms    Calculated P Axis 49 degrees    Calculated R Axis 54 degrees    Calculated T Axis 35 degrees    Diagnosis       Sinus rhythm  Probable left atrial enlargement  RSR' in V1 or V2, right VCD or RVH    Confirmed by WESLEY BROWN ([id number]) on 1/17/2023 1:32:48 PM     ECHO ADULT COMPLETE Collection Time: 01/17/23  2:01 PM   Result Value Ref Range    LVOT Peak Gradient 3 mmHg    LVOT Mean Gradient 2 mmHg    LVOT Peak Velocity 0.8 m/s    LVOT VTI 17.3 cm    LA Volume 2C 55 18 - 58 mL    AV Peak Gradient 4 mmHg    AV Mean Gradient 2 mmHg    AV Peak Velocity 1.0 m/s    AV Mean Velocity 0.7 m/s    AV VTI 20.8 cm    MV A Velocity 0.85 m/s    MV E Wave Deceleration Time 244.6 ms    MV E Velocity 0.51 m/s    LV E' Lateral Velocity 6 cm/s    LV E' Septal Velocity 7 cm/s    MV Peak Gradient 3 mmHg    MV Mean Gradient 1 mmHg    MV Max Velocity 0.9 m/s    MV Mean Velocity 0.5 m/s    MV VTI 22.7 cm       Assessment/Plan:     Syncope  Likely secondary to narcotics, pain medications as well as anesthesia status post TKR. With significant associated low blood pressures and orthostasis. No significant chest pain, shortness of breath or hypoxia noted. Blood glucose was 148. Will obtain troponin to rule out ACS however patient is not having any active chest pain. Significantly low blood pressures and will administer 2 L of IV normal saline boluses. Patient is alert after syncope patient is alert and oriented x4. Counseled patient on extending hospital stay for an additional day for further monitoring. S/p Right TKR  -further management per ortho  Pt/Ot eval and tx  Aspirin 325 Mg twice daily for DVT prophylaxis per orthopedic surgery. Acute hypoxia  Currently requiring 2 L NC O2 however without any significant shortness of breath noted. Obtain chest x-ray with episode of syncope noted. Hypertension:  Hold home lisinopril for now. Hyperlipidemia:  Rosuvastatin. BPH:  Hold finasteride. VTE prophylaxis: aspirin per ortho    Care Plan discussed with: Patient/Family, Nurse, and     Total time spent with patient: 40 minutes. With greater than 50% spent in coordination of care and counseling.     Erna Calderon MD

## 2023-01-17 NOTE — PROGRESS NOTES
Problem: Mobility Impaired (Adult and Pediatric)  Goal: *Acute Goals and Plan of Care (Insert Text)  Description: Physical Therapy Goals  Initiated 1/16/2023 and to be accomplished within 1 day(s)  1. Patient will move from supine to sit and sit to supine , scoot up and down, and roll side to side in bed with modified independence. 2.  Patient will transfer from bed to chair and chair to bed with modified independence using the least restrictive device. 3.  Patient will perform sit to stand with modified independence. 4.  Patient will ambulate with modified independence for 200 feet with the least restrictive device. 5.  Patient will ascend/descend 4 stairs with 1 handrail(s) with modified independence. PLOF: Community ambulator, no aD, (I) ADLs. Outcome: Progressing Towards Goal   PHYSICAL THERAPY TREATMENT    Patient: Bassam Kam (78 y.o. male)  Date: 1/17/2023   Start Time: 1515   Stop Time: 1525        Primary Diagnosis: Osteoarthritis of right knee, unspecified osteoarthritis type [M17.11]  OA (osteoarthritis) of knee [M17.9]  S/P total knee replacement, right [Z96.651]  Procedure(s) (LRB):  RIGHT TKA (INPATIENT) (Right) 1 Day Post-Op   Precautions:  WBAT    ASSESSMENT :  Pt is eager to participate with PT. Orthostatic BP checked during session. Supine 116/70, Seated 106/59, Standing 122/76. Pt performed all mobility and transfers with supervision for safety. After this Pt transfers to chair followed by SPT to w/c to go down for chest Xray. See below for treatment details. Progression toward goals: All goals met. [x]      Improving appropriately and progressing toward goals  []      Improving slowly and progressing toward goals  []      Not making progress toward goals and plan of care will be adjusted          PLAN :  Patient continues to benefit from skilled intervention to address the above impairments. Continue treatment per established plan of care.     Recommendations and Planned Interventions:   Patient to be seen 1-2x/day, 4-7 days/wk. Discharge Recommendations: Outpatient  AM-PAC: 20/24  Further Equipment Recommendations for Discharge: N/A     SUBJECTIVE:   Patient stated \"Im Good\"    OBJECTIVE DATA SUMMARY:     Functional Mobility Training:  Bed Mobility:  Rolling: Modified independent  Supine to Sit: Modified independent     Scooting: Modified independent    Transfers:  Sit to Stand: Supervision (safety to monitor BP)  Stand to Sit: Supervision  Pt does stand to tranfer without RW. Pain:  Pain level pre-treatment:not assessed   Pain level post-treatment:Not assessed   Pain Location: R knee  Pain Intervention(s): Medication (see MAR); Rest, Ice, Repositioning   Response to intervention: Nurse notified, See doc flow    Activity Tolerance:   Good  Please refer to the flowsheet for vital signs taken during this treatment. After treatment:   [x]         Patient left in w/c with xray technician to transport.   []           []         Call bell left within reach  []         Nursing notified  []         Caregiver present  []         Bed alarm activated  []         SCDs applied    COMMUNICATION/EDUCATION:   [x]         Role of Physical Therapy in the acute care setting. [x]         Fall prevention education was provided and the patient/caregiver indicated understanding. [x]         Patient/family have participated as able in goal setting and plan of care. [x]         Patient/family agree to work toward stated goals and plan of care. []         Patient understands intent and goals of therapy, but is neutral about his/her participation. []         Patient is unable to participate in goal setting/plan of care: ongoing with therapy staff.  []         Other:     Thank you for this referral.  Lowell Arbour, LPTA Merline Sachs, SPTA   Time Calculation: 10 mins

## 2023-01-17 NOTE — PROGRESS NOTES
Care Management Interventions  PCP Verified by CM: Yes  Palliative Care Criteria Met (RRAT>21 & CHF Dx)?: No  Mode of Transport at Discharge: Other (see comment) (Family)  Transition of Care Consult (CM Consult): Discharge Planning  MyChart Signup: No  Discharge Durable Medical Equipment: No  Health Maintenance Reviewed: Yes  Physical Therapy Consult: Yes  Occupational Therapy Consult: No  Speech Therapy Consult: No  Support Systems: Spouse/Significant Other  Confirm Follow Up Transport: Family  The Patient and/or Patient Representative was Provided with a Choice of Provider and Agrees with the Discharge Plan?: Yes  Freedom of Choice List was Provided with Basic Dialogue that Supports the Patient's Individualized Plan of Care/Goals, Treatment Preferences and Shares the Quality Data Associated with the Providers?: Yes  Discharge Location  Patient Expects to be Discharged to[de-identified] Home with outpatient services  Reason for Admission: Chart reviewed and noted patient presented to the hospital for surgery- Right TKA.      Dx: Osteoarthritis of Right Knee     PMH: HTN, Arthritis                      RUR Score: NA                    Plan for utilizing home health: TBD         PCP: First and Last name:  Columba Andrea MD     Name of Practice:    Are you a current patient: Yes/No:    Approximate date of last visit:    Can you participate in a virtual visit with your PCP:                     Current Advanced Directive/Advance Care Plan: Full Code      Healthcare Decision Maker: Primary Decision [facility]- Life Partner- Иринаsarah Sánchez- [phone], Secondary Decision Maker- - Mariana Multani   Click here to [address] including selection of the Healthcare Decision Maker Relationship (ie \"Primary\")             Primary Decision Maker: Jesus Cody - Life Partner - [phone]    Secondary Decision Maker: Luther Mei -                   Transition of Care Plan: Patient lives at home with his Life Partner Glenis Seals. He states they have been together for 48 years. He has a cane, crutches, W/C, rollator and a wheeled walker. Teach back and Medication Reconciliation will be completed. Nurse will make follow-up appointments. Patient has a virtual appointment with the NP on 1-. He also has an appointment with [facility] in [facility] on 1-. Patient will be returning back home with outpatient services at discharge.

## 2023-01-17 NOTE — PROGRESS NOTES
0798- Patient in physical therapy states he doesn't feel well patient assisted to [facility] and brought back to room upon nurse seeing patient patient is WC unresponsive cool sweaty head dropped. [id number] MRT called    1717- Patient responded to sternal rub patient does not remember what happened patient placed in bed by staff. Manual BP obtained BP 73/50 NS bolus started patient placed in trendelenburg MD at bedside. Patient able to answer questions. Trop and EKG ordered. 1009- Patient placed on 4L oxyegn for sats at 85% sats increased to 96%     1028- 86/50 Wife at bedside. 1200- patient sitting up in bed no distress noted. Wife at bedside.

## 2023-01-17 NOTE — PROGRESS NOTES
Problem: Falls - Risk of  Goal: *Absence of Falls  Description: Document Azeb Barnhart Fall Risk and appropriate interventions in the flowsheet.   Outcome: Progressing Towards Goal  Note: Fall Risk Interventions:

## 2023-01-17 NOTE — PROGRESS NOTES
Advance Care Planning     General Advance Care Planning (ACP) Conversation      Date of Conversation: 5/11/2022  Conducted with: Patient with Decision Making Capacity    Healthcare Decision Maker:     Primary Decision Maker: Shivam Saenz Partner - [phone]    Secondary Decision Maker: Luther Mei - Brother  Click here to complete [address] including selection of the Healthcare Decision Maker Relationship (ie \"Primary\")    Today we documented Decision Maker(s) consistent with ACP documents on file. Content/Action Overview:    Has ACP document(s) on file - reflects the patient's care preferences  Reviewed DNR/DNI and patient elects Full Code (Attempt Resuscitation)  Topics discussed: treatment goals       Length of Voluntary ACP Conversation in minutes:  <16 minutes (Non-Billable)    Nini Calles RN

## 2023-01-17 NOTE — PROGRESS NOTES
Problem: Mobility Impaired (Adult and Pediatric)  Goal: *Acute Goals and Plan of Care (Insert Text)  Description: Physical Therapy Goals  Initiated 1/16/2023 and to be accomplished within 1 day(s)  1. Patient will move from supine to sit and sit to supine , scoot up and down, and roll side to side in bed with modified independence. 2.  Patient will transfer from bed to chair and chair to bed with modified independence using the least restrictive device. 3.  Patient will perform sit to stand with modified independence. 4.  Patient will ambulate with modified independence for 200 feet with the least restrictive device. 5.  Patient will ascend/descend 4 stairs with 1 handrail(s) with modified independence. PLOF: Community ambulator, no aD, (I) ADLs. Outcome: Progressing Towards Goal   PHYSICAL THERAPY EVALUATION     Patient: Lexi Wakefield (58 y.o. male)  Date: 1/16/2023   Start Time: 1802   Stop Time: 1824  $$ Initial PT Evaluation: Low Complex 20 Min     Primary Diagnosis: Osteoarthritis of right knee, unspecified osteoarthritis type [M17.11]  OA (osteoarthritis) of knee [M17.9]  S/P total knee replacement, right [Z96.651]  Procedure(s) (LRB):  RIGHT TKA (INPATIENT) (Right) Day of Surgery   Precautions:  WBAT    ASSESSMENT :  Based on the objective data described below, the patient presents s/p R TKA and he is WBAT. He is able to ambulate with a RW with SBA and he is able to attempt toileting with SBA but he was unable to relieve bladder. He is educated on a HEP and tolerates well. He would benefit from further P.T. to improve strength, gait, and stair navigation. He can return home with outpatient P.T. PLAN :  Recommendations and Planned Interventions:   Patient to be seen 1-2x/day, 4-7 days/wk. Discharge Recommendations: Outpatient  AM-PAC: 20/24  Further Equipment Recommendations for Discharge: N/A     SUBJECTIVE:   Patient states the numbness is starting to wear off.     OBJECTIVE DATA SUMMARY:     Past Medical History:   Diagnosis Date    Bladder neck contracture     BPH with obstruction/lower urinary tract symptoms     Essential hypertension with goal blood pressure less than 140/90     Family history of colon cancer     History of blood transfusion 2015    with shoulder replacement    Hyperlipidemia     Osteoarthritis     Osteoarthritis of left hip     PMR (polymyalgia rheumatica) ([facility] [address] 7/20/2016    Skin cancer     basal cell on face    Urinary retention      Past Surgical History:   Procedure Laterality Date    CYSTOSCOPY      ENDOSCOPY, COLON, DIAGNOSTIC      [facility] LASER [id number]  3/2/11    Laser incision of bladder neck contracture    HX BACK SURGERY      lower    HX MOHS PROCEDURES  1/11    rt hip repl    HX ORTHOPAEDIC Right 2015    shoulder replacement    HX OTHER SURGICAL      tonsillectomy, back sx,     HX OTHER SURGICAL      basel cell removed from right ear lobe    HX SHOULDER ARTHROSCOPY Left 2010    HX TURP      Laser    [place] LASER VAPORIZATION OF PROSTATE FOR URINE FLOW       Barriers to Learning/Limitations: None  Compensate with: N/A  Home Situation:   Home Situation  Home Environment: Private residence  # Steps to Enter: 5  Rails to Enter: Yes  Hand Rails : Right  One/Two Story Residence: One story  Living Alone: No  Support Systems: Spouse/Significant Other  Patient Expects to be Discharged to[de-identified] Home with outpatient services  Current DME Used/Available at Home: mel Cochran, [address] [facility], [place], straight  Critical Behavior:  Neurologic State: Alert  Orientation Level: Oriented X4     Skin Integrity: Incision (comment)  Skin Integumentary  Skin Integrity: Incision (comment)     Strength:    Strength: Generally decreased, functional (R knee 4/5, SLR 1802, 4 hours after spinal)  Tone & Sensation:   Tone: Normal    Sensation: Impaired (pelvic area numb, feet tingling)  Coordination:  Coordination: Within functional limits  Range Of Motion:   AROM: Generally decreased, functional (R knee 11-74)  PROM: Generally decreased, functional (R knee 4-80)  Posture:  Posture (WDL): Within defined limits     Functional Mobility:  Bed Mobility:  Rolling: Modified independent  Supine to Sit: Modified independent     Scooting: Modified independent  Transfers:  Sit to Stand: Stand-by assistance  Stand to Sit: Stand-by assistance  Balance:   Sitting: Intact  Standing: Intact  Ambulation/Gait Training:  Distance (ft): 200 Feet (ft)  Assistive Device: Walker, rolling  Ambulation - Level of Assistance: Stand-by assistance     Gait Description (WDL): Exceptions to WDL  Gait Abnormalities: Other (R knee hyperextending at times)  Right Side Weight Bearing: As tolerated   AM-PAC:  20/24; Current research shows that an AM-PAC score of 17 or less is typically not associated with a discharge to the patient's home setting, whereas a score of 18 or greater is typically associated with a discharge to the patient's home setting. Pain:  Pain level pre-treatment: 5/10   Pain level post-treatment: 5/10  Pain Location: R knee  Pain Intervention(s): Medication (see MAR); Rest, Ice, Repositioning   Response to intervention: Nurse notified, See doc flow    Activity Tolerance:   Good  Please refer to the flowsheet for vital signs taken during this treatment. After treatment:   [x]         Patient left in no apparent distress sitting up in chair  []         Patient left in no apparent distress in bed  [x]         Call bell left within reach  [x]         Nursing notified  []         Caregiver present  []         Bed alarm activated  []         SCDs applied    COMMUNICATION/EDUCATION:   [x]         Role of Physical Therapy in the acute care setting. [x]         Fall prevention education was provided and the patient/caregiver indicated understanding. [x]         Patient/family have participated as able in goal setting and plan of care. [x]         Patient/family agree to work toward stated goals and plan of care.   []         Patient understands intent and goals of therapy, but is neutral about his/her participation. []         Patient is unable to participate in goal setting/plan of care: ongoing with therapy staff.  []         Other:     Thank you for this referral.  Nohemy Neri, PT, DPT   Time Calculation: 22 mins

## 2023-01-17 NOTE — PROGRESS NOTES
Subjective:     Post-Operative Day: 1 Status Post right Total Knee Arthroplasty  Systemic or Specific Complaints:No Complaints; stated his BP dropped earlier today when up with PT    Objective:     Patient Vitals for the past 24 hrs:   BP Temp Pulse Resp SpO2   01/17/23 1146 99/60 96.9 °F (36.1 °C) 80 17 94 %   01/17/23 1106 (!) 90/56 -- -- -- --   01/17/23 1050 (!) 99/53 -- -- -- --   01/17/23 1028 -- -- -- -- 96 %   01/17/23 0955 (!) 90/43 -- -- -- --   01/17/23 0945 (!) 73/50 -- -- -- --   01/17/23 0812 111/60 97.2 °F (36.2 °C) 85 17 92 %   01/17/23 0416 110/70 97.2 °F (36.2 °C) 89 17 90 %   01/17/23 0030 104/61 97.5 °F (36.4 °C) 98 17 95 %   01/16/23 1951 131/71 97.8 °F (36.6 °C) 85 16 97 %   01/16/23 1637 113/77 98 °F (36.7 °C) 83 16 96 %   01/16/23 1627 113/62 97.8 °F (36.6 °C) 80 16 95 %   01/16/23 1612 125/60 -- 80 17 93 %   01/16/23 1607 (!) 118/59 -- 81 16 94 %   01/16/23 1602 125/68 -- 80 15 96 %   01/16/23 1557 132/65 97.3 °F (36.3 °C) 83 14 94 %   01/16/23 1544 121/67 98.1 °F (36.7 °C) 84 16 99 %   01/16/23 1331 115/75 -- 77 16 100 %   01/16/23 1326 117/75 -- 80 16 100 %   01/16/23 1321 125/83 -- 83 16 100 %   01/16/23 1316 136/82 -- 85 16 100 %       General: alert, cooperative, no distress, appears stated age   Wound: Wound clean and dry no evidence of infection.    Motion: +SLR   DVT Exam: No evidence of DVT seen on physical exam.     Data Review:    Recent Results (from the past 24 hour(s))   METABOLIC PANEL, BASIC    Collection Time: 01/17/23  3:55 AM   Result Value Ref Range    Sodium 137 136 - 145 mmol/L    Potassium 4.8 3.5 - 5.5 mmol/L    Chloride 103 100 - 111 mmol/L    CO2 25 21 - 32 mmol/L    Anion gap 9 3.0 - 18.0 mmol/L    Glucose 143 (H) 74 - 99 mg/dL    BUN 19 (H) 7 - 18 mg/dL    Creatinine 1.23 0.60 - 1.30 mg/dL    BUN/Creatinine ratio 15 12 - 20      eGFR 59 (L) >60 ml/min/1.73m2    Calcium 9.0 8.5 - 10.1 mg/dL   CBC WITH AUTOMATED DIFF    Collection Time: 01/17/23  3:55 AM   Result Value Ref Range    WBC 15.9 (H) 4.6 - 13.2 K/uL    RBC 3.92 (L) 4.35 - 5.65 M/uL    HGB 12.4 (L) 13.0 - 16.0 g/dL    HCT 37.4 36.0 - 48.0 %    MCV 95.4 78.0 - 100.0 FL    MCH 31.6 24.0 - 34.0 PG    MCHC 33.2 31.0 - 37.0 g/dL    RDW 12.7 11.6 - 14.5 %    PLATELET 559 874 - 194 K/uL    MPV 9.3 9.2 - 11.8 FL    NRBC 0.0 0.0  WBC    ABSOLUTE NRBC 0.00 0.00 - 0.01 K/uL    NEUTROPHILS 83 (H) 40 - 73 %    LYMPHOCYTES 8 (L) 21 - 52 %    MONOCYTES 8 3 - 10 %    EOSINOPHILS 0 0 - 5 %    BASOPHILS 0 0 - 2 %    IMMATURE GRANULOCYTES 1 (H) 0 - 0.5 %    ABS. NEUTROPHILS 13.3 (H) 1.8 - 8.0 K/UL    ABS. LYMPHOCYTES 1.2 0.9 - 3.6 K/UL    ABS. MONOCYTES 1.3 (H) 0.05 - 1.2 K/UL    ABS. EOSINOPHILS 0.0 0.0 - 0.4 K/UL    ABS. BASOPHILS 0.0 0.0 - 0.1 K/UL    ABS. IMM. GRANS. 0.1 (H) 0.00 - 0.04 K/UL    DF AUTOMATED     GLUCOSE, POC    Collection Time: 01/17/23  9:58 AM   Result Value Ref Range    Glucose (POC) 148 (H) 70 - 110 mg/dL    Performed by Reji Newton    TROPONIN-HIGH SENSITIVITY    Collection Time: 01/17/23 10:00 AM   Result Value Ref Range    Troponin-High Sensitivity 6 0 - 78 ng/L   EKG, 12 LEAD, SUBSEQUENT    Collection Time: 01/17/23 10:06 AM   Result Value Ref Range    Ventricular Rate 73 BPM    Atrial Rate 73 BPM    P-R Interval 207 ms    QRS Duration 100 ms    Q-T Interval 387 ms    QTC Calculation (Bezet) 427 ms    Calculated P Axis 49 degrees    Calculated R Axis 54 degrees    Calculated T Axis 35 degrees    Diagnosis       Sinus rhythm  Probable left atrial enlargement  RSR' in V1 or V2, right VCD or RVH           Assessment:     Status Post right Total Knee Arthroplasty. Doing well postoperatively; hypotensive episode earlier this morning. Plan:     IM to manage BP  Continue up with PT as tolerated. Hold opioids if possible until BP stable. Plan DC to home tomorrow if stable and follow up next week.

## 2023-01-18 VITALS
WEIGHT: 237 LBS | DIASTOLIC BLOOD PRESSURE: 57 MMHG | BODY MASS INDEX: 30.42 KG/M2 | RESPIRATION RATE: 17 BRPM | SYSTOLIC BLOOD PRESSURE: 113 MMHG | OXYGEN SATURATION: 95 % | TEMPERATURE: 97.4 F | HEIGHT: 74 IN | HEART RATE: 104 BPM

## 2023-01-18 LAB
ANION GAP SERPL CALC-SCNC: 7 MMOL/L (ref 3–18)
BASOPHILS # BLD: 0 K/UL (ref 0–0.1)
BASOPHILS NFR BLD: 0 % (ref 0–2)
BNP SERPL-MCNC: 49 PG/ML (ref 0–1800)
BUN SERPL-MCNC: 18 MG/DL (ref 7–18)
BUN/CREAT SERPL: 17 (ref 12–20)
CA-I BLD-MCNC: 8.5 MG/DL (ref 8.5–10.1)
CHLORIDE SERPL-SCNC: 105 MMOL/L (ref 100–111)
CO2 SERPL-SCNC: 28 MMOL/L (ref 21–32)
CREAT SERPL-MCNC: 1.04 MG/DL (ref 0.6–1.3)
DIFFERENTIAL METHOD BLD: ABNORMAL
EOSINOPHIL # BLD: 0 K/UL (ref 0–0.4)
EOSINOPHIL NFR BLD: 0 % (ref 0–5)
ERYTHROCYTE [DISTWIDTH] IN BLOOD BY AUTOMATED COUNT: 13 % (ref 11.6–14.5)
GLUCOSE SERPL-MCNC: 118 MG/DL (ref 74–99)
HCT VFR BLD AUTO: 29.9 % (ref 36–48)
HGB BLD-MCNC: 9.9 G/DL (ref 13–16)
IMM GRANULOCYTES # BLD AUTO: 0 K/UL (ref 0–0.04)
IMM GRANULOCYTES NFR BLD AUTO: 0 % (ref 0–0.5)
LYMPHOCYTES # BLD: 1.8 K/UL (ref 0.9–3.6)
LYMPHOCYTES NFR BLD: 23 % (ref 21–52)
MCH RBC QN AUTO: 31.7 PG (ref 24–34)
MCHC RBC AUTO-ENTMCNC: 33.1 G/DL (ref 31–37)
MCV RBC AUTO: 95.8 FL (ref 78–100)
MONOCYTES # BLD: 1.2 K/UL (ref 0.05–1.2)
MONOCYTES NFR BLD: 15 % (ref 3–10)
NEUTS SEG # BLD: 4.9 K/UL (ref 1.8–8)
NEUTS SEG NFR BLD: 62 % (ref 40–73)
NRBC # BLD: 0 K/UL (ref 0–0.01)
NRBC BLD-RTO: 0 PER 100 WBC
PLATELET # BLD AUTO: 202 K/UL (ref 135–420)
PMV BLD AUTO: 9.7 FL (ref 9.2–11.8)
POTASSIUM SERPL-SCNC: 4.4 MMOL/L (ref 3.5–5.5)
RBC # BLD AUTO: 3.12 M/UL (ref 4.35–5.65)
SODIUM SERPL-SCNC: 140 MMOL/L (ref 136–145)
WBC # BLD AUTO: 7.9 K/UL (ref 4.6–13.2)

## 2023-01-18 PROCEDURE — 83880 ASSAY OF NATRIURETIC PEPTIDE: CPT

## 2023-01-18 PROCEDURE — 97116 GAIT TRAINING THERAPY: CPT

## 2023-01-18 PROCEDURE — 85025 COMPLETE CBC W/AUTO DIFF WBC: CPT

## 2023-01-18 PROCEDURE — 74011250637 HC RX REV CODE- 250/637: Performed by: NURSE PRACTITIONER

## 2023-01-18 PROCEDURE — 74011000250 HC RX REV CODE- 250: Performed by: NURSE PRACTITIONER

## 2023-01-18 PROCEDURE — 97110 THERAPEUTIC EXERCISES: CPT

## 2023-01-18 PROCEDURE — G0378 HOSPITAL OBSERVATION PER HR: HCPCS

## 2023-01-18 PROCEDURE — 80048 BASIC METABOLIC PNL TOTAL CA: CPT

## 2023-01-18 PROCEDURE — 36415 COLL VENOUS BLD VENIPUNCTURE: CPT

## 2023-01-18 RX ORDER — LISINOPRIL 5 MG/1
5 TABLET ORAL DAILY
Qty: 90 TABLET | Refills: 3 | Status: SHIPPED
Start: 2023-01-20

## 2023-01-18 RX ADMIN — ASPIRIN 325 MG: 325 TABLET, COATED ORAL at 08:24

## 2023-01-18 RX ADMIN — SENNOSIDES 8.6 MG: 8.6 TABLET, COATED ORAL at 08:23

## 2023-01-18 RX ADMIN — ACETAMINOPHEN 650 MG: 325 TABLET ORAL at 08:24

## 2023-01-18 RX ADMIN — SODIUM CHLORIDE, PRESERVATIVE FREE 10 ML: 5 INJECTION INTRAVENOUS at 05:36

## 2023-01-18 NOTE — PROGRESS NOTES
1930  Received care of pt lying in bed watching TV with wife at bedside. Routine assessment and vs completed. Pt denies pain at this time. Dressing to right knee is dry and intact with ice packs in place. Pt denies any dizziness. Call bell in reach. 2212  pt requested and received tylenol tabs 2 forpain    9542  Sleeping with no distress noted    0230  Sleeping with no distress noted    0600  Pt sleeping with no distress noted. Pt received tylenol tabs 2 x 1 for pain. Dressing remains dry and intact and ice packs in place. Pt denies any dizziness. Wife remains at bedside.

## 2023-01-18 NOTE — PROGRESS NOTES
Subjective:     Post-Operative Day: 2 Status Post right Total Knee Arthroplasty  Systemic or Specific Complaints:No Complaints; states he rested very well last night and no further episodes with BP. Objective:     Patient Vitals for the past 24 hrs:   BP Temp Pulse Resp SpO2 Height Weight   01/18/23 0819 133/75 97.4 °F (36.3 °C) 91 17 95 % -- --   01/18/23 0421 115/67 97 °F (36.1 °C) 89 17 92 % -- --   01/17/23 2358 (!) 108/53 97 °F (36.1 °C) 98 17 95 % -- --   01/17/23 2102 -- -- -- -- 99 % -- --   01/17/23 2042 112/68 97 °F (36.1 °C) 89 17 98 % -- --   01/17/23 1638 105/64 96.8 °F (36 °C) 91 17 96 % -- --   01/17/23 1411 123/74 -- -- -- -- 6' 2\" (1.88 m) 237 lb (107.5 kg)   01/17/23 1146 99/60 96.9 °F (36.1 °C) 80 17 94 % -- --   01/17/23 1106 (!) 90/56 -- -- -- -- -- --   01/17/23 1050 (!) 99/53 -- -- -- -- -- --   01/17/23 1028 -- -- -- -- 96 % -- --   01/17/23 0955 (!) 90/43 -- -- -- -- -- --   01/17/23 0945 (!) 73/50 -- -- -- -- -- --       General: alert, cooperative, no distress, appears stated age   Wound: Wound clean and dry no evidence of infection.    Motion: +SLR   DVT Exam: No evidence of DVT seen on physical exam.     Data Review:    Recent Results (from the past 24 hour(s))   GLUCOSE, POC    Collection Time: 01/17/23  9:58 AM   Result Value Ref Range    Glucose (POC) 148 (H) 70 - 110 mg/dL    Performed by Margo Quintero    TROPONIN-HIGH SENSITIVITY    Collection Time: 01/17/23 10:00 AM   Result Value Ref Range    Troponin-High Sensitivity 6 0 - 78 ng/L   EKG, 12 LEAD, SUBSEQUENT    Collection Time: 01/17/23 10:06 AM   Result Value Ref Range    Ventricular Rate 73 BPM    Atrial Rate 73 BPM    P-R Interval 207 ms    QRS Duration 100 ms    Q-T Interval 387 ms    QTC Calculation (Bezet) 427 ms    Calculated P Axis 49 degrees    Calculated R Axis 54 degrees    Calculated T Axis 35 degrees    Diagnosis       Sinus rhythm  Probable left atrial enlargement  RSR' in V1 or V2, right VCD or RVH    Confirmed by Jennifer Rm, [facility] ([id number]) on 1/17/2023 1:32:48 PM     ECHO ADULT COMPLETE    Collection Time: 01/17/23  2:01 PM   Result Value Ref Range    LVOT Peak Gradient 3 mmHg    LVOT Mean Gradient 2 mmHg    LVOT Peak Velocity 0.8 m/s    LVOT VTI 17.3 cm    LA Volume 2C 55 18 - 58 mL    AV Peak Gradient 4 mmHg    AV Mean Gradient 2 mmHg    AV Peak Velocity 1.0 m/s    AV Mean Velocity 0.7 m/s    AV VTI 20.8 cm    MV A Velocity 0.85 m/s    MV E Wave Deceleration Time 244.6 ms    MV E Velocity 0.51 m/s    LV E' Lateral Velocity 6 cm/s    LV E' Septal Velocity 7 cm/s    MV Peak Gradient 3 mmHg    MV Mean Gradient 1 mmHg    MV Max Velocity 0.9 m/s    MV Mean Velocity 0.5 m/s    MV VTI 22.7 cm    MV E/A 0.60     E/E' Ratio (Averaged) 7.89     E/E' Lateral 8.50     E/E' Septal 7.29     LA Volume Index 2C 24 16 - 34 mL/m2    AV Velocity Ratio 0.80     LVOT:AV VTI Index 0.83     MV:LVOT VTI Index 1.31     LA Volume BP 55 18 - 58 mL    LA Volume Index BP 24 16 - 34 ml/m2    LA Volume 4C 55 18 - 58 mL    LA Volume Index 4C 24 16 - 34 mL/m2   METABOLIC PANEL, BASIC    Collection Time: 01/18/23  3:30 AM   Result Value Ref Range    Sodium 140 136 - 145 mmol/L    Potassium 4.4 3.5 - 5.5 mmol/L    Chloride 105 100 - 111 mmol/L    CO2 28 21 - 32 mmol/L    Anion gap 7 3.0 - 18.0 mmol/L    Glucose 118 (H) 74 - 99 mg/dL    BUN 18 7 - 18 mg/dL    Creatinine 1.04 0.60 - 1.30 mg/dL    BUN/Creatinine ratio 17 12 - 20      eGFR >60 >60 ml/min/1.73m2    Calcium 8.5 8.5 - 10.1 mg/dL   NT-PRO BNP    Collection Time: 01/18/23  3:30 AM   Result Value Ref Range    NT pro-BNP 49 0 - 1,800 pg/mL   CBC WITH AUTOMATED DIFF    Collection Time: 01/18/23  3:30 AM   Result Value Ref Range    WBC 7.9 4.6 - 13.2 K/uL    RBC 3.12 (L) 4.35 - 5.65 M/uL    HGB 9.9 (L) 13.0 - 16.0 g/dL    HCT 29.9 (L) 36.0 - 48.0 %    MCV 95.8 78.0 - 100.0 FL    MCH 31.7 24.0 - 34.0 PG    MCHC 33.1 31.0 - 37.0 g/dL    RDW 13.0 11.6 - 14.5 %    PLATELET 821 482 - 614 K/uL MPV 9.7 9.2 - 11.8 FL    NRBC 0.0 0.0  WBC    ABSOLUTE NRBC 0.00 0.00 - 0.01 K/uL    NEUTROPHILS 62 40 - 73 %    LYMPHOCYTES 23 21 - 52 %    MONOCYTES 15 (H) 3 - 10 %    EOSINOPHILS 0 0 - 5 %    BASOPHILS 0 0 - 2 %    IMMATURE GRANULOCYTES 0 0 - 0.5 %    ABS. NEUTROPHILS 4.9 1.8 - 8.0 K/UL    ABS. LYMPHOCYTES 1.8 0.9 - 3.6 K/UL    ABS. MONOCYTES 1.2 0.05 - 1.2 K/UL    ABS. EOSINOPHILS 0.0 0.0 - 0.4 K/UL    ABS. BASOPHILS 0.0 0.0 - 0.1 K/UL    ABS. IMM. GRANS. 0.0 0.00 - 0.04 K/UL    DF AUTOMATED           Assessment:     Status Post right Total Knee Arthroplasty. BP now stable and O2 discontinued. Plan: May DC to home later today if BP stable while up with PT. Please verify DC with Dr. Bonnie Aviles.

## 2023-01-18 NOTE — DISCHARGE SUMMARY
Discharge Summary       PATIENT ID: Srini Gray  MRN: [id number]   YOB: 1940    DATE OF ADMISSION: 1/16/2023  9:31 AM    DATE OF DISCHARGE: 01/18/23    PRIMARY CARE PROVIDER: Magali Jurado MD     ATTENDING PHYSICIAN: Jose Manriquez MD  DISCHARGING PROVIDER: Jose Manriquez MD        CONSULTATIONS: IP CONSULT TO ORTHOPEDIC SURGERY  IP CONSULT TO HOSPITALIST    PROCEDURES/SURGERIES: Procedure(s) with comments:  RIGHT TKA (INPATIENT) - Right adductor canal block     ADMITTING [address] [place] [address] COURSE:   Srini Gray is a 81 y.o. male with a past med hx sig for HTN, HLP, BPH and OA s/p elective right TKA today by dr Nolberto Tripathi. Hospitalist was asked to watch overnight due to age. Pt was seen at bedside in company of spouse, and denies any acute complaints. States he has been in his healthy state prior to todat's surgery. States knee pain is in good control. He is hungry and wants some food. He is pending PT eval.         DISCHARGE DIAGNOSES / PLAN:      Assessment & Plan:      #1: s/p elective right tka  -further management per ortho  Pt/Ot eval and tx     #2: Hypertension:  -controlled. cont lisinopril      #3: Hyperlipidemia:  -cont rosuvastatin. #4: BPH:  -cont finasteride.     Pt has no further symptoms with standing  Have asked to hold ace until 1/20/23    FOLLOW UP APPOINTMENTS:    Follow-up Information       Follow up With Specialties Details Why [address] [facility], [address] [facility], [facility] Nurse Practitioner Follow up in 1 week(s) Follow up with Giorgio Genao NP Virtually on 1/23/23 at 1:45 pm [address] Can Amaro MD Internal Medicine Physician   [address] [place] [address]  [phone]                 DIET: Resume previous diet        DISCHARGE MEDICATIONS:  Current Discharge Medication List        CONTINUE these medications which have CHANGED    Details   lisinopriL (PRINIVIL, ZESTRIL) 5 mg tablet Take 1 Tablet by mouth daily. Qty: 90 Tablet, Refills: 3  Start date: 1/20/2023           CONTINUE these medications which have NOT CHANGED    Details   finasteride (PROSCAR) 5 mg tablet Take 1 Tablet by mouth daily. Qty: 90 Tablet, Refills: 3    Associated Diagnoses: Benign prostatic hyperplasia with urinary obstruction      rosuvastatin (CRESTOR) 5 mg tablet Take 1 Tablet by mouth daily. Qty: 90 Tablet, Refills: 3      acetaminophen (TYLENOL) 500 mg tablet Take 2 Tablets by mouth every six (6) hours. DOCUSATE SODIUM (STOOL SOFTENER PO) Take  by mouth daily. multivitamin (ONE A DAY) tablet Take 1 Tab by mouth daily. cholecalciferol (VITAMIN D3) 25 mcg (1,000 unit) cap Take  by mouth daily. GLUC/ERICA-MSM#1/C/CHARLY/RAMÓN/BOR (OSTEO BI-FLEX PO) Take 1 Tab by mouth daily. oxyCODONE-acetaminophen (Percocet) 5-325 mg per tablet Take 1 Tablet by mouth every four to six (4-6) hours as needed for Pain for up to 14 days. Max Daily Amount: 6 Tablets. Qty: 30 Tablet, Refills: 0    Comments: Do no take until after surgery  Associated Diagnoses: Osteoarthritis of both knees, unspecified osteoarthritis type      ondansetron (ZOFRAN ODT) 4 mg disintegrating tablet Take 1 Tablet by mouth every eight (8) hours as needed for Nausea, Vomiting or Nausea or Vomiting for up to 20 doses. Qty: 20 Tablet, Refills: 0               NOTIFY YOUR PHYSICIAN FOR ANY OF THE FOLLOWING:   Fever over 101 degrees for 24 hours. Chest pain, shortness of breath, fever, chills, nausea, vomiting, diarrhea, change in mentation, falling, weakness, bleeding. Severe pain or pain not relieved by medications. Or, any other signs or symptoms that you may have questions about.     DISPOSITION:home    Home With:   OT  PT  HH  RN       Long term SNF/Inpatient Rehab    Independent/assisted living    Hospice    Other:       PATIENT CONDITION AT DISCHARGE:     Functional status    Poor     Deconditioned    x Independent Cognition   x  Lucid     Forgetful     Dementia      Catheters/lines (plus indication)    Plata     PICC     PEG    x None      Code status    x Full code     DNR      PHYSICAL EXAMINATION AT DISCHARGE:  General:          Alert, cooperative, no distress, appears stated age. HEENT:           Atraumatic, anicteric sclerae, pink conjunctivae                          No oral ulcers, mucosa moist, throat clear, dentition fair  Neck:               Supple, symmetrical  Lungs:             Clear to auscultation bilaterally. No Wheezing or Rhonchi. No rales. Chest wall:      No tenderness  No Accessory muscle use. Heart:              Regular  rhythm,  No  murmur   No edema  Abdomen:        Soft, non-tender. Not distended. Bowel sounds normal  Extremities:     No cyanosis. No clubbing,                            Skin turgor normal, Capillary refill normal  Skin:                Not pale. Not Jaundiced  No rashes   Psych:             Not anxious or agitated.   Neurologic:      Alert, moves all extremities, answers questions appropriately and responds to commands       CHRONIC MEDICAL DIAGNOSES:  Problem List as of 1/18/2023 Date Reviewed: 1/6/2023            Codes Class Noted - Resolved    OA (osteoarthritis) of knee ICD-10-CM: M17.9  ICD-9-CM: 715.36  1/16/2023 - Present        S/P total knee replacement, right ICD-10-CM: F47.190  ICD-9-CM: V43.65  1/16/2023 - Present        Cervical spondylosis ICD-10-CM: B94.689  ICD-9-CM: 721.0  9/24/2022 - Present        Lumbar spondylosis ICD-10-CM: M47.816  ICD-9-CM: 721.3  9/24/2022 - Present        Neck pain ICD-10-CM: M54.2  ICD-9-CM: 723.1  9/24/2022 - Present        Chronic bilateral low back pain without sciatica ICD-10-CM: M54.50, G89.29  ICD-9-CM: 724.2, 338.29  9/24/2022 - Present        Status post total replacement of left hip ICD-10-CM: F11.625  ICD-9-CM: V43.64  8/29/2021 - Present        Nodule of finger of right hand ICD-10-CM: R22.31  ICD-9-CM: 782.2 3/25/2018 - Present        Class 1 obesity due to excess calories with serious comorbidity in adult ICD-10-CM: E66.09  ICD-9-CM: 278.00  3/25/2018 - Present        Positive CRISTI (antinuclear antibody)/ RNP antibody ICD-10-CM: R76.8  ICD-9-CM: 795.79  9/17/2017 - Present        Esophageal dysmotility ICD-10-CM: K22.4  ICD-9-CM: 530.5  2/10/2017 - Present        Hoarseness ICD-10-CM: R49.0  ICD-9-CM: 784.42  11/13/2016 - Present        PMR (polymyalgia rheumatica) (HCC) ICD-10-CM: M35.3  ICD-9-CM: 754  7/20/2016 - Present        Essential hypertension ICD-10-CM: I10  ICD-9-CM: 401.9  5/29/2015 - Present        Primary osteoarthritis ICD-10-CM: M19.91  ICD-9-CM: 715.10  5/29/2015 - Present        Hearing loss ICD-10-CM: H91.90  ICD-9-CM: 389.9  11/14/2013 - Present        Vitamin D insufficiency ICD-10-CM: E55.9  ICD-9-CM: 268.9  5/16/2013 - Present        Hyperlipidemia ICD-10-CM: E78.5  ICD-9-CM: 272.4  Unknown - Present        BPH with obstruction/lower urinary tract symptoms ICD-10-CM: N40.1, N13.8  ICD-9-CM: 600.01, 599.69  Unknown - Present        Bladder neck contracture ICD-10-CM: N32.0  ICD-9-CM: 596.0  Unknown - Present        RESOLVED: Osteoarthritis of left hip ICD-10-CM: M16.12  ICD-9-CM: 715.95  5/26/2021 - 9/24/2022        RESOLVED: Odynophagia ICD-10-CM: R13.10  ICD-9-CM: 787.20  2/10/2017 - 9/17/2017        RESOLVED: Sore throat ICD-10-CM: J02.9  ICD-9-CM: 955  11/13/2016 - 2/10/2017        RESOLVED: Elevated PSA ICD-10-CM: R97.20  ICD-9-CM: 790.93  4/24/2012 - 7/20/2016        RESOLVED: Retention of urine, unspecified ICD-10-CM: R33.9  ICD-9-CM: 788.20  Unknown - 7/20/2016           Greater than 35 minutes were spent with the patient on counseling and coordination of care    Signed:   Shannon Méndez MD  1/18/2023  10:33 AM

## 2023-01-19 ENCOUNTER — HOSPITAL ENCOUNTER (OUTPATIENT)
Dept: PHYSICAL THERAPY | Age: 83
Discharge: HOME | End: 2023-01-19
Payer: MEDICARE

## 2023-01-19 PROCEDURE — 97530 THERAPEUTIC ACTIVITIES: CPT

## 2023-01-19 PROCEDURE — 97161 PT EVAL LOW COMPLEX 20 MIN: CPT

## 2023-01-19 NOTE — THERAPY EVALUATION
[facility]  [address] [facility]  [phone] fax    Plan of Care/ Statement of Necessity for Physical Therapy Services    Patient name: Liz Post Start of Care: 2023   Referral source: Adela Winchester MD : 1940    Medical Diagnosis: Pain in right knee [M25.561]  Payor: [place] MEDICARE / Plan: VA MEDICARE PART A & B / Product Type: Medicare /  Onset DVFD:    Treatment Diagnosis: Right knee Pain   Prior Hospitalization: see medical history Provider#: [id number]   Medications: Verified on Patient summary List    Comorbidities: arthritis   Prior Level of Function: mod ind with all mobility, living with wife, no AD, 4 steps to enter with rails, walking, going to see football/basket ball games. The Plan of Care and following information is based on the information from the initial evaluation. Assessment/ isaac information: Mr. Estee Wu is a 80 y/o, M pt with CC of Right knee pain. Pt is 4 days s/p Right TKR. Pt reports passing out in the hospital due to low BP. He's doing well at this time. Dressing looks well, girth measurement is 49 cm mid joint with Right knee, 46 cm with Left. Pt present with decreased ROM, decreased strength of hips abd & ext. Ambulating with SPC (just in case, using cane with Right side), gait balance deemed good minus, pt demonstrates decreased speed, decreased knee ext, min antalgic. Static balance deemed good. Pt would benefit from skilled PT to address these deficits above to improve the ability to amb comfortably and safely.     Vasopnuematic compression justification:  Per bilateral girth measures taken and listed above the edema is considered significant and having an impact on the patient's strength, balance, gait, transfers, self care, and ADLs    Evaluation Complexity History MEDIUM  Complexity : 1-2 comorbidities / personal factors will impact the outcome/ POC ; Examination LOW Complexity : 1-2 Standardized tests and measures addressing body structure, function, activity limitation and / or participation in recreation  ;Presentation LOW Complexity : Stable, uncomplicated  ;Clinical Decision Making MEDIUM Complexity : FOTO score of 26-74  Overall Complexity Rating: LOW   Problem List: pain affecting function, decrease ROM, decrease strength, edema affecting function, impaired gait/ balance, decrease ADL/ functional abilitiies, decrease activity tolerance, decrease flexibility/ joint mobility, and decrease transfer abilities   Treatment Plan may include any combination of the following: Therapeutic exercise, Neuromuscular reeducation, Manual therapy, Therapeutic activity, Self care/home management, Vasopneumatic device, and Gait training  Patient / Family readiness to learn indicated by: asking questions, trying to perform skills, and interest  Persons(s) to be included in education: patient (P)  Barriers to Learning/Limitations: None  Patient Goal (s): improve ROM, getting up & down steps to go watch ball game  Patient Self Reported Health Status: good  Rehabilitation Potential: good      Short term goals: To be accomplished within 1 week  1. Pt will be independent with HEP to maintain progression. Eval status: good understanding     Long term goals: To be accomplished within 4 weeks  1. Pt will improve FOTO score by 11 points to 68/100 to show improvement with functional mobility performance. Eval status: 57     2. Pt will have Right knee AROM -5 to 115 degrees to amb household and community with normal and safe pattern. Eval status: -15 to 95     3. Pt will report 50% improvement of pain during standing & walking to improve ease with ADLs. Eval status: 2-9/10 with standing & walking    4. Pt will be able to amb household (100-300 ft) on level surface with no AD to perform ADLs comfortably. Eval status: amb with SPC just in case     5.  Pt will report No difficulty with performing light activities around his home. Eval status: Moderate difficulty      Frequency / Duration: Patient to be seen 3 times per week for 4 weeks. Patient/ Caregiver education and instruction: Diagnosis, prognosis, self care, activity modification, brace/ splint application, and exercises   [x]  Plan of care has been reviewed with PTA    Certification Period: 1- to 2-  Mariangel Cummings Hawkins 1/19/2023 9:40 AM    ________________________________________________________________________    I certify that the above Therapy Services are being furnished while the patient is under my care. I agree with the treatment plan and certify that this therapy is necessary.     [de-identified] Signature:____________Date:_________TIME:________     Chelsey Colvin MD  ** Signature, Date and Time must be completed for valid certification **    Please sign and return to [facility]  [place]  [address] [place] [address]  [phone] fax

## 2023-01-19 NOTE — THERAPY EVALUATION
PT DAILY TREATMENT NOTE/KNEE EVAL     Patient Name: Paco Valenzuela  VXBQ:  : 1940  [x]  Patient  Verified  Payor: VA MEDICARE / Plan: VA MEDICARE PART A & B / Product Type: Medicare /    In time: 11:04  Out time: 11:40  Total Treatment Time (min): 36  Visit #: 1 of     Medicare/BCBS Only   Total Timed Codes (min):  23 1:1 Treatment Time:  36     Treatment Area: Pain in right knee [M25.561]      SUBJECTIVE  Pain Level (0-10 scale): 0/10 with sitting, 2-8/10 with standing & walking  []constant []intermittent []improving []worsening []no change since onset    Any medication changes, allergies to medications, adverse drug reactions, diagnosis change, or new procedure performed?: [x] No    [] Yes (see summary sheet for update)  Subjective functional status/changes:     PLOF: mod ind with all mobility, living with wife, no AD, 4 steps to enter with rails, walking, going to see football/basket ball games. Limitations to PLOF:   Mechanism of Injury:   Current symptoms/Complaints: Mr. Kelsey Whitman is a 80 y/o, M pt with CC of Right knee pain. Pt is 4 days s/p Right TKR. Pt reports passing out in the hospital due to low BP. He's doing well at this time. Previous Treatment/Compliance:   PMHx/Surgical Hx:   Work Hx:   Living Situation:   Pt Goals: improve ROM, getting up & down steps to go watch ball game  Barriers: []pain []financial []time []transportation []other  Motivation:   Substance use: []Alcohol []Tobacco []other:   FABQ Score: []low []elevate  Cognition: A & O x     Other:    OBJECTIVE/EXAMINATION  Domestic Life:   Activity/Recreational Limitations:   Mobility:   Self Care:          Modality rationale: PD   Min Type Additional Details    [] Estim:  []Unatt       []IFC  []Premod                        []Other:  []w/ice   []w/heat  Position:  Location:    [] Estim: []Att    []TENS instruct  []NMES                    []Other:  []w/US   []w/ice []w/heat  Position:  Location:    []  Traction: [] Cervical       []Lumbar                       [] Prone          []Supine                       []Intermittent   []Continuous Lbs:  [] before manual  [] after manual    []  Ultrasound: []Continuous   [] Pulsed                           []1MHz   []3MHz Location:  W/cm2:    []  Iontophoresis with dexamethasone         Location: [] Take home patch   [] In clinic    []  Ice     []  heat  []  Ice massage  []  Laser   []  Anodyne Position:  Location:    []  Laser with stim  []  Other: Position:  Location:    []  Vasopneumatic Device Pressure:       [] lo [] med [] hi   Temperature: [] lo [] med [] hi   [] Skin assessment post-treatment:  []intact []redness- no adverse reaction    []redness - adverse reaction:     13 min [x]Eval                  []Re-Eval       23 min Therapeutic Activity:  []  See flow sheet :Pt edu within scope of practice on prognosis, POC, pain management/modalities use, swelling management, curb/stair sequence, HEP   Rationale: increase ROM, increase strength, improve coordination, improve balance, and increase proprioception  to improve the patients ability to perform aDLs/amb with ease & safety             With   [] TE   [] TA   [] neuro   [] other: Patient Education: [x] Review HEP    [] Progressed/Changed HEP based on:   [] positioning   [] body mechanics   [] transfers   [] heat/ice application    [] other:      Other Objective/Functional Measures:     Physical Therapy Evaluation - Knee    Posture: [] Varus    [x] Valgus  very mild   [] Recurvatum        [] Tibial Torsion    [] Foot Supination    [x] Foot Pronation    Describe:    Gait:  [] Normal    [] Abnormal    [x] Antalgic    [] NWB    Device: SPC    Describe: decreased speed, decreased knee ROM, decreased ankle rocker    ROM / Strength  [] Unable to assess                  AROM                      PROM                   Strength (1-5)    Left Right Left Right Left Right   Hip Flexion 4+ 4+    Extension     4+ 4_    Abduction     3 3    Adduction         Knee Flexion 120 95   ~5 ~4+    Extension 0 -15   5 5   Ankle Plantarflexion     ~4+ ~4+    Dorsiflexion     5 5       Flexibility: [] Unable to assess at this time  Hamstrings:    (L) Tightness= [x] WNL   [] Min   [] Mod   [] Severe    (R) Tightness= [] WNL   [x] Min   [] Mod   [] Severe  Quadriceps:    (L) Tightness= [] WNL   [] Min   [] Mod   [] Severe    (R) Tightness= [] WNL   [] Min   [] Mod   [] Severe  Gastroc:      (L) Tightness= [] WNL   [x] Min   [] Mod   [] Severe    (R) Tightness= [] WNL   [x] Min   [] Mod   [] Severe  Other:    Palpation:   Neg/Pos  Neg/Pos  Neg/Pos   Joint Line  Quad tendon  Patellar ligament    Patella  Fibular head  Pes Anserinus    Tibial tubercle  Hamstring tendons  Infrapatellar fat pad      Optional Tests:  Patellar Positioning (Static)   []L []R Normal []L []R Lateral   []L []R Catalina Omalley      []L []R Medial   []L []R [place]    Patellar Tracking   []L []R Glide (Lat)   []L []R Tilt (Lat)     []L []R Glide (Med)  []L []R Tilt (Med)      []L []R Tile (Inf)     Patellar Mobility   []L []R Hypermobile []L []R Hypomobile         Girth Measurements:     Cm at mid joint  Cm above joint line   Cm at   Cm below joint line  Cm at joint line   Left 46 cm       Right  49 cm                 Other tests/comments:   Romberg on firm floor with both EO & EC: good balance for 15 sec test   Unable to tolerate prone    Pain Level (0-10 scale) post treatment: 0-4/10    ASSESSMENT/Changes in Function: see POC    Patient will continue to benefit from skilled PT services to modify and progress therapeutic interventions, address functional mobility deficits, address ROM deficits, address strength deficits, analyze and address soft tissue restrictions, analyze and cue movement patterns, analyze and modify body mechanics/ergonomics, assess and modify postural abnormalities, address imbalance/dizziness, and instruct in home and community integration to attain remaining goals.      [x]  See Plan of Care  []  See progress note/recertification  []  See Discharge Summary         Progress towards goals / Updated goals:  See POC    PLAN  [x]  Upgrade activities as tolerated     [x]  Continue plan of care  []  Update interventions per flow sheet       []  Discharge due to:_  []  Other:_      Yan Corea 1/19/2023  9:40 AM

## 2023-01-23 ENCOUNTER — OFFICE VISIT (OUTPATIENT)
Dept: ORTHOPEDIC SURGERY | Age: 83
End: 2023-01-23
Payer: MEDICARE

## 2023-01-23 DIAGNOSIS — Z96.651 STATUS POST RIGHT KNEE REPLACEMENT: Primary | ICD-10-CM

## 2023-01-23 PROCEDURE — 99024 POSTOP FOLLOW-UP VISIT: CPT | Performed by: NURSE PRACTITIONER

## 2023-01-23 NOTE — PROGRESS NOTES
Subjective:      Patient presents for postop care following right TKA. Surgery was on 1/16/2023. Ambulating with a cane. Pain is controlled with current analgesics. Medication(s) being used: acetaminophen. .    Objective: There were no vitals taken for this visit. General:  alert, cooperative, no distress, appears stated age   ROM: -5/100; +SLR   Incision:   healing well, no drainage, no erythema, incision well approximated, moderate swelling     Assessment:     Doing well postoperatively. Plan:     1. Continue PT. 2. Wound care/showering discussed. 3. Continue DVT prophylaxis as directed. 4. Follow up at 1 yr with Dr. Lamberto Rowe for xray's of the right knee and as needed. Dara Joy

## 2023-01-23 NOTE — PATIENT INSTRUCTIONS
Jiffy Knee Replacement: What to Expect at [address] [place] [address] Drive had a Jiffy Knee replacement. The doctor replaced the worn ends of the bones that connect to your knee (thighbone and lower leg bone) with plastic and metal parts. Your knee will continue to improve for up to a year. You will need to do weeks/months of physical rehabilitation (rehab) after a knee replacement. Rehab will help you strengthen the muscles of the knee and help you regain movement. After you recover, your artificial knee will allow you to do normal daily activities with less pain or no pain at all. You may be able to hike, dance, or ride a bike. Talk to your doctor about whether you can do more strenuous activities. Always tell your caregivers that you have an artificial knee. How long it will take to walk on your own, return to normal activities, and go back to work depends on your health and how well your rehabilitation (rehab) program goes. The better you do with your rehab exercises, the quicker you will get your strength and movement back. This care sheet gives you a general idea about how long it will take for you to recover. But each person recovers at a different pace. Follow the steps below to get better as quickly as possible. How can you care for yourself at home? Activity    You will be participating in an outpatient physical therapy program. Your goal is progress from a walker to a cane to nothing at all while walking. You should be doing your home exercises 3-4 times daily. The therapist will determine when you are ready to stop the physical therapy program.     If you require a work note, please call our office when you are ready to go back to work. You may drive when you are no longer using a walker or daytime narcotic pain medications. Some clicking or clunking in the knee is normal.     If you have any traumas or falls, contact our office immediately.      Some bruising in the leg and foot is normal. If you experience any significant calf pain or swelling or shortness of breath, please call Dr. Amy Lopez or go to the ER if it is urgent. Diet    Drink plenty of fluids (unless your doctor tells you not to). You may notice that your bowel movements are not regular right after your surgery. This is common. Try to avoid constipation and straining with bowel movements. Drinking enough fluids, taking a stool softener, and eating foods that are good sources of fiber can help you avoid constipation. If you have not had a bowel movement after a couple of days, talk to your doctor. Medicines    You should take aspirin 325 mg twice daily until you are 1 month out from surgery. If you take a prescription blood thinner, continue that as directed. If you think your pain medicine is making you sick to your stomach: Take your medicine after meals (unless your doctor has told you not to). Take the prescribed nausea pills as directed. Ask your doctor for a different pain medicine. If your doctor prescribed antibiotics, take them as directed. If you require a refill on narcotic pain medication, please let us know at the time of today's appointment or give at least 2 business days for refill for future dates. Incision care    You can shower and get your incision wet with soap and water. Pat it dry and no further dressing changes will be required. You will see a clear surgical mesh tape on your knee. You may remove that tape two weeks after the surgery. If you notice any redness around the incision site or fluid from the knee incision, call Dr. San Player office immediately. Ice    For pain and swelling, put ice or a cold pack on the area for 10 to 20 minutes at a time. Put a thin cloth between the ice and your skin. If your doctor recommended cold therapy using a portable machine, follow the instructions that came with the machine.    Other instructions    Wear compression stockings if your doctor told you to. These may help to prevent blood clots. Your doctor will tell you how long you need to keep wearing the compression stockings. If you have any procedures or dental work where there may be bleeding, it is recommended that you get antibiotics to take 1 hour prior to that procedure. Please call our office at least 2 business ahead of the procedure. Follow-up care is a key part of your treatment and safety. Be sure to make and go to all appointments, and call your doctor if you are having problems. It's also a good idea to know your test results and keep a list of the medicines you take. When should you call for help? Call 911 anytime you think you may need emergency care. For example, call if:    You passed out (lost consciousness). You have severe trouble breathing. You have sudden chest pain and shortness of breath, or you cough up blood. Call your doctor now or seek immediate medical care if:    You have signs of infection, such as: Increased pain, swelling, warmth, or redness. Red streaks leading from the incision. Pus draining from the incision. A fever. 2. You have signs of a blood clot, such as:  Pain in your calf, back of the knee, thigh, or groin. Redness and swelling in your leg or groin. Your incision comes open and begins to bleed, or the bleeding increases. You have pain that does not get better after you take pain medicine. Watch closely for changes in your health, and be sure to contact your doctor if:    You do not have a bowel movement after taking a laxative. Where can you learn more? Go to http://www.gray.com/  Enter T054 in the search box to learn more about \"Total Knee Replacement: What to Expect at Home. \"  Current as of: July 1, 2021               Content Version: 13.2  © 4100-3230 [employer]wise, [employer].    Care instructions adapted under license by [employer] (which disclaims liability or warranty for this information). If you have questions about a medical condition or this instruction, always ask your healthcare professional. [place] [address] any warranty or liability for your use of this information.

## 2023-01-24 ENCOUNTER — HOSPITAL ENCOUNTER (OUTPATIENT)
Dept: PHYSICAL THERAPY | Age: 83
Discharge: HOME | End: 2023-01-24
Payer: MEDICARE

## 2023-01-24 PROCEDURE — 97110 THERAPEUTIC EXERCISES: CPT

## 2023-01-24 NOTE — THERAPY EVALUATION
PT DAILY TREATMENT NOTE  Patient Name: Milagro Day  MJRR:[id number]  : 1940  [x]  Patient  Verified  Payor: VA MEDICARE / Plan: VA MEDICARE PART A & B / Product Type: Medicare /    In time: 1:00  Out time: 1:44  Total Treatment Time (min): 44  Visit #: 2 of     Medicare/BCBS Only   Total Timed Codes (min): 34 1:1 Treatment Time: 34     Treatment Area: Pain in right knee [M25.561]      SUBJECTIVE  Pain Level (0-10 scale): 1/10 sitting, 6/10 with movement  []constant []intermittent []improving []worsening []no change since onset    Any medication changes, allergies to medications, adverse drug reactions, diagnosis change, or new procedure performed?: [x] No    [] Yes (see summary sheet for update)  Subjective functional status/changes:     Pt reports HEP is going slow. HE is doing okay, if he was excellent he wouldn't need to be here. OBJECTIVE    Modality rationale: To decrease inflammation and pain to allow patient to ambulate and perform ADLs with ease.     Min Type Additional Details    [] Estim:  []Unatt       []IFC  []Premod                        []Other:  []w/ice   []w/heat  Position:  Location:    [] Estim: []Att    []TENS instruct  []NMES                    []Other:  []w/US   []w/ice   []w/heat  Position:  Location:    []  Traction: [] Cervical       []Lumbar                       [] Prone          []Supine                       []Intermittent   []Continuous Lbs:  [] before manual  [] after manual    []  Ultrasound: []Continuous   [] Pulsed                           []1MHz   []3MHz Location:  W/cm2:    []  Iontophoresis with dexamethasone         Location: [] Take home patch   [] In clinic   10 [x]  Ice     []  heat  []  Ice massage  []  Laser   []  Anodyne Position:  Location:    []  Laser with stim  []  Other: Position:  Location:   In use []  Vasopneumatic Device  Measurement pre: 49 cm  Measurement post:  cm  Taken at: mid patella Pressure:       [x] lo [] med [] hi Temperature: [x] lo [] med [] hi   [x] Skin assessment post-treatment:  [x]intact []redness- no adverse reaction    []redness - adverse reaction:     30 min Therapeutic Exercise:  []  See flow sheet :   Rationale: increase ROM, increase strength, improve coordination, improve balance, and increase proprioception  to improve the patients ability to perform aDLs/amb with ease & safety    4 min Manual Therapy: gentle patella mobs   Technique:       [] STM []ASTM [] TPR []PROM [] Stretching  [x] Jt manipulation []Gr I [] II [x]  III [x] IV [] V []  Treatment Area: right knee  Other: The manual therapy interventions were performed at a separate and distinct time from the therapeutic activities interventions. Rationale: decrease pain and increase ROM to allow patient to ambulate with ease. With   [x] TE   [] TA   [] neuro   [] other: Patient Education: [x] Review HEP    [] Progressed/Changed HEP based on:   [] positioning   [] body mechanics   [] transfers   [] heat/ice application    [] other:      Other Objective/Functional Measures:   Right Knee AROM: 8-95*     - abdominal bulge noted with TA    Pain Level (0-10 scale) post treatment: 2-3/10 sitting,  5-6/10 with movement    ASSESSMENT/Changes in Function:   Initiated PT POC today per flow sheet, requiring vc and demo 100% of the time for proper form and technique with TE. Patient put forth good effort and had minimal increase in pain. Educated on continued ice and elevation at home for inflammation.      Patient will continue to benefit from skilled PT services to modify and progress therapeutic interventions, address functional mobility deficits, address ROM deficits, address strength deficits, analyze and address soft tissue restrictions, analyze and cue movement patterns, analyze and modify body mechanics/ergonomics, assess and modify postural abnormalities, address imbalance/dizziness, and instruct in home and community integration to attain remaining goals. [x]  See Plan of Care  []  See progress note/recertification  []  See Discharge Summary         Progress towards goals / Updated goals:  Short term goals: To be accomplished within 1 week  1. Pt will be independent with HEP to maintain progression. Eval status: good understanding  Current: compliant (1/24/23)     Long term goals: To be accomplished within 4 weeks  1. Pt will improve FOTO score by 11 points to 68/100 to show improvement with functional mobility performance. Eval status: 57     2. Pt will have Right knee AROM -5 to 115 degrees to amb household and community with normal and safe pattern. Eval status: -15 to 95     3. Pt will report 50% improvement of pain during standing & walking to improve ease with ADLs. Eval status: 2-9/10 with standing & walking    4. Pt will be able to amb household (100-300 ft) on level surface with no AD to perform ADLs comfortably. Eval status: amb with SPC just in case     5. Pt will report No difficulty with performing light activities around his home. Eval status:  Moderate difficulty    PLAN  [x]  Upgrade activities as tolerated     [x]  Continue plan of care  []  Update interventions per flow sheet       []  Discharge due to:_  []  Other:_      Taj Beckman, LIZA 1/24/2023  1:44 PM

## 2023-01-26 ENCOUNTER — APPOINTMENT (OUTPATIENT)
Dept: PHYSICAL THERAPY | Age: 83
End: 2023-01-26
Payer: MEDICARE

## 2023-01-30 ENCOUNTER — HOSPITAL ENCOUNTER (OUTPATIENT)
Dept: PHYSICAL THERAPY | Age: 83
Discharge: HOME | End: 2023-01-30
Payer: MEDICARE

## 2023-01-30 PROCEDURE — 97016 VASOPNEUMATIC DEVICE THERAPY: CPT

## 2023-01-30 PROCEDURE — 97110 THERAPEUTIC EXERCISES: CPT

## 2023-01-30 NOTE — PROGRESS NOTES
PT DAILY TREATMENT NOTE     Patient Name: Javon Carmen  TRHX:  : 1940  [x]  Patient  Verified  Payor: [place] MEDICARE / Plan: [place] MEDICARE PART A & B / Product Type: Medicare /    In time:2:35P  Out time:3:17P  Total Treatment Time (min): 42  Visit #: 3 of     Medicare/BCBS Only   Total Timed Codes (min):  32 1:1 Treatment Time:  32       Treatment Area: Pain in right knee [M25.561]    SUBJECTIVE  Pain Level (0-10 scale): 5/10  Any medication changes, allergies to medications, adverse drug reactions, diagnosis change, or new procedure performed?: [x] No    [] Yes (see summary sheet for update)  Subjective functional status/changes:   [] No changes reported  Patient reports pain in his knee today. He feels the bike loosens him up. He a sore after last visit. He still has difficulty bending his knee. He wants to work on stretching his leg and walking. He is supposed to have a left TKA in April.      OBJECTIVE    Modality rationale: decrease inflammation and decrease pain to improve the patients ability to perform ADLs with increased ease   Min Type Additional Details    [] Estim:  []Unatt       []IFC  []Premod                        []Other:  []w/ice   []w/heat  Position:  Location:    [] Estim: []Att    []TENS instruct  []NMES                    []Other:  []w/US   []w/ice   []w/heat  Position:  Location:    []  Traction: [] Cervical       []Lumbar                       [] Prone          []Supine                       []Intermittent   []Continuous Lbs:  [] before manual  [] after manual    []  Ultrasound: []Continuous   [] Pulsed                           []1MHz   []3MHz W/cm2:  Location:    []  Iontophoresis with dexamethasone         Location: [] Take home patch   [] In clinic    []  Ice     []  heat  []  Ice massage  []  Laser   []  Anodyne Position:  Location:    []  Laser with stim  []  Other:  Position:  Location:   10 [x]  Vasopneumatic Device    [x]  Right     [] Left  Pre-treatment girth: 52 cm (taken by PT)  Post-treatment girth: 54.9 cm (taken by other staff member)  Measured at (location): mid patella  Pressure:       [x] lo [] med [] hi   Temperature: [x] lo [] med [] hi   [x] Skin assessment post-treatment:  [x]intact []redness- no adverse reaction    []redness - adverse reaction:       32 min Therapeutic Exercise:  [] See flow sheet :   Rationale: increase ROM, increase strength, improve coordination, improve balance, and increase proprioception to improve the patients ability to perform ADLs with increased ease            With   [] TE   [] TA   [] neuro   [] other: Patient Education: [x] Review HEP    [] Progressed/Changed HEP based on:   [] positioning   [] body mechanics   [] transfers   [] heat/ice application    [] other:      Other Objective/Functional Measures:     Noted edema to right knee   Challenged with squats  Unable to achieve full TKE with LAQ machine-plan to trial with ankle weights at next follow up     Left knee girth: mid patella 47 cm    Pain Level (0-10 scale) post treatment: 3/10    ASSESSMENT/Changes in Function: Session today emphasized activities to promote knee flex ROM as patient reports this is his greatest limitation at this time. He demonstrates improving knee ext AROM however knee flex AROM remains grossly impaired. He continues to present with significant edema to right knee at this time likely contributing to limited right knee flex AROM. Patient will continue to benefit from skilled PT services to modify and progress therapeutic interventions, address functional mobility deficits, address ROM deficits, address strength deficits, analyze and address soft tissue restrictions, analyze and cue movement patterns, analyze and modify body mechanics/ergonomics, assess and modify postural abnormalities, address imbalance/dizziness, and instruct in home and community integration to attain remaining goals.    y         Progress towards goals / Updated goals:  Short term goals: To be accomplished within 1 week  1. Pt will be independent with HEP to maintain progression. Eval status: good understanding  Current: compliant (1/24/23)     Long term goals: To be accomplished within 4 weeks  1. Pt will improve FOTO score by 11 points to 68/100 to show improvement with functional mobility performance. Eval status: 57     2. Pt will have Right knee AROM -5 to 115 degrees to amb household and community with normal and safe pattern. Eval status: -15 to 95  Progressing-ext -4 deg, flex 90 deg (1/20/23)     3. Pt will report 50% improvement of pain during standing & walking to improve ease with ADLs. Eval status: 2-9/10 with standing & walking    4. Pt will be able to amb household (100-300 ft) on level surface with no AD to perform ADLs comfortably. Eval status: amb with SPC just in case     5. Pt will report No difficulty with performing light activities around his home. Eval status:  Moderate difficulty    PLAN  [x]  Upgrade activities as tolerated     [x]  Continue plan of care  []  Update interventions per flow sheet       []  Discharge due to:_  []  Other:_      Lawernce Click, PT 1/30/2023  8:48 AM    Future Appointments   Date Time Provider Josefa Cooper   1/30/2023  2:30 PM Jeb Briggs [facility]   2/1/2023 11:30 AM Geni Mcneill PTA [facility]   2/3/2023  2:00 PM Rosina Pronto [facility]   2/6/2023  2:00 PM Rosina Pronto [facility]   2/8/2023  1:00 PM Rosina Pronto [facility]   2/10/2023 11:30 AM Gavin Paz PT [facility]   2/13/2023  2:00 PM Rosina Beeto [facility]   2/15/2023 12:00 PM Rosina Guillory [facility]   2/17/2023  1:00 PM Rosina Guillory [facility]   2/20/2023  2:00 PM Jeb Briggs [facility]   2/22/2023  2:00 PM Gavin Paz  [facility]

## 2023-02-01 ENCOUNTER — HOSPITAL ENCOUNTER (OUTPATIENT)
Dept: PHYSICAL THERAPY | Age: 83
Discharge: HOME | End: 2023-02-01
Payer: MEDICARE

## 2023-02-01 PROCEDURE — 97110 THERAPEUTIC EXERCISES: CPT

## 2023-02-01 NOTE — PROGRESS NOTES
PT DAILY TREATMENT NOTE     Patient Name: Cydney Kurtz  SLWQ:  : 1940  [x]  Patient  Verified  Payor: [place] MEDICARE / Plan: [place] MEDICARE PART A & B / Product Type: Medicare /    In time: 11:32  Out time: 12:15  Total Treatment Time (min): 43  Visit #: 4 of     Medicare/BCBS Only   Total Timed Codes (min): 28 1:1 Treatment Time: 28       Treatment Area: Pain in right knee [M25.561]    SUBJECTIVE  Pain Level (0-10 scale): 5-6/10  Any medication changes, allergies to medications, adverse drug reactions, diagnosis change, or new procedure performed?: [x] No    [] Yes (see summary sheet for update)  Subjective functional status/changes:   [] No changes reported  Patient reports he is working on bending his knee 4-5 times a day and icing and elevating like 3 times a day, it doesn't seem to want to break loose yet.      OBJECTIVE    Modality rationale: decrease inflammation and decrease pain to improve the patients ability to perform ADLs with increased ease   Min Type Additional Details    [] Estim:  []Unatt       []IFC  []Premod                        []Other:  []w/ice   []w/heat  Position:  Location:    [] Estim: []Att    []TENS instruct  []NMES                    []Other:  []w/US   []w/ice   []w/heat  Position:  Location:    []  Traction: [] Cervical       []Lumbar                       [] Prone          []Supine                       []Intermittent   []Continuous Lbs:  [] before manual  [] after manual    []  Ultrasound: []Continuous   [] Pulsed                           []1MHz   []3MHz W/cm2:  Location:    []  Iontophoresis with dexamethasone         Location: [] Take home patch   [] In clinic    []  Ice     []  heat  []  Ice massage  []  Laser   []  Anodyne Position:  Location:    []  Laser with stim  []  Other:  Position:  Location:   10 [x]  Vasopneumatic Device    [x]  Right     []  Left  Pre-treatment girth: 47 cm (taken by PTA)  Post-treatment girth: 49.5 cm (taken by ATC)  Measured at (location): mid patella  Pressure:       [x] lo [] med [] hi   Temperature: [x] lo [] med [] hi   [x] Skin assessment post-treatment:  [x]intact []redness- no adverse reaction    []redness - adverse reaction:       28 min Therapeutic Exercise:  [] See flow sheet :   Rationale: increase ROM, increase strength, improve coordination, improve balance, and increase proprioception to improve the patients ability to perform ADLs with increased ease          With   [x] TE   [] TA   [] neuro   [] other: Patient Education: [x] Review HEP    [] Progressed/Changed HEP based on:   [] positioning   [] body mechanics   [] transfers   [] heat/ice application    [] other:      Other Objective/Functional Measures:   - progressed to LV 2 on Bike warmup  - progressed to 50# on leg press  - initiated tandem, moderate ankle strategy noted  - initiated 6 inch step ups    Pain Level (0-10 scale) post treatment: 4/10    ASSESSMENT/Changes in Function:   Patient tolerated session well with minimal increase in pain. He continues to present with moderate edema and decreased knee flexion at 92 degrees of AROM today. Patient will continue to benefit from skilled PT services to modify and progress therapeutic interventions, address functional mobility deficits, address ROM deficits, address strength deficits, analyze and address soft tissue restrictions, analyze and cue movement patterns, analyze and modify body mechanics/ergonomics, assess and modify postural abnormalities, address imbalance/dizziness, and instruct in home and community integration to attain remaining goals. Progress towards goals / Updated goals:  Short term goals: To be accomplished within 1 week  1. Pt will be independent with HEP to maintain progression. Eval status: good understanding  Current: compliant (1/24/23)     Long term goals: To be accomplished within 4 weeks  1.  Pt will improve FOTO score by 11 points to 68/100 to show improvement with functional mobility performance. Eval status: 57     2. Pt will have Right knee AROM -5 to 115 degrees to amb household and community with normal and safe pattern. Eval status: -15 to 95  Progressing-ext -4 deg, flex 90 deg (1/20/23)     3. Pt will report 50% improvement of pain during standing & walking to improve ease with ADLs. Eval status: 2-9/10 with standing & walking    4. Pt will be able to amb household (100-300 ft) on level surface with no AD to perform ADLs comfortably. Eval status: amb with SPC just in case  Current: ambulating in community with without AD (2/1/23)     5. Pt will report No difficulty with performing light activities around his home. Eval status:  Moderate difficulty    PLAN  [x]  Upgrade activities as tolerated     [x]  Continue plan of care  []  Update interventions per flow sheet       []  Discharge due to:_  []  Other:_      Mary Kay Weiss PTA 2/1/2023  12:15 PM    Future Appointments   Date Time Provider Josefa Cooper   2/1/2023 11:30 AM Danisha Blunt PTA [facility]   2/6/2023  2:00 PM Thermon Ape [facility]   2/8/2023  1:00 PM [facility]   2/10/2023 11:30 AM Gavin Mary PT [facility]   2/13/2023  2:00 PM Thermon Ape [facility]   2/15/2023 12:00 PM Thermon Ape [facility]   2/17/2023  1:00 PM [facility]   2/20/2023  2:00 PM Jeb Rodriguez [facility]   2/22/2023  2:00 PM Gavin Mary PT [facility]

## 2023-02-03 ENCOUNTER — APPOINTMENT (OUTPATIENT)
Dept: PHYSICAL THERAPY | Age: 83
End: 2023-02-03
Payer: MEDICARE

## 2023-02-04 DIAGNOSIS — I10 ESSENTIAL HYPERTENSION: Primary | ICD-10-CM

## 2023-02-04 DIAGNOSIS — E78.5 HYPERLIPIDEMIA, UNSPECIFIED HYPERLIPIDEMIA TYPE: ICD-10-CM

## 2023-02-05 ENCOUNTER — APPOINTMENT (OUTPATIENT)
Dept: GENERAL RADIOLOGY | Age: 83
End: 2023-02-05
Attending: STUDENT IN AN ORGANIZED HEALTH CARE EDUCATION/TRAINING PROGRAM
Payer: MEDICARE

## 2023-02-05 ENCOUNTER — HOSPITAL ENCOUNTER (EMERGENCY)
Age: 83
Discharge: HOME | End: 2023-02-05
Attending: STUDENT IN AN ORGANIZED HEALTH CARE EDUCATION/TRAINING PROGRAM
Payer: MEDICARE

## 2023-02-05 VITALS
DIASTOLIC BLOOD PRESSURE: 73 MMHG | RESPIRATION RATE: 16 BRPM | SYSTOLIC BLOOD PRESSURE: 124 MMHG | OXYGEN SATURATION: 99 % | HEART RATE: 91 BPM | TEMPERATURE: 97.3 F

## 2023-02-05 DIAGNOSIS — E78.5 HYPERLIPIDEMIA, UNSPECIFIED HYPERLIPIDEMIA TYPE: ICD-10-CM

## 2023-02-05 DIAGNOSIS — R10.13 ABDOMINAL PAIN, EPIGASTRIC: ICD-10-CM

## 2023-02-05 DIAGNOSIS — E78.5 HYPERLIPIDEMIA, UNSPECIFIED HYPERLIPIDEMIA TYPE: Primary | ICD-10-CM

## 2023-02-05 DIAGNOSIS — I10 ESSENTIAL HYPERTENSION: Primary | ICD-10-CM

## 2023-02-05 DIAGNOSIS — K59.00 CONSTIPATION, UNSPECIFIED CONSTIPATION TYPE: Primary | ICD-10-CM

## 2023-02-05 DIAGNOSIS — R07.9 CHEST PAIN, UNSPECIFIED TYPE: ICD-10-CM

## 2023-02-05 LAB
ALBUMIN SERPL-MCNC: 4 G/DL (ref 3.4–5)
ALBUMIN/GLOB SERPL: 1.3 (ref 0.8–1.7)
ALP SERPL-CCNC: 83 U/L (ref 45–117)
ALT SERPL-CCNC: 22 U/L (ref 16–61)
ANION GAP SERPL CALC-SCNC: 5 MMOL/L (ref 3–18)
AST SERPL-CCNC: 18 U/L (ref 10–38)
BASOPHILS # BLD: 0 K/UL (ref 0–0.1)
BASOPHILS NFR BLD: 0 % (ref 0–2)
BILIRUB DIRECT SERPL-MCNC: 0.1 MG/DL (ref 0–0.2)
BILIRUB SERPL-MCNC: 0.4 MG/DL (ref 0.2–1)
BUN SERPL-MCNC: 15 MG/DL (ref 7–18)
BUN/CREAT SERPL: 14 (ref 12–20)
CALCIUM SERPL-MCNC: 9.1 MG/DL (ref 8.5–10.1)
CHLORIDE SERPL-SCNC: 103 MMOL/L (ref 100–111)
CO2 SERPL-SCNC: 29 MMOL/L (ref 21–32)
CREAT SERPL-MCNC: 1.04 MG/DL (ref 0.6–1.3)
DIFFERENTIAL METHOD BLD: ABNORMAL
EOSINOPHIL # BLD: 0.1 K/UL (ref 0–0.4)
EOSINOPHIL NFR BLD: 1 % (ref 0–5)
ERYTHROCYTE [DISTWIDTH] IN BLOOD BY AUTOMATED COUNT: 13.4 % (ref 11.6–14.5)
GLOBULIN SER CALC-MCNC: 3.1 G/DL (ref 2–4)
GLUCOSE SERPL-MCNC: 130 MG/DL (ref 74–99)
HCT VFR BLD AUTO: 35.9 % (ref 36–48)
HGB BLD-MCNC: 11.8 G/DL (ref 13–16)
IMM GRANULOCYTES # BLD AUTO: 0 K/UL (ref 0–0.04)
IMM GRANULOCYTES NFR BLD AUTO: 0 % (ref 0–0.5)
LYMPHOCYTES # BLD: 1.6 K/UL (ref 0.9–3.6)
LYMPHOCYTES NFR BLD: 15 % (ref 21–52)
MCH RBC QN AUTO: 32.2 PG (ref 24–34)
MCHC RBC AUTO-ENTMCNC: 32.9 G/DL (ref 31–37)
MCV RBC AUTO: 97.8 FL (ref 78–100)
MONOCYTES # BLD: 0.8 K/UL (ref 0.05–1.2)
MONOCYTES NFR BLD: 7 % (ref 3–10)
NEUTS SEG # BLD: 8.5 K/UL (ref 1.8–8)
NEUTS SEG NFR BLD: 77 % (ref 40–73)
NRBC # BLD: 0 K/UL (ref 0–0.01)
NRBC BLD-RTO: 0 PER 100 WBC
PLATELET # BLD AUTO: 353 K/UL (ref 135–420)
PMV BLD AUTO: 9.3 FL (ref 9.2–11.8)
POTASSIUM SERPL-SCNC: 4.1 MMOL/L (ref 3.5–5.5)
PROT SERPL-MCNC: 7.1 G/DL (ref 6.4–8.2)
RBC # BLD AUTO: 3.67 M/UL (ref 4.35–5.65)
SODIUM SERPL-SCNC: 137 MMOL/L (ref 136–145)
TROPONIN I SERPL HS-MCNC: 6 NG/L (ref 0–78)
TROPONIN I SERPL HS-MCNC: 7 NG/L (ref 0–78)
WBC # BLD AUTO: 11.1 K/UL (ref 4.6–13.2)

## 2023-02-05 PROCEDURE — 99285 EMERGENCY DEPT VISIT HI MDM: CPT

## 2023-02-05 PROCEDURE — 93005 ELECTROCARDIOGRAM TRACING: CPT

## 2023-02-05 PROCEDURE — 84484 ASSAY OF TROPONIN QUANT: CPT

## 2023-02-05 PROCEDURE — 71045 X-RAY EXAM CHEST 1 VIEW: CPT

## 2023-02-05 PROCEDURE — 80048 BASIC METABOLIC PNL TOTAL CA: CPT

## 2023-02-05 PROCEDURE — 85025 COMPLETE CBC W/AUTO DIFF WBC: CPT

## 2023-02-05 PROCEDURE — 74011250637 HC RX REV CODE- 250/637: Performed by: PHYSICIAN ASSISTANT

## 2023-02-05 PROCEDURE — 74011000250 HC RX REV CODE- 250: Performed by: PHYSICIAN ASSISTANT

## 2023-02-05 PROCEDURE — 80076 HEPATIC FUNCTION PANEL: CPT

## 2023-02-05 RX ORDER — POLYETHYLENE GLYCOL 3350 17 G/17G
POWDER, FOR SOLUTION ORAL
Qty: 507 G | Refills: 0 | Status: SHIPPED | OUTPATIENT
Start: 2023-02-05

## 2023-02-05 RX ORDER — LIDOCAINE HYDROCHLORIDE 20 MG/ML
15 SOLUTION OROPHARYNGEAL
Status: DISCONTINUED | OUTPATIENT
Start: 2023-02-05 | End: 2023-02-05 | Stop reason: HOSPADM

## 2023-02-05 RX ADMIN — LIDOCAINE HYDROCHLORIDE 15 ML: 20 SOLUTION ORAL at 18:12

## 2023-02-05 RX ADMIN — ALUMINUM HYDROXIDE AND MAGNESIUM HYDROXIDE 15 ML: 200; 200 SUSPENSION ORAL at 18:12

## 2023-02-05 NOTE — ED PROVIDER NOTES
EMERGENCY DEPARTMENT HISTORY AND PHYSICAL EXAM    Date: 2/5/2023  Patient Name: Demetria Hornre    History of Presenting Illness     Chief Complaint   Patient presents with    Chest Pain         History Provided By: Patient      Additional History (Context): Demetria Horner is a 81 y.o. male with a history of hypertension and BPH who presents today for epigastric abdominal pain and substernal chest pain. Patient reports it occurred just prior to arrival but has been occurring intermittently for the past couple days. Patient reports he initially thought it was just gas pain however his significant other insisted she bring him to the emergency department for evaluation. Patient reports once he got up and started walking around the pain subsided. Reports he has been having constipation but still has been having small bowel movements. Has not had a full one recently. Denies any nausea or vomiting. Reports since he came into the emergency department he has been asymptomatic. PCP: Anuja Monterroso MD    Current Facility-Administered Medications   Medication Dose Route Frequency Provider Last Rate Last Admin    lidocaine (XYLOCAINE) 2 % viscous solution 15 mL  15 mL Mouth/Throat Q3H PRN NADER Aguirre   15 mL at 02/05/23 1812     Current Outpatient Medications   Medication Sig Dispense Refill    polyethylene glycol (Miralax) 17 gram/dose powder Mix 8 tablespoons with 64 ounces of Gatorade and then drink 1 cup every hour until gone 507 g 0    finasteride (PROSCAR) 5 mg tablet Take 1 Tablet by mouth daily. 90 Tablet 2    lisinopriL (PRINIVIL, ZESTRIL) 5 mg tablet Take 1 Tablet by mouth daily. 90 Tablet 3    ondansetron (ZOFRAN ODT) 4 mg disintegrating tablet Take 1 Tablet by mouth every eight (8) hours as needed for Nausea, Vomiting or Nausea or Vomiting for up to 20 doses. 20 Tablet 0    rosuvastatin (CRESTOR) 5 mg tablet Take 1 Tablet by mouth daily.  90 Tablet 3    acetaminophen (TYLENOL) 500 mg tablet Take 2 Tablets by mouth every six (6) hours. DOCUSATE SODIUM (STOOL SOFTENER PO) Take  by mouth daily. multivitamin (ONE A DAY) tablet Take 1 Tab by mouth daily. cholecalciferol (VITAMIN D3) 25 mcg (1,000 unit) cap Take  by mouth daily. GLUC/ERICA-MSM#1/C/CHARLY/RAMÓN/BOR (OSTEO BI-FLEX PO) Take 1 Tab by mouth daily. Past History     Past Medical History:  Past Medical History:   Diagnosis Date    Bladder neck contracture     BPH with obstruction/lower urinary tract symptoms     Essential hypertension with goal blood pressure less than 140/90     Family history of colon cancer     History of blood transfusion 2015    with shoulder replacement    Hyperlipidemia     Osteoarthritis     Osteoarthritis of left hip     PMR (polymyalgia rheumatica) ([facility] [address] 7/20/2016    Skin cancer     basal cell on face    Urinary retention        Past Surgical History:  Past Surgical History:   Procedure Laterality Date    CYSTOSCOPY      ENDOSCOPY, COLON, DIAGNOSTIC      [facility] BERENICE LASER [id number]  3/2/11    Laser incision of bladder neck contracture    HX BACK SURGERY      lower    HX MOHS PROCEDURES  1/11    rt hip repl    HX ORTHOPAEDIC Right 2015    shoulder replacement    HX OTHER SURGICAL      tonsillectomy, back sx,     HX OTHER SURGICAL      basel cell removed from right ear lobe    HX SHOULDER ARTHROSCOPY Left 2010    HX TURP      Laser    OK LASER VAPORIZATION OF PROSTATE FOR URINE FLOW         Family History:  Family History   Problem Relation Age of Onset    Cancer Other     Heart Disease Other        Social History:  Social History     Tobacco Use    Smoking status: Never    Smokeless tobacco: Never   Vaping Use    Vaping Use: Never used   Substance Use Topics    Alcohol use:  Yes     Alcohol/week: 7.0 standard drinks     Types: 7 Glasses of wine per week     Comment: wine with dinner 3 times a week    Drug use: No       Allergies:  No Known Allergies      Review of Systems   Review of Systems Constitutional:  Negative for chills and fever. HENT:  Negative for congestion, rhinorrhea and sore throat. Respiratory:  Negative for cough and shortness of breath. Cardiovascular:  Positive for chest pain. Gastrointestinal:  Positive for abdominal pain and constipation. Negative for blood in stool, diarrhea, nausea and vomiting. Genitourinary:  Negative for dysuria, frequency and hematuria. Musculoskeletal:  Negative for back pain and myalgias. Skin:  Negative for rash and wound. Neurological:  Negative for dizziness and headaches. All other systems reviewed and are negative. All Other Systems Negative  Physical Exam     Vitals:    02/05/23 1516   BP: 124/73   Pulse: 91   Resp: 16   Temp: 97.3 °F (36.3 °C)   SpO2: 99%     Physical Exam  Vitals and nursing note reviewed. Constitutional:       General: He is not in acute distress. Appearance: He is well-developed. He is not diaphoretic. Comments: Very well appearing for age    HENT:      Head: Normocephalic and atraumatic. Eyes:      Conjunctiva/sclera: Conjunctivae normal.   Cardiovascular:      Rate and Rhythm: Normal rate and regular rhythm. Heart sounds: Normal heart sounds. Pulmonary:      Effort: Pulmonary effort is normal. No respiratory distress. Breath sounds: Normal breath sounds. Chest:      Chest wall: No tenderness. Abdominal:      General: Bowel sounds are normal. There is no distension. Palpations: Abdomen is soft. Tenderness: There is abdominal tenderness in the epigastric area. There is no guarding or rebound. Musculoskeletal:         General: No deformity. Normal range of motion. Cervical back: Normal range of motion and neck supple. Skin:     General: Skin is warm and dry. Neurological:      Mental Status: He is alert and oriented to person, place, and time.          Diagnostic Study Results     Labs -     Recent Results (from the past 12 hour(s))   EKG, 12 LEAD, INITIAL Collection Time: 02/05/23  3:12 PM   Result Value Ref Range    Ventricular Rate 91 BPM    Atrial Rate 91 BPM    P-R Interval 192 ms    QRS Duration 74 ms    Q-T Interval 358 ms    QTC Calculation (Bezet) 440 ms    Calculated R Axis 46 degrees    Calculated T Axis 36 degrees    Diagnosis       Normal sinus rhythm  Junctional ST depression, probably normal  Borderline ECG  When compared with ECG of 10-SEP-2020 15:46,  Incomplete right bundle branch block is no longer present     CBC WITH AUTOMATED DIFF    Collection Time: 02/05/23  3:43 PM   Result Value Ref Range    WBC 11.1 4.6 - 13.2 K/uL    RBC 3.67 (L) 4.35 - 5.65 M/uL    HGB 11.8 (L) 13.0 - 16.0 g/dL    HCT 35.9 (L) 36.0 - 48.0 %    MCV 97.8 78.0 - 100.0 FL    MCH 32.2 24.0 - 34.0 PG    MCHC 32.9 31.0 - 37.0 g/dL    RDW 13.4 11.6 - 14.5 %    PLATELET 276 107 - 501 K/uL    MPV 9.3 9.2 - 11.8 FL    NRBC 0.0 0  WBC    ABSOLUTE NRBC 0.00 0.00 - 0.01 K/uL    NEUTROPHILS 77 (H) 40 - 73 %    LYMPHOCYTES 15 (L) 21 - 52 %    MONOCYTES 7 3 - 10 %    EOSINOPHILS 1 0 - 5 %    BASOPHILS 0 0 - 2 %    IMMATURE GRANULOCYTES 0 0.0 - 0.5 %    ABS. NEUTROPHILS 8.5 (H) 1.8 - 8.0 K/UL    ABS. LYMPHOCYTES 1.6 0.9 - 3.6 K/UL    ABS. MONOCYTES 0.8 0.05 - 1.2 K/UL    ABS. EOSINOPHILS 0.1 0.0 - 0.4 K/UL    ABS. BASOPHILS 0.0 0.0 - 0.1 K/UL    ABS. IMM.  GRANS. 0.0 0.00 - 0.04 K/UL    DF AUTOMATED     METABOLIC PANEL, BASIC    Collection Time: 02/05/23  3:43 PM   Result Value Ref Range    Sodium 137 136 - 145 mmol/L    Potassium 4.1 3.5 - 5.5 mmol/L    Chloride 103 100 - 111 mmol/L    CO2 29 21 - 32 mmol/L    Anion gap 5 3.0 - 18 mmol/L    Glucose 130 (H) 74 - 99 mg/dL    BUN 15 7.0 - 18 MG/DL    Creatinine 1.04 0.6 - 1.3 MG/DL    BUN/Creatinine ratio 14 12 - 20      eGFR >60 >60 ml/min/1.73m2    Calcium 9.1 8.5 - 10.1 MG/DL   HEPATIC FUNCTION PANEL    Collection Time: 02/05/23  3:43 PM   Result Value Ref Range    Protein, total 7.1 6.4 - 8.2 g/dL    Albumin 4.0 3.4 - 5.0 g/dL Globulin 3.1 2.0 - 4.0 g/dL    A-G Ratio 1.3 0.8 - 1.7      Bilirubin, total 0.4 0.2 - 1.0 MG/DL    Bilirubin, direct 0.1 0.0 - 0.2 MG/DL    Alk. phosphatase 83 45 - 117 U/L    AST (SGOT) 18 10 - 38 U/L    ALT (SGPT) 22 16 - 61 U/L   TROPONIN-HIGH SENSITIVITY    Collection Time: 02/05/23  3:45 PM   Result Value Ref Range    Troponin-High Sensitivity 6 0 - 78 ng/L   TROPONIN-HIGH SENSITIVITY    Collection Time: 02/05/23  6:11 PM   Result Value Ref Range    Troponin-High Sensitivity 7 0 - 78 ng/L       Radiologic Studies -   XR CHEST PORT    (Results Pending)     CT Results  (Last 48 hours)      None          CXR Results  (Last 48 hours)      None              Medical Decision Making   I am the first provider for this patient. I reviewed the vital signs, available nursing notes, past medical history, past surgical history, family history and social history. Vital Signs-Reviewed the patient's vital signs. Records Reviewed: Nursing Notes and Old Medical Records     Procedures: None   Procedures    Provider Notes (Medical Decision Making):     differential diagnosis: ACS, arrhythmia, constipation, gastroenteritis, IBS, IBD, CAD    Plan: Patient is currently asymptomatic and pain-free. Will order full cardiac work-up and include 2 troponins. Will order GI cocktail.    7:02 PM  Initial work-up was reassuring. Pending repeat Trope at this time. 7:45 PM  Repeat troponin was within normal limits. Will discharge home with MiraLAX. I have discussed concerns for constipation as source for epigastric pain. Patient agrees. Have stressed importance of hydration. Will discharge home with strict return precautions.          MED RECONCILIATION:  Current Facility-Administered Medications   Medication Dose Route Frequency    lidocaine (XYLOCAINE) 2 % viscous solution 15 mL  15 mL Mouth/Throat Q3H PRN     Current Outpatient Medications   Medication Sig    polyethylene glycol (Miralax) 17 gram/dose powder Mix 8 tablespoons with 64 ounces of Gatorade and then drink 1 cup every hour until gone    finasteride (PROSCAR) 5 mg tablet Take 1 Tablet by mouth daily. lisinopriL (PRINIVIL, ZESTRIL) 5 mg tablet Take 1 Tablet by mouth daily. ondansetron (ZOFRAN ODT) 4 mg disintegrating tablet Take 1 Tablet by mouth every eight (8) hours as needed for Nausea, Vomiting or Nausea or Vomiting for up to 20 doses. rosuvastatin (CRESTOR) 5 mg tablet Take 1 Tablet by mouth daily. acetaminophen (TYLENOL) 500 mg tablet Take 2 Tablets by mouth every six (6) hours. DOCUSATE SODIUM (STOOL SOFTENER PO) Take  by mouth daily. multivitamin (ONE A DAY) tablet Take 1 Tab by mouth daily. cholecalciferol (VITAMIN D3) 25 mcg (1,000 unit) cap Take  by mouth daily. GLUC/ERICA-MSM#1/C/CHARLY/RAMÓN/BOR (OSTEO BI-FLEX PO) Take 1 Tab by mouth daily. Disposition:  Home     DISCHARGE NOTE:   Pt has been reexamined. Patient has no new complaints, changes, or physical findings. Care plan outlined and precautions discussed. Results of workup were reviewed with the patient. All medications were reviewed with the patient. All of pt's questions and concerns were addressed. Patient was instructed and agrees to follow up with PCP as well as to return to the ED upon further deterioration. Patient is ready to go home.     Follow-up Information       Follow up With Specialties Details Why Contact Info    [facility] EMERGENCY DE[facility] Emergency Medicine  As needed [address] [place] [address]    Marielle Vaca MD Internal Medicine Physician Schedule an appointment as soon as possible for a visit   [address]  [phone]              Current Discharge Medication List        START taking these medications    Details   polyethylene glycol (Miralax) 17 gram/dose powder Mix 8 tablespoons with 64 ounces of Gatorade and then drink 1 cup every hour until gone  Qty: 507 g, Refills: 0  Start date: 2/5/2023                 Diagnosis     Clinical Impression:   1. Constipation, unspecified constipation type    2. Abdominal pain, epigastric    3. Chest pain, unspecified type          \"Please note that this dictation was completed with [employer], the computer voice recognition software. Quite often unanticipated grammatical, syntax, homophones, and other interpretive errors are inadvertently transcribed by the computer software. Please disregard these errors. Please excuse any errors that have escaped final proofreading. \"

## 2023-02-05 NOTE — ED TRIAGE NOTES
Pt arrived with c/o of \"chest pain that contracts really tight and then it eases away. \" Pt points to top of abdomen of where pain is. States this has been on going for the past two hours.

## 2023-02-06 ENCOUNTER — APPOINTMENT (OUTPATIENT)
Dept: PHYSICAL THERAPY | Age: 83
End: 2023-02-06
Payer: MEDICARE

## 2023-02-06 LAB
ATRIAL RATE: 91 BPM
CALCULATED R AXIS, ECG10: 46 DEGREES
CALCULATED T AXIS, ECG11: 36 DEGREES
DIAGNOSIS, 93000: NORMAL
P-R INTERVAL, ECG05: 192 MS
Q-T INTERVAL, ECG07: 358 MS
QRS DURATION, ECG06: 74 MS
QTC CALCULATION (BEZET), ECG08: 440 MS
VENTRICULAR RATE, ECG03: 91 BPM

## 2023-02-06 NOTE — ED NOTES
8:40 PM  02/05/23     Discharge instructions given to patient (name) with verbalization of understanding. Patient accompanied by no one. Patient discharged with the following prescriptions --. Patient discharged to home (destination).       Bonny Hannon

## 2023-02-07 ENCOUNTER — HOSPITAL ENCOUNTER (OUTPATIENT)
Dept: PHYSICAL THERAPY | Age: 83
Discharge: HOME | End: 2023-02-07
Payer: MEDICARE

## 2023-02-07 PROCEDURE — 97110 THERAPEUTIC EXERCISES: CPT

## 2023-02-07 PROCEDURE — 97016 VASOPNEUMATIC DEVICE THERAPY: CPT

## 2023-02-07 NOTE — PROGRESS NOTES
PT DAILY TREATMENT NOTE     Patient Name: Srini Gray  [id number]  : 1940  [x]  Patient  Verified  Payor: [place] MEDICARE / Plan: [place] MEDICARE PART A & B / Product Type: Medicare /    In time:201  Out time:245  Total Treatment Time (min): 44  Visit #: 5 of 12    Medicare/BCBS Only   Total Timed Codes (min):  29 1:1 Treatment Time:  29       Treatment Area: Pain in right knee [M25.561]    SUBJECTIVE  Pain Level (0-10 scale): 6/10  Any medication changes, allergies to medications, adverse drug reactions, diagnosis change, or new procedure performed?: [x] No    [] Yes (see summary sheet for update)  Subjective functional status/changes:   [] No changes reported  Pt stated that the knee was a little stiff this morning    OBJECTIVE    Modality rationale: decrease inflammation and decrease pain to improve the patients ability to perform ADLs with increased ease    Min Type Additional Details     [] Estim:  []Unatt       []IFC  []Premod                        []Other:  []w/ice   []w/heat  Position:  Location:     [] Estim: []Att    []TENS instruct  []NMES                    []Other:  []w/US   []w/ice   []w/heat  Position:  Location:     []  Traction: [] Cervical       []Lumbar                       [] Prone          []Supine                       []Intermittent   []Continuous Lbs:  [] before manual  [] after manual     []  Ultrasound: []Continuous   [] Pulsed                           []1MHz   []3MHz W/cm2:  Location:     []  Iontophoresis with dexamethasone         Location: [] Take home patch   [] In clinic     []  Ice     []  heat  []  Ice massage  []  Laser   []  Anodyne Position:  Location:     []  Laser with stim  []  Other:  Position:  Location:   10 [x]  Vasopneumatic Device    [x]  Right     []  Left  Pre-treatment girth: 47 cm (taken by PTA)  Post-treatment girth: 49.5 cm (taken by ATC)  Measured at (location): mid patella  Pressure:       [x] lo [] med [] hi   Temperature: [x] lo [] med [] hi   [x] Skin assessment post-treatment:  [x]intact []redness- no adverse reaction    []redness - adverse reaction:       29 min Therapeutic Exercise:  [x] See flow sheet :   Rationale: increase ROM, increase strength, improve coordination, improve balance, and increase proprioception to improve the patients ability to perform ADLs with increased ease          With   [x] TE   [] TA   [] neuro   [] other: Patient Education: [x] Review HEP    [] Progressed/Changed HEP based on:   [] positioning   [] body mechanics   [] transfers   [] heat/ice application    [] other:      Other Objective/Functional Measures:   Had some difficulty with leg press  No complaint of increased pain during session   Cont with decreased AROM in the right knee     Pain Level (0-10 scale) post treatment: 2/10    ASSESSMENT/Changes in Function:   Pt is making slow progress toward goals. Pt cont to report compliance with HEP. Cont with decreased strength and AROm in the right knee. Cont with decreased standing and walking tolerance, but is improving. Pt reports going to stores and walking around for exercise. Patient will continue to benefit from skilled PT services to modify and progress therapeutic interventions, address functional mobility deficits, address ROM deficits, address strength deficits, analyze and cue movement patterns, analyze and modify body mechanics/ergonomics, assess and modify postural abnormalities, address imbalance/dizziness, and instruct in home and community integration to attain remaining goals. [x]  See Plan of Care  []  See progress note/recertification  []  See Discharge Summary         Progress towards goals / Updated goals:  Short term goals: To be accomplished within 1 week  1. Pt will be independent with HEP to maintain progression. Eval status: good understanding  Current: compliant (1/24/23)     Long term goals: To be accomplished within 4 weeks  1.  Pt will improve FOTO score by 11 points to 68/100 to show improvement with functional mobility performance. Eval status: 57     2. Pt will have Right knee AROM -5 to 115 degrees to amb household and community with normal and safe pattern. Eval status: -15 to 95  Progressing-ext -4 deg, flex 90 deg (1/20/23)     3. Pt will report 50% improvement of pain during standing & walking to improve ease with ADLs. Eval status: 2-9/10 with standing & walking    4. Pt will be able to amb household (100-300 ft) on level surface with no AD to perform ADLs comfortably. Eval status: amb with SPC just in case  Current: ambulating in community with without AD (2/1/23)     5. Pt will report No difficulty with performing light activities around his home. Eval status:  Moderate difficulty    PLAN  []  Upgrade activities as tolerated     [x]  Continue plan of care  []  Update interventions per flow sheet       []  Discharge due to:_  []  Other:_      Bryan Olea PTA 2/7/2023  6:33 AM    Future Appointments   Date Time Provider Josefa Cooper   2/7/2023  2:00 PM Eliezer Suárez [facility]   2/8/2023  1:00 PM Harshil Loving PTA [facility]   2/13/2023  2:00 PM Dioni Garcia [facility]   2/15/2023 12:00 PM Dioni Garcia [facility]   2/17/2023  1:00 PM Dioni Garcia [facility]   2/20/2023  2:00 PM Jeb Hatch [facility]   2/22/2023  2:00 PM Gavin Mayo PT [facility]

## 2023-02-08 ENCOUNTER — HOSPITAL ENCOUNTER (OUTPATIENT)
Dept: PHYSICAL THERAPY | Age: 83
Discharge: HOME | End: 2023-02-08
Payer: MEDICARE

## 2023-02-08 PROCEDURE — 97016 VASOPNEUMATIC DEVICE THERAPY: CPT

## 2023-02-08 PROCEDURE — 97110 THERAPEUTIC EXERCISES: CPT

## 2023-02-08 PROCEDURE — 97530 THERAPEUTIC ACTIVITIES: CPT

## 2023-02-08 NOTE — PROGRESS NOTES
PT DAILY TREATMENT NOTE     Patient Name: Sulaiman Valentino  [id number]  : 1940  [x]  Patient  Verified  Payor: [place] MEDICARE / Plan: [place] MEDICARE PART A & B / Product Type: Medicare /    In time: 12:56  Out time: 1:45  Total Treatment Time (min): 49  Visit #: 6 of 12    Medicare/BCBS Only   Total Timed Codes (min): 49 1:1 Treatment Time: 34       Treatment Area: Pain in right knee [M25.561]    SUBJECTIVE  Pain Level (0-10 scale): 5/10  Any medication changes, allergies to medications, adverse drug reactions, diagnosis change, or new procedure performed?: [x] No    [] Yes (see summary sheet for update)  Subjective functional status/changes:   [] No changes reported  Pt stated he does not feel like he is making progress with his knee bending.      OBJECTIVE    Modality rationale: decrease inflammation and decrease pain to improve the patients ability to perform ADLs with increased ease    Min Type Additional Details     [] Estim:  []Unatt       []IFC  []Premod                        []Other:  []w/ice   []w/heat  Position:  Location:     [] Estim: []Att    []TENS instruct  []NMES                    []Other:  []w/US   []w/ice   []w/heat  Position:  Location:     []  Traction: [] Cervical       []Lumbar                       [] Prone          []Supine                       []Intermittent   []Continuous Lbs:  [] before manual  [] after manual     []  Ultrasound: []Continuous   [] Pulsed                           []1MHz   []3MHz W/cm2:  Location:     []  Iontophoresis with dexamethasone         Location: [] Take home patch   [] In clinic     []  Ice     []  heat  []  Ice massage  []  Laser   []  Anodyne Position:  Location:     []  Laser with stim  []  Other:  Position:  Location:   15 [x]  Vasopneumatic Device    [x]  Right     []  Left  Pre-treatment girth: 47 cm (taken by PTA)  Post-treatment girth: 46 cm (taken by PTA)  Measured at (location): mid patella  Pressure:       [x] lo [] med [] hi Temperature: [x] lo [] med [] hi   [x] Skin assessment post-treatment:  [x]intact []redness- no adverse reaction    []redness - adverse reaction:       22 min Therapeutic Exercise:  [x] See flow sheet :   Rationale: increase ROM, increase strength, improve coordination, improve balance, and increase proprioception to improve the patients ability to perform ADLs with increased ease    12 min Therapeutic Activity:  [x] See flow sheet : step up/over, measurements   Rationale: increase ROM, increase strength, improve coordination, improve balance, and increase proprioception to improve the patients ability to perform ADLs with increased ease          With   [x] TE   [] TA   [] neuro   [] other: Patient Education: [x] Review HEP    [] Progressed/Changed HEP based on:   [] positioning   [] body mechanics   [] transfers   [] heat/ice application    [] other:      Other Objective/Functional Measures:   - increased reps as noted on FS  - added hip flexor and quad combination S   - continued moderate inflammation of right knee    Right Knee AROM: 0-95*    Pain Level (0-10 scale) post treatment: 2-3/10    ASSESSMENT/Changes in Function:   Patient demonstrates fair tolerance to program. Continues moderate inflammation in right knee which is contributing to decreased knee flexion. Significant hip flexor tightness addressed with additional of combo hip flexor/quad S. Decreased inflammation after VASO with pt's LEs elevated on both wedge and plinth at 45*. Patient will continue to benefit from skilled PT services to modify and progress therapeutic interventions, address functional mobility deficits, address ROM deficits, address strength deficits, analyze and cue movement patterns, analyze and modify body mechanics/ergonomics, assess and modify postural abnormalities, address imbalance/dizziness, and instruct in home and community integration to attain remaining goals.      [x]  See Plan of Care  []  See progress note/recertification  []  See Discharge Summary         Progress towards goals / Updated goals:  Short term goals: To be accomplished within 1 week  1. Pt will be independent with HEP to maintain progression. Eval status: good understanding  Current: compliant (1/24/23)     Long term goals: To be accomplished within 4 weeks  1. Pt will improve FOTO score by 11 points to 68/100 to show improvement with functional mobility performance. Eval status: 57     2. Pt will have Right knee AROM -5 to 115 degrees to amb household and community with normal and safe pattern. Eval status: -15 to 95  Current: Progressing-ext -4 deg, flex 90 deg (1/20/23), 0-95* (2/8/23)     3. Pt will report 50% improvement of pain during standing & walking to improve ease with ADLs. Eval status: 2-9/10 with standing & walking  Current: 3-8/10 with standing/walking (2/8/23)    4. Pt will be able to amb household (100-300 ft) on level surface with no AD to perform ADLs comfortably. Eval status: amb with SPC just in case  Current: ambulating in community with without AD (2/1/23)     5. Pt will report No difficulty with performing light activities around his home. Eval status:  Moderate difficulty    PLAN  []  Upgrade activities as tolerated     [x]  Continue plan of care  []  Update interventions per flow sheet       []  Discharge due to:_  []  Other:_      Misael Stern, PTA 2/8/2023  1:49 PM    Future Appointments   Date Time Provider Josefa Cooper   2/8/2023  1:00 PM Linnette Cornea [facility]   2/13/2023  2:00 PM Linnette Cornea [facility]   2/15/2023 12:00 PM Linnette Cornea [facility]   2/17/2023  1:00 PM Linnette Cornea [facility]   2/20/2023  2:00 PM [place], [place] [facility]   2/22/2023  2:00 PM Gavin Powers, PT [facility]

## 2023-02-10 ENCOUNTER — APPOINTMENT (OUTPATIENT)
Dept: PHYSICAL THERAPY | Age: 83
End: 2023-02-10
Payer: MEDICARE

## 2023-02-13 ENCOUNTER — APPOINTMENT (OUTPATIENT)
Dept: PHYSICAL THERAPY | Age: 83
End: 2023-02-13
Payer: MEDICARE

## 2023-02-13 ENCOUNTER — HOSPITAL ENCOUNTER (OUTPATIENT)
Facility: HOSPITAL | Age: 83
Setting detail: RECURRING SERIES
Discharge: HOME | End: 2023-02-16
Payer: MEDICARE

## 2023-02-13 PROCEDURE — 97016 VASOPNEUMATIC DEVICE THERAPY: CPT

## 2023-02-13 PROCEDURE — 97110 THERAPEUTIC EXERCISES: CPT

## 2023-02-13 PROCEDURE — 97530 THERAPEUTIC ACTIVITIES: CPT

## 2023-02-15 ENCOUNTER — APPOINTMENT (OUTPATIENT)
Dept: PHYSICAL THERAPY | Age: 83
End: 2023-02-15
Payer: MEDICARE

## 2023-02-15 ENCOUNTER — HOSPITAL ENCOUNTER (OUTPATIENT)
Facility: HOSPITAL | Age: 83
Setting detail: RECURRING SERIES
Discharge: HOME | End: 2023-02-18
Payer: MEDICARE

## 2023-02-15 PROCEDURE — 97530 THERAPEUTIC ACTIVITIES: CPT

## 2023-02-15 PROCEDURE — 97110 THERAPEUTIC EXERCISES: CPT

## 2023-02-15 PROCEDURE — 97016 VASOPNEUMATIC DEVICE THERAPY: CPT

## 2023-02-15 NOTE — PROGRESS NOTES
PHYSICAL / OCCUPATIONAL THERAPY - DAILY TREATMENT NOTE (updated )    Patient Name: Maria De Jesus Cornelius    Date: 2/15/2023    : 1940  Insurance: Payor: MEDICARE / Plan: MEDICARE PART A AND B / Product Type: *No Product type* /      Patient  verified yes     Visit #   Current / Total 8 12   Time   In / Out 12:01 12:51   Pain   In / Out -6/10 4/10   Subjective Functional Status/Changes: Pt reports he gets a knot or sharp pain in the patella when he first starts moving and then it works itself out. Changes to:  Meds, Allergies, Med Hx, Sx Hx? If yes, update Summary List no       TREATMENT AREA =  right knee pain    OBJECTIVE    Modalities Rationale:     decrease inflammation and decrease pain to improve patient's ability to progress to PLOF and address remaining functional goals. min [] Estim Unattended, type/location:                                      []  w/ice    []  w/heat    min [] Estim Attended, type/location:                                     []  w/US     []  w/ice    []  w/heat    []  TENS insruct      min []  Mechanical Traction: type/lbs                   []  pro   []  sup   []  int   []  cont    []  before manual    []  after manual    min []  Ultrasound, settings/location:      min []  Iontophoresis w/ dexamethasone, location:                                               []  take home patch       []  in clinic    min  unbilled []  Ice     []  Heat    location/position:     min []  Paraffin,  details:    15 min [x]  Vasopneumatic Device, press/temp:  Low/Low    min []  Debby Farias / Mariajose Bravo:     If using vaso (only need to measure limb vaso being performed on)      pre-treatment girth : 46.5 cm       post-treatment girth : 46.5 cm      measured at (landmark location) : mid patella on wedge     Vasopnuematic compression justification:  Per bilateral girth measures taken and listed above the edema is considered significant and having an impact on the patient's ability to ambulate with ease.      min [x]  Other:    Skin assessment post-treatment (if applicable):    [x]  intact    [x]  redness- no adverse reaction                 []redness - adverse reaction:        Therapeutic Procedures: Tx Min Billable or 1:1 Min (if diff from Tx Min) Procedure, Rationale, Specifics   20  11052 Therapeutic Exercise (timed):  increase ROM, strength, coordination, balance, and proprioception to improve patient's ability to progress to PLOF and address remaining functional goals. (see flow sheet as applicable)     Details if applicable:       15  [id number] Therapeutic Activity (timed):  use of dynamic activities replicating functional movements to increase ROM, strength, coordination, balance, and proprioception in order to improve patient's ability to progress to PLOF and address remaining functional goals. (see flow sheet as applicable)     Details if applicable:     50 48 MC BC Totals Reminder: bill using total billable min of TIMED therapeutic procedures (example: do not include dry needle or estim unattended, both untimed codes, in totals to left)  8-22 min = 1 unit; 23-37 min = 2 units; 38-52 min = 3 units; 53-67 min = 4 units; 68-82 min = 5 units   Total Total     [x]  Patient Education billed concurrently with other procedures   [x] Review HEP    [] Progressed/Changed HEP, detail:    [] Other detail:       Objective Information/Functional Measures/Assessment   - increased hold times for incline S  - progressed to 4# LAQ, blue TB HS curls  - progressed to 8 inch step ups    Patient remains challenged with tandem stance requiring intermittent touch. He tolerated progression of reps and resistance as noted on FS. No change in knee AROM today. Slow decrease in knee edema noted pre-VASO.      Patient will continue to benefit from skilled PT / OT services to modify and progress therapeutic interventions, analyze and address functional mobility deficits, analyze and address ROM deficits, analyze and address strength deficits, analyze and address soft tissue restrictions, analyze and cue for proper movement patterns, analyze and modify for postural abnormalities, analyze and address imbalance/dizziness, and instruct in home and community integration to address functional deficits and attain remaining goals. Progress toward goals / Updated goals:  [x]  See Progress Note/Recertification    Short term goals: To be accomplished within 1 week  1. Pt will be independent with HEP to maintain progression. Eval status: good understanding  Current: compliant (1/24/23)     Long term goals: To be accomplished within 4 weeks  1. Pt will improve FOTO score by 11 points to 68/100 to show improvement with functional mobility performance. Eval status: 57     2. Pt will have Right knee AROM -5 to 115 degrees to amb household and community with normal and safe pattern. Eval status: -15 to 95  Current: Progressing-ext -4 deg, flex 90 deg (1/20/23), 0-95* (2/8/23)     3. Pt will report 50% improvement of pain during standing & walking to improve ease with ADLs. Eval status: 2-9/10 with standing & walking  Current: 3-8/10 with standing/walking (2/8/23)    4. Pt will be able to amb household (100-300 ft) on level surface with no AD to perform ADLs comfortably. Eval status: amb with SPC just in case  Current: ambulating in community without AD (2/1/23)     5. Pt will report No difficulty with performing light activities around his home. Eval status:  Moderate difficulty    PLAN  yes Continue plan of care  [x]  Upgrade activities as tolerated  []  Discharge due to :  [x]  Other: recert due [facility], [place]    2/15/2023    10:34 AM    Future Appointments   Date Time Provider Marleni Pretty   2/15/2023 12:00 PM Phill Salcido PTA [facility]   2/17/2023  1:00 PM Phill Salcido PTA [facility]   2/20/2023  2:00 PM Candance Piano, [place] [facility]   2/22/2023  2:00 PM Candance Piano, [place] [facility]   3/16/2023 9:45 AM [facility] LAB VISIT [facility] AMB   3/23/2023 10:30 AM Cinthia Ruelas MD [facility] AMB   3/30/2023  2:30 PM Chris Beltran MD [facility] AMB   4/5/2023  8:00 AM Chris Beltran MD [facility] AMB   9/11/2023 10:15 AM MD Bella Denise

## 2023-02-17 ENCOUNTER — APPOINTMENT (OUTPATIENT)
Dept: PHYSICAL THERAPY | Age: 83
End: 2023-02-17
Payer: MEDICARE

## 2023-02-17 ENCOUNTER — HOSPITAL ENCOUNTER (OUTPATIENT)
Facility: HOSPITAL | Age: 83
Setting detail: RECURRING SERIES
Discharge: HOME | End: 2023-02-20
Payer: MEDICARE

## 2023-02-17 PROCEDURE — 97530 THERAPEUTIC ACTIVITIES: CPT

## 2023-02-17 PROCEDURE — 97110 THERAPEUTIC EXERCISES: CPT
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
## 2023-02-17 NOTE — THERAPY RECERTIFICATION
[facility] [place] [email or web address]  [address] [facility]  [phone] fax    Continued Plan of Care/ Re-certification for Physical Therapy Services    Patient name: Selena Dowling Start of Care: 2023   Referral source: Cher Dickerson MD : 1940               Medical Diagnosis: Pain in right knee [M25.561]  [place]or: [place] / Plan: [place] MED[place]ARE PART A & B / Product Type: Medicare /  Onset RGIQ:4-               Treatment Diagnosis: Right knee Pain   Prior Hospitalization: see medical history Provider#: [id number]   Medications: Verified on Patient summary List    Comorbidities: arthritis   Prior Level of Function: mod ind with all mobility, living with wife, no AD, 4 steps to enter with rails, walking, going to see football/basket ball games. Visits from Start of Care: 9    Missed Visits: 2    The Plan of Care and following information is based on the patient's current status:  Short term goals: To be accomplished within 1 week  1. Pt will be independent with HEP to maintain progression. Eval status: good understanding  Current Status: compliant (23)     Long term goals: To be accomplished within 4 weeks  1. Pt will improve FOTO score by 11 points to 68/100 to show improvement with functional mobility performance. Eval status: 57  Current Status: 52/100 (23)     2. Pt will have Right knee AROM -5 to 115 degrees to amb household and community with normal and safe pattern. Eval status: -15 to 95  Current Status: Progressing-ext -4 deg, flex 90 deg (23), 0-95* (23), 0-95*/100* pre/post-manual (23)     3. Pt will report 50% improvement of pain during standing & walking to improve ease with ADLs. Eval status: 2-9/10 with standing & walking  Current Status: 3-8/10 with standing/walking (23), 25% with 3-10/10 pain range (23)    4.  Pt will be able to amb household (100-300 ft) on level surface with no AD to perform ADLs comfortably. Eval status: amb with SPC just in case  Current Status: MET: ambulating in community without AD (2/1/23)     5. Pt will report No difficulty with performing light activities around his home. Eval status: Moderate difficulty  Current Status: \"moderate difficulty\" (2/17/23)      Key functional changes: minimal increase in knee ROM, good knee strength, ambulating without AD, 25% subjective improvement      Problems/ barriers to goal attainment: none     Problem List: pain affecting function, decrease ROM, decrease strength, edema affecting function, impaired gait/ balance, decrease ADL/ functional abilitiies, decrease activity tolerance, decrease flexibility/ joint mobility, and decrease transfer abilities                Treatment Plan: Therapeutic exercise, Neuromuscular reeducation, Manual therapy, Therapeutic activity, Self care/home management, Vasopneumatic device, and Gait training     Patient Goal (s) has been updated and includes: \"to walk out of here with no pain\"    Goals for this certification period to be accomplished in 4 weeks  1. Pt will be independent with updated HEP to maintain progression. Status at last progress note/recertification: new goal     2. Pt will improve FOTO score  to at least 68/100 to show improvement with functional mobility performance. Status at last progress note/recertification: 72/148(-7)    3. Patient will report no more than \" little difficulty\" with \"Performing light activities around your home\" in order to demonstrate improved tolerance to ADLs. Status at last progress note/recertification: \"moderate difficulty\"     4. Pt improve right knee flex AROM to at least 115 deg degrees to amb household and community with normal and safe pattern. Status at last progress note/recertification: 1-62*/329* pre/post-manual     5. Pt will report 50% improvement of pain during standing & walking to improve ease with ADLs.   Status at last progress note/recertification: 25% with 3-10/10 pain range         Frequency / Duration: Patient to be seen 2-3 times per week for 4 weeks    Assessment / Recommendations:  Mr. Nate Zimmerman is making slow limited progress towards his initial goals, with current right knee AROM 0-100 degrees after manual and stretches. He continues to have moderate edema of right knee. He reports 25% overall improvement being decreasing pain and increasing ROM and strength. Current right knee strength at 5/5 for extension and 4+/5 for flexion in short sitting. His current pain range is 3-10/10. He continues to c/o being challenged with walking tolerance 10-15 min, stair negotiation, ingress/egress car, squatting/bending/lifting. Certification Period: 2/18/2023-3/19/2023    Wilkins Sandifer, GLADYS   2/17/2023   8:00 AM    ________________________________________________________________________  I certify that the above Therapy Services are being furnished while the patient is under my care. I agree with the treatment plan and certify that this therapy is necessary. [] I have read the above and request that my patient continue as recommended. [] I have read the above report and request that my patient continue therapy with the following changes/special instructions: _______________________________________  [] I have read the above report and request that my patient be discharged from therapy    Physician's Signature:____________Date:_________TIME:________     No ref.  provider found  ** Signature, Date and Time must be completed for valid certification **    Please sign and return to [facility] [employer]  [address] [place] [address]  [phone] fax

## 2023-02-20 ENCOUNTER — HOSPITAL ENCOUNTER (OUTPATIENT)
Facility: HOSPITAL | Age: 83
Setting detail: RECURRING SERIES
Discharge: HOME | End: 2023-02-23
Payer: MEDICARE

## 2023-02-20 ENCOUNTER — APPOINTMENT (OUTPATIENT)
Dept: PHYSICAL THERAPY | Age: 83
End: 2023-02-20
Payer: MEDICARE

## 2023-02-20 PROCEDURE — 97110 THERAPEUTIC EXERCISES: CPT

## 2023-02-20 PROCEDURE — 97016 VASOPNEUMATIC DEVICE THERAPY: CPT

## 2023-02-20 NOTE — PROGRESS NOTES
PHYSICAL / OCCUPATIONAL THERAPY - DAILY TREATMENT NOTE (updated )    Patient Name: Wally Gonzalez    Date: 2023    : 1940  Insurance: Payor: MEDICARE / Plan: MEDICARE PART A AND B / Product Type: *No Product type* /      Patient  verified yes   Visit #   Current / Total 1 12   Time   In / Out 2:03P 2:43P   Pain   In / Out 6-7/10-anterior knee 3-4/10   Subjective Functional Status/Changes: Patient reports increased pain recently which he attributes to weather. He reports symptoms are come and go in the knee. He is scheduled to have his left knee replaced in April. He felt pretty good after last visit. He wants to be able to go up and down the stairs more normally. He does most of the exercises in the bed trying to bend his knee and tries to walk. Changes to:  Meds, Allergies, Med Hx, Sx Hx? If yes, update Summary List no       TREATMENT AREA =  No admission diagnoses are documented for this encounter. OBJECTIVE    Modalities Rationale:     decrease edema, decrease inflammation, and decrease pain to improve patient's ability to progress to PLOF and address remaining functional goals. min [] Estim Unattended, type/location:                                      []  w/ice    []  w/heat    min [] Estim Attended, type/location:                                     []  w/US     []  w/ice    []  w/heat    []  TENS insruct      min []  Mechanical Traction: type/lbs                   []  pro   []  sup   []  int   []  cont    []  before manual    []  after manual    min []  Ultrasound, settings/location:      min []  Iontophoresis w/ dexamethasone, location:                                               []  take home patch       []  in clinic    min  unbill []  Ice     []  Heat    location/position:     min []  Paraffin,  details:     min []  Vasopneumatic Device, press/temp:     min []  Chhaya Cristobal / Maday Mackey:     If using vaso (only need to measure limb vaso being performed on)      pre-treatment girth : 50 cm      post-treatment girth : 47 cm      measured at (landmark location) :  mid-patella   10 min []  Other:    Skin assessment post-treatment (if applicable):    [x]  intact    [x]  redness- no adverse reaction                 []redness - adverse reaction:         Therapeutic Procedures: Tx Min Billable or 1:1 Min (if diff from Tx Min) Procedure, Rationale, Specifics   30  87406 Therapeutic Exercise (timed):  increase ROM, strength, coordination, balance, and proprioception to improve patient's ability to progress to PLOF and address remaining functional goals. (see flow sheet as applicable)     Details if applicable:                           27  [facility] Totals Reminder: bill using total billable min of TIMED therapeutic procedures (example: do not include dry needle or estim unattended, both untimed codes, in totals to left)  8-22 min = 1 unit; 23-37 min = 2 units; 38-52 min = 3 units; 53-67 min = 4 units; 68-82 min = 5 units   Total Total     [x]  Patient Education billed concurrently with other procedures   [x] Review HEP    [] Progressed/Changed HEP, detail:    [] Other detail:       Objective Information/Functional Measures/Assessment    Stairs: unilateral handrail, reciprocal pattern; discomfort with ascending and descending  Lack of TKE B with ambulation  Improved knee flex AROM with squatting vs knee flex stretch at step and sidelying knee flex stretch with strap      Patient progressing towards goals in therapy. He continues to be limited by decreased knee flex AROM and right knee edema limiting tolerance to stair navigation. Session today emphasized activities to promote knee flex ROM including quad/hip flex stretching and WB knee flex. Plan to continues to progress with manual interventions as needed along with flexibility and strengthening to improve knee AROM/stability and increase tolerance to daily activities and stair navigation.     Patient will continue to benefit from skilled PT / OT services to modify and progress therapeutic interventions, analyze and address functional mobility deficits, analyze and address ROM deficits, analyze and address strength deficits, analyze and address soft tissue restrictions, analyze and cue for proper movement patterns, analyze and modify for postural abnormalities, analyze and address imbalance/dizziness, and instruct in home and community integration to address functional deficits and attain remaining goals. Progress toward goals / Updated goals:  []  See Progress Note/Recertification    1. Pt will be independent with updated HEP to maintain progression. Status at last progress note/recertification: new goal     2. Pt will improve FOTO score  to at least 68/100 to show improvement with functional mobility performance. Status at last progress note/recertification: 02/176(-0)     3. Patient will report no more than \" little difficulty\" with \"Performing light activities around your home\" in order to demonstrate improved tolerance to ADLs. Status at last progress note/recertification: \"moderate difficulty\"     4. Pt improve right knee flex AROM to at least 115 deg degrees to amb household and community with normal and safe pattern. Status at last progress note/recertification: 9-65*/293* pre/post-manual     5. Pt will report 50% improvement of pain during standing & walking to improve ease with ADLs.   Status at last progress note/recertification: 00% with 3-10/10 pain range              PLAN  Yes Continue plan of care  [x]  Upgrade activities as tolerated  []  Discharge due to :  []  Other:    Rhea Gambino PT    2/20/2023    11:00 AM    Future Appointments   Date Time Provider Marleni Pretty   2/20/2023  2:00 PM Horacio Engel [facility]   2/22/2023  2:00 PM Horacio Engel [facility]   2/28/2023  2:00 PM Horacio Engel [facility]   3/2/2023  2:30 PM Braxton Hand PTA [facility]   3/6/2023 10:30 AM Yury Hollins Marquez Muñoz, [place] [facility]   3/8/2023  1:00 PM Zayra Stack, PTA [facility]   3/10/2023 11:30 AM Zeina Pace, PT [facility]   3/13/2023 10:30 AM Zayra Stack, PTA [id number]   3/15/2023 11:30 AM Zayra Stack, PTA [facility]   3/16/2023  9:45 AM IO LAB VISIT [facility] BS AMB   3/17/2023 11:30 AM Zayra Stack, PTA [facility]   3/20/2023 11:00 AM Zayra Stack, PTA [facility]   3/23/2023 10:30 AM Eric Braga MD IO BS AMB   3/30/2023  2:30 PM Helen Love MD [facility] BS AMB   4/5/2023  8:00 AM MD RADHA Nuñez BS AMB   9/11/2023 10:15 AM MD Didier Musa Pulse

## 2023-02-22 ENCOUNTER — APPOINTMENT (OUTPATIENT)
Dept: PHYSICAL THERAPY | Age: 83
End: 2023-02-22
Payer: MEDICARE

## 2023-02-22 ENCOUNTER — HOSPITAL ENCOUNTER (OUTPATIENT)
Facility: HOSPITAL | Age: 83
Setting detail: RECURRING SERIES
Discharge: HOME | End: 2023-02-25
Payer: MEDICARE

## 2023-02-22 PROCEDURE — 97110 THERAPEUTIC EXERCISES: CPT

## 2023-02-22 PROCEDURE — 97530 THERAPEUTIC ACTIVITIES: CPT

## 2023-02-22 NOTE — PROGRESS NOTES
PHYSICAL / OCCUPATIONAL THERAPY - DAILY TREATMENT NOTE (updated )    Patient Name: Mackenzie Gomez    Date: 2023    : 1940  Insurance: Payor: MEDICARE / Plan: MEDICARE PART A AND B / Product Type: *No Product type* /      Patient  verified yes   Visit #   Current / Total 2 12   Time   In / Out 2:00P 2:45P   Pain   In / Out 5/10 2/10   Subjective Functional Status/Changes: Patient reports he feels ok today. He reports he was fine after last visit. He felt it got tighter again when he tried to do exercises. It normally loosens and then stiffens when he goes to bed. He will normally sleep on his back and side. He tries to keep his legs straight but then end up bent. He will take medication before bed and at 2:30/3A. Changes to:  Meds, Allergies, Med Hx, Sx Hx? If yes, update Summary List no       TREATMENT AREA =  Right knee pain    OBJECTIVE    Modalities Rationale:     decrease edema, decrease inflammation, and decrease pain to improve patient's ability to progress to PLOF and address remaining functional goals. min [] Estim Unattended, type/location:                                      []  w/ice    []  w/heat    min [] Estim Attended, type/location:                                     []  w/US     []  w/ice    []  w/heat    []  TENS insruct      min []  Mechanical Traction: type/lbs                   []  pro   []  sup   []  int   []  cont    []  before manual    []  after manual    min []  Ultrasound, settings/location:      min []  Iontophoresis w/ dexamethasone, location:                                               []  take home patch       []  in clinic    min  unbill []  Ice     []  Heat    location/position:     min []  Paraffin,  details:     min []  Vasopneumatic Device, press/temp:     min []  Muhammad Jovany / Katherine Setters:     If using vaso (only need to measure limb vaso being performed on)      pre-treatment girth : 47 cm      post-treatment girth : 48 cm (measurements taken by different DPTs)      measured at (landmark location) : mid patella   10 min []  Other:    Skin assessment post-treatment (if applicable):    [x]  intact    []  redness- no adverse reaction                 []redness - adverse reaction:         Therapeutic Procedures: Tx Min Billable or 1:1 Min (if diff from Tx Min) Procedure, Rationale, Specifics   16  17593 Therapeutic Activity (timed):  use of dynamic activities replicating functional movements to increase ROM, strength, coordination, balance, and proprioception in order to improve patient's ability to progress to PLOF and address remaining functional goals. (see flow sheet as applicable)     Details if applicable:  step ups, ecc heel taps, patient education     19  35096 Therapeutic Exercise (timed):  increase ROM, strength, coordination, balance, and proprioception to improve patient's ability to progress to PLOF and address remaining functional goals.  (see flow sheet as applicable)     Details if applicable:     28  [facility] Totals Reminder: bill using total billable min of TIMED therapeutic procedures (example: do not include dry needle or estim unattended, both untimed codes, in totals to left)  8-22 min = 1 unit; 23-37 min = 2 units; 38-52 min = 3 units; 53-67 min = 4 units; 68-82 min = 5 units   Total Total     [x]  Patient Education billed concurrently with other procedures   [x] Review HEP    [] Progressed/Changed HEP, detail:    [] Other detail:       Objective Information/Functional Measures/Assessment    Increased right knee pain with step ups  Cues for decreased speed and TKE with right step ups for control/stability  Increased knee pain with ecc heel taps improved with use of smaller 2\" step with activity vs 6\" step  Pain localized to anterolateral joint lines   B patellar discomfort with TKE (more on right)  Reviewed scar massage and patella mobilization for home due to decreased patellar mobility (greater superior/inferior, less lateral, least medial)-patient with difficulty performing patellar glides      Patient continues to make slow, steady progress towards goals. He continues to report joint line pain with knee flex in WB limiting tolerance with stairs. He continues to demonstrate decreased patellar mobility and edema to proximal 1/3 of incision with scar hypertrophy contributing to pain with TKE and lack of TKE with ambulation B. He reports continued compliance with HEP. Plan to continue with manual interventions to address patellar mobility and reduce scar tissue to improve overall knee mechanics. Patient will continue to benefit from skilled PT / OT services to modify and progress therapeutic interventions, analyze and address functional mobility deficits, analyze and address ROM deficits, analyze and address strength deficits, analyze and address soft tissue restrictions, analyze and cue for proper movement patterns, analyze and modify for postural abnormalities, analyze and address imbalance/dizziness, and instruct in home and community integration to address functional deficits and attain remaining goals. Progress toward goals / Updated goals:  []  See Progress Note/Recertification    1. Pt will be independent with updated HEP to maintain progression. Status at last progress note/recertification: new goal     2. Pt will improve FOTO score  to at least 68/100 to show improvement with functional mobility performance. Status at last progress note/recertification: 50/411(-7)     3. Patient will report no more than \" little difficulty\" with \"Performing light activities around your home\" in order to demonstrate improved tolerance to ADLs. Status at last progress note/recertification: \"moderate difficulty\"     4. Pt improve right knee flex AROM to at least 115 deg degrees to amb household and community with normal and safe pattern. Status at last progress note/recertification: 5-31*/416* pre/post-manual     5.  Pt will report 50% improvement of pain during standing & walking to improve ease with ADLs.   Status at last progress note/recertification: 54% with 3-10/10 pain range     PLAN  Yes  Continue plan of care  [x]  Upgrade activities as tolerated  []  Discharge due to :  []  Other:    Jose Mulligan, PT    2/22/2023    11:26 AM    Future Appointments   Date Time Provider Marleni Maria De Jesus   2/22/2023  2:00 PM Jose Mulligan, [address] [facility]   2/28/2023  2:00 PM Jose Mulligan, [address] [facility]   3/2/2023  2:30 PM Nevaeh Craven, PTA VHSAEGZ [facility]   3/6/2023 10:30 AM Jose Mulligan, PT [facility]   3/8/2023  1:00 PM Nevaeh Craven, PTA NZUSHEM [facility]   3/10/2023 11:30 AM Jose Mulligan, PT [facility]   3/13/2023 10:30 AM Nevaeh Craven, PTA BZUAHYA [facility]   3/15/2023 11:30 AM Nevaeh Craven, PTA [facility]   3/16/2023  9:45 AM IO LAB VISIT [facility] BS AMB   3/17/2023 11:30 AM Nevaeh Craven, PTA MZAUACM [facility]   3/20/2023 11:00 AM Nevaeh Craven, PTA [id number] [facility]   3/23/2023 10:30 AM Rachelle Leigh MD [facility] BS AMB   3/30/2023  2:30 PM Thomas Patterson MD [facility] BS AMB   4/5/2023  8:00 AM Chris Kovacs MD [facility] BS AMB   9/11/2023 10:15 AM MD Kaylee Marlow [facility]

## 2023-02-28 ENCOUNTER — HOSPITAL ENCOUNTER (OUTPATIENT)
Facility: HOSPITAL | Age: 83
Setting detail: RECURRING SERIES
Discharge: HOME | End: 2023-03-03
Payer: MEDICARE

## 2023-02-28 PROCEDURE — 97016 VASOPNEUMATIC DEVICE THERAPY: CPT

## 2023-02-28 PROCEDURE — 97110 THERAPEUTIC EXERCISES: CPT

## 2023-02-28 PROCEDURE — 97530 THERAPEUTIC ACTIVITIES: CPT

## 2023-02-28 NOTE — PROGRESS NOTES
PHYSICAL / OCCUPATIONAL THERAPY - DAILY TREATMENT NOTE (updated )    Patient Name: Ashkan Jaime    Date: 2023    : 1940  Insurance: Payor: MEDICARE / Plan: MEDICARE PART A AND B / Product Type: *No Product type* /      Patient  verified yes   Visit #   Current / Total 3 12   Time   In / Out 2:00P 2:48P   Pain   In / Out 5/10 2/10   Subjective Functional Status/Changes: Patient reports he was very achy over the weekend. He had to do a lot of walking. He went to a basketball game Friday. He tried to ice and stretch it and it seems a little better today. He was ok after last visit. He has been working on his kneecap and scar at home. Changes to:  Meds, Allergies, Med Hx, Sx Hx? If yes, update Summary List yes       TREATMENT AREA =  No admission diagnoses are documented for this encounter. OBJECTIVE    Modalities Rationale:     decrease edema, decrease inflammation, and decrease pain to improve patient's ability to progress to PLOF and address remaining functional goals. min [] Estim Unattended, type/location:                                      []  w/ice    []  w/heat    min [] Estim Attended, type/location:                                     []  w/US     []  w/ice    []  w/heat    []  TENS insruct      min []  Mechanical Traction: type/lbs                   []  pro   []  sup   []  int   []  cont    []  before manual    []  after manual    min []  Ultrasound, settings/location:      min []  Iontophoresis w/ dexamethasone, location:                                               []  take home patch       []  in clinic    min  unbill []  Ice     []  Heat    location/position:     min []  Paraffin,  details:     min []  Vasopneumatic Device, press/temp:    10 min []  Delaney Spittle / Pinzon العراقي:     If using vaso (only need to measure limb vaso being performed on)      pre-treatment girth : 45 cm joint line      post-treatment girth : 46.5 cm mid patella      measured at (landmark location) : min []  Other:    Skin assessment post-treatment (if applicable):    [x]  intact    []  redness- no adverse reaction                 []redness - adverse reaction:         Therapeutic Procedures: Tx Min Billable or 1:1 Min (if diff from Tx Min) Procedure, Rationale, Specifics   30  11573 Therapeutic Exercise (timed):  increase ROM, strength, coordination, balance, and proprioception to improve patient's ability to progress to PLOF and address remaining functional goals. (see flow sheet as applicable)     Details if applicable:       8  56482 Manual Therapy (timed):  decrease pain, increase ROM, and increase tissue extensibility to improve patient's ability to progress to PLOF and address remaining functional goals. The manual therapy interventions were performed at a separate and distinct time from the therapeutic activities interventions . (see flow sheet as applicable)     Details if applicable:  scar massage, patellar mobilizations   45  [facility] Totals Reminder: bill using total billable min of TIMED therapeutic procedures (example: do not include dry needle or estim unattended, both untimed codes, in totals to left)  8-22 min = 1 unit; 23-37 min = 2 units; 38-52 min = 3 units; 53-67 min = 4 units; 68-82 min = 5 units   Total Total     [x]  Patient Education billed concurrently with other procedures   [x] Review HEP    [] Progressed/Changed HEP, detail:    [] Other detail:       Objective Information/Functional Measures/Assessment    Educated on adding QS to SLR at home  Min quad lag with lowering of SLR  Difficulty maintaining right knee in ext with sidelying add  Hip flexor tightness noted with attempt at sidelying hip abd  Improved patellar mobility noted vs previous visit      Patient demonstrates improving right patellar mobility this visit. He continues to lack full knee ext with ambulation due to decreased quad endurance evidenced by extensor lag with SLR.  Patient also demonstrates hip flex tightness limiting hip ext and contributing to increased trunk flex in standing. Patient will continue to benefit from skilled PT / OT services to modify and progress therapeutic interventions, analyze and address functional mobility deficits, analyze and address ROM deficits, analyze and address strength deficits, analyze and address soft tissue restrictions, analyze and cue for proper movement patterns, analyze and modify for postural abnormalities, analyze and address imbalance/dizziness, and instruct in home and community integration to address functional deficits and attain remaining goals. Progress toward goals / Updated goals:  []  See Progress Note/Recertification    1. Pt will be independent with updated HEP to maintain progression. Status at last progress note/recertification: new goal     2. Pt will improve FOTO score  to at least 68/100 to show improvement with functional mobility performance. Status at last progress note/recertification: 48/833(-9)     3. Patient will report no more than \" little difficulty\" with \"Performing light activities around your home\" in order to demonstrate improved tolerance to ADLs. Status at last progress note/recertification: \"moderate difficulty\"     4. Pt improve right knee flex AROM to at least 115 deg degrees to amb household and community with normal and safe pattern. Status at last progress note/recertification: 3-96*/867* pre/post-manual     5. Pt will report 50% improvement of pain during standing & walking to improve ease with ADLs.   Status at last progress note/recertification: 61% with 3-10/10 pain range        PLAN  yes Continue plan of care  [x]  Upgrade activities as tolerated  []  Discharge due to :  []  Other:    Micheal Marte, PT    2/28/2023    10:02 AM    Future Appointments   Date Time Provider Marleni Pretty   2/28/2023  2:00 PM Micheal Marte, [address] [place], [place] [address] [address]   3/2/2023  2:30 PM [facility] [address]   3/6/2023 10:30 AM Wiley Landon, [place] [facility]   3/8/2023  1:00 PM Orvel Moy, PTA [facility]   3/10/2023 11:30 AM Wiley Landon, PT [facility]   3/13/2023 10:30 AM Orvel Moy, PTA [id number] [facility]   3/15/2023 11:30 AM Orvel Moy, PTA [facility]   3/16/2023  9:45 AM IOC LAB VISIT IO BS AMB   3/17/2023 11:30 AM Orvel Moy, PTA [id number] [facility]   3/20/2023 11:00 AM Orvel Moy, PTA [facility]   3/23/2023 10:30 AM Lupis Osorio MD IO BS AMB   3/30/2023  2:30 PM Jessi Norwood MD [facility] BS AMB   4/5/2023  8:00 AM Chris Tellez MD [facility] BS AMB   9/11/2023 10:15 AM MD Gene Ha Argyle

## 2023-03-02 ENCOUNTER — HOSPITAL ENCOUNTER (OUTPATIENT)
Facility: HOSPITAL | Age: 83
Setting detail: RECURRING SERIES
Discharge: HOME | End: 2023-03-05
Payer: MEDICARE

## 2023-03-02 PROCEDURE — 97140 MANUAL THERAPY 1/> REGIONS: CPT

## 2023-03-02 PROCEDURE — 97110 THERAPEUTIC EXERCISES: CPT

## 2023-03-02 PROCEDURE — 97016 VASOPNEUMATIC DEVICE THERAPY: CPT

## 2023-03-02 NOTE — PROGRESS NOTES
PHYSICAL / OCCUPATIONAL THERAPY - DAILY TREATMENT NOTE (updated )    Patient Name: Jenny Emery    Date: 3/2/2023    : 1940  Insurance: Payor: MEDICARE / Plan: MEDICARE PART A AND B / Product Type: *No Product type* /      Patient  verified yes   Visit #   Current / Total 4 12   Time   In / Out 2:33 3:23   Pain   In / Out 3-4/10 2/10   Subjective Functional Status/Changes: Patient reports he is having any easier time getting out of bed in the morning. He still has trouble with heel slides being painful. Changes to:  Meds, Allergies, Med Hx, Sx Hx? If yes, update Summary List yes       TREATMENT AREA =  right knee pain    OBJECTIVE    Modalities Rationale:     decrease edema, decrease inflammation, and decrease pain to improve patient's ability to progress to PLOF and address remaining functional goals. min [] Estim Unattended, type/location:                                      []  w/ice    []  w/heat    min [] Estim Attended, type/location:                                     []  w/US     []  w/ice    []  w/heat    []  TENS insruct      min []  Mechanical Traction: type/lbs                   []  pro   []  sup   []  int   []  cont    []  before manual    []  after manual    min []  Ultrasound, settings/location:      min []  Iontophoresis w/ dexamethasone, location:                                               []  take home patch       []  in clinic    min  unbill []  Ice     []  Heat    location/position:     min []  Paraffin,  details:    10 min [x]  Vasopneumatic Device, press/temp:     min []  Melanie Turner / Jonn Halt:     If using vaso (only need to measure limb vaso being performed on)      pre-treatment girth : 46.5 cm joint line      post-treatment girth : 46.5 cm mid patella      measured at (landmark location) :      min []  Other:    Skin assessment post-treatment (if applicable):    [x]  intact    []  redness- no adverse reaction                 []redness - adverse reaction: Therapeutic Procedures: Tx Min Billable or 1:1 Min (if diff from Tx Min) Procedure, Rationale, Specifics   25  50535 Therapeutic Exercise (timed):  increase ROM, strength, coordination, balance, and proprioception to improve patient's ability to progress to PLOF and address remaining functional goals. (see flow sheet as applicable)     Details if applicable:       10  16456 Manual Therapy (timed):  decrease pain, increase ROM, and increase tissue extensibility to improve patient's ability to progress to PLOF and address remaining functional goals. The manual therapy interventions were performed at a separate and distinct time from the therapeutic activities interventions . (see flow sheet as applicable)     Details if applicable:  scar massage, patellar mobilizations   50 48 [facility] Totals Reminder: bill using total billable min of TIMED therapeutic procedures (example: do not include dry needle or estim unattended, both untimed codes, in totals to left)  8-22 min = 1 unit; 23-37 min = 2 units; 38-52 min = 3 units; 53-67 min = 4 units; 68-82 min = 5 units   Total Total     [x]  Patient Education billed concurrently with other procedures   [x] Review HEP    [] Progressed/Changed HEP, detail:    [] Other detail:       Objective Information/Functional Measures/Assessment  AROM: 4-104* post stretching    - hypomobile patella today    Patient presents with decreased pain since last session and mild-moderate swelling of medial right knee, although decreasing slowly overall. Hypomobile patella compared to last session. Decreased knee extension ROM, lacking 4 degrees from neutral today however slow increase in knee flexion to 104*.       Patient will continue to benefit from skilled PT / OT services to modify and progress therapeutic interventions, analyze and address functional mobility deficits, analyze and address ROM deficits, analyze and address strength deficits, analyze and address soft tissue restrictions, analyze and cue for proper movement patterns, analyze and modify for postural abnormalities, analyze and address imbalance/dizziness, and instruct in home and community integration to address functional deficits and attain remaining goals. Progress toward goals / Updated goals:  []  See Progress Note/Recertification    1. Pt will be independent with updated HEP to maintain progression. Status at last progress note/recertification: new goal     2. Pt will improve FOTO score  to at least 68/100 to show improvement with functional mobility performance. Status at last progress note/recertification: 65/323(-9)     3. Patient will report no more than \" little difficulty\" with \"Performing light activities around your home\" in order to demonstrate improved tolerance to ADLs. Status at last progress note/recertification: \"moderate difficulty\"     4. Pt improve right knee flex AROM to at least 115 deg degrees to amb household and community with normal and safe pattern. Status at last progress note/recertification: 7-00*/183* pre/post-manual  Current: 4-104* (3/2/23)     5. Pt will report 50% improvement of pain during standing & walking to improve ease with ADLs.   Status at last progress note/recertification: 66% with 3-10/10 pain range        PLAN  yes Continue plan of care  [x]  Upgrade activities as tolerated  []  Discharge due to :  []  Other:    Phill Salcido PTA    3/2/2023    8:11 AM    Future Appointments   Date Time Provider Marleni Pretty   3/2/2023  2:30 PM Phill Salcido PTA [facility]   3/6/2023 10:30 AM Candance Piano, PT [facility]   3/8/2023  1:00 PM Phill Salcido PTA [facility]   3/10/2023 11:30 AM Candance Piano, PT [facility]   3/13/2023 10:30 AM Phill Salcido PTA [facility]   3/15/2023 11:30 AM Phill Salcido PTA [facility]   3/16/2023  9:45 AM IOC LAB VISIT [facility] BS AMB   3/17/2023 11:30 AM Doris Mishra, PTA [facility]   3/20/2023 11:00 AM Alisa Guerin, [facility] [address]   3/23/2023 10:30 AM Doug Harada, MD IO BS AMB   3/30/2023  2:30 PM Mina Morgan MD [facility] BS AMB   4/5/2023  8:00 AM Chris Tyson MD [facility] BS AMB   9/11/2023 10:15 AM Adalid Kline MD [facility]

## 2023-03-06 ENCOUNTER — HOSPITAL ENCOUNTER (OUTPATIENT)
Facility: HOSPITAL | Age: 83
Setting detail: RECURRING SERIES
Discharge: HOME | End: 2023-03-09
Payer: MEDICARE

## 2023-03-06 PROCEDURE — 97530 THERAPEUTIC ACTIVITIES: CPT

## 2023-03-06 PROCEDURE — 97110 THERAPEUTIC EXERCISES: CPT

## 2023-03-06 PROCEDURE — 97016 VASOPNEUMATIC DEVICE THERAPY: CPT

## 2023-03-06 NOTE — PROGRESS NOTES
PHYSICAL / OCCUPATIONAL THERAPY - DAILY TREATMENT NOTE (updated )    Patient Name: Liz Ramos    Date: 3/6/2023    : 1940  Insurance: Payor: MEDICARE / Plan: MEDICARE PART A AND B / Product Type: *No Product type* /      Patient  verified Yes     Visit #   Current / Total 5 12   Time   In / Out 10:31A 11:17A   Pain   In / Out 3/10 2-3/10   Subjective Functional Status/Changes: Patient reports he is good today. He was ok after last visit. He has been working on moving the kneecap around but still has trouble finding it and moving it. He normally works on the knee while sitting in a recliner with his legs up. He reports her pain is a lot less at night. 1042   Changes to:  Meds, Allergies, Med Hx, Sx Hx? If yes, update Summary List no       TREATMENT AREA =  Pain in right knee [M25.561]    OBJECTIVE    36  25      Modalities Rationale:     decrease edema, decrease inflammation, and decrease pain to improve patient's ability to progress to PLOF and address remaining functional goals. min [] Estim Unattended, type/location:                                      []  w/ice    []  w/heat    min [] Estim Attended, type/location:                                     []  w/US     []  w/ice    []  w/heat    []  TENS insruct      min []  Mechanical Traction: type/lbs                   []  pro   []  sup   []  int   []  cont    []  before manual    []  after manual    min []  Ultrasound, settings/location:      min []  Iontophoresis w/ dexamethasone, location:                                               []  take home patch       []  in clinic    min  unbill []  Ice     []  Heat    location/position:     min []  Paraffin,  details:     min []  Vasopneumatic Device, press/temp:     min []  Violeta Zabala / Uzma Willis:     If using vaso (only need to measure limb vaso being performed on)      pre-treatment girth : 45.5 cm joint line      post-treatment girth : 47.5 cm nid patella      measured at (landmark location) :  joint line   10 min []  Other:    Skin assessment post-treatment (if applicable):    [x]  intact    []  redness- no adverse reaction                 []redness - adverse reaction:         Therapeutic Procedures: Tx Min Billable or 1:1 Min (if diff from Tx Min) Procedure, Rationale, Specifics   8 8 [id number] Therapeutic Activity (timed):  use of dynamic activities replicating functional movements to increase ROM, strength, coordination, balance, and proprioception in order to improve patient's ability to progress to PLOF and address remaining functional goals. (see flow sheet as applicable)     Details if applicable:       28 17 15149 Therapeutic Exercise (timed):  increase ROM, strength, coordination, balance, and proprioception to improve patient's ability to progress to PLOF and address remaining functional goals. (see flow sheet as applicable)     Details if applicable:     39 25 [facility] Totals Reminder: bill using total billable min of TIMED therapeutic procedures (example: do not include dry needle or estim unattended, both untimed codes, in totals to left)  8-22 min = 1 unit; 23-37 min = 2 units; 38-52 min = 3 units; 53-67 min = 4 units; 68-82 min = 5 units   Total Total     [x]  Patient Education billed concurrently with other procedures   [x] Review HEP    [] Progressed/Changed HEP, detail:    [] Other detail:       Objective Information/Functional Measures/Assessment    Mod hypomobility with lateral glide, min hypomobility with other directions      Patient continues to make slow, steady progress towards goals. He continues to ambulate with right genu varus and lack of TKE B and continues to demonstrate decreased right patellar mobility. Will continue to progress with right LE ROM, strength, and stability in preparation for left TKA at beginning of April.      Patient will continue to benefit from skilled PT / OT services to modify and progress therapeutic interventions, analyze and address functional mobility deficits, analyze and address ROM deficits, analyze and address strength deficits, analyze and address soft tissue restrictions, analyze and cue for proper movement patterns, analyze and modify for postural abnormalities, analyze and address imbalance/dizziness, and instruct in home and community integration to address functional deficits and attain remaining goals. Progress toward goals / Updated goals:  []  See Progress Note/Recertification    1. Pt will be independent with updated HEP to maintain progression. Status at last progress note/recertification: new goal     2. Pt will improve FOTO score  to at least 68/100 to show improvement with functional mobility performance. Status at last progress note/recertification: 00/401(-4)     3. Patient will report no more than \" little difficulty\" with \"Performing light activities around your home\" in order to demonstrate improved tolerance to ADLs. Status at last progress note/recertification: \"moderate difficulty\"     4. Pt improve right knee flex AROM to at least 115 deg degrees to amb household and community with normal and safe pattern. Status at last progress note/recertification: 9-15*/877* pre/post-manual  Current: 4-104* (3/2/23)     5. Pt will report 50% improvement of pain during standing & walking to improve ease with ADLs.   Status at last progress note/recertification: 48% with 3-10/10 pain range        PLAN  Yes  Continue plan of care  []  Upgrade activities as tolerated  []  Discharge due to :  []  Other:    Woodfin Claude, PT    3/6/2023    8:40 AM    Future Appointments   Date Time Provider Marleni Pretty   3/6/2023 10:30 AM Woodfin Claude, PT [facility]   3/8/2023  1:00 PM Devang Roland PTA [facility]   3/10/2023 11:30 AM Woodfin Claude, PT [facility]   3/13/2023 10:30 AM GLADYS Gutierrez [facility]   3/15/2023 11:30 AM Devang Roland PTA [facility]   3/16/2023  9:45 AM [facility] LAB VISIT [facility] BS AMB   3/17/2023 11:30 AM Patrica Godinez PTA [id number]   3/20/2023 11:00 AM Andriy Mcdonald [facility]   3/23/2023 10:30 AM MD RACHEL Bhakta BS AMB   3/30/2023  2:30 PM Huber Smith MD [facility] BS AMB   4/5/2023  8:00 AM MD RADHA Solis BS AMB   9/11/2023 10:15 AM MD Antonio Gallo

## 2023-03-08 ENCOUNTER — HOSPITAL ENCOUNTER (OUTPATIENT)
Facility: HOSPITAL | Age: 83
Setting detail: RECURRING SERIES
Discharge: HOME | End: 2023-03-11
Payer: MEDICARE

## 2023-03-10 ENCOUNTER — HOSPITAL ENCOUNTER (OUTPATIENT)
Facility: HOSPITAL | Age: 83
Setting detail: RECURRING SERIES
Discharge: HOME | End: 2023-03-13
Payer: MEDICARE

## 2023-03-10 PROCEDURE — 97112 NEUROMUSCULAR REEDUCATION: CPT

## 2023-03-10 PROCEDURE — 97110 THERAPEUTIC EXERCISES: CPT

## 2023-03-10 NOTE — PROGRESS NOTES
PHYSICAL / OCCUPATIONAL THERAPY - DAILY TREATMENT NOTE (updated )    Patient Name: Maite Cedillo    Date: 3/10/2023    : 1940  Insurance: Payor: MEDICARE / Plan: MEDICARE PART A AND B / Product Type: *No Product type* /      Patient  verified yes   Visit #   Current / Total 6 12   Time   In / Out 11:32A 12:03P   Pain   In / Out 3/10 1/10   Subjective Functional Status/Changes: Patient reports he is ok today. Patient reports he was ok after last visit. He still just has pain when he tries to bend his knee more. It feels tight mostly to the back of his knee. Changes to:  Meds, Allergies, Med Hx, Sx Hx? If yes, update Summary List no       TREATMENT AREA =  Pain in right knee [M25.561]    OBJECTIVE      Therapeutic Procedures: Tx Min Billable or 1:1 Min (if diff from Tx Min) Procedure, Rationale, Specifics   23 21 [id number] Therapeutic Exercise (timed):  increase ROM, strength, coordination, balance, and proprioception to improve patient's ability to progress to PLOF and address remaining functional goals. (see flow sheet as applicable)     Details if applicable:       8 8 43408 Neuromuscular Re-Education (timed):  improve balance, coordination, kinesthetic sense, posture, core stability and proprioception to improve patient's ability to develop conscious control of individual muscles and awareness of position of extremities in order to progress to PLOF and address remaining functional goals.  (see flow sheet as applicable)     Details if applicable:  tibial distraction, hold-relax for knee flex, tapping on muscles to encourage hamstring activation and knee flex in prone   31 29 [facility] Totals Reminder: bill using total billable min of TIMED therapeutic procedures (example: do not include dry needle or estim unattended, both untimed codes, in totals to left)  8-22 min = 1 unit; 23-37 min = 2 units; 38-52 min = 3 units; 53-67 min = 4 units; 68-82 min = 5 units   Total Total     [x]  Patient Education billed concurrently with other procedures   [x] Review HEP    [] Progressed/Changed HEP, detail:    [] Other detail:       Objective Information/Functional Measures/Assessment    Tibial distractions with passive flex followed by hold relax for knee flex  Right knee flex AROM: 104 deg  Difficulty with prone knee flex  Tightness to proximal calf and distal hamstring  Educated on increasing stretch times for at least 30 sec         Patient continues to present with decreased right knee flex AROM along with lack of TKE. He responds well to hold relax to increase knee flex. He demonstrates hamstring and calf tightness contributing to lack of TKE and discomfort with end range flex. He demonstrates hamstring weakness contributing to decreased knee flex. Patient will continue to benefit from skilled PT / OT services to modify and progress therapeutic interventions, analyze and address functional mobility deficits, analyze and address ROM deficits, analyze and address strength deficits, analyze and address soft tissue restrictions, analyze and cue for proper movement patterns, analyze and modify for postural abnormalities, analyze and address imbalance/dizziness, and instruct in home and community integration to address functional deficits and attain remaining goals. Progress toward goals / Updated goals:  []  See Progress Note/Recertification    1. Pt will be independent with updated HEP to maintain progression. Status at last progress note/recertification: new goal     2. Pt will improve FOTO score  to at least 68/100 to show improvement with functional mobility performance. Status at last progress note/recertification: 86/492(-0)     3. Patient will report no more than \" little difficulty\" with \"Performing light activities around your home\" in order to demonstrate improved tolerance to ADLs. Status at last progress note/recertification: \"moderate difficulty\"     4.  Pt improve right knee flex AROM to at least 115 deg degrees to [facility] household and community with normal and safe pattern. Status at last progress note/recertification: 2-46*/131* pre/post-manual  Current: 4-104* (3/2/23)     5. Pt will report 50% improvement of pain during standing & walking to improve ease with ADLs.   Status at last progress note/recertification: 45% with 3-10/10 pain range     PLAN  yes Continue plan of care  [x]  Upgrade activities as tolerated  []  Discharge due to :  []  Other:    Ryan Quintero, PT    3/10/2023    8:48 AM    Future Appointments   Date Time Provider Marleni Pretty   3/10/2023 11:30 AM Ryan Quintero PT [id number]   3/13/2023 10:30 AM Bran Miller PTA [facility]   3/15/2023 11:30 AM Bran Miller PTA [facility]   3/16/2023  9:45 AM IO LAB VISIT [facility] BS AMB   3/17/2023 11:30 AM Bran Miller PTA [id number]   3/20/2023 11:00 AM Bran Miller PTA [id number]   3/23/2023 10:30 AM Will Elias MD [facility] BS AMB   3/30/2023  2:30 PM Jon Thompson MD [facility] BS AMB   4/5/2023  8:00 AM Manish Darylene Basta, MD [facility] BS AMB   9/11/2023 10:15 AM MD Jay Crzu

## 2023-03-13 ENCOUNTER — HOSPITAL ENCOUNTER (OUTPATIENT)
Facility: HOSPITAL | Age: 83
Setting detail: RECURRING SERIES
Discharge: HOME | End: 2023-03-16
Payer: MEDICARE

## 2023-03-13 PROCEDURE — 97110 THERAPEUTIC EXERCISES: CPT

## 2023-03-13 PROCEDURE — 97112 NEUROMUSCULAR REEDUCATION: CPT

## 2023-03-13 NOTE — PROGRESS NOTES
PHYSICAL / OCCUPATIONAL THERAPY - DAILY TREATMENT NOTE (updated )    Patient Name: Mary Davey    Date: 3/13/2023    : 1940  Insurance: Payor: MEDICARE / Plan: MEDICARE PART A AND B / Product Type: *No Product type* /      Patient  verified yes   Visit #   Current / Total 7 12   Time   In / Out 10:31 11:12   Pain   In / Out 5/10 2-3/10   Subjective Functional Status/Changes: Patient reports 5/10 pain,he is okay. Changes to:  Meds, Allergies, Med Hx, Sx Hx? If yes, update Summary List no       TREATMENT AREA =  Pain in right knee [M25.561]    OBJECTIVE    Modalities Rationale:     decrease inflammation and decrease pain to improve patient's ability to progress to PLOF and address remaining functional goals. min [] Estim Unattended, type/location:                                      []  w/ice    []  w/heat    min [] Estim Attended, type/location:                                     []  w/US     []  w/ice    []  w/heat    []  TENS insruct      min []  Mechanical Traction: type/lbs                   []  pro   []  sup   []  int   []  cont    []  before manual    []  after manual    min []  Ultrasound, settings/location:      min []  Iontophoresis w/ dexamethasone, location:                                               []  take home patch       []  in clinic   10 min  unbill [x]  Ice     []  Heat    location/position: Long sitting with 5# over CP    min []  Paraffin,  details:     min []  Vasopneumatic Device, press/temp:     min []  Haley Anaya / David Quinones: If using vaso (only need to measure limb vaso being performed on)      pre-treatment girth :       post-treatment girth :       measured at (landmark location) :      min []  Other:    Skin assessment post-treatment (if applicable):    [x]  intact    []  redness- no adverse reaction                 []redness - adverse reaction:         Therapeutic Procedures:     Tx Min Billable or 1:1 Min (if diff from [employer]) Procedure, Rationale, Specifics   20  41860 Therapeutic Exercise (timed):  increase ROM, strength, coordination, balance, and proprioception to improve patient's ability to progress to PLOF and address remaining functional goals. (see flow sheet as applicable)     Details if applicable:       11  63008 Neuromuscular Re-Education (timed):  improve balance, coordination, kinesthetic sense, posture, core stability and proprioception to improve patient's ability to develop conscious control of individual muscles and awareness of position of extremities in order to progress to PLOF and address remaining functional goals. (see flow sheet as applicable)     Details if applicable:  tibial distraction, hold-relax for knee flex, tapping on muscles to encourage hamstring activation and knee flex in prone   39 31 [facility] Totals Reminder: bill using total billable min of TIMED therapeutic procedures (example: do not include dry needle or estim unattended, both untimed codes, in totals to left)  8-22 min = 1 unit; 23-37 min = 2 units; 38-52 min = 3 units; 53-67 min = 4 units; 68-82 min = 5 units   Total Total     [x]  Patient Education billed concurrently with other procedures   [x] Review HEP    [] Progressed/Changed HEP, detail:    [] Other detail:       Objective Information/Functional Measures/Assessment  - added 5# to CP to increase knee extension  - significant hamstring and hip flexor tightness    Patient continues to demonstrate significant tightness of hamstrings, hip flexors and calves. Reviewed stretching at home, pt reports compliance. Poor tolerance to [facility], cued to focus on breathing and relaxing. Fair tolerance to addition of 5# to Cp to increase TKE. Plan to resume leg machines NV.      Patient will continue to benefit from skilled PT / OT services to modify and progress therapeutic interventions, analyze and address functional mobility deficits, analyze and address ROM deficits, analyze and address strength deficits, analyze and address soft tissue restrictions, analyze and cue for proper movement patterns, analyze and modify for postural abnormalities, analyze and address imbalance/dizziness, and instruct in home and community integration to address functional deficits and attain remaining goals.    Progress toward goals / Updated goals:  []  See Progress Note/Recertification    1. Pt will be independent with updated HEP to maintain progression.  Status at last progress note/recertification: new goal     2. Pt will improve FOTO score  to at least 68/100 to show improvement with functional mobility performance.  Status at last progress note/recertification: 52/100(-5)     3. Patient will report no more than \" little difficulty\" with \"Performing light activities around your home\" in order to demonstrate improved tolerance to ADLs.  Status at last progress note/recertification: \"moderate difficulty\"     4. Pt improve right knee flex AROM to at least 115 deg degrees to amb household and community with normal and safe pattern.  Status at last progress note/recertification: 0-95*/100* pre/post-manual  Current: 4-104* (3/2/23), 6-102* (3/13/23)     5. Pt will report 50% improvement of pain during standing & walking to improve ease with ADLs.  Status at last progress note/recertification: 25% with 3-10/10 pain range     PLAN  yes Continue plan of care  [x]  Upgrade activities as tolerated  []  Discharge due to :  []  Other:    Sara Mishra, PTA    3/13/2023    7:52 AM    Future Appointments   Date Time Provider Department Center   3/13/2023 10:30 AM Sara Grafzer, PTA [facility]   3/15/2023 11:30 AM Sara A Tenzer, PTA [facility]   3/16/2023  9:45 AM IO LAB VISIT [facility]   3/17/2023 11:30 AM Sarajacques Mishra, PTA [facility]   3/20/2023 11:00 AM Sarakeiry Mishra, PTA [facility]   3/23/2023 10:30 AM Gaby Devi MD [facility]   3/30/2023  2:30 PM Chris Tamez MD [facility]   4/5/2023  8:00 AM Chris Tamez MD  [facility] BS AMB   9/11/2023 10:15 AM Hany Quijano MD [facility] Sched

## 2023-03-15 ENCOUNTER — HOSPITAL ENCOUNTER (OUTPATIENT)
Facility: HOSPITAL | Age: 83
Setting detail: RECURRING SERIES
Discharge: HOME | End: 2023-03-18
Payer: MEDICARE

## 2023-03-15 ENCOUNTER — ANESTHESIA EVENT (OUTPATIENT)
Age: 83
End: 2023-03-15
Payer: MEDICARE

## 2023-03-15 PROCEDURE — 97110 THERAPEUTIC EXERCISES: CPT

## 2023-03-15 PROCEDURE — 97112 NEUROMUSCULAR REEDUCATION: CPT

## 2023-03-15 RX ORDER — ROSUVASTATIN CALCIUM 5 MG/1
TABLET, COATED ORAL
Qty: 90 TABLET | Refills: 3 | Status: SHIPPED | OUTPATIENT
Start: 2023-03-15

## 2023-03-15 NOTE — PROGRESS NOTES
PHYSICAL / OCCUPATIONAL THERAPY - DAILY TREATMENT NOTE (updated )    Patient Name: Trevon Fuentes    Date: 3/15/2023    : 1940  Insurance: Payor: MEDICARE / Plan: MEDICARE PART A AND B / Product Type: *No Product type* /      Patient  verified yes   Visit #   Current / Total 8 12   Time   In / Out 11:30 12:10   Pain   In / Out 2/10 1/10   Subjective Functional Status/Changes: Patient reports he is about the same. Changes to:  Meds, Allergies, Med Hx, Sx Hx? If yes, update Summary List no       TREATMENT AREA =  Pain in right knee [M25.561]    OBJECTIVE    Modalities Rationale:     decrease inflammation and decrease pain to improve patient's ability to progress to PLOF and address remaining functional goals. min [] Estim Unattended, type/location:                                      []  w/ice    []  w/heat    min [] Estim Attended, type/location:                                     []  w/US     []  w/ice    []  w/heat    []  TENS insruct      min []  Mechanical Traction: type/lbs                   []  pro   []  sup   []  int   []  cont    []  before manual    []  after manual    min []  Ultrasound, settings/location:      min []  Iontophoresis w/ dexamethasone, location:                                               []  take home patch       []  in clinic   10 min  unbill [x]  Ice     []  Heat    location/position: Long sitting with 5# over CP    min []  Paraffin,  details:     min []  Vasopneumatic Device, press/temp:     min []  Verona Idler / Wadell Mercedes: If using vaso (only need to measure limb vaso being performed on)      pre-treatment girth :       post-treatment girth :       measured at (landmark location) :      min []  Other:    Skin assessment post-treatment (if applicable):    [x]  intact    []  redness- no adverse reaction                 []redness - adverse reaction:         Therapeutic Procedures:     Tx Min Billable or 1:1 Min (if diff from [employer]) Procedure, Rationale, Specifics   20  67376 Therapeutic Exercise (timed):  increase ROM, strength, coordination, balance, and proprioception to improve patient's ability to progress to PLOF and address remaining functional goals. (see flow sheet as applicable)     Details if applicable:       10  87387 Neuromuscular Re-Education (timed):  improve balance, coordination, kinesthetic sense, posture, core stability and proprioception to improve patient's ability to develop conscious control of individual muscles and awareness of position of extremities in order to progress to PLOF and address remaining functional goals. (see flow sheet as applicable)     Details if applicable:  tibial distraction, hold-relax for knee flex, tapping on muscles to encourage hamstring activation and knee flex in prone   30 30 MC BC Totals Reminder: bill using total billable min of TIMED therapeutic procedures (example: do not include dry needle or estim unattended, both untimed codes, in totals to left)  8-22 min = 1 unit; 23-37 min = 2 units; 38-52 min = 3 units; 53-67 min = 4 units; 68-82 min = 5 units   Total Total     [x]  Patient Education billed concurrently with other procedures   [x] Review HEP    [] Progressed/Changed HEP, detail:    [] Other detail:       Objective Information/Functional Measures/Assessment   - increased hold times with stretches  - initiated blk TB TKE  - initiated blk TB chair taps  - updated HEP    Patient with fair tolerance to progression of program. He was challenged with chair taps and required height of 22\" today. Challenged with increase in hold times and encouraged to focus on breathing and relaxation during stretches. Updated HEP and reviewed, pt verbalizes understanding.      Patient will continue to benefit from skilled PT / OT services to modify and progress therapeutic interventions, analyze and address functional mobility deficits, analyze and address ROM deficits, analyze and address strength deficits, analyze and address soft tissue restrictions, analyze and cue for proper movement patterns, analyze and modify for postural abnormalities, analyze and address imbalance/dizziness, and instruct in home and community integration to address functional deficits and attain remaining goals. Progress toward goals / Updated goals:  []  See Progress Note/Recertification    1. Pt will be independent with updated HEP to maintain progression. Status at last progress note/recertification: new goal  Current:      2. Pt will improve FOTO score  to at least 68/100 to show improvement with functional mobility performance. Status at last progress note/recertification: 27/673(-2)     3. Patient will report no more than \" little difficulty\" with \"Performing light activities around your home\" in order to demonstrate improved tolerance to ADLs. Status at last progress note/recertification: \"moderate difficulty\"     4. Pt improve right knee flex AROM to at least 115 deg degrees to amb household and community with normal and safe pattern. Status at last progress note/recertification: 3-66*/525* pre/post-manual  Current: 4-104* (3/2/23), 6-102* (3/13/23)     5. Pt will report 50% improvement of pain during standing & walking to improve ease with ADLs.   Status at last progress note/recertification: 74% with 3-10/10 pain range     PLAN  yes Continue plan of care  [x]  Upgrade activities as tolerated  []  Discharge due to :  []  Other:    Tree Cardenas PTA    3/15/2023    10:12 AM    Future Appointments   Date Time Provider Marleni Maria De Jesus   3/15/2023 11:30 AM Tree Cardenas PTA [facility]   3/17/2023 11:30 AM Tree Cardenas PTA [facility]   3/17/2023  1:30 PM [facility] LAB VISIT [facility]   3/20/2023 11:00 AM Tree Cardenas PTA [facility]   3/23/2023 10:30 AM Pillo Barbosa MD [facility]   3/30/2023  2:30 PM Tyson Madison MD [facility]   4/5/2023  8:00 AM Chris Sousa MD [facility]   9/11/2023 10:15 AM Tommie Gabriel Callahan MD [facility] William Garcia

## 2023-03-17 ENCOUNTER — HOSPITAL ENCOUNTER (OUTPATIENT)
Facility: HOSPITAL | Age: 83
Setting detail: RECURRING SERIES
Discharge: HOME | End: 2023-03-20
Payer: MEDICARE

## 2023-03-17 PROCEDURE — 97530 THERAPEUTIC ACTIVITIES: CPT

## 2023-03-17 PROCEDURE — 97140 MANUAL THERAPY 1/> REGIONS: CPT

## 2023-03-17 NOTE — PROGRESS NOTES
Physical Therapy Discharge Instructions      [facility] [employer] Angelia TAMMY  [address] [place] [address]  [phone] fax    Patient: Juliet Dailey  : 1940      Continue Home Exercise Program: stretching DAILY, strengthening 4-5 times per week.        Continue with    [x] Ice  as needed for 10-15 minutes     [] Heat           Follow up with MD:     [x] Upon completion of therapy     [] As needed      Recommendations:     [x]   Return to activity with home program    []   Return to activity with the following modifications:       []Post Rehab Program    []Join Independent aquatic program     [x]Return to/join local gym
Jennifer Collins, PTA [facility] [address] [facility]   3/23/2023 10:30 AM Mercy Lira MD [facility] BS AMB   3/30/2023  1:50 PM [facility]   4/42/8208  2:30 PM Mikel Manzo MD [facility] BS AMB   4/5/2023  8:00 AM MD YESSENIA Dahl BS AMB   9/11/2023 10:15 AM MD Mckayla Koo

## 2023-03-18 LAB
ALBUMIN SERPL-MCNC: 4.5 G/DL (ref 3.6–4.6)
ALBUMIN/GLOB SERPL: 1.8 {RATIO} (ref 1.2–2.2)
ALP SERPL-CCNC: 95 IU/L (ref 44–121)
ALT SERPL-CCNC: 12 IU/L (ref 0–44)
AST SERPL-CCNC: 15 IU/L (ref 0–40)
BASOPHILS # BLD AUTO: 0 X10E3/UL (ref 0–0.2)
BASOPHILS NFR BLD AUTO: 0 %
BILIRUB SERPL-MCNC: 0.4 MG/DL (ref 0–1.2)
BUN SERPL-MCNC: 12 MG/DL (ref 8–27)
BUN/CREAT SERPL: 12 (ref 10–24)
CALCIUM SERPL-MCNC: 9.3 MG/DL (ref 8.6–10.2)
CHLORIDE SERPL-SCNC: 101 MMOL/L (ref 96–106)
CHOLEST SERPL-MCNC: 115 MG/DL (ref 100–199)
CO2 SERPL-SCNC: 23 MMOL/L (ref 20–29)
CREAT SERPL-MCNC: 1 MG/DL (ref 0.76–1.27)
EGFRCR SERPLBLD CKD-EPI 2021: 76 ML/MIN/1.73
EOSINOPHIL # BLD AUTO: 0.1 X10E3/UL (ref 0–0.4)
EOSINOPHIL NFR BLD AUTO: 1 %
ERYTHROCYTE [DISTWIDTH] IN BLOOD BY AUTOMATED COUNT: 11.8 % (ref 11.6–15.4)
GLOBULIN SER CALC-MCNC: 2.5 G/DL (ref 1.5–4.5)
GLUCOSE SERPL-MCNC: 85 MG/DL (ref 70–99)
HCT VFR BLD AUTO: 39.2 % (ref 37.5–51)
HDLC SERPL-MCNC: 34 MG/DL
HGB BLD-MCNC: 13 G/DL (ref 13–17.7)
IMM GRANULOCYTES # BLD AUTO: 0 X10E3/UL (ref 0–0.1)
IMM GRANULOCYTES NFR BLD AUTO: 0 %
LDLC SERPL CALC-MCNC: 64 MG/DL (ref 0–99)
LYMPHOCYTES # BLD AUTO: 1.8 X10E3/UL (ref 0.7–3.1)
LYMPHOCYTES NFR BLD AUTO: 27 %
MAGNESIUM SERPL-MCNC: 2.1 MG/DL (ref 1.6–2.3)
MCH RBC QN AUTO: 31.6 PG (ref 26.6–33)
MCHC RBC AUTO-ENTMCNC: 33.2 G/DL (ref 31.5–35.7)
MCV RBC AUTO: 95 FL (ref 79–97)
MONOCYTES # BLD AUTO: 0.6 X10E3/UL (ref 0.1–0.9)
MONOCYTES NFR BLD AUTO: 9 %
NEUTROPHILS # BLD AUTO: 4 X10E3/UL (ref 1.4–7)
NEUTROPHILS NFR BLD AUTO: 63 %
PLATELET # BLD AUTO: 282 X10E3/UL (ref 150–450)
POTASSIUM SERPL-SCNC: 5 MMOL/L (ref 3.5–5.2)
PROT SERPL-MCNC: 7 G/DL (ref 6–8.5)
RBC # BLD AUTO: 4.11 X10E6/UL (ref 4.14–5.8)
SODIUM SERPL-SCNC: 140 MMOL/L (ref 134–144)
SPECIMEN STATUS REPORT: NORMAL
TRIGL SERPL-MCNC: 83 MG/DL (ref 0–149)
VLDLC SERPL CALC-MCNC: 17 MG/DL (ref 5–40)
WBC # BLD AUTO: 6.5 X10E3/UL (ref 3.4–10.8)

## 2023-03-20 ENCOUNTER — APPOINTMENT (OUTPATIENT)
Facility: HOSPITAL | Age: 83
End: 2023-03-20
Payer: MEDICARE

## 2023-03-23 ENCOUNTER — OFFICE VISIT (OUTPATIENT)
Age: 83
End: 2023-03-23
Payer: MEDICARE

## 2023-03-23 VITALS
HEIGHT: 74 IN | OXYGEN SATURATION: 97 % | DIASTOLIC BLOOD PRESSURE: 71 MMHG | WEIGHT: 238 LBS | RESPIRATION RATE: 16 BRPM | SYSTOLIC BLOOD PRESSURE: 122 MMHG | HEART RATE: 88 BPM | BODY MASS INDEX: 30.54 KG/M2 | TEMPERATURE: 98.6 F

## 2023-03-23 DIAGNOSIS — M47.816 LUMBAR SPONDYLOSIS: ICD-10-CM

## 2023-03-23 DIAGNOSIS — M17.0 PRIMARY OSTEOARTHRITIS OF BOTH KNEES: ICD-10-CM

## 2023-03-23 DIAGNOSIS — M54.50 CHRONIC BILATERAL LOW BACK PAIN WITHOUT SCIATICA: ICD-10-CM

## 2023-03-23 DIAGNOSIS — Z01.818 PREOPERATIVE EVALUATION TO RULE OUT SURGICAL CONTRAINDICATION: Primary | ICD-10-CM

## 2023-03-23 DIAGNOSIS — I49.9 IRREGULAR HEART RHYTHM: ICD-10-CM

## 2023-03-23 DIAGNOSIS — E66.09 CLASS 1 OBESITY DUE TO EXCESS CALORIES WITH SERIOUS COMORBIDITY AND BODY MASS INDEX (BMI) OF 30.0 TO 30.9 IN ADULT: ICD-10-CM

## 2023-03-23 DIAGNOSIS — M47.812 CERVICAL SPONDYLOSIS: ICD-10-CM

## 2023-03-23 DIAGNOSIS — R79.9 ABNORMAL FINDING OF BLOOD CHEMISTRY, UNSPECIFIED: ICD-10-CM

## 2023-03-23 DIAGNOSIS — N40.1 BPH WITH OBSTRUCTION/LOWER URINARY TRACT SYMPTOMS: ICD-10-CM

## 2023-03-23 DIAGNOSIS — I10 ESSENTIAL HYPERTENSION: ICD-10-CM

## 2023-03-23 DIAGNOSIS — N13.8 BPH WITH OBSTRUCTION/LOWER URINARY TRACT SYMPTOMS: ICD-10-CM

## 2023-03-23 DIAGNOSIS — G89.29 CHRONIC BILATERAL LOW BACK PAIN WITHOUT SCIATICA: ICD-10-CM

## 2023-03-23 DIAGNOSIS — E78.00 PURE HYPERCHOLESTEROLEMIA: ICD-10-CM

## 2023-03-23 DIAGNOSIS — M35.3 POLYMYALGIA RHEUMATICA (HCC): ICD-10-CM

## 2023-03-23 DIAGNOSIS — R73.01 IMPAIRED FASTING GLUCOSE: ICD-10-CM

## 2023-03-23 PROCEDURE — 99211 OFF/OP EST MAY X REQ PHY/QHP: CPT | Performed by: INTERNAL MEDICINE

## 2023-03-23 PROCEDURE — 3074F SYST BP LT 130 MM HG: CPT | Performed by: INTERNAL MEDICINE

## 2023-03-23 PROCEDURE — 99215 OFFICE O/P EST HI 40 MIN: CPT | Performed by: INTERNAL MEDICINE

## 2023-03-23 PROCEDURE — 93005 ELECTROCARDIOGRAM TRACING: CPT | Performed by: INTERNAL MEDICINE

## 2023-03-23 PROCEDURE — G8484 FLU IMMUNIZE NO ADMIN: HCPCS | Performed by: INTERNAL MEDICINE

## 2023-03-23 PROCEDURE — 1036F TOBACCO NON-USER: CPT | Performed by: INTERNAL MEDICINE

## 2023-03-23 PROCEDURE — G8417 CALC BMI ABV UP PARAM F/U: HCPCS | Performed by: INTERNAL MEDICINE

## 2023-03-23 PROCEDURE — G8427 DOCREV CUR MEDS BY ELIG CLIN: HCPCS | Performed by: INTERNAL MEDICINE

## 2023-03-23 PROCEDURE — 1124F ACP DISCUSS-NO DSCNMKR DOCD: CPT | Performed by: INTERNAL MEDICINE

## 2023-03-23 PROCEDURE — 93010 ELECTROCARDIOGRAM REPORT: CPT | Performed by: INTERNAL MEDICINE

## 2023-03-23 PROCEDURE — 3078F DIAST BP <80 MM HG: CPT | Performed by: INTERNAL MEDICINE

## 2023-03-23 ASSESSMENT — PATIENT HEALTH QUESTIONNAIRE - PHQ9
SUM OF ALL RESPONSES TO PHQ QUESTIONS 1-9: 0
SUM OF ALL RESPONSES TO PHQ QUESTIONS 1-9: 0
SUM OF ALL RESPONSES TO PHQ9 QUESTIONS 1 & 2: 0
SUM OF ALL RESPONSES TO PHQ QUESTIONS 1-9: 0
1. LITTLE INTEREST OR PLEASURE IN DOING THINGS: 0
SUM OF ALL RESPONSES TO PHQ QUESTIONS 1-9: 0
2. FEELING DOWN, DEPRESSED OR HOPELESS: 0

## 2023-03-23 NOTE — PATIENT INSTRUCTIONS
fat-free or low-fat dairy products. Replace butter, margarine, and hydrogenated or partially hydrogenated oils with olive and canola oils. (Canola oil margarine without trans fat is fine.)  Replace red meat with fish, poultry, and soy protein (like tofu). Limit processed and packaged foods like chips, crackers, and cookies. Bake, broil, or steam foods. Don't duenas them. Be physically active. Get at least 30 minutes of exercise on most days of the week. Walking is a good choice. You also may want to do other activities, such as running, swimming, cycling, or playing tennis or team sports. Stay at a healthy weight or lose weight by making the changes in eating and physical activity listed above. Losing just a small amount of weight, even 5 to 10 pounds, can reduce your risk for having a heart attack or stroke. Do not smoke. When should you call for help? Watch closely for changes in your health, and be sure to contact your doctor if:    You need help making lifestyle changes. You have questions about your medicine. Where can you learn more? Go to http://www.gray.com/  Enter U724 in the search box to learn more about \"High Cholesterol: Care Instructions. \"  Current as of: December 16, 2019               Content Version: 12.6  © 2488-5088 [employer]. Care instructions adapted under license by [employer] (which disclaims liability or warranty for this information). If you have questions about a medical condition or this instruction, always ask your healthcare professional. [place] [address] any warranty or liability for your use of this information.

## 2023-03-23 NOTE — PROGRESS NOTES
Tacos Womack presents today for   Chief Complaint   Patient presents with    Hypertension     6 month follow up         1. \"Have you been to the ER, urgent care clinic since your last visit? Hospitalized since your last visit? \" yes    2. \"Have you seen or consulted any other health care providers outside of the [address] [place] [address] since your last visit? \" no    3. For patients aged 39-70: Has the patient had a colonoscopy / FIT/ Cologuard? NA - based on age      If the patient is female:    4. For patients aged 41-77: Has the patient had a mammogram within the past 2 years? NA - based on age or sex      11. For patients aged 21-65: Has the patient had a pap smear?  NA - based on age or sex
Final    Lymphocytes % 03/17/2023 27  Not Estab. % Final    Monocytes % 03/17/2023 9  Not Estab. % Final    Eosinophils % 03/17/2023 1  Not Estab. % Final    Basophils % 03/17/2023 0  Not Estab. % Final    Neutrophils Absolute 03/17/2023 4.0  1.4 - 7.0 x10E3/uL Final    Lymphocytes Absolute 03/17/2023 1.8  0.7 - 3.1 x10E3/uL Final    Monocytes Absolute 03/17/2023 0.6  0.1 - 0.9 x10E3/uL Final    Eosinophils Absolute 03/17/2023 0.1  0.0 - 0.4 x10E3/uL Final    Basophils Absolute 03/17/2023 0.0  0.0 - 0.2 x10E3/uL Final    Immature Granulocytes 03/17/2023 0  Not Estab. % Final    Immature Grans (Abs) 03/17/2023 0.0  0.0 - 0.1 x10E3/uL Final    Specimen Status Report 03/17/2023 COMMENT   Final    Glucose 03/17/2023 85  70 - 99 mg/dL Final    BUN 03/17/2023 12  8 - 27 mg/dL Final    Creatinine 03/17/2023 1.00  0.76 - 1.27 mg/dL Final    Est, Glomerular Filtration Rate 03/17/2023 76  >59 mL/min/1.73 Final    BUN/Creatinine Ratio 03/17/2023 12  10 - 24 Final    Sodium 03/17/2023 140  134 - 144 mmol/L Final    Potassium 03/17/2023 5.0  3.5 - 5.2 mmol/L Final    Chloride 03/17/2023 101  96 - 106 mmol/L Final    CO2 03/17/2023 23  20 - 29 mmol/L Final    Calcium 03/17/2023 9.3  8.6 - 10.2 mg/dL Final    Total Protein 03/17/2023 7.0  6.0 - 8.5 g/dL Final    Albumin 03/17/2023 4.5  3.6 - 4.6 g/dL Final    Globulin, Total 03/17/2023 2.5  1.5 - 4.5 g/dL Final    Albumin/Globulin Ratio 03/17/2023 1.8  1.2 - 2.2 Final    Total Bilirubin 03/17/2023 0.4  0.0 - 1.2 mg/dL Final    Alkaline Phosphatase 03/17/2023 95  44 - 121 IU/L Final    AST 03/17/2023 15  0 - 40 IU/L Final    ALT 03/17/2023 12  0 - 44 IU/L Final    Cholesterol 03/17/2023 115  100 - 199 mg/dL Final    Triglycerides 03/17/2023 83  0 - 149 mg/dL Final    HDL 03/17/2023 34 (A)  >39 mg/dL Final    VLDL Cholesterol Calculated 03/17/2023 17  5 - 40 mg/dL Final    LDL Calculated 03/17/2023 64  0 - 99 mg/dL Final    Magnesium 03/17/2023 2.1  1.6 - 2.3 mg/dL Final     EKG

## 2023-03-26 ENCOUNTER — TELEPHONE (OUTPATIENT)
Age: 83
End: 2023-03-26

## 2023-03-26 NOTE — TELEPHONE ENCOUNTER
Please fax office note for preoperative evaluation to Dr. Radha Prater. Jossie Norwood for upcoming left TKA on 4/5/2023.

## 2023-03-28 DIAGNOSIS — Z96.652 STATUS POST TOTAL LEFT KNEE REPLACEMENT: Primary | ICD-10-CM

## 2023-03-30 ENCOUNTER — HOSPITAL ENCOUNTER (OUTPATIENT)
Age: 83
Discharge: HOME | End: 2023-04-02

## 2023-03-30 ENCOUNTER — OFFICE VISIT (OUTPATIENT)
Age: 83
End: 2023-03-30

## 2023-03-30 DIAGNOSIS — M17.12 OSTEOARTHRITIS OF LEFT KNEE, UNSPECIFIED OSTEOARTHRITIS TYPE: ICD-10-CM

## 2023-03-30 DIAGNOSIS — M25.562 LEFT KNEE PAIN, UNSPECIFIED CHRONICITY: Primary | ICD-10-CM

## 2023-03-30 DIAGNOSIS — M25.561 RIGHT KNEE PAIN, UNSPECIFIED CHRONICITY: ICD-10-CM

## 2023-03-30 PROBLEM — M47.817 LUMBOSACRAL SPONDYLOSIS WITHOUT MYELOPATHY: Status: ACTIVE | Noted: 2022-10-11

## 2023-03-30 PROBLEM — M47.812 FACET ARTHROPATHY, CERVICAL: Status: ACTIVE | Noted: 2022-09-24

## 2023-03-30 PROBLEM — M50.30 DEGENERATIVE DISC DISEASE, CERVICAL: Status: ACTIVE | Noted: 2022-09-24

## 2023-03-30 PROBLEM — M16.0 PRIMARY OSTEOARTHRITIS OF HIPS, BILATERAL: Status: ACTIVE | Noted: 2021-05-26

## 2023-03-30 PROBLEM — M96.1 LUMBAR POST-LAMINECTOMY SYNDROME: Status: ACTIVE | Noted: 2022-09-27

## 2023-03-30 PROBLEM — M19.91 PRIMARY LOCALIZED OSTEOARTHROSIS OF MULTIPLE SITES: Status: ACTIVE | Noted: 2023-03-30

## 2023-03-30 RX ORDER — OXYCODONE HYDROCHLORIDE AND ACETAMINOPHEN 5; 325 MG/1; MG/1
1 TABLET ORAL
Qty: 30 TABLET | Refills: 0 | Status: SHIPPED | OUTPATIENT
Start: 2023-03-30 | End: 2023-04-07

## 2023-03-30 RX ORDER — CEPHALEXIN 500 MG/1
500 CAPSULE ORAL EVERY 8 HOURS
Qty: 9 CAPSULE | Refills: 0 | Status: SHIPPED | OUTPATIENT
Start: 2023-03-30 | End: 2023-04-02

## 2023-03-30 RX ORDER — ONDANSETRON 8 MG/1
8 TABLET, ORALLY DISINTEGRATING ORAL EVERY 8 HOURS PRN
Qty: 20 TABLET | Refills: 0 | Status: SHIPPED | OUTPATIENT
Start: 2023-03-30 | End: 2023-04-06

## 2023-03-30 NOTE — PROGRESS NOTES
discussed. The patient DOES wish to proceed with their procedure at this time. Visit Diagnoses         Codes    Left knee pain, unspecified chronicity    -  Primary M25.562               HPI:  The patient is here with a chief complaint of left knee pain, dull, throbbing pain. It has been the same. Pain is 3/10. Failed conservative treatment. X-rays of the left knee are positive for severe osteoarthritis. Assessment/Plan:  Plan will be for left total knee replacement. Inpatient stay. We will match it up to the opposite side. No history of blood clots. Percocet, Keflex, Zofran, aspirin for DVT prophylaxis and we will go from there. As part of continued conservative pain management options the patient was advised to utilize Tylenol or OTC NSAIDS as long as it is not medically contraindicated. Return to Office: Follow-up and Dispositions    Return for ALREADY SCHEDULED FOR SURGERY. Scribed by Damien Nova LPN as dictated by [facility] [place] CONSTANCE Wakefield MD.  Documentation, performed by, True and Accepted Chris Wakefield MD

## 2023-03-30 NOTE — PATIENT INSTRUCTIONS
Try to walk once in a while to keep your knee from getting stiff. Ask your doctor or physical therapist whether shoe inserts may reduce your arthritis pain. If you can afford it, get new athletic shoes at least every year. This can help reduce the strain on your knees. Use a device to help you do everyday activities. A cane or walking stick can help you keep your balance when you walk. Hold the cane or walking stick in the hand opposite the painful knee. If you feel like you may fall when you walk, try using crutches or a front-wheeled walker. These can prevent falls that could cause more damage to your knee. A knee brace may help keep your knee stable and prevent pain. You also can use other things to make life easier, such as a higher toilet seat and handrails in the bathtub or shower. Take pain medicines exactly as directed. Do not wait until you are in severe pain. You will get better results if you take it sooner. If you are not taking a prescription pain medicine, take an over-the-counter medicine such as acetaminophen (Tylenol), ibuprofen (Advil, Motrin), or naproxen (Aleve). Read and follow all instructions on the label. Do not take two or more pain medicines at the same time unless the doctor told you to. Many pain medicines have acetaminophen, which is Tylenol. Too much acetaminophen (Tylenol) can be harmful. Tell your doctor if you take a blood thinner, have diabetes, or have allergies to shellfish. Ask your doctor if you might benefit from a shot of steroid medicine into your knee. This may provide pain relief for several months. Many people take the supplements glucosamine and chondroitin for osteoarthritis. Some people feel they help, but the medical research does not show that they work. Talk to your doctor before you take these supplements. When should you call for help? Call your doctor now or seek immediate medical care if:    You have sudden swelling, warmth, or pain in your knee.

## 2023-03-31 LAB
BACTERIA SPEC CULT: ABNORMAL
BACTERIA SPEC CULT: ABNORMAL
Lab: ABNORMAL

## 2023-04-05 ENCOUNTER — HOSPITAL ENCOUNTER (OUTPATIENT)
Age: 83
Setting detail: OBSERVATION
Discharge: HOME | End: 2023-04-06
Attending: ORTHOPAEDIC SURGERY | Admitting: INTERNAL MEDICINE
Payer: MEDICARE

## 2023-04-05 ENCOUNTER — APPOINTMENT (OUTPATIENT)
Age: 83
End: 2023-04-05
Attending: ORTHOPAEDIC SURGERY
Payer: MEDICARE

## 2023-04-05 ENCOUNTER — ANESTHESIA (OUTPATIENT)
Age: 83
End: 2023-04-05
Payer: MEDICARE

## 2023-04-05 PROBLEM — Z96.652 S/P TOTAL KNEE ARTHROPLASTY, LEFT: Status: ACTIVE | Noted: 2023-04-05

## 2023-04-05 PROCEDURE — 2580000003 HC RX 258: Performed by: ORTHOPAEDIC SURGERY

## 2023-04-05 PROCEDURE — 3600000005 HC SURGERY LEVEL 5 BASE: Performed by: ORTHOPAEDIC SURGERY

## 2023-04-05 PROCEDURE — 3600000015 HC SURGERY LEVEL 5 ADDTL 15MIN: Performed by: ORTHOPAEDIC SURGERY

## 2023-04-05 PROCEDURE — 2720000010 HC SURG SUPPLY STERILE: Performed by: ORTHOPAEDIC SURGERY

## 2023-04-05 PROCEDURE — 6360000002 HC RX W HCPCS: Performed by: NURSE PRACTITIONER

## 2023-04-05 PROCEDURE — G0378 HOSPITAL OBSERVATION PER HR: HCPCS

## 2023-04-05 PROCEDURE — 6370000000 HC RX 637 (ALT 250 FOR IP): Performed by: NURSE PRACTITIONER

## 2023-04-05 PROCEDURE — C1713 ANCHOR/SCREW BN/BN,TIS/BN: HCPCS | Performed by: ORTHOPAEDIC SURGERY

## 2023-04-05 PROCEDURE — 6360000002 HC RX W HCPCS: Performed by: ORTHOPAEDIC SURGERY

## 2023-04-05 PROCEDURE — 2580000003 HC RX 258: Performed by: NURSE PRACTITIONER

## 2023-04-05 PROCEDURE — 2500000003 HC RX 250 WO HCPCS: Performed by: NURSE ANESTHETIST, CERTIFIED REGISTERED

## 2023-04-05 PROCEDURE — 3700000001 HC ADD 15 MINUTES (ANESTHESIA): Performed by: ORTHOPAEDIC SURGERY

## 2023-04-05 PROCEDURE — 2580000003 HC RX 258: Performed by: NURSE ANESTHETIST, CERTIFIED REGISTERED

## 2023-04-05 PROCEDURE — 7100000001 HC PACU RECOVERY - ADDTL 15 MIN: Performed by: ORTHOPAEDIC SURGERY

## 2023-04-05 PROCEDURE — 2709999900 HC NON-CHARGEABLE SUPPLY: Performed by: ORTHOPAEDIC SURGERY

## 2023-04-05 PROCEDURE — C1776 JOINT DEVICE (IMPLANTABLE): HCPCS | Performed by: ORTHOPAEDIC SURGERY

## 2023-04-05 PROCEDURE — 2700000000 HC OXYGEN THERAPY PER DAY

## 2023-04-05 PROCEDURE — 64447 NJX AA&/STRD FEMORAL NRV IMG: CPT | Performed by: NURSE ANESTHETIST, CERTIFIED REGISTERED

## 2023-04-05 PROCEDURE — 51798 US URINE CAPACITY MEASURE: CPT

## 2023-04-05 PROCEDURE — 7100000000 HC PACU RECOVERY - FIRST 15 MIN: Performed by: ORTHOPAEDIC SURGERY

## 2023-04-05 PROCEDURE — 73560 X-RAY EXAM OF KNEE 1 OR 2: CPT

## 2023-04-05 PROCEDURE — 6360000002 HC RX W HCPCS: Performed by: NURSE ANESTHETIST, CERTIFIED REGISTERED

## 2023-04-05 PROCEDURE — 2500000003 HC RX 250 WO HCPCS: Performed by: ORTHOPAEDIC SURGERY

## 2023-04-05 PROCEDURE — 3700000000 HC ANESTHESIA ATTENDED CARE: Performed by: ORTHOPAEDIC SURGERY

## 2023-04-05 PROCEDURE — 6370000000 HC RX 637 (ALT 250 FOR IP): Performed by: ORTHOPAEDIC SURGERY

## 2023-04-05 PROCEDURE — 97162 PT EVAL MOD COMPLEX 30 MIN: CPT

## 2023-04-05 DEVICE — PALACOS® R+G IS A FAST SETTING POLYMER CONTAINING GENTAMICIN, FOR USE IN BONE SURGERY. MIXING OF THE TWO COMPONENT SYSTEM, CONSISTING OF A POWDER AND A LIQUID, INITIALLY PRODUCES A LIQUID AND THEN A PASTE, WHICH IS USED TO ANCHOR THE PROSTHESIS TO THE BONE. THE HARDENED BONE CEMENT ALLOWS STABLE FIXATION OF THE PROSTHESIS AND TRANSFERS ALL STRESSES PRODUCED IN A MOVEMENT TO THE BONE VIA THE LARGE INTERFACE. INSOLUBLE ZIRCONIUM DIOXIDE IS INCLUDED IN THE CEMENT POWDER AS AN X RAY CONTRAST MEDIUM. THE CHLOROPHYLL ADDITIVE SERVES AS OPTICAL MARKING OF THE BONE CEMENT AT THE SITE OF THE OPERATION.
Type: IMPLANTABLE DEVICE | Site: KNEE | Status: FUNCTIONAL
Brand: PALACOS®

## 2023-04-05 DEVICE — ATTUNE PATELLA MEDIALIZED DOME 41MM CEMENTED AOX
Type: IMPLANTABLE DEVICE | Site: KNEE | Status: FUNCTIONAL
Brand: ATTUNE

## 2023-04-05 DEVICE — ATTUNE KNEE SYSTEM FEMORAL POSTERIOR STABILIZED SIZE 9 LEFT CEMENTED
Type: IMPLANTABLE DEVICE | Site: KNEE | Status: FUNCTIONAL
Brand: ATTUNE

## 2023-04-05 DEVICE — ATTUNE KNEE SYSTEM TIBIAL BASE ROTATING PLATFORM SIZE 9 CEMENTED
Type: IMPLANTABLE DEVICE | Site: KNEE | Status: FUNCTIONAL
Brand: ATTUNE

## 2023-04-05 DEVICE — ATTUNE KNEE SYSTEM TIBIAL INSERT ROTATING PLATFORM POSTERIOR STABILIZED SIZE 9 6MM AOX
Type: IMPLANTABLE DEVICE | Site: KNEE | Status: FUNCTIONAL
Brand: ATTUNE

## 2023-04-05 DEVICE — KNEE K1 TOT HEMI STD CEM IMPL CAPPED SYNTHES: Type: IMPLANTABLE DEVICE | Site: KNEE | Status: FUNCTIONAL

## 2023-04-05 RX ORDER — BUPIVACAINE HYDROCHLORIDE 5 MG/ML
INJECTION, SOLUTION EPIDURAL; INTRACAUDAL
Status: COMPLETED | OUTPATIENT
Start: 2023-04-05 | End: 2023-04-05

## 2023-04-05 RX ORDER — BUPIVACAINE HYDROCHLORIDE 7.5 MG/ML
INJECTION, SOLUTION INTRASPINAL
Status: COMPLETED | OUTPATIENT
Start: 2023-04-05 | End: 2023-04-05

## 2023-04-05 RX ORDER — DEXAMETHASONE SODIUM PHOSPHATE 10 MG/ML
4 INJECTION, SOLUTION INTRAMUSCULAR; INTRAVENOUS
Status: DISCONTINUED | OUTPATIENT
Start: 2023-04-05 | End: 2023-04-05 | Stop reason: CLARIF

## 2023-04-05 RX ORDER — TRANEXAMIC ACID 100 MG/ML
INJECTION, SOLUTION INTRAVENOUS PRN
Status: DISCONTINUED | OUTPATIENT
Start: 2023-04-05 | End: 2023-04-05 | Stop reason: SDUPTHER

## 2023-04-05 RX ORDER — SODIUM CHLORIDE 0.9 % (FLUSH) 0.9 %
5-40 SYRINGE (ML) INJECTION EVERY 12 HOURS SCHEDULED
Status: DISCONTINUED | OUTPATIENT
Start: 2023-04-05 | End: 2023-04-05 | Stop reason: HOSPADM

## 2023-04-05 RX ORDER — FENTANYL CITRATE 50 UG/ML
50 INJECTION, SOLUTION INTRAMUSCULAR; INTRAVENOUS EVERY 5 MIN PRN
Status: DISCONTINUED | OUTPATIENT
Start: 2023-04-05 | End: 2023-04-05 | Stop reason: CLARIF

## 2023-04-05 RX ORDER — SODIUM CHLORIDE 9 MG/ML
INJECTION, SOLUTION INTRAVENOUS PRN
Status: DISCONTINUED | OUTPATIENT
Start: 2023-04-05 | End: 2023-04-05 | Stop reason: CLARIF

## 2023-04-05 RX ORDER — SODIUM CHLORIDE 9 MG/ML
INJECTION, SOLUTION INTRAVENOUS PRN
Status: DISCONTINUED | OUTPATIENT
Start: 2023-04-05 | End: 2023-04-05 | Stop reason: HOSPADM

## 2023-04-05 RX ORDER — OXYCODONE HYDROCHLORIDE 5 MG/1
5 TABLET ORAL EVERY 4 HOURS PRN
Status: DISCONTINUED | OUTPATIENT
Start: 2023-04-05 | End: 2023-04-06 | Stop reason: HOSPADM

## 2023-04-05 RX ORDER — ALBUTEROL SULFATE 2.5 MG/3ML
2.5 SOLUTION RESPIRATORY (INHALATION)
Status: DISCONTINUED | OUTPATIENT
Start: 2023-04-05 | End: 2023-04-05 | Stop reason: HOSPADM

## 2023-04-05 RX ORDER — SODIUM CHLORIDE, SODIUM LACTATE, POTASSIUM CHLORIDE, CALCIUM CHLORIDE 600; 310; 30; 20 MG/100ML; MG/100ML; MG/100ML; MG/100ML
INJECTION, SOLUTION INTRAVENOUS CONTINUOUS
Status: DISCONTINUED | OUTPATIENT
Start: 2023-04-05 | End: 2023-04-05 | Stop reason: CLARIF

## 2023-04-05 RX ORDER — SODIUM CHLORIDE 0.9 % (FLUSH) 0.9 %
5-40 SYRINGE (ML) INJECTION PRN
Status: DISCONTINUED | OUTPATIENT
Start: 2023-04-05 | End: 2023-04-05 | Stop reason: HOSPADM

## 2023-04-05 RX ORDER — SODIUM CHLORIDE, SODIUM LACTATE, POTASSIUM CHLORIDE, CALCIUM CHLORIDE 600; 310; 30; 20 MG/100ML; MG/100ML; MG/100ML; MG/100ML
INJECTION, SOLUTION INTRAVENOUS CONTINUOUS
Status: DISCONTINUED | OUTPATIENT
Start: 2023-04-05 | End: 2023-04-05 | Stop reason: HOSPADM

## 2023-04-05 RX ORDER — SODIUM CHLORIDE 0.9 % (FLUSH) 0.9 %
5-40 SYRINGE (ML) INJECTION PRN
Status: DISCONTINUED | OUTPATIENT
Start: 2023-04-05 | End: 2023-04-05 | Stop reason: CLARIF

## 2023-04-05 RX ORDER — SODIUM CHLORIDE 0.9 % (FLUSH) 0.9 %
5-40 SYRINGE (ML) INJECTION EVERY 12 HOURS SCHEDULED
Status: DISCONTINUED | OUTPATIENT
Start: 2023-04-05 | End: 2023-04-05 | Stop reason: CLARIF

## 2023-04-05 RX ORDER — IPRATROPIUM BROMIDE AND ALBUTEROL SULFATE 2.5; .5 MG/3ML; MG/3ML
1 SOLUTION RESPIRATORY (INHALATION)
Status: DISCONTINUED | OUTPATIENT
Start: 2023-04-05 | End: 2023-04-05 | Stop reason: CLARIF

## 2023-04-05 RX ORDER — SODIUM CHLORIDE 0.9 % (FLUSH) 0.9 %
5-40 SYRINGE (ML) INJECTION EVERY 12 HOURS SCHEDULED
Status: DISCONTINUED | OUTPATIENT
Start: 2023-04-05 | End: 2023-04-06 | Stop reason: HOSPADM

## 2023-04-05 RX ORDER — KETOROLAC TROMETHAMINE 30 MG/ML
15 INJECTION, SOLUTION INTRAMUSCULAR; INTRAVENOUS ONCE
Status: DISCONTINUED | OUTPATIENT
Start: 2023-04-05 | End: 2023-04-05 | Stop reason: CLARIF

## 2023-04-05 RX ORDER — DIPHENHYDRAMINE HYDROCHLORIDE 50 MG/ML
25 INJECTION INTRAMUSCULAR; INTRAVENOUS EVERY 6 HOURS PRN
Status: DISCONTINUED | OUTPATIENT
Start: 2023-04-05 | End: 2023-04-06 | Stop reason: HOSPADM

## 2023-04-05 RX ORDER — SODIUM CHLORIDE 0.9 % (FLUSH) 0.9 %
5-40 SYRINGE (ML) INJECTION PRN
Status: DISCONTINUED | OUTPATIENT
Start: 2023-04-05 | End: 2023-04-06 | Stop reason: HOSPADM

## 2023-04-05 RX ORDER — FENTANYL CITRATE 50 UG/ML
25 INJECTION, SOLUTION INTRAMUSCULAR; INTRAVENOUS EVERY 5 MIN PRN
Status: DISCONTINUED | OUTPATIENT
Start: 2023-04-05 | End: 2023-04-05 | Stop reason: CLARIF

## 2023-04-05 RX ORDER — PROPOFOL 10 MG/ML
INJECTION, EMULSION INTRAVENOUS CONTINUOUS PRN
Status: DISCONTINUED | OUTPATIENT
Start: 2023-04-05 | End: 2023-04-05 | Stop reason: SDUPTHER

## 2023-04-05 RX ORDER — DEXAMETHASONE SODIUM PHOSPHATE 10 MG/ML
INJECTION, SOLUTION INTRAMUSCULAR; INTRAVENOUS
Status: COMPLETED | OUTPATIENT
Start: 2023-04-05 | End: 2023-04-05

## 2023-04-05 RX ORDER — ONDANSETRON 4 MG/1
4 TABLET, ORALLY DISINTEGRATING ORAL EVERY 8 HOURS PRN
Status: DISCONTINUED | OUTPATIENT
Start: 2023-04-05 | End: 2023-04-06 | Stop reason: HOSPADM

## 2023-04-05 RX ORDER — MIDAZOLAM HYDROCHLORIDE 1 MG/ML
INJECTION INTRAMUSCULAR; INTRAVENOUS PRN
Status: DISCONTINUED | OUTPATIENT
Start: 2023-04-05 | End: 2023-04-05 | Stop reason: SDUPTHER

## 2023-04-05 RX ORDER — LABETALOL HYDROCHLORIDE 5 MG/ML
10 INJECTION, SOLUTION INTRAVENOUS
Status: DISCONTINUED | OUTPATIENT
Start: 2023-04-05 | End: 2023-04-05 | Stop reason: CLARIF

## 2023-04-05 RX ORDER — ONDANSETRON 2 MG/ML
4 INJECTION INTRAMUSCULAR; INTRAVENOUS
Status: DISCONTINUED | OUTPATIENT
Start: 2023-04-05 | End: 2023-04-05 | Stop reason: CLARIF

## 2023-04-05 RX ORDER — CELECOXIB 200 MG/1
200 CAPSULE ORAL ONCE
Status: COMPLETED | OUTPATIENT
Start: 2023-04-05 | End: 2023-04-05

## 2023-04-05 RX ORDER — ONDANSETRON 2 MG/ML
4 INJECTION INTRAMUSCULAR; INTRAVENOUS EVERY 6 HOURS PRN
Status: DISCONTINUED | OUTPATIENT
Start: 2023-04-05 | End: 2023-04-06 | Stop reason: HOSPADM

## 2023-04-05 RX ORDER — ACETAMINOPHEN 325 MG/1
650 TABLET ORAL EVERY 6 HOURS
Status: DISCONTINUED | OUTPATIENT
Start: 2023-04-05 | End: 2023-04-06 | Stop reason: HOSPADM

## 2023-04-05 RX ORDER — DIPHENHYDRAMINE HYDROCHLORIDE 50 MG/ML
12.5 INJECTION INTRAMUSCULAR; INTRAVENOUS
Status: DISCONTINUED | OUTPATIENT
Start: 2023-04-05 | End: 2023-04-05 | Stop reason: CLARIF

## 2023-04-05 RX ORDER — DIPHENHYDRAMINE HCL 25 MG
25 TABLET ORAL EVERY 6 HOURS PRN
Status: DISCONTINUED | OUTPATIENT
Start: 2023-04-05 | End: 2023-04-06 | Stop reason: HOSPADM

## 2023-04-05 RX ORDER — BUPIVACAINE HYDROCHLORIDE AND EPINEPHRINE 2.5; 5 MG/ML; UG/ML
INJECTION, SOLUTION INFILTRATION; PERINEURAL PRN
Status: DISCONTINUED | OUTPATIENT
Start: 2023-04-05 | End: 2023-04-05 | Stop reason: ALTCHOICE

## 2023-04-05 RX ORDER — OXYCODONE HYDROCHLORIDE 10 MG/1
10 TABLET ORAL EVERY 4 HOURS PRN
Status: DISCONTINUED | OUTPATIENT
Start: 2023-04-05 | End: 2023-04-06 | Stop reason: HOSPADM

## 2023-04-05 RX ORDER — FINASTERIDE 5 MG/1
5 TABLET, FILM COATED ORAL DAILY
Status: DISCONTINUED | OUTPATIENT
Start: 2023-04-05 | End: 2023-04-06 | Stop reason: HOSPADM

## 2023-04-05 RX ORDER — ROSUVASTATIN CALCIUM 10 MG/1
5 TABLET, COATED ORAL DAILY
Status: DISCONTINUED | OUTPATIENT
Start: 2023-04-06 | End: 2023-04-06 | Stop reason: HOSPADM

## 2023-04-05 RX ORDER — SODIUM CHLORIDE 9 MG/ML
INJECTION, SOLUTION INTRAVENOUS ONCE
Status: COMPLETED | OUTPATIENT
Start: 2023-04-05 | End: 2023-04-06

## 2023-04-05 RX ADMIN — PROPOFOL 35 MCG/KG/MIN: 10 INJECTION, EMULSION INTRAVENOUS at 12:27

## 2023-04-05 RX ADMIN — ACETAMINOPHEN 650 MG: 325 TABLET ORAL at 15:34

## 2023-04-05 RX ADMIN — MIDAZOLAM HYDROCHLORIDE 4 MG: 2 INJECTION, SOLUTION INTRAMUSCULAR; INTRAVENOUS at 12:15

## 2023-04-05 RX ADMIN — CELECOXIB 200 MG: 200 CAPSULE ORAL at 10:00

## 2023-04-05 RX ADMIN — SODIUM CHLORIDE, POTASSIUM CHLORIDE, SODIUM LACTATE AND CALCIUM CHLORIDE: 600; 310; 30; 20 INJECTION, SOLUTION INTRAVENOUS at 12:15

## 2023-04-05 RX ADMIN — BUPIVACAINE HYDROCHLORIDE IN DEXTROSE 12 MG: 7.5 INJECTION, SOLUTION SUBARACHNOID at 12:15

## 2023-04-05 RX ADMIN — DEXAMETHASONE SODIUM PHOSPHATE 4 MG: 10 INJECTION INTRAMUSCULAR; INTRAVENOUS at 11:45

## 2023-04-05 RX ADMIN — MIDAZOLAM HYDROCHLORIDE 4 MG: 2 INJECTION, SOLUTION INTRAMUSCULAR; INTRAVENOUS at 11:46

## 2023-04-05 RX ADMIN — BUPIVACAINE HYDROCHLORIDE 20 ML: 5 INJECTION, SOLUTION EPIDURAL; INTRACAUDAL; PERINEURAL at 11:45

## 2023-04-05 RX ADMIN — ACETAMINOPHEN 650 MG: 325 TABLET ORAL at 21:09

## 2023-04-05 RX ADMIN — SODIUM CHLORIDE: 9 INJECTION, SOLUTION INTRAVENOUS at 15:53

## 2023-04-05 RX ADMIN — CEFAZOLIN 2000 MG: 2 INJECTION, POWDER, FOR SOLUTION INTRAMUSCULAR; INTRAVENOUS at 12:27

## 2023-04-05 RX ADMIN — TRANEXAMIC ACID 1000 MG: 100 INJECTION, SOLUTION INTRAVENOUS at 12:27

## 2023-04-05 RX ADMIN — CEFAZOLIN 2000 MG: 2 INJECTION, POWDER, FOR SOLUTION INTRAMUSCULAR; INTRAVENOUS at 20:15

## 2023-04-05 RX ADMIN — SODIUM CHLORIDE, POTASSIUM CHLORIDE, SODIUM LACTATE AND CALCIUM CHLORIDE: 600; 310; 30; 20 INJECTION, SOLUTION INTRAVENOUS at 10:01

## 2023-04-05 RX ADMIN — FINASTERIDE 5 MG: 5 TABLET, FILM COATED ORAL at 17:50

## 2023-04-05 RX ADMIN — TRANEXAMIC ACID 1000 MG: 100 INJECTION, SOLUTION INTRAVENOUS at 13:10

## 2023-04-05 ASSESSMENT — PAIN SCALES - GENERAL: PAINLEVEL_OUTOF10: 3

## 2023-04-05 ASSESSMENT — PAIN DESCRIPTION - PAIN TYPE: TYPE: SURGICAL PAIN

## 2023-04-05 ASSESSMENT — PAIN - FUNCTIONAL ASSESSMENT: PAIN_FUNCTIONAL_ASSESSMENT: NONE - DENIES PAIN

## 2023-04-05 ASSESSMENT — PAIN DESCRIPTION - DESCRIPTORS: DESCRIPTORS: ACHING

## 2023-04-05 ASSESSMENT — PAIN DESCRIPTION - LOCATION: LOCATION: KNEE

## 2023-04-05 ASSESSMENT — PAIN DESCRIPTION - ORIENTATION: ORIENTATION: LEFT

## 2023-04-05 NOTE — DISCHARGE INSTRUCTIONS
Update History & Physical    The Patient's History and Physical was reviewed with the patient. The patient was examined. There was no change. The surgical site was confirmed by the patient and me. Patient understands and wants to proceed with the procedure. If applicable, I have discussed with the patient / power of  the rationale for blood component transfusion; its benefits in treating or preventing fatigue, organ damage, or death; and its risk which includes mild transfusion reactions, rare risk of blood borne infection, or more serious but rare reactions. I have discussed the alternatives to transfusion, including the risk and consequences of not receiving transfusion. The patient / Navjot Cabot of  had an opportunity to ask questions and had agreed to proceed with transfusion of blood components. Plan:  The risk, benefits, expected outcome, and alternative to the recommended procedure have been discussed with the patient.

## 2023-04-05 NOTE — PERIOP NOTE
Time Out done by Angelica Handy CRNA prior to nerve block of Lt. Knee. Block written on Left leg by Angelica Handy CRNA.

## 2023-04-05 NOTE — OP NOTE
[id number]    Device identifier: [id number] Device identifier type: GS1      GUDID Information       Request status Waiting for response                As of 4/5/2023       Status: Implanted                      Component Pat [id number] Polyeth Dome Jose Medialized Attune - [id number] - Implanted   (Left) Knee      Inventory item: COMPONENT PAT [id number] POLYETH DOME JOSE MEDIALIZED ATTUNE Model/Cat number: [id number]    : Extole Lot number: [id number]    Device identifier: [id number] Device identifier type: GS1      GUDID Information       Request status Waiting for response                As of 4/5/2023       Status: Implanted                      Insert Tibial Attune Ps Rp Insrt Sz9 6mm - [id number] - Implanted   (Left) Knee      Inventory item: INSERT TIBIAL ATTUNE PS RP INSRT SZ9 6MM Model/Cat number: [id number]    : Extole Lot number: [id number]    Device identifier: [id number] Device identifier type: GS1      GUDID Information       Request status Waiting for response                As of 4/5/2023       Status: Implanted                      Component Fem Sz 9 L Knee Post Stbl Jose Attune - [id number] - Implanted   (Left) Knee      Inventory item: COMPONENT FEM SZ 9 L KNEE POST STBL JOSE ATTUNE Model/Cat number: [id number]    : Extole Lot number: [id number]    Device identifier: [id number] Device identifier type: GS1      GUDID Information       Request status Waiting for response                As of 4/5/2023       Status: Implanted                      Attune Rp Tib Base Sz 9 Jose - [id number] - Implanted   (Left) Knee      Inventory item: ATTUNE RP TIB BASE SZ 9 JOSE Model/Cat number: [id number]    : [employer] Lot number: [id number]    Device identifier: [id number] Device identifier type: GS1      GUDID Information       Request status Waiting for response

## 2023-04-05 NOTE — ANESTHESIA PROCEDURE NOTES
Peripheral Block    Patient location during procedure: pre-op  Reason for block: post-op pain management and at surgeon's request  Start time: 4/5/2023 11:45 AM  End time: 4/5/2023 11:50 AM  Staffing  Performed: resident/CRNA   Resident/CRNA: JODY Ragland - CRNA  Preanesthetic Checklist  Completed: patient identified, IV checked, site marked, risks and benefits discussed, surgical/procedural consents, equipment checked, pre-op evaluation, timeout performed, anesthesia consent given, oxygen available, monitors applied/VS acknowledged, fire risk safety assessment completed and verbalized and blood product R/B/A discussed and consented  Peripheral Block   Patient position: supine  Prep: ChloraPrep  Provider prep: sterile gloves  Patient monitoring: cardiac monitor, continuous pulse ox, frequent blood pressure checks, IV access, oxygen and responsive to questions  Block type: Saphenous  Laterality: left  Injection technique: single-shot  Guidance: ultrasound guided    Needle   Needle type: short-bevel   Needle gauge: 20 G  Needle localization: ultrasound guidance  Needle length: 10 cm  Assessment   Injection assessment: negative aspiration for heme, no paresthesia on injection, local visualized surrounding nerve on ultrasound and no intravascular symptoms  Paresthesia pain: none  Slow fractionated injection: yes  Hemodynamics: stable  Real-time US image taken/store: yes  Outcomes: uncomplicated and patient tolerated procedure well    Medications Administered  bupivacaine (MARCAINE) PF injection 0.5% - Perineural   20 mL - 4/5/2023 11:45:00 AM  dexamethasone (DECADRON) (PF) 10 mg/mL injection - Other   4 mg - 4/5/2023 11:45:00 AM

## 2023-04-05 NOTE — ANESTHESIA POSTPROCEDURE EVALUATION
Department of Anesthesiology  Postprocedure Note    Patient: Mary Rockwell  MRN: [id number]  YOB: 1940  Date of evaluation: 4/5/2023      Procedure Summary     Date: 04/05/23 Room / Location: [facility] MAIN 02 / [facility] MA[facility] OR    Anesthesia Start: 1215 Anesthesia Stop: 1414    Procedure: LEFT TKA (Left: Knee) Diagnosis:       Primary osteoarthritis of left knee      (Primary osteoarthritis of left knee [M17.12])    Surgeons: Scar Boyd MD Responsible Provider: JODY Torres CRNA    Anesthesia Type: Regional, Spinal ASA Status: 2          Anesthesia Type: Regional, Spinal    Kaela Phase I: Kaela Score: 10    Kaela Phase II:        Anesthesia Post Evaluation    Patient location during evaluation: bedside  Patient participation: complete - patient participated  Level of consciousness: awake and alert  Pain score: 0  Airway patency: patent  Nausea & Vomiting: no nausea and no vomiting  Complications: no  Cardiovascular status: hemodynamically stable  Respiratory status: acceptable and room air  Hydration status: euvolemic  Multimodal analgesia pain management approach

## 2023-04-05 NOTE — ANESTHESIA PRE PROCEDURE
Department of Anesthesiology  Preprocedure Note       Name:  Braeden Kirkpatrick   Age:  81 y.o.  :  1940                                          MRN:  [id number]         Date:  2023      Surgeon: Tamiko Wright):  Sonia Choi MD    Procedure: Procedure(s):  LEFT TKA (POSSIBLE INPATIENT)    Medications prior to admission:   Prior to Admission medications    Medication Sig Start Date End Date Taking? Authorizing Provider   oxyCODONE-acetaminophen (PERCOCET) 5-325 MG per tablet Take 1 tablet by mouth every 4-6 hours as needed for Pain for up to 8 days. Do not start taking pain medication until after surgery! Max Daily Amount: 6 tablets 3/30/23 4/7/23  JODY Flowers   ondansetron (ZOFRAN-ODT) 8 MG TBDP disintegrating tablet Place 1 tablet under the tongue every 8 hours as needed for Nausea or Vomiting Do Not start until after surgery 3/30/23 4/6/23  JODY Flowers   rosuvastatin (CRESTOR) 5 MG tablet TAKE 1 TABLET BY MOUTH DAILY. 3/15/23   Shaun Mccain MD   acetaminophen (TYLENOL) 500 MG tablet Take 1,000 mg by mouth in the morning and 1,000 mg at noon and 1,000 mg in the evening and 1,000 mg before bedtime.  21   Ar Automatic Reconciliation   vitamin D 25 MCG (1000 UT) CAPS Take by mouth daily    Ar Automatic Reconciliation   finasteride (PROSCAR) 5 MG tablet Take 1 tablet by mouth daily 23   Ar Automatic Reconciliation   lisinopril (PRINIVIL;ZESTRIL) 5 MG tablet Take 1 tablet by mouth daily 23   Ar Automatic Reconciliation       Current medications:    Current Facility-Administered Medications   Medication Dose Route Frequency Provider Last Rate Last Admin    lactated ringers IV soln infusion   IntraVENous Continuous JODY Maynard CRNA        sodium chloride flush 0.9 % injection 5-40 mL  5-40 mL IntraVENous 2 times per day JODY Maynard CRNA        sodium chloride flush 0.9 % injection 5-40 mL  5-40 mL IntraVENous PRN JODY Maynard CRNA       

## 2023-04-05 NOTE — ANESTHESIA PROCEDURE NOTES
Spinal Block    Patient location during procedure: OR  End time: 4/5/2023 12:25 PM  Reason for block: primary anesthetic and at surgeon's request  Staffing  Performed: resident/CRNA   Resident/CRNA: JODY Allen CRNA  Spinal Block  Patient position: sitting  Prep: Betadine  Patient monitoring: cardiac monitor, continuous pulse ox, continuous capnometry, oxygen and frequent blood pressure checks  Approach: midline  Location: L2/L3  Guidance: ultrasound guided  Provider prep: mask and sterile gloves  Local infiltration: lidocaine  Needle  Needle type:  Warren   Needle gauge: 25 G  Needle length: 3.5 in  Assessment  Sensory level: T10  Events: cerebrospinal fluid  Swirl obtained: Yes  CSF: clear  Attempts: 1  Hemodynamics: stable  Preanesthetic Checklist  Completed: patient identified, IV checked, site marked, risks and benefits discussed, surgical/procedural consents, equipment checked, pre-op evaluation, timeout performed, anesthesia consent given, oxygen available, monitors applied/VS acknowledged, fire risk safety assessment completed and verbalized and blood product R/B/A discussed and consented

## 2023-04-05 NOTE — H&P
History and Physical    Subjective:     Natacha Abarca is a 81 y.o. pleasant male With a past medical history significant for HTN, HLP, BPH who was status post elective left total knee arthroplasty today by Dr. Lisa Ruiz. Hospitalist was asked to observe overnight. Patient seen at bedside in company of spouse. Appears pleasant, happy spirits, and denies any acute complaints at this time. No chest pain, shortness of breath, fever, chills, abdominal pain or discomfort. No nausea or vomiting. States incisional pain in the left knee is tolerable. He has otherwise been in his usual state of health up until today. He was status post right TKA in January 2023. Past Medical History:   Diagnosis Date    Bladder neck contracture     BPH with obstruction/lower urinary tract symptoms     Essential hypertension with goal blood pressure less than 140/90     Family history of colon cancer     History of blood transfusion 2015    with shoulder replacement    Hyperlipidemia     Osteoarthritis     Osteoarthritis of left hip     PMR (polymyalgia rheumatica) ([facility] [address] 7/20/2016    Skin cancer     basal cell on face    Urinary retention       Past Surgical History:   Procedure Laterality Date    BACK SURGERY      lower    COLONOSCOPY      CYSTOSCOPY      JOINT REPLACEMENT Right 01/2023    Rt.  TKA    LASER VAPORIZATION SURGERY PROSTATE, COMPLETE      MOHS SURGERY  1/11    rt hip repl    ORTHOPEDIC SURGERY Right 2015    shoulder replacement    OTHER SURGICAL HISTORY      tonsillectomy, back sx,     OTHER SURGICAL HISTORY      basel cell removed from right ear lobe    SHOULDER ARTHROSCOPY Left 2010    TOTAL KNEE ARTHROPLASTY Left 4/5/2023    LEFT TKA performed by Vera Christine MD at [facility] MAIN OR    TURP      Laser     Family History   Problem Relation Age of Onset    Heart Disease Other     Cancer Other       Social History     Tobacco Use    Smoking status: Never    Smokeless tobacco: Never   Substance Use Topics

## 2023-04-06 VITALS
DIASTOLIC BLOOD PRESSURE: 68 MMHG | SYSTOLIC BLOOD PRESSURE: 126 MMHG | OXYGEN SATURATION: 98 % | HEART RATE: 81 BPM | TEMPERATURE: 97 F | WEIGHT: 237.1 LBS | RESPIRATION RATE: 16 BRPM | HEIGHT: 74 IN | BODY MASS INDEX: 30.43 KG/M2

## 2023-04-06 LAB
ANION GAP SERPL CALC-SCNC: 7 MMOL/L (ref 3–18)
BUN SERPL-MCNC: 11 MG/DL (ref 7–18)
BUN/CREAT SERPL: 14 (ref 12–20)
CA-I BLD-MCNC: 8.5 MG/DL (ref 8.5–10.1)
CHLORIDE SERPL-SCNC: 104 MMOL/L (ref 100–111)
CO2 SERPL-SCNC: 25 MMOL/L (ref 21–32)
CREAT SERPL-MCNC: 0.79 MG/DL (ref 0.6–1.3)
ERYTHROCYTE [DISTWIDTH] IN BLOOD BY AUTOMATED COUNT: 12.1 % (ref 11.6–14.5)
GLUCOSE SERPL-MCNC: 130 MG/DL (ref 74–99)
HCT VFR BLD AUTO: 37.3 % (ref 36–48)
HGB BLD-MCNC: 12.5 G/DL (ref 13–16)
MCH RBC QN AUTO: 31.4 PG (ref 24–34)
MCHC RBC AUTO-ENTMCNC: 33.5 G/DL (ref 31–37)
MCV RBC AUTO: 93.7 FL (ref 78–100)
NRBC # BLD: 0 K/UL (ref 0–0.01)
NRBC BLD-RTO: 0 PER 100 WBC
PLATELET # BLD AUTO: 235 K/UL (ref 135–420)
PMV BLD AUTO: 9.4 FL (ref 9.2–11.8)
POTASSIUM SERPL-SCNC: 4.2 MMOL/L (ref 3.5–5.5)
RBC # BLD AUTO: 3.98 M/UL (ref 4.35–5.65)
SODIUM SERPL-SCNC: 136 MMOL/L (ref 136–145)
WBC # BLD AUTO: 11.8 K/UL (ref 4.6–13.2)

## 2023-04-06 PROCEDURE — 36415 COLL VENOUS BLD VENIPUNCTURE: CPT

## 2023-04-06 PROCEDURE — 6360000002 HC RX W HCPCS: Performed by: ORTHOPAEDIC SURGERY

## 2023-04-06 PROCEDURE — 80048 BASIC METABOLIC PNL TOTAL CA: CPT

## 2023-04-06 PROCEDURE — 2580000003 HC RX 258: Performed by: ORTHOPAEDIC SURGERY

## 2023-04-06 PROCEDURE — 97116 GAIT TRAINING THERAPY: CPT

## 2023-04-06 PROCEDURE — 6370000000 HC RX 637 (ALT 250 FOR IP): Performed by: NURSE PRACTITIONER

## 2023-04-06 PROCEDURE — 85027 COMPLETE CBC AUTOMATED: CPT

## 2023-04-06 PROCEDURE — 6370000000 HC RX 637 (ALT 250 FOR IP): Performed by: ORTHOPAEDIC SURGERY

## 2023-04-06 PROCEDURE — G0378 HOSPITAL OBSERVATION PER HR: HCPCS

## 2023-04-06 RX ADMIN — ROSUVASTATIN 5 MG: 10 TABLET, FILM COATED ORAL at 09:08

## 2023-04-06 RX ADMIN — ASPIRIN 325 MG: 325 TABLET, COATED ORAL at 09:08

## 2023-04-06 RX ADMIN — CEFAZOLIN 2000 MG: 2 INJECTION, POWDER, FOR SOLUTION INTRAMUSCULAR; INTRAVENOUS at 04:42

## 2023-04-06 RX ADMIN — ACETAMINOPHEN 650 MG: 325 TABLET ORAL at 09:07

## 2023-04-06 RX ADMIN — FINASTERIDE 5 MG: 5 TABLET, FILM COATED ORAL at 09:08

## 2023-04-06 RX ADMIN — SODIUM CHLORIDE, PRESERVATIVE FREE 10 ML: 5 INJECTION INTRAVENOUS at 09:10

## 2023-04-06 RX ADMIN — ACETAMINOPHEN 650 MG: 325 TABLET ORAL at 04:42

## 2023-04-06 ASSESSMENT — PAIN DESCRIPTION - LOCATION
LOCATION: KNEE
LOCATION: KNEE

## 2023-04-06 ASSESSMENT — PAIN DESCRIPTION - ORIENTATION
ORIENTATION: LEFT
ORIENTATION: LEFT

## 2023-04-06 ASSESSMENT — PAIN SCALES - GENERAL
PAINLEVEL_OUTOF10: 2
PAINLEVEL_OUTOF10: 3

## 2023-04-06 ASSESSMENT — PAIN DESCRIPTION - DESCRIPTORS: DESCRIPTORS: ACHING

## 2023-04-06 NOTE — PROGRESS NOTES
0700- Assumed care of patient on walking rounds. Patient lying in bed wife at bedside. Shift assessment complete. VSS.     0908- AM medications administered     1125- Patient discharged home. Iv removed.  Patient verbalized understanding of instructions
1500- assumed care of patient from pscu to room 257 via bed. Wife at bedside. vss.  Patient oriented to room and CB system CB ib reach     1654- PT at bedside
1845 - Assumed care of pt, shift report given    2015 - VSS. Assessment completed. Pt is a&ox4. States knee pain is between a 2-3/10. IVF infusing per orders. A&ox4. LS clear. BS active. Dressing to LLE clean, dry and intact. Pt has not yet voided, agreed to wait until 2100 to bladder scan. Wife at bedside. Fresh ice for knee and fresh ice water given. Antibiotic hung per order. No further needs voiced at this time. CBWR     2109 - Scheduled tylenol given. Pt able to void 100 ml's urine, stating it was difficult. Will bladder scan    2140 - Bladder scan showed 611 ml's bladder in urine, bladder is distended and firm. Straight cathed pt per orders, 600 ml's yellow urine returned. Pt states he feels less pressure now. Pt resting in bed, no needs voiced at this time. 2330 - Pt able to void 275 ml's yellow urine. Denies any pain in knee at this time. Ice has been rotated appropriately to knee. 0245 - Urinal emptied 900 ml's clear yellow urine. Pt denies any needs at this time and is requesting a break from the ice until morning. CBWR      0445 - VSS. Pt resting awake in bed. Scheduled medication given. Pt denies any needs at this time.  CBWR
Physical Therapy  Facility/Department: [address]  Daily Treatment Note  NAME: Johanna Marin  : 1940  MRN: [id number]    Date of Service: 2023    Discharge Recommendations:  Outpatient PT        Patient Diagnosis(es): Primary osteoarthritis of left knee [M17.12]  S/P total knee arthroplasty, left [Z96.652] S/P total knee arthroplasty, left    Assessment   Assessment: Pt was sitting on EOB upon entrance agreeable to participate in physical therapy. Session began with a review of HEP with demonstration and verbal teaching to ensure proper form and muscle optimal muscle contraction. Once standing patient ambulated with  ft, Mod I, with no LOB witnessed. Pt cued to increase RADHA, decrease UE support, and for heel toe gait. Stair navigation was completed using R HR with step to pattern, mod I. Pt was instructed to decrease hip swing and to flex knee more with ascend. STS completed x5 working to fully weight bear through L LE when standing and sitting down. Pt then ambulated with SPC  to room with proper coordination and stride length. Pt was left sitting in room with all needs met. Activity Tolerance: Patient tolerated treatment well     Subjective    Subjective  Subjective: Pt reports minor soreness in L knee. He is agreeable to particpate in physical therapy.      Objective   Vitals  Heart Rate: 81  Heart Rate Source: Monitor  BP: 126/68  MAP (Calculated): 87  Bed Mobility Training  Bed Mobility Training: Yes  Overall Level of Assistance: Independent  Rolling: Independent  Supine to Sit: Independent  Sit to Supine: Independent  Scooting: Independent  Balance  Sitting: Intact  Standing: Intact  Transfer Training  Transfer Training: Yes  Overall Level of Assistance: Modified independent  Sit to Stand: Modified independent  Stand to Sit: Modified independent  Gait Training  Gait Training: Yes  Left Side Weight Bearing: As tolerated  Gait  Overall Level of Assistance: Modified independent  Base of
of function , Medical condition/comorbidities , and Pain tolerance/Management     PLAN :  Physcial Therapy Plan  General Plan:  (1-2x/day x 2-3 days)  Current Treatment Recommendations: Strengthening;ROM;Balance training;Functional mobility training;Transfer training;Gait training;Stair training;Home exercise program;Therapeutic activities     SUBJECTIVE:   Subjective  Subjective: Pt supine in bed upon entering room, spouse at bedside. Pt motivated to get up & participate with PT.     OBJECTIVE DATA SUMMARY:     Past Medical History:   Diagnosis Date    Bladder neck contracture     BPH with obstruction/lower urinary tract symptoms     Essential hypertension with goal blood pressure less than 140/90     Family history of colon cancer     History of blood transfusion 2015    with shoulder replacement    Hyperlipidemia     Osteoarthritis     Osteoarthritis of left hip     PMR (polymyalgia rheumatica) ([facility] [address] 7/20/2016    Skin cancer     basal cell on face    Urinary retention      Past Surgical History:   Procedure Laterality Date    BACK SURGERY      lower    COLONOSCOPY      CYSTOSCOPY      JOINT REPLACEMENT Right 01/2023    Rt.  TKA    LASER VAPORIZATION SURGERY PROSTATE, COMPLETE      MOHS SURGERY  1/11    rt hip repl    ORTHOPEDIC SURGERY Right 2015    shoulder replacement    OTHER SURGICAL HISTORY      tonsillectomy, back sx,     OTHER SURGICAL HISTORY      basel cell removed from right ear lobe    SHOULDER ARTHROSCOPY Left 2010    TOTAL KNEE ARTHROPLASTY Left 4/5/2023    LEFT TKA performed by Tammy Mendez [facility] MA[facility] OR    TURP      Laser       Home Situation:  Social/Functional History  Lives With: Spouse  Type of Home: House    Vision:  Vision  Vision: Within Functional Limits    Auditory:  Hearing  Hearing: Within functional limits       Orientation and Cognition:   Orientation  Overall Orientation Status: Within Normal Limits  Orientation Level: Oriented X4      Gross Assessment  Gross Assessment  AROM:

## 2023-04-06 NOTE — PLAN OF CARE
Problem: ABCDS Injury Assessment  Goal: Absence of physical injury  Outcome: Progressing     Problem: Pain  Goal: Verbalizes/displays adequate comfort level or baseline comfort level  Outcome: Progressing     Problem: Discharge Planning  Goal: Discharge to home or other facility with appropriate resources  Outcome: Progressing  Flowsheets (Taken 4/5/2023 2000)  Discharge to home or other facility with appropriate resources: Identify barriers to discharge with patient and caregiver     Problem: Safety - Adult  Goal: Free from fall injury  Outcome: Progressing

## 2023-04-06 NOTE — DISCHARGE SUMMARY
No Wheezing or Rhonchi. No rales. Chest wall:      No tenderness  No Accessory muscle use. Heart:              Regular  rhythm,  No  murmur   No edema  Abdomen:        Soft, non-tender. Not distended. Bowel sounds normal  Extremities:     No cyanosis. No clubbing,                            Skin turgor normal, Capillary refill normal  Skin:                Not pale. Not Jaundiced  No rashes   Psych:             Not anxious or agitated.   Neurologic:      Alert, moves all extremities, answers questions appropriately and responds to commands       CHRONIC MEDICAL DIAGNOSES:      Greater than 35 minutes were spent with the patient on counseling and coordination of care    Signed:   Coty Chow MD  4/6/2023  11:18 AM

## 2023-04-06 NOTE — CARE COORDINATION
Advance Care Planning     General Advance Care Planning (ACP) Conversation    Date of Conversation: 04-   Conducted with: Patient with Decision Making Capacity    Healthcare Decision Maker:    Primary Decision Maker: Tamika Burch - Domestic Partner - [phone]    Secondary Decision Maker: Alon Billings - Other  Click here to complete Healthcare Decision Makers including selection of the Healthcare Decision Maker Relationship (ie \"Primary\"). Today we documented desired Decision Maker(s), who is (are) NOT Legal Next of Kin. ACP documents are required for decision maker authority. Content/Action Overview:   Has ACP document(s) NOT on file - requested patient to provide  Reviewed DNR/DNI and patient elects Full Code (Attempt Resuscitation)  treatment goals      Length of Voluntary ACP Conversation in minutes:  <16 minutes (Non-Billable)    Claudia Hall RN
housing: None  Potential Assistance needed at discharge: (P) N/A            Potential DME:    Patient expects to discharge to: (P) [facility] for transportation at discharge: (P) Family    Financial    Payor: MEDICARE / Plan: MEDICARE PART A AND B / Product Type: *No Product type* /     Does insurance require precert for SNF: No    Potential assistance Purchasing Medications: (P) No  Meds-to-Beds request:        [address], [address]  [address] [id number]  Phone: [phone] Fax: [phone]      Notes:    Factors facilitating achievement of predicted outcomes: Cooperative and Pleasant    Barriers to discharge: None    Additional Case Management Notes: Discharge Planning/Transition of Care- MIRIAM BURNETT. Patient lives at home with his S.O. He states they have been together for 49 years. He has a cane, crutches, wheeled walker and a rollator. He has an appointment with the NP on 4- at 1330. He also has a PT appointment with [facility] [employer] [address]  [address]AMMY on 4-7-2023 at 1000. Discharge planning with teach back and medication reconciliation will be completed. Nurse will make follow-up appointments. Patient will be returning back home at discharge. The Plan for Transition of Care is related to the following treatment goals of Primary osteoarthritis of left knee [M17.12]  S/P total knee arthroplasty, left [M05.107]    IF APPLICABLE: The Patient and/or patient representative Lino Gonsalves and his family were provided with a choice of provider and agrees with the discharge plan. Freedom of choice list with basic dialogue that supports the patient's individualized plan of care/goals and shares the quality data associated with the providers was provided to: (P) Patient   Patient Representative Name:       The Patient and/or Patient Representative Agree with the Discharge Plan?  (P) Yes    Anita Coyne RN  Case

## 2023-04-07 ENCOUNTER — CARE COORDINATION (OUTPATIENT)
Facility: CLINIC | Age: 83
End: 2023-04-07

## 2023-04-07 NOTE — CARE COORDINATION
[facility] Care Transitions Initial Follow Up Call    Call within 2 business days of discharge: Yes    Care Transitions Outreach Attempt     CTN Attempted to reach patient for transitions of care follow up. Unable to reach patient. Left a voice message with office contact information. No Pt. Medical/health information left on message. Patient: Johanna Marin Patient : 1940 MRN: [id number]    Last Discharge [address]       Date Complaint Diagnosis Description Type Department Provider    23   Admission (Discharged) Bahman Ngo MD              Was this an external facility discharge?  No Discharge Facility: n/a    Noted following upcoming appointments from discharge chart review:   [facility] follow up appointment(s):   Future Appointments   Date Time Provider Marleni Pretty   4/10/2023 12:00 PM Mare Escobar PTA [facility]   2023  1:30 PM JODY Villar AMB   2023  4:30 PM Juan Manuel Acevedo, PT [facility]   2023 10:40 AM Mayo Mills PTA [facility]   2023 11:20 AM Mayo Mills, GLADYS BQAJUIU [facility]   2023 10:00 AM Mayo Mills PTA [facility]   2023 10:40 AM Juan Manuel Acevedo, PT [facility]   2023 10:00 AM Juan Manuel Acevedo, PT [facility]   2023 10:40 AM Juan Manuel Acevedo, PT [facility]   2023 12:40 PM Mayo Mills PTA [facility]   5/3/2023 12:00 PM Mayo Mills PTA [facility]   2023 10:00 AM Mayo Mills, PTA [facility]   2023  2:40 PM Mayo Mills, PTA ZBJITIG [facility]   5/10/2023 11:20 AM Hipolito Antonio, PT MIHKCOQ [facility]   2023 12:00 PM Juan Manuel Acevedo, PT [facility]   2023 10:15 AM MD Arley Pacheco [facility]   2023  9:15 AM [facility] LAB VISIT [facility] BS AMB   2023 10:30 AM Radhika Padgett MD [facility] BS AMB     Anthony Lambert RN

## 2023-04-13 ENCOUNTER — HOSPITAL ENCOUNTER (OUTPATIENT)
Facility: HOSPITAL | Age: 83
Setting detail: RECURRING SERIES
Discharge: HOME | End: 2023-04-16
Payer: MEDICARE

## 2023-04-13 PROCEDURE — 97530 THERAPEUTIC ACTIVITIES: CPT

## 2023-04-13 PROCEDURE — 97110 THERAPEUTIC EXERCISES: CPT

## 2023-04-18 ENCOUNTER — CARE COORDINATION (OUTPATIENT)
Facility: CLINIC | Age: 83
End: 2023-04-18

## 2023-04-18 ENCOUNTER — APPOINTMENT (OUTPATIENT)
Facility: HOSPITAL | Age: 83
End: 2023-04-18
Payer: MEDICARE

## 2023-04-18 NOTE — CARE COORDINATION
[facility] Care Transitions Follow Up Call      Care Transition Nurse contacted the patient by telephone to follow up after admission on 23. Verified name and  with patient as identifiers. Patient: Demetrius Morin  Patient : 1940   MRN: [id number]  Reason for Admission: LEFT TKA  Discharge Date: 23 RARS: No data recorded    Needs to be reviewed by the provider   Additional needs identified to be addressed with provider: No  none             Method of communication with provider: none. Patient reported that he is doing well. No new or worsening of symptoms reported by Pt. At this time. Pt. Reported that everything is okay. No questions, concerns and/or needs at this time as per Pt. Pt. Reported that he is still attending outpatient PT. Pt. Thanked us for follow up call. Kept the conversation short and concluded the call. Follow Up  Future Appointments   Date Time Provider Marleni Pretty   2023 11:20 AM Mikayla Splinter, PTA [facility]   2023 10:00 AM Mikayla Splinter, PTA [facility]   2023 10:40 AM Mariajose Sarkar, PT [facility]   2023 10:00 AM Mariajose Sarkar, PT [facility]   2023 10:40 AM Mariajose Sarkar, PT [facility]   2023 12:40 PM Mikayla Splinter, PTA [facility]   5/3/2023 12:00 PM Kathi Sullivan, PTA [facility]   2023 10:00 AM Mikayla Splinter, PTA [facility]   2023  2:40 PM Mikayla Splinter, PTA [facility]   5/10/2023 11:20 AM Sumit Bowen, PT [facility]   2023 12:00 PM Mariajose Sarkar, PT [facility]   2023 10:15 AM Meron Berry MD [facility]   2023  9:15 AM [facility] LAB VISIT [facility] AMB   2023 10:30 AM Kevin Esquivel MD [facility] AMB       Plan for next call:  follow up on surgical site, assess for any needs.      Beatrice Starkey RN

## 2023-04-19 ENCOUNTER — HOSPITAL ENCOUNTER (OUTPATIENT)
Facility: HOSPITAL | Age: 83
Setting detail: RECURRING SERIES
Discharge: HOME | End: 2023-04-22
Payer: MEDICARE

## 2023-04-19 PROCEDURE — 97016 VASOPNEUMATIC DEVICE THERAPY: CPT

## 2023-04-19 PROCEDURE — 97110 THERAPEUTIC EXERCISES: CPT

## 2023-04-19 PROCEDURE — 97530 THERAPEUTIC ACTIVITIES: CPT

## 2023-04-19 NOTE — PROGRESS NOTES
PHYSICAL / OCCUPATIONAL THERAPY - DAILY TREATMENT NOTE (updated )    Patient Name: Brennon Marques    Date: 2023    : 1940  Insurance: Payor: Jhonathan Phillip / Plan: [employer] MEDICARE SUPP / Product Type: *No Product type* /      Patient  verified yes   Visit #   Current / Total 4 36   Time   In / Out 11:20 12:10   Pain   In / Out 6-7/10 6/10   Subjective Functional Status/Changes: Pt reports this knee is going a little slower than when he had the right one done, but he still has a lot of swelling. He brought his negative wound system box in for Fernanda Wood to see. Changes to:  Meds, Allergies, Med Hx, Sx Hx? If yes, update Summary List no       TREATMENT AREA =  Pain in left knee [M25.562]    OBJECTIVE    Modalities Rationale:     decrease inflammation and decrease pain to improve patient's ability to progress to PLOF and address remaining functional goals. min [] Estim Unattended, type/location:                                      []  w/ice    []  w/heat    min [] Estim Attended, type/location:                                     []  w/US     []  w/ice    []  w/heat    []  TENS insruct      min []  Mechanical Traction: type/lbs                   []  pro   []  sup   []  int   []  cont    []  before manual    []  after manual    min []  Ultrasound, settings/location:      min []  Iontophoresis w/ dexamethasone, location:                                               []  take home patch       []  in clinic    min  unbill []  Ice     []  Heat    location/position:     min []  Paraffin,  details:    15 min [x]  Vasopneumatic Device, press/temp: low/low Supine with wedge and LE/plinth elevated - left knee    min []  Edwin Corrigan / Stephon Camacho:     If using vaso (only need to measure limb vaso being performed on)      pre-treatment girth : 48.5 cm      post-treatment girth : 47 cm       measured at (landmark location) : mid patella     min []  Other:    Skin assessment post-treatment (if applicable):

## 2023-04-21 ENCOUNTER — HOSPITAL ENCOUNTER (OUTPATIENT)
Facility: HOSPITAL | Age: 83
Setting detail: RECURRING SERIES
Discharge: HOME | End: 2023-04-24
Payer: MEDICARE

## 2023-04-21 PROCEDURE — 97110 THERAPEUTIC EXERCISES: CPT

## 2023-04-21 PROCEDURE — 97016 VASOPNEUMATIC DEVICE THERAPY: CPT

## 2023-04-21 PROCEDURE — 97530 THERAPEUTIC ACTIVITIES: CPT

## 2023-04-21 PROCEDURE — 97140 MANUAL THERAPY 1/> REGIONS: CPT

## 2023-04-24 ENCOUNTER — HOSPITAL ENCOUNTER (OUTPATIENT)
Facility: HOSPITAL | Age: 83
Setting detail: RECURRING SERIES
Discharge: HOME | End: 2023-04-27
Payer: MEDICARE

## 2023-04-24 PROCEDURE — 97140 MANUAL THERAPY 1/> REGIONS: CPT

## 2023-04-24 PROCEDURE — 97110 THERAPEUTIC EXERCISES: CPT

## 2023-04-24 PROCEDURE — 97016 VASOPNEUMATIC DEVICE THERAPY: CPT

## 2023-04-24 NOTE — PROGRESS NOTES
PHYSICAL / OCCUPATIONAL THERAPY - DAILY TREATMENT NOTE (updated )    Patient Name: Bob Nascimento    Date: 2023    : 1940  Insurance: Payor: Rema  / Plan: Alee [employer] PLUS HMO / Product Type: *No Product type* /      Patient  verified yes   Visit #   Current / Total 6 36   Time   In / Out 10:42P 11:34A   Pain   In / Out 7/10 5/10   Subjective Functional Status/Changes: Patient reports he feels the weather has made his knees hurt worse. He was ok after last visit. He still has soreness to left knee around the incision; MD told him it could take up to a year to heal. [place] so had to go up and down the stairs more. Riding in the car was ok. Changes to:  Meds, Allergies, Med Hx, Sx Hx? If yes, update Summary List no       TREATMENT AREA =  Pain in left knee [M25.562]    OBJECTIVE    Modalities Rationale:     decrease edema, decrease inflammation, and decrease pain to improve patient's ability to progress to PLOF and address remaining functional goals. min [] Estim Unattended, type/location:                                      []  w/ice    []  w/heat    min [] Estim Attended, type/location:                                     []  w/US     []  w/ice    []  w/heat    []  TENS insruct      min []  Mechanical Traction: type/lbs                   []  pro   []  sup   []  int   []  cont    []  before manual    []  after manual    min []  Ultrasound, settings/location:      min []  Iontophoresis w/ dexamethasone, location:                                               []  take home patch       []  in clinic   During vaso min  unbill [x]  Ice     []  Heat    location/position: Right knee/semi reclined with B LE elevated    min []  Paraffin,  details:    10 min [x]  Vasopneumatic Device, press/temp:  Low/low    min []  Clabe Divine / Loletha Messenger:     If using vaso (only need to measure limb vaso being performed on)      pre-treatment girth : 49 cm

## 2023-04-25 ENCOUNTER — TELEPHONE (OUTPATIENT)
Age: 83
End: 2023-04-25

## 2023-04-25 DIAGNOSIS — M79.672 LEFT FOOT PAIN: Primary | ICD-10-CM

## 2023-04-25 NOTE — TELEPHONE ENCOUNTER
Patient called in stating that he would like a referral for a foot dr.He is having pain in the ball of his left foot. Please Advise

## 2023-04-26 ENCOUNTER — HOSPITAL ENCOUNTER (OUTPATIENT)
Facility: HOSPITAL | Age: 83
Setting detail: RECURRING SERIES
Discharge: HOME | End: 2023-04-29
Payer: MEDICARE

## 2023-04-26 PROCEDURE — 97112 NEUROMUSCULAR REEDUCATION: CPT

## 2023-04-26 PROCEDURE — 97110 THERAPEUTIC EXERCISES: CPT

## 2023-04-26 PROCEDURE — 97016 VASOPNEUMATIC DEVICE THERAPY: CPT

## 2023-04-26 NOTE — PROGRESS NOTES
PHYSICAL / OCCUPATIONAL THERAPY - DAILY TREATMENT NOTE (updated )    Patient Name: Susana Asher    Date: 2023    : 1940  Insurance: Payor: MEDICO / Plan: [employer] MEDICARE SUPP / Product Type: *No Product type* /      Patient  verified yes   Visit #   Current / Total 7 36   Time   In / Out 10:00A 10:52P   Pain   In / Out 5-6/10 10   Subjective Functional Status/Changes: Patient reports he has an area to his incision that has had some clear drainage. He works on pushing it together . He still has lot of pain sleeping. Changes to:  Meds, Allergies, Med Hx, Sx Hx? If yes, update Summary List no       TREATMENT AREA =  Pain in left knee [M25.562]    OBJECTIVE      Modalities Rationale:     decrease edema, decrease inflammation, and decrease pain to improve patient's ability to progress to PLOF and address remaining functional goals. min [] Estim Unattended, type/location:                                      []  w/ice    []  w/heat    min [] Estim Attended, type/location:                                     []  w/US     []  w/ice    []  w/heat    []  TENS insruct      min []  Mechanical Traction: type/lbs                   []  pro   []  sup   []  int   []  cont    []  before manual    []  after manual    min []  Ultrasound, settings/location:      min []  Iontophoresis w/ dexamethasone, location:                                               []  take home patch       []  in clinic    min  unbill []  Ice     []  Heat    location/position:     min []  Paraffin,  details:    10 min []  Vasopneumatic Device, press/temp: Low/low    min []  Ana Maria Rosen / Claude Pun:     If using vaso (only need to measure limb vaso being performed on)      pre-treatment girth : 47 cm      post-treatment girth : 47 cm      measured at (landmark location) :  mid patella    min []  Other:    Skin assessment post-treatment (if applicable):    [x]  intact    []  redness- no adverse reaction

## 2023-04-28 ENCOUNTER — HOSPITAL ENCOUNTER (OUTPATIENT)
Facility: HOSPITAL | Age: 83
Setting detail: RECURRING SERIES
Discharge: HOME | End: 2023-05-01
Payer: MEDICARE

## 2023-04-28 ENCOUNTER — TELEMEDICINE (OUTPATIENT)
Age: 83
End: 2023-04-28

## 2023-04-28 DIAGNOSIS — M25.562 LEFT KNEE PAIN, UNSPECIFIED CHRONICITY: Primary | ICD-10-CM

## 2023-04-28 PROCEDURE — 97110 THERAPEUTIC EXERCISES: CPT

## 2023-04-28 PROCEDURE — 97016 VASOPNEUMATIC DEVICE THERAPY: CPT

## 2023-04-28 PROCEDURE — 97112 NEUROMUSCULAR REEDUCATION: CPT

## 2023-04-28 RX ORDER — CEPHALEXIN 500 MG/1
500 CAPSULE ORAL 4 TIMES DAILY
Qty: 28 CAPSULE | Refills: 0 | Status: SHIPPED | OUTPATIENT
Start: 2023-04-28 | End: 2023-05-05

## 2023-04-28 RX ORDER — POLYETHYLENE GLYCOL 3350, SODIUM SULFATE ANHYDROUS, SODIUM BICARBONATE, SODIUM CHLORIDE, POTASSIUM CHLORIDE 236; 22.74; 6.74; 5.86; 2.97 G/4L; G/4L; G/4L; G/4L; G/4L
POWDER, FOR SOLUTION ORAL
COMMUNITY
Start: 2023-04-17

## 2023-05-01 ENCOUNTER — HOSPITAL ENCOUNTER (OUTPATIENT)
Facility: HOSPITAL | Age: 83
Setting detail: RECURRING SERIES
Discharge: HOME | End: 2023-05-04
Payer: MEDICARE

## 2023-05-01 PROCEDURE — 97112 NEUROMUSCULAR REEDUCATION: CPT

## 2023-05-01 PROCEDURE — 97110 THERAPEUTIC EXERCISES: CPT

## 2023-05-01 PROCEDURE — 97530 THERAPEUTIC ACTIVITIES: CPT

## 2023-05-01 PROCEDURE — 97016 VASOPNEUMATIC DEVICE THERAPY: CPT

## 2023-05-01 NOTE — PROGRESS NOTES
PHYSICAL / OCCUPATIONAL THERAPY - DAILY TREATMENT NOTE (updated )    Patient Name: Hilda Moses    Date: 2023    : 1940  Insurance: Payor: MEDICARE / Plan: MEDICARE PART A AND B / Product Type: *No Product type* /      Patient  verified Yes     Visit #   Current / Total 9 36   Time   In / Out 12:40 1:36   Pain   In / Out 7/10 4/10   Subjective Functional Status/Changes: Pt. Reports he is doing ok today. He has an infection at the top of his incision and started antifolics    Changes to:  Meds, Allergies, Med Hx, Sx Hx? If yes, update Summary List no       TREATMENT AREA =  Pain in left knee [M25.562]    OBJECTIVE    Modalities Rationale:     decrease edema, decrease inflammation, and decrease pain to improve patient's ability to progress to PLOF and address remaining functional goals. min [] Estim Unattended, type/location:                                      []  w/ice    []  w/heat    min [] Estim Attended, type/location:                                     []  w/US     []  w/ice    []  w/heat    []  TENS insruct      min []  Mechanical Traction: type/lbs                   []  pro   []  sup   []  int   []  cont    []  before manual    []  after manual    min []  Ultrasound, settings/location:      min []  Iontophoresis w/ dexamethasone, location:                                               []  take home patch       []  in clinic    min  unbill []  Ice     []  Heat    location/position:     min []  Paraffin,  details:    15 min [x]  Vasopneumatic Device, press/temp: Low/low. Incline table to towel roll under ankle. min []  Loanne Buoy / Daved Milton:     If using vaso (only need to measure limb vaso being performed on)      pre-treatment girth :  47 cm      post-treatment girth : 46.5 cm      measured at (landmark location) :  mid patella    min []  Other:    Skin assessment post-treatment (if applicable):    []  intact    []  redness- no adverse reaction                 []redness -

## 2023-05-03 ENCOUNTER — HOSPITAL ENCOUNTER (OUTPATIENT)
Facility: HOSPITAL | Age: 83
Setting detail: RECURRING SERIES
Discharge: HOME | End: 2023-05-06
Payer: MEDICARE

## 2023-05-03 ENCOUNTER — CARE COORDINATION (OUTPATIENT)
Facility: CLINIC | Age: 83
End: 2023-05-03

## 2023-05-03 PROCEDURE — 97016 VASOPNEUMATIC DEVICE THERAPY: CPT

## 2023-05-03 PROCEDURE — 97530 THERAPEUTIC ACTIVITIES: CPT

## 2023-05-03 PROCEDURE — 97110 THERAPEUTIC EXERCISES: CPT

## 2023-05-03 NOTE — CARE COORDINATION
[facility] Care Transitions Follow Up Call    Patient Current Location:  Home: [address] [place] [address]. [phone]    Care Transition Nurse contacted the patient by telephone for care transition follow up. Verified name and  with patient as identifiers. Patient: Durga Ocampo  Patient : 1940   MRN: [id number]   Discharge Date: 23 RARS: No data recorded    Needs to be reviewed by the provider   Additional needs identified to be addressed with provider: No  none             Method of communication with provider: none    Pt. Reports that he is doing okay. Pt. Reported that he just got back from Outpatient PT. Pt. Reports that knee is coming along. Pt. Reported that he was having issues with his Knee was last week and he  called his surgeon. Pt. States that surgeon precribed antibiotic and neosporin cream.   Pt. Reported that his knee is better today. No questions, concerns and/or needs at this time as per Pt. Patient reminded that there is a physician on call 24 hours a day / 7 days a week (M- 5pm to 8am and from Friday 5pm until Monday 8a for the weekend) should the patient have questions or concerns. Patient reminded to call 911 if situation is emergent ( such as chest pain, shortness of breath, unstoppable bleeding, feeling of passing out,  worsening of symptoms)or patient feels the situation is emergent. Pt verbalizes understanding.     Follow Up  Future Appointments   Date Time Provider Marleni Pretty   2023 10:00 AM [facility]   2023  2:40 PM Chance Stern PTA [facility]   5/10/2023 11:20 AM [facility]   2023 12:00 PM [facility]   2023 10:15 AM MD Raffi Sanchez   2023  9:15 AM [facility] LAB VISIT [facility] AMB   2023 10:30 AM Angie Lange MD [facility] BS AMB       Care Transition Nurse reviewed medical action plan and red flags with patient and

## 2023-05-03 NOTE — THERAPY RECERTIFICATION
[facility] [place]  [address] [facility], [place]  [phone] fax    Continued Plan of Care/ Re-certification for Physical Therapy Services    Patient Name: Vick Man : 1940   Medical   Diagnosis: Pain in right knee [M25.561] Treatment Diagnosis: M25.562  LEFT KNEE PAIN       Onset Date: Left TKA 23 Payor :  Payor: MEDICARE / Plan: MEDICARE PART A AND B / Product Type: *No Product type* /    Referral Source: Shawna Styles MD Start of Care [facility]): 2023   Prior Hospitalization: See medical history Provider #: [id number]   Prior Level of Function: Ambulating without AD; lives in 1 story home with 4 stairs to enter   Comorbidities: Arthritis; right TKA 23   Medications: Verified on Patient Summary List     Visits from Start of Care: 10    Missed Visits: 0    Reporting Period: 23 to 5/3/23    Subjective Reports:     Current Status/Treatment Goals:  Short Term Goals: To be accomplished in 4 weeks  1. Patient will report compliance with initial HEP to optimize therapy outcomes. Status at eval: reviewed  MET daily (4/10/23)     2. Patient will improve FOTO score by at least 5 points in order to demonstrate functional improvement. Status at eval: 47/100   goal met. Increased from 47 to 75/100     3. Patient will improve left knee AROM to at least 90 deg flex and -8 deg ext in order to ambulate with increased ease. Status at eval: 75 deg flex, -15 deg ext  Not met: 6-103 degrees      4. Patient will be able to ambulate at least 250 ft without AD in order to navigate home and community with increased ease. Status at eval: ambulating with SPC and RW            Current: goal met. Pt reports not using AD for any ambulation      Long Term Goals: To be accomplished in 12 weeks  1. Patient will improve FOTO score to at least 67/100 points in order to demonstrate functional improvement.    Status at

## 2023-05-03 NOTE — PROGRESS NOTES
PHYSICAL / OCCUPATIONAL THERAPY - DAILY TREATMENT NOTE (updated )    Patient Name: Durga Ocampo    Date: 5/3/2023    : 1940  Insurance: Payor: MEDICARE / Plan: MEDICARE PART A AND B / Product Type: *No Product type* /      Patient  verified Yes     Visit #   Current / Total 10 36   Time   In / Out 1200 1250   Pain   In / Out 4-5/10 4/10   Subjective Functional Status/Changes: Pt stated that is here. Changes to:  Meds, Allergies, Med Hx, Sx Hx? If yes, update Summary List no       TREATMENT AREA =  Pain in left knee [M25.562]    OBJECTIVE    Modalities Rationale:     decrease inflammation and decrease pain to improve patient's ability to progress to PLOF and address remaining functional goals. min [] Estim Unattended, type/location:                                      []  w/ice    []  w/heat    min [] Estim Attended, type/location:                                     []  w/US     []  w/ice    []  w/heat    []  TENS insruct      min []  Mechanical Traction: type/lbs                   []  pro   []  sup   []  int   []  cont    []  before manual    []  after manual    min []  Ultrasound, settings/location:      min []  Iontophoresis w/ dexamethasone, location:                                               []  take home patch       []  in clinic    min  unbill []  Ice     []  Heat    location/position:     min []  Paraffin,  details:    15 min [x]  Vasopneumatic Device, press/temp: Low/low    min []  Susan Jeter / Oj Muse: If using vaso (only need to measure limb vaso being performed on)      pre-treatment girth :       post-treatment girth :       measured at (landmark location) :      min []  Other:    Skin assessment post-treatment (if applicable):    [x]  intact    []  redness- no adverse reaction                 []redness - adverse reaction:         Therapeutic Procedures:     Tx Min Billable or 1:1 Min (if diff from Tx Min) Procedure, Rationale, Specifics   22 19310 Therapeutic

## 2023-05-05 ENCOUNTER — HOSPITAL ENCOUNTER (OUTPATIENT)
Facility: HOSPITAL | Age: 83
Setting detail: RECURRING SERIES
Discharge: HOME | End: 2023-05-08
Payer: MEDICARE

## 2023-05-05 PROCEDURE — 97110 THERAPEUTIC EXERCISES: CPT

## 2023-05-05 PROCEDURE — 97530 THERAPEUTIC ACTIVITIES: CPT

## 2023-05-05 PROCEDURE — 97016 VASOPNEUMATIC DEVICE THERAPY: CPT

## 2023-05-08 ENCOUNTER — HOSPITAL ENCOUNTER (OUTPATIENT)
Facility: HOSPITAL | Age: 83
Setting detail: RECURRING SERIES
Discharge: HOME | End: 2023-05-11
Payer: MEDICARE

## 2023-05-08 PROCEDURE — 97140 MANUAL THERAPY 1/> REGIONS: CPT

## 2023-05-08 PROCEDURE — 97530 THERAPEUTIC ACTIVITIES: CPT

## 2023-05-08 PROCEDURE — 97016 VASOPNEUMATIC DEVICE THERAPY: CPT

## 2023-05-08 PROCEDURE — 97110 THERAPEUTIC EXERCISES: CPT

## 2023-05-08 NOTE — PROGRESS NOTES
MD Jacinto Baeza   9/20/2023  9:15 AM IO LAB VISIT [facility] BS AMB   9/27/2023 10:30 AM Heber Figueroa MD [facility] BS AMB

## 2023-05-10 ENCOUNTER — HOSPITAL ENCOUNTER (OUTPATIENT)
Facility: HOSPITAL | Age: 83
Setting detail: RECURRING SERIES
Discharge: HOME | End: 2023-05-13
Payer: MEDICARE

## 2023-05-10 ENCOUNTER — CARE COORDINATION (OUTPATIENT)
Facility: CLINIC | Age: 83
End: 2023-05-10

## 2023-05-10 PROCEDURE — 97110 THERAPEUTIC EXERCISES: CPT

## 2023-05-10 PROCEDURE — 97140 MANUAL THERAPY 1/> REGIONS: CPT

## 2023-05-10 PROCEDURE — 97016 VASOPNEUMATIC DEVICE THERAPY: CPT

## 2023-05-10 PROCEDURE — 97112 NEUROMUSCULAR REEDUCATION: CPT

## 2023-05-10 NOTE — CARE COORDINATION
[facility] Care Transitions Follow Up Call    Patient Current Location:  Home: [address] [place] [address] [place] [facility] [phone]    Care Transition Nurse contacted the patient by telephone for  transition of care follow up. Verified name and  with patient as identifiers. Patient: Patricia Verma  Patient : 1940   MRN: [id number]   Discharge Date: 23 RARS: No data recorded    Needs to be reviewed by the provider   Additional needs identified to be addressed with provider: No  none             Method of communication with provider: none    Pt. Reports that he is doing okay. Pt. Reported that he just got back from Outpatient PT. Pt. Reports that his surgical knee is healing well. No questions, concerns and/or needs at this time as per Pt. This episode is closed and resolved. Patient reminded that there is a physician on call 24 hours a day / 7 days a week (M-F 5pm to 8am and from Friday 5pm until Monday 8a for the weekend) should the patient have questions or concerns. Patient reminded to call 911 if situation is emergent ( such as chest pain, shortness of breath, unstoppable bleeding, feeling of passing out,  worsening of symptoms)or patient feels the situation is emergent. Pt verbalizes understanding. Follow Up  Future Appointments   Date Time Provider Marleni Pretty   2023 12:00 PM [place], [place] [facility]   2023 10:15 AM MD Juan Caraballo Sched   2023  9:15 AM [facility] LAB VISIT [facility] BS AMB   2023 10:30 AM Kirill Waldron MD [facility] BS AMB       Care Transition Nurse reviewed medical action plan and red flags with patient and discussed any barriers to care and/or understanding of plan of care after discharge. Discussed appropriate site of care based on symptoms and resources available to patient including: PCP  Specialist  When to call 911. The patient agrees to contact the PCP and Surgeon office for questions related to their healthcare.

## 2023-05-10 NOTE — PROGRESS NOTES
diff from Tx Min) Procedure, Rationale, Specifics   30  10871 Therapeutic Exercise (timed):  increase ROM, strength, coordination, balance, and proprioception to improve patient's ability to progress to PLOF and address remaining functional goals. (see flow sheet as applicable)     Details if applicable:  see flow sheet     10  29319 Manual Therapy (timed):  increase ROM to improve patient's ability to progress to PLOF and address remaining functional goals. The manual therapy interventions were performed at a separate and distinct time from the therapeutic activities interventions . (see flow sheet as applicable)     Details if applicable:  knee distraction with flexion stretch, posterior femur mob during gastroc stretch at wall          Details if applicable:            Details if applicable:            Details if applicable:     36  MC BC Totals Reminder: bill using total billable min of TIMED therapeutic procedures (example: do not include dry needle or estim unattended, both untimed codes, in totals to left)  8-22 min = 1 unit; 23-37 min = 2 units; 38-52 min = 3 units; 53-67 min = 4 units; 68-82 min = 5 units   Total Total     [x]  Patient Education billed concurrently with other procedures   [x] Review HEP    [] Progressed/Changed HEP, detail:    [] Other detail:       Objective Information/Functional Measures/Assessment    Left knee AROM: 5-105 degrees  Pt. Tolerated increased resistance during LAQ well  Challenged with 4 inch eccentric step downs with poor control with left LE    Assessment:  Pt. Is progressing slowly towards goals. He demonstrates improving ambulation but continues to lack terminal knee extension. Pt. Demonstrates improving knee strength with increased resistance during exercises.  He is compliant with her HEP and walking program.     Patient will continue to benefit from skilled PT / OT services to modify and progress therapeutic interventions, analyze and address functional mobility

## 2023-05-12 ENCOUNTER — HOSPITAL ENCOUNTER (OUTPATIENT)
Facility: HOSPITAL | Age: 83
Setting detail: RECURRING SERIES
Discharge: HOME | End: 2023-05-15
Payer: MEDICARE

## 2023-05-12 PROCEDURE — 97016 VASOPNEUMATIC DEVICE THERAPY: CPT

## 2023-05-12 PROCEDURE — 97112 NEUROMUSCULAR REEDUCATION: CPT

## 2023-05-12 PROCEDURE — 97110 THERAPEUTIC EXERCISES: CPT

## 2023-05-21 ENCOUNTER — APPOINTMENT (OUTPATIENT)
Facility: HOSPITAL | Age: 83
End: 2023-05-21
Payer: MEDICARE

## 2023-05-21 ENCOUNTER — HOSPITAL ENCOUNTER (EMERGENCY)
Facility: HOSPITAL | Age: 83
Discharge: HOME | End: 2023-05-21
Attending: EMERGENCY MEDICINE
Payer: MEDICARE

## 2023-05-21 VITALS
OXYGEN SATURATION: 98 % | SYSTOLIC BLOOD PRESSURE: 134 MMHG | WEIGHT: 240 LBS | TEMPERATURE: 98.4 F | HEIGHT: 74 IN | RESPIRATION RATE: 16 BRPM | HEART RATE: 93 BPM | DIASTOLIC BLOOD PRESSURE: 74 MMHG | BODY MASS INDEX: 30.8 KG/M2

## 2023-05-21 DIAGNOSIS — K62.89 PROCTITIS: ICD-10-CM

## 2023-05-21 DIAGNOSIS — K59.00 CONSTIPATION, UNSPECIFIED CONSTIPATION TYPE: Primary | ICD-10-CM

## 2023-05-21 DIAGNOSIS — N28.1 RENAL CYST: ICD-10-CM

## 2023-05-21 LAB
ALBUMIN SERPL-MCNC: 3.9 G/DL (ref 3.4–5)
ALBUMIN/GLOB SERPL: 1.1 (ref 0.8–1.7)
ALP SERPL-CCNC: 85 U/L (ref 45–117)
ALT SERPL-CCNC: 16 U/L (ref 16–61)
ANION GAP SERPL CALC-SCNC: 3 MMOL/L (ref 3–18)
AST SERPL-CCNC: 11 U/L (ref 10–38)
BASOPHILS # BLD: 0 K/UL (ref 0–0.1)
BASOPHILS NFR BLD: 0 % (ref 0–2)
BILIRUB SERPL-MCNC: 0.4 MG/DL (ref 0.2–1)
BUN SERPL-MCNC: 14 MG/DL (ref 7–18)
BUN/CREAT SERPL: 15 (ref 12–20)
CALCIUM SERPL-MCNC: 8.9 MG/DL (ref 8.5–10.1)
CHLORIDE SERPL-SCNC: 104 MMOL/L (ref 100–111)
CO2 SERPL-SCNC: 29 MMOL/L (ref 21–32)
CREAT SERPL-MCNC: 0.95 MG/DL (ref 0.6–1.3)
DIFFERENTIAL METHOD BLD: ABNORMAL
EOSINOPHIL # BLD: 0.1 K/UL (ref 0–0.4)
EOSINOPHIL NFR BLD: 1 % (ref 0–5)
ERYTHROCYTE [DISTWIDTH] IN BLOOD BY AUTOMATED COUNT: 13.3 % (ref 11.6–14.5)
GLOBULIN SER CALC-MCNC: 3.5 G/DL (ref 2–4)
GLUCOSE SERPL-MCNC: 114 MG/DL (ref 74–99)
HCT VFR BLD AUTO: 39.3 % (ref 36–48)
HGB BLD-MCNC: 12.7 G/DL (ref 13–16)
IMM GRANULOCYTES # BLD AUTO: 0 K/UL (ref 0–0.04)
IMM GRANULOCYTES NFR BLD AUTO: 0 % (ref 0–0.5)
LYMPHOCYTES # BLD: 1.2 K/UL (ref 0.9–3.6)
LYMPHOCYTES NFR BLD: 19 % (ref 21–52)
MCH RBC QN AUTO: 30.2 PG (ref 24–34)
MCHC RBC AUTO-ENTMCNC: 32.3 G/DL (ref 31–37)
MCV RBC AUTO: 93.6 FL (ref 78–100)
MONOCYTES # BLD: 0.5 K/UL (ref 0.05–1.2)
MONOCYTES NFR BLD: 8 % (ref 3–10)
NEUTS SEG # BLD: 4.7 K/UL (ref 1.8–8)
NEUTS SEG NFR BLD: 72 % (ref 40–73)
NRBC # BLD: 0 K/UL (ref 0–0.01)
NRBC BLD-RTO: 0 PER 100 WBC
PLATELET # BLD AUTO: 268 K/UL (ref 135–420)
PMV BLD AUTO: 9.1 FL (ref 9.2–11.8)
POTASSIUM SERPL-SCNC: 4.4 MMOL/L (ref 3.5–5.5)
PROT SERPL-MCNC: 7.4 G/DL (ref 6.4–8.2)
RBC # BLD AUTO: 4.2 M/UL (ref 4.35–5.65)
SODIUM SERPL-SCNC: 136 MMOL/L (ref 136–145)
WBC # BLD AUTO: 6.5 K/UL (ref 4.6–13.2)

## 2023-05-21 PROCEDURE — 99285 EMERGENCY DEPT VISIT HI MDM: CPT

## 2023-05-21 PROCEDURE — 74177 CT ABD & PELVIS W/CONTRAST: CPT

## 2023-05-21 PROCEDURE — 80053 COMPREHEN METABOLIC PANEL: CPT

## 2023-05-21 PROCEDURE — 6370000000 HC RX 637 (ALT 250 FOR IP): Performed by: PHYSICIAN ASSISTANT

## 2023-05-21 PROCEDURE — 85025 COMPLETE CBC W/AUTO DIFF WBC: CPT

## 2023-05-21 PROCEDURE — 6360000004 HC RX CONTRAST MEDICATION: Performed by: PHYSICIAN ASSISTANT

## 2023-05-21 RX ORDER — AMOXICILLIN AND CLAVULANATE POTASSIUM 875; 125 MG/1; MG/1
1 TABLET, FILM COATED ORAL
Status: COMPLETED | OUTPATIENT
Start: 2023-05-21 | End: 2023-05-21

## 2023-05-21 RX ORDER — DOCUSATE SODIUM 100 MG/1
100 CAPSULE, LIQUID FILLED ORAL 2 TIMES DAILY
Qty: 20 CAPSULE | Refills: 0 | Status: SHIPPED | OUTPATIENT
Start: 2023-05-21 | End: 2023-05-31

## 2023-05-21 RX ORDER — AMOXICILLIN AND CLAVULANATE POTASSIUM 875; 125 MG/1; MG/1
1 TABLET, FILM COATED ORAL 2 TIMES DAILY
Qty: 14 TABLET | Refills: 0 | Status: SHIPPED | OUTPATIENT
Start: 2023-05-21 | End: 2023-05-28

## 2023-05-21 RX ADMIN — IOPAMIDOL 80 ML: 612 INJECTION, SOLUTION INTRAVENOUS at 12:38

## 2023-05-21 RX ADMIN — AMOXICILLIN AND CLAVULANATE POTASSIUM 1 TABLET: 875; 125 TABLET, FILM COATED ORAL at 15:31

## 2023-05-21 ASSESSMENT — ENCOUNTER SYMPTOMS
VOMITING: 0
DIARRHEA: 0
CONSTIPATION: 1
SHORTNESS OF BREATH: 0
NAUSEA: 0
ABDOMINAL PAIN: 1

## 2023-06-02 ENCOUNTER — TELEPHONE (OUTPATIENT)
Facility: HOSPITAL | Age: 83
End: 2023-06-02

## 2023-06-02 NOTE — TELEPHONE ENCOUNTER
called Pt to find out if he would like to cont PT, has not been seen since 5/12/23. Pt stated he has a f/u w/ referring provider next week and will call back to ran or D/C. Reminded 30 day D/C policy, will hold.

## 2023-06-19 ENCOUNTER — TELEPHONE (OUTPATIENT)
Facility: HOSPITAL | Age: 83
End: 2023-06-19

## 2023-06-19 NOTE — TELEPHONE ENCOUNTER
Left vm for patient for follow up as we had not heard from pt regarding continuation vs d/c; advised if we do not hear from pt by 6/21 we will d/c

## 2023-09-20 ENCOUNTER — HOSPITAL ENCOUNTER (OUTPATIENT)
Facility: HOSPITAL | Age: 83
Setting detail: SPECIMEN
Discharge: HOME | End: 2023-09-23
Payer: MEDICARE

## 2023-09-20 DIAGNOSIS — I10 ESSENTIAL HYPERTENSION: ICD-10-CM

## 2023-09-20 DIAGNOSIS — R73.01 IMPAIRED FASTING GLUCOSE: ICD-10-CM

## 2023-09-20 DIAGNOSIS — E78.00 PURE HYPERCHOLESTEROLEMIA: ICD-10-CM

## 2023-09-20 DIAGNOSIS — M35.3 POLYMYALGIA RHEUMATICA (HCC): ICD-10-CM

## 2023-09-20 LAB
25(OH)D3 SERPL-MCNC: 55.9 NG/ML (ref 30–100)
ALBUMIN SERPL-MCNC: 3.9 G/DL (ref 3.4–5)
ALBUMIN/GLOB SERPL: 1.3 (ref 0.8–1.7)
ALP SERPL-CCNC: 87 U/L (ref 45–117)
ALT SERPL-CCNC: 21 U/L (ref 16–61)
ANION GAP SERPL CALC-SCNC: 2 MMOL/L (ref 3–18)
APPEARANCE UR: CLEAR
AST SERPL-CCNC: 11 U/L (ref 10–38)
BASOPHILS # BLD: 0 K/UL (ref 0–0.1)
BASOPHILS NFR BLD: 0 % (ref 0–2)
BILIRUB SERPL-MCNC: 0.4 MG/DL (ref 0.2–1)
BILIRUB UR QL: NEGATIVE
BUN SERPL-MCNC: 12 MG/DL (ref 7–18)
BUN/CREAT SERPL: 11 (ref 12–20)
CALCIUM SERPL-MCNC: 9.1 MG/DL (ref 8.5–10.1)
CHLORIDE SERPL-SCNC: 102 MMOL/L (ref 100–111)
CHOLEST SERPL-MCNC: 152 MG/DL
CO2 SERPL-SCNC: 31 MMOL/L (ref 21–32)
COLOR UR: YELLOW
CREAT SERPL-MCNC: 1.12 MG/DL (ref 0.6–1.3)
DIFFERENTIAL METHOD BLD: ABNORMAL
EOSINOPHIL # BLD: 0.1 K/UL (ref 0–0.4)
EOSINOPHIL NFR BLD: 2 % (ref 0–5)
EPITH CASTS URNS QL MICRO: NORMAL /LPF (ref 0–5)
ERYTHROCYTE [DISTWIDTH] IN BLOOD BY AUTOMATED COUNT: 13.1 % (ref 11.6–14.5)
GLOBULIN SER CALC-MCNC: 2.9 G/DL (ref 2–4)
GLUCOSE SERPL-MCNC: 96 MG/DL (ref 74–99)
GLUCOSE UR STRIP.AUTO-MCNC: NEGATIVE MG/DL
HBA1C MFR BLD: 5.1 % (ref 4.2–5.6)
HCT VFR BLD AUTO: 42.3 % (ref 36–48)
HDLC SERPL-MCNC: 52 MG/DL (ref 40–60)
HDLC SERPL: 2.9 (ref 0–5)
HGB BLD-MCNC: 13.6 G/DL (ref 13–16)
HGB UR QL STRIP: NEGATIVE
IMM GRANULOCYTES # BLD AUTO: 0 K/UL (ref 0–0.04)
IMM GRANULOCYTES NFR BLD AUTO: 0 % (ref 0–0.5)
KETONES UR QL STRIP.AUTO: NEGATIVE MG/DL
LDLC SERPL CALC-MCNC: 86.8 MG/DL (ref 0–100)
LEUKOCYTE ESTERASE UR QL STRIP.AUTO: NEGATIVE
LIPID PANEL: NORMAL
LYMPHOCYTES # BLD: 1.4 K/UL (ref 0.9–3.6)
LYMPHOCYTES NFR BLD: 25 % (ref 21–52)
MCH RBC QN AUTO: 31.1 PG (ref 24–34)
MCHC RBC AUTO-ENTMCNC: 32.2 G/DL (ref 31–37)
MCV RBC AUTO: 96.8 FL (ref 78–100)
MONOCYTES # BLD: 0.7 K/UL (ref 0.05–1.2)
MONOCYTES NFR BLD: 12 % (ref 3–10)
NEUTS SEG # BLD: 3.4 K/UL (ref 1.8–8)
NEUTS SEG NFR BLD: 60 % (ref 40–73)
NITRITE UR QL STRIP.AUTO: NEGATIVE
NRBC # BLD: 0 K/UL (ref 0–0.01)
NRBC BLD-RTO: 0 PER 100 WBC
PH UR STRIP: 7 (ref 5–8)
PLATELET # BLD AUTO: 246 K/UL (ref 135–420)
PMV BLD AUTO: 9.6 FL (ref 9.2–11.8)
POTASSIUM SERPL-SCNC: 4.9 MMOL/L (ref 3.5–5.5)
PROT SERPL-MCNC: 6.8 G/DL (ref 6.4–8.2)
PROT UR STRIP-MCNC: NEGATIVE MG/DL
RBC # BLD AUTO: 4.37 M/UL (ref 4.35–5.65)
RBC #/AREA URNS HPF: NORMAL /HPF (ref 0–5)
SODIUM SERPL-SCNC: 135 MMOL/L (ref 136–145)
SP GR UR REFRACTOMETRY: 1.01 (ref 1–1.03)
TRIGL SERPL-MCNC: 66 MG/DL
UROBILINOGEN UR QL STRIP.AUTO: 0.2 EU/DL (ref 0.2–1)
VLDLC SERPL CALC-MCNC: 13.2 MG/DL
WBC # BLD AUTO: 5.6 K/UL (ref 4.6–13.2)
WBC URNS QL MICRO: NORMAL /HPF (ref 0–4)

## 2023-09-20 PROCEDURE — 36415 COLL VENOUS BLD VENIPUNCTURE: CPT

## 2023-09-20 PROCEDURE — 81001 URINALYSIS AUTO W/SCOPE: CPT

## 2023-09-20 PROCEDURE — 82306 VITAMIN D 25 HYDROXY: CPT

## 2023-09-20 PROCEDURE — 85025 COMPLETE CBC W/AUTO DIFF WBC: CPT

## 2023-09-20 PROCEDURE — 80053 COMPREHEN METABOLIC PANEL: CPT

## 2023-09-20 PROCEDURE — 83036 HEMOGLOBIN GLYCOSYLATED A1C: CPT

## 2023-09-20 PROCEDURE — 80061 LIPID PANEL: CPT

## 2023-09-26 SDOH — ECONOMIC STABILITY: FOOD INSECURITY: WITHIN THE PAST 12 MONTHS, YOU WORRIED THAT YOUR FOOD WOULD RUN OUT BEFORE YOU GOT MONEY TO BUY MORE.: NEVER TRUE

## 2023-09-26 SDOH — ECONOMIC STABILITY: TRANSPORTATION INSECURITY
IN THE PAST 12 MONTHS, HAS LACK OF TRANSPORTATION KEPT YOU FROM MEETINGS, WORK, OR FROM GETTING THINGS NEEDED FOR DAILY LIVING?: NO

## 2023-09-26 SDOH — ECONOMIC STABILITY: INCOME INSECURITY: HOW HARD IS IT FOR YOU TO PAY FOR THE VERY BASICS LIKE FOOD, HOUSING, MEDICAL CARE, AND HEATING?: NOT HARD AT ALL

## 2023-09-26 SDOH — ECONOMIC STABILITY: HOUSING INSECURITY
IN THE LAST 12 MONTHS, WAS THERE A TIME WHEN YOU DID NOT HAVE A STEADY PLACE TO SLEEP OR SLEPT IN A SHELTER (INCLUDING NOW)?: NO

## 2023-09-26 SDOH — HEALTH STABILITY: PHYSICAL HEALTH: ON AVERAGE, HOW MANY DAYS PER WEEK DO YOU ENGAGE IN MODERATE TO STRENUOUS EXERCISE (LIKE A BRISK WALK)?: 2 DAYS

## 2023-09-26 SDOH — ECONOMIC STABILITY: FOOD INSECURITY: WITHIN THE PAST 12 MONTHS, THE FOOD YOU BOUGHT JUST DIDN'T LAST AND YOU DIDN'T HAVE MONEY TO GET MORE.: NEVER TRUE

## 2023-09-26 SDOH — HEALTH STABILITY: PHYSICAL HEALTH: ON AVERAGE, HOW MANY MINUTES DO YOU ENGAGE IN EXERCISE AT THIS LEVEL?: 30 MIN

## 2023-09-26 ASSESSMENT — LIFESTYLE VARIABLES
HOW OFTEN DURING THE LAST YEAR HAVE YOU HAD A FEELING OF GUILT OR REMORSE AFTER DRINKING: 0
HOW OFTEN DURING THE LAST YEAR HAVE YOU FOUND THAT YOU WERE NOT ABLE TO STOP DRINKING ONCE YOU HAD STARTED: NEVER
HOW OFTEN DURING THE LAST YEAR HAVE YOU FAILED TO DO WHAT WAS NORMALLY EXPECTED FROM YOU BECAUSE OF DRINKING: 0
HAVE YOU OR SOMEONE ELSE BEEN INJURED AS A RESULT OF YOUR DRINKING: NO
HOW OFTEN DURING THE LAST YEAR HAVE YOU NEEDED AN ALCOHOLIC DRINK FIRST THING IN THE MORNING TO GET YOURSELF GOING AFTER A NIGHT OF HEAVY DRINKING: NEVER
HAS A RELATIVE, FRIEND, DOCTOR, OR ANOTHER HEALTH PROFESSIONAL EXPRESSED CONCERN ABOUT YOUR DRINKING OR SUGGESTED YOU CUT DOWN: NO
HOW MANY STANDARD DRINKS CONTAINING ALCOHOL DO YOU HAVE ON A TYPICAL DAY: 2
HOW MANY STANDARD DRINKS CONTAINING ALCOHOL DO YOU HAVE ON A TYPICAL DAY: 3 OR 4
HOW OFTEN DURING THE LAST YEAR HAVE YOU NEEDED AN ALCOHOLIC DRINK FIRST THING IN THE MORNING TO GET YOURSELF GOING AFTER A NIGHT OF HEAVY DRINKING: 0
HOW OFTEN DURING THE LAST YEAR HAVE YOU HAD A FEELING OF GUILT OR REMORSE AFTER DRINKING: NEVER
HAVE YOU OR SOMEONE ELSE BEEN INJURED AS A RESULT OF YOUR DRINKING: 0
HOW OFTEN DO YOU HAVE A DRINK CONTAINING ALCOHOL: 4 OR MORE TIMES A WEEK
HOW OFTEN DURING THE LAST YEAR HAVE YOU BEEN UNABLE TO REMEMBER WHAT HAPPENED THE NIGHT BEFORE BECAUSE YOU HAD BEEN DRINKING: 0
HAS A RELATIVE, FRIEND, DOCTOR, OR ANOTHER HEALTH PROFESSIONAL EXPRESSED CONCERN ABOUT YOUR DRINKING OR SUGGESTED YOU CUT DOWN: 0
HOW OFTEN DURING THE LAST YEAR HAVE YOU FAILED TO DO WHAT WAS NORMALLY EXPECTED FROM YOU BECAUSE OF DRINKING: NEVER
HOW OFTEN DURING THE LAST YEAR HAVE YOU FOUND THAT YOU WERE NOT ABLE TO STOP DRINKING ONCE YOU HAD STARTED: 0
HOW OFTEN DO YOU HAVE A DRINK CONTAINING ALCOHOL: 5
HOW OFTEN DO YOU HAVE SIX OR MORE DRINKS ON ONE OCCASION: 1
HOW OFTEN DURING THE LAST YEAR HAVE YOU BEEN UNABLE TO REMEMBER WHAT HAPPENED THE NIGHT BEFORE BECAUSE YOU HAD BEEN DRINKING: NEVER

## 2023-09-26 ASSESSMENT — PATIENT HEALTH QUESTIONNAIRE - PHQ9
SUM OF ALL RESPONSES TO PHQ QUESTIONS 1-9: 0
SUM OF ALL RESPONSES TO PHQ9 QUESTIONS 1 & 2: 0
SUM OF ALL RESPONSES TO PHQ QUESTIONS 1-9: 0
2. FEELING DOWN, DEPRESSED OR HOPELESS: 0
1. LITTLE INTEREST OR PLEASURE IN DOING THINGS: 0

## 2023-09-27 ENCOUNTER — OFFICE VISIT (OUTPATIENT)
Age: 83
End: 2023-09-27

## 2023-09-27 VITALS
HEART RATE: 88 BPM | TEMPERATURE: 97.5 F | RESPIRATION RATE: 16 BRPM | DIASTOLIC BLOOD PRESSURE: 74 MMHG | OXYGEN SATURATION: 99 % | HEIGHT: 74 IN | SYSTOLIC BLOOD PRESSURE: 128 MMHG | WEIGHT: 241 LBS | BODY MASS INDEX: 30.93 KG/M2

## 2023-09-27 DIAGNOSIS — M54.50 CHRONIC BILATERAL LOW BACK PAIN WITHOUT SCIATICA: ICD-10-CM

## 2023-09-27 DIAGNOSIS — N13.8 BPH WITH OBSTRUCTION/LOWER URINARY TRACT SYMPTOMS: ICD-10-CM

## 2023-09-27 DIAGNOSIS — M15.9 PRIMARY OSTEOARTHRITIS INVOLVING MULTIPLE JOINTS: ICD-10-CM

## 2023-09-27 DIAGNOSIS — I10 ESSENTIAL HYPERTENSION: Primary | ICD-10-CM

## 2023-09-27 DIAGNOSIS — E66.09 CLASS 1 OBESITY DUE TO EXCESS CALORIES WITH SERIOUS COMORBIDITY AND BODY MASS INDEX (BMI) OF 30.0 TO 30.9 IN ADULT: ICD-10-CM

## 2023-09-27 DIAGNOSIS — M47.812 CERVICAL SPONDYLOSIS: ICD-10-CM

## 2023-09-27 DIAGNOSIS — R73.01 IMPAIRED FASTING GLUCOSE: ICD-10-CM

## 2023-09-27 DIAGNOSIS — E78.00 PURE HYPERCHOLESTEROLEMIA: ICD-10-CM

## 2023-09-27 DIAGNOSIS — G89.29 CHRONIC BILATERAL LOW BACK PAIN WITHOUT SCIATICA: ICD-10-CM

## 2023-09-27 DIAGNOSIS — Z71.89 ACP (ADVANCE CARE PLANNING): ICD-10-CM

## 2023-09-27 DIAGNOSIS — M35.3 POLYMYALGIA RHEUMATICA (HCC): ICD-10-CM

## 2023-09-27 DIAGNOSIS — N40.1 BPH WITH OBSTRUCTION/LOWER URINARY TRACT SYMPTOMS: ICD-10-CM

## 2023-09-27 DIAGNOSIS — Z00.00 MEDICARE ANNUAL WELLNESS VISIT, SUBSEQUENT: ICD-10-CM

## 2023-09-27 NOTE — PATIENT INSTRUCTIONS
months. Try to get at least 150 minutes of exercise per week or 10,000 steps per day on a pedometer . Order or download the FREE \"Exercise & Physical Activity: Your Everyday Guide\" from [employer]. Call 8-272.946.7248 or search [employer] online. You need 2190-2659 mg of calcium and 6206-0608 IU of vitamin D per day. It is possible to meet your calcium requirement with diet alone, but a vitamin D supplement is usually necessary to meet this goal.  When exposed to the sun, use a sunscreen that protects against both UVA and UVB radiation with an SPF of 30 or greater. Reapply every 2 to 3 hours or after sweating, drying off with a towel, or swimming. Always wear a seat belt when traveling in a car. Always wear a helmet when riding a bicycle or motorcycle.

## 2023-09-28 NOTE — PROGRESS NOTES
Gene Maldonado presents today for   Chief Complaint   Patient presents with    Medicare AWV       1. \"Have you been to the ER, urgent care clinic since your last visit? Hospitalized since your last visit? \" Yes, May 2023 for constipation     2. \"Have you seen or consulted any other health care providers outside of the [address] [place] [address] since your last visit? \" no     3. For patients aged 43-73: Has the patient had a colonoscopy / FIT/ Cologuard? NA - based on age      If the patient is female:    4. For patients aged 43-66: Has the patient had a mammogram within the past 2 years? NA - based on age or sex      11. For patients aged 21-65: Has the patient had a pap smear?  NA - based on age or sex
Medicare Annual Wellness Visit    Galo Estrella is here for Medicare AWV    Assessment & Plan   Essential hypertension  -     Comprehensive Metabolic Panel; Future  Pure hypercholesterolemia  -     Comprehensive Metabolic Panel; Future  -     Lipid Panel; Future  Polymyalgia rheumatica (HCC)  BPH with obstruction/lower urinary tract symptoms  Impaired fasting glucose  -     Comprehensive Metabolic Panel; Future  Chronic bilateral low back pain without sciatica  Cervical spondylosis  Primary osteoarthritis involving multiple joints  Class 1 obesity due to excess calories with serious comorbidity and body mass index (BMI) of 30.0 to 30.9 in adult  Medicare annual wellness visit, subsequent  ACP (advance care planning)  Recommendations for Preventive Services Due: see orders and patient instructions/AVS.  Recommended screening schedule for the next 5-10 years is provided to the patient in written form: see Patient Instructions/AVS.     Return in about 6 months (around 3/27/2024), or if symptoms worsen or fail to improve. Subjective   See office progress note for details. Patient's complete Health Risk Assessment and screening values have been reviewed and are found in Flowsheets. The following problems were reviewed today and where indicated follow up appointments were made and/or referrals ordered. Positive Risk Factor Screenings with Interventions:         Alcohol Screening:  Alcohol Use: Heavy Drinker (9/26/2023)    AUDIT-C     Frequency of Alcohol Consumption: 4 or more times a week     Average Number of Drinks: 3 or 4     Frequency of Binge Drinking: Never      AUDIT-C Score: 5   AUDIT Total Score: 5    Interpretation of AUDIT-C score:   3-7 indicates potential alcohol risk. 8 or more is associated with harmful or hazardous drinking. 15 or more in women, and 15 or more in men, is likely to indicate alcohol dependence.   Interventions:  Patient declined any further intervention or
Moderna COVID 19 vaccine series and received one Moderna booster dose. Reports completed the updated bivalent booster dose and will request date at next visit. Completed 2/2 doses of Shingrix vaccine. Discussed recommended vaccines for the fall, including influenza vaccine, updated COVID vaccine, and new RSV vaccine. Other immunizations up to date. Colonoscopy completed and no further screening needed. Continue regular eye exams with Dr. Darien Butler. Vitamin D level has been normal on maintenance dose supplement. In addition, an annual Medicare wellness visit was done today. Patient understands recommendations and agrees with plan. Follow-up in 6 months. Time spent in preparing for the visit, including review of history, tests done prior to arrival, additional time reviewing clinical data, imaging, outside records and test results:  5  minutes. Time spent in counseling with patient and/or family members regarding care plan: 25 minutes. Time spent on same-day documentation, ordering tests, treatments, and referring patient for further care: 10 minutes. Time spent on visit does not include time for documentation not completed on day of visit.

## 2023-09-28 NOTE — ACP (ADVANCE CARE PLANNING)
Advance Care Planning     Advance Care Planning (ACP) Physician/NP/PA Conversation    Date of Conversation: 9/27/2023  Conducted with: Patient with Decision Making Capacity    Healthcare Decision Maker:      Primary Decision Maker: Conchita Lazo - Domestic Partner - [phone]    Secondary Decision Maker: Alon Billings - Other    Click here to complete Healthcare Decision Makers including selection of the Healthcare Decision Maker Relationship (ie \"Primary\")  Today we confirmed his healthcare decision-makers as his partner of over 36 years and his twin brother. Care Preferences:    Hospitalization: \"If your health worsens and it becomes clear that your chance of recovery is unlikely, what would be your preference regarding hospitalization? \"  The patient would prefer hospitalization. Ventilation: \"If you were unable to breath on your own and your chance of recovery was unlikely, what would be your preference about the use of a ventilator (breathing machine) if it was available to you? \"  The patient would desire the use of a ventilator. Resuscitation: \"In the event your heart stopped as a result of an underlying serious health condition, would you want attempts made to restart your heart, or would you prefer a natural death? \"  Yes, attempt to resuscitate.     treatment goals, benefit/burden of treatment options, ventilation preferences, hospitalization preferences, resuscitation preferences, and end of life care preferences (vegetative state/imminent death)    Conversation Outcomes / Follow-Up Plan:  ACP complete - no further action today  Reviewed DNR/DNI and patient elects Full Code (Attempt Resuscitation)    Length of Voluntary ACP Conversation in minutes:  16 minutes    Wilda Caldwell MD

## 2023-10-01 PROBLEM — M19.91 PRIMARY LOCALIZED OSTEOARTHROSIS OF MULTIPLE SITES: Status: RESOLVED | Noted: 2023-03-30 | Resolved: 2023-10-01

## 2023-10-01 PROBLEM — M54.2 NECK PAIN: Status: RESOLVED | Noted: 2022-09-24 | Resolved: 2023-10-01

## 2023-10-01 PROBLEM — R22.31 NODULE OF FINGER OF RIGHT HAND: Status: RESOLVED | Noted: 2018-03-25 | Resolved: 2023-10-01

## 2023-10-01 PROBLEM — M16.0 PRIMARY OSTEOARTHRITIS OF HIPS, BILATERAL: Status: RESOLVED | Noted: 2021-05-26 | Resolved: 2023-10-01

## 2023-10-03 RX ORDER — LISINOPRIL 5 MG/1
5 TABLET ORAL DAILY
Qty: 90 TABLET | Refills: 3 | Status: SHIPPED | OUTPATIENT
Start: 2023-10-03

## 2023-10-03 NOTE — TELEPHONE ENCOUNTER
PCP: Alexander Hess MD    LAST REFILL PER CHART:      Disp Refills Start End   lisinopriL (PRINIVIL, ZESTRIL) 5 mg tablet 90 Tablet 3 1/20/2023    Sig - Route:  Take 1 Tablet by mouth daily. - Oral   Class: No Print    Future Appointments   Date Time Provider [address]   1/11/2024  4:00 PM Jonas Ang MD [facility] [place] Sched   3/19/2024  8:45 AM [facility] LAB VISIT [facility] BS AMB   3/27/2024 11:00 AM Alexander Hess MD [facility] BS AMB

## 2023-11-07 NOTE — PROGRESS NOTES
1. Have you been to the ER, urgent care clinic or hospitalized since your last visit? NO.     2. Have you seen or consulted any other health care providers outside of the [address] [place] [address] since your last visit (Include any pap smears or colon screening)?  NO Addended by: TERRIE CORONEL on: 11/7/2023 11:54 AM     Modules accepted: Orders

## 2024-03-19 ENCOUNTER — HOSPITAL ENCOUNTER (OUTPATIENT)
Facility: HOSPITAL | Age: 84
Setting detail: SPECIMEN
Discharge: HOME | End: 2024-03-22
Payer: MEDICARE

## 2024-03-19 DIAGNOSIS — R73.01 IMPAIRED FASTING GLUCOSE: ICD-10-CM

## 2024-03-19 DIAGNOSIS — I10 ESSENTIAL HYPERTENSION: ICD-10-CM

## 2024-03-19 DIAGNOSIS — E78.00 PURE HYPERCHOLESTEROLEMIA: ICD-10-CM

## 2024-03-19 LAB
ALBUMIN SERPL-MCNC: 4 G/DL (ref 3.4–5)
ALBUMIN/GLOB SERPL: 1.3 (ref 0.8–1.7)
ALP SERPL-CCNC: 77 U/L (ref 45–117)
ALT SERPL-CCNC: 19 U/L (ref 16–61)
ANION GAP SERPL CALC-SCNC: 3 MMOL/L (ref 3–18)
AST SERPL-CCNC: 11 U/L (ref 10–38)
BILIRUB SERPL-MCNC: 0.6 MG/DL (ref 0.2–1)
BUN SERPL-MCNC: 16 MG/DL (ref 7–18)
BUN/CREAT SERPL: 14 (ref 12–20)
CALCIUM SERPL-MCNC: 9.1 MG/DL (ref 8.5–10.1)
CHLORIDE SERPL-SCNC: 105 MMOL/L (ref 100–111)
CHOLEST SERPL-MCNC: 161 MG/DL
CO2 SERPL-SCNC: 31 MMOL/L (ref 21–32)
CREAT SERPL-MCNC: 1.17 MG/DL (ref 0.6–1.3)
GLOBULIN SER CALC-MCNC: 3.1 G/DL (ref 2–4)
GLUCOSE SERPL-MCNC: 94 MG/DL (ref 74–99)
HDLC SERPL-MCNC: 50 MG/DL (ref 40–60)
HDLC SERPL: 3.2 (ref 0–5)
LDLC SERPL CALC-MCNC: 83.8 MG/DL (ref 0–100)
LIPID PANEL: NORMAL
POTASSIUM SERPL-SCNC: 4.9 MMOL/L (ref 3.5–5.5)
PROT SERPL-MCNC: 7.1 G/DL (ref 6.4–8.2)
SODIUM SERPL-SCNC: 139 MMOL/L (ref 136–145)
TRIGL SERPL-MCNC: 136 MG/DL
VLDLC SERPL CALC-MCNC: 27.2 MG/DL

## 2024-03-19 PROCEDURE — 36415 COLL VENOUS BLD VENIPUNCTURE: CPT

## 2024-03-19 PROCEDURE — 80061 LIPID PANEL: CPT

## 2024-03-19 PROCEDURE — 80053 COMPREHEN METABOLIC PANEL: CPT

## 2024-03-20 RX ORDER — ROSUVASTATIN CALCIUM 5 MG/1
5 TABLET, COATED ORAL DAILY
Qty: 90 TABLET | Refills: 3 | Status: SHIPPED | OUTPATIENT
Start: 2024-03-20

## 2024-03-20 NOTE — TELEPHONE ENCOUNTER
PCP: Gaby Devi MD    LAST REFILL PER CHART:  Medication:rosuvastatin (CRESTOR) 5 MG tablet   Ordered On:03/15/2023  Instructions:TAKE 1 TABLET BY MOUTH DAILY   Dispense:90 tablets  Refills:3      Future Appointments   Date Time Provider Department Center   3/27/2024 11:00 AM Gaby Devi MD [facility] BS AMB   1/14/2025 10:20 AM Bertha El PA-C [facility] Sched

## 2024-03-27 ENCOUNTER — OFFICE VISIT (OUTPATIENT)
Age: 84
End: 2024-03-27

## 2024-03-27 VITALS
BODY MASS INDEX: 32.6 KG/M2 | DIASTOLIC BLOOD PRESSURE: 70 MMHG | RESPIRATION RATE: 16 BRPM | HEART RATE: 88 BPM | OXYGEN SATURATION: 97 % | TEMPERATURE: 98.5 F | HEIGHT: 74 IN | SYSTOLIC BLOOD PRESSURE: 116 MMHG | WEIGHT: 254 LBS

## 2024-03-27 DIAGNOSIS — M15.9 PRIMARY OSTEOARTHRITIS INVOLVING MULTIPLE JOINTS: ICD-10-CM

## 2024-03-27 DIAGNOSIS — I10 ESSENTIAL HYPERTENSION: Primary | ICD-10-CM

## 2024-03-27 DIAGNOSIS — E78.00 PURE HYPERCHOLESTEROLEMIA: ICD-10-CM

## 2024-03-27 DIAGNOSIS — M54.50 CHRONIC BILATERAL LOW BACK PAIN WITHOUT SCIATICA: ICD-10-CM

## 2024-03-27 DIAGNOSIS — R73.01 IMPAIRED FASTING GLUCOSE: ICD-10-CM

## 2024-03-27 DIAGNOSIS — G89.29 CHRONIC BILATERAL LOW BACK PAIN WITHOUT SCIATICA: ICD-10-CM

## 2024-03-27 DIAGNOSIS — Z87.39 HISTORY OF POLYMYALGIA RHEUMATICA: ICD-10-CM

## 2024-03-27 DIAGNOSIS — E66.09 CLASS 1 OBESITY DUE TO EXCESS CALORIES WITH SERIOUS COMORBIDITY AND BODY MASS INDEX (BMI) OF 32.0 TO 32.9 IN ADULT: ICD-10-CM

## 2024-03-27 DIAGNOSIS — N40.1 BPH WITH OBSTRUCTION/LOWER URINARY TRACT SYMPTOMS: ICD-10-CM

## 2024-03-27 DIAGNOSIS — N13.8 BPH WITH OBSTRUCTION/LOWER URINARY TRACT SYMPTOMS: ICD-10-CM

## 2024-03-27 DIAGNOSIS — E55.9 VITAMIN D DEFICIENCY: ICD-10-CM

## 2024-03-27 ASSESSMENT — PATIENT HEALTH QUESTIONNAIRE - PHQ9
SUM OF ALL RESPONSES TO PHQ QUESTIONS 1-9: 0
1. LITTLE INTEREST OR PLEASURE IN DOING THINGS: NOT AT ALL
SUM OF ALL RESPONSES TO PHQ QUESTIONS 1-9: 0
SUM OF ALL RESPONSES TO PHQ QUESTIONS 1-9: 0
2. FEELING DOWN, DEPRESSED OR HOPELESS: NOT AT ALL
SUM OF ALL RESPONSES TO PHQ QUESTIONS 1-9: 0
SUM OF ALL RESPONSES TO PHQ9 QUESTIONS 1 & 2: 0

## 2024-03-27 NOTE — PATIENT INSTRUCTIONS
Incorporated.   Care instructions adapted under license by [employer] (which disclaims liability or warranty for this information). If you have questions about a medical condition or this instruction, always ask your healthcare professional. [employer] disclaims any warranty or liability for your use of this information.        High Cholesterol: Care Instructions  Your Care Instructions     Cholesterol is a type of fat in your blood. It is needed for many body functions, such as making new cells. Cholesterol is made by your body. It also comes from food you eat. High cholesterol means that you have too much of the fat in your blood. This raises your risk of a heart attack and stroke.  LDL and HDL are part of your total cholesterol. LDL is the \"bad\" cholesterol. High LDL can raise your risk for heart disease, heart attack, and stroke. HDL is the \"good\" cholesterol. It helps clear bad cholesterol from the body. High HDL is linked with a lower risk of heart disease, heart attack, and stroke.  Your cholesterol levels help your doctor find out your risk for having a heart attack or stroke. You and your doctor can talk about whether you need to lower your risk and what treatment is best for you.  A heart-healthy lifestyle along with medicines can help lower your cholesterol and your risk. The way you choose to lower your risk will depend on how high your risk is for heart attack and stroke. It will also depend on how you feel about taking medicines.  Follow-up care is a key part of your treatment and safety. Be sure to make and go to all appointments, and call your doctor if you are having problems. It's also a good idea to know your test results and keep a list of the medicines you take.  How can you care for yourself at home?  Eat a variety of foods every day. Good choices include fruits, vegetables, whole grains (like oatmeal), dried beans and peas, nuts and seeds, soy products (like tofu), and

## 2024-03-27 NOTE — PROGRESS NOTES
Jaciel Billings presents today for   Chief Complaint   Patient presents with    6 Month Follow-Up       \"Have you been to the ER, urgent care clinic since your last visit?  Hospitalized since your last visit?\"    NO    “Have you seen or consulted any other health care providers outside of [facility] since your last visit?”    NO            
lifestyle modifications, including heart healthy diet, regular exercise, and weight loss. Will readdress next visit.  13. Health maintenance. Completed Moderna COVID 19 vaccine series and received one Moderna booster dose.  Reports completed the updated bivalent booster dose and will request date at next visit.  Completed 2/2 doses of Shingrix vaccine. Discussed recommended vaccines, including influenza vaccine, updated COVID vaccine, and new RSV vaccine, and reports all completed.  Also discussed recommendation for repeat COVID-vaccine given age.  Other immunizations up to date. Colonoscopy completed and no further screening needed. Continue regular eye exams with Dr. Ray. Vitamin D level has been normal on maintenance dose supplement. Medicare wellness visit up-to-date.      Patient understands recommendations and agrees with plan.  Follow-up in 6 months.

## 2024-03-28 NOTE — PROGRESS NOTES
PHYSICAL / OCCUPATIONAL THERAPY - DAILY TREATMENT NOTE (updated )    Patient Name: Angela Ortiz    Date: 2023    : 1940  Insurance: Payor: MEDICARE / Plan: MEDICARE PART A AND B / Product Type: *No Product type* /      Patient  verified yes     Visit #   Current / Total 7 12   Time   In / Out 1:58 2:45   Pain   In / Out 6/10 3/10   Subjective Functional Status/Changes: Pt reports he does not follow up with his MD until March. He is wondering how long the pain is going to last.    Changes to:  Meds, Allergies, Med Hx, Sx Hx? If yes, update Summary List no       TREATMENT AREA =  right knee pain    OBJECTIVE    Modalities Rationale:     decrease inflammation and decrease pain to improve patient's ability to progress to PLOF and address remaining functional goals. min [] Estim Unattended, type/location:                                      []  w/ice    []  w/heat    min [] Estim Attended, type/location:                                     []  w/US     []  w/ice    []  w/heat    []  TENS insruct      min []  Mechanical Traction: type/lbs                   []  pro   []  sup   []  int   []  cont    []  before manual    []  after manual    min []  Ultrasound, settings/location:      min []  Iontophoresis w/ dexamethasone, location:                                               []  take home patch       []  in clinic    min  unbilled []  Ice     []  Heat    location/position:     min []  Paraffin,  details:    15 min [x]  Vasopneumatic Device, press/temp:  Low/Low    min []  Lizy La / Kristopher Hobbs: If using vaso (only need to measure limb vaso being performed on)      pre-treatment girth : 47 cm       post-treatment girth : 46 cm      measured at (landmark location) : mid patella on wedge     Vasopnuematic compression justification:  Per bilateral girth measures taken and listed above the edema is considered significant and having an impact on the patient's ability to ambulate with ease. min [x]  Other:    Skin assessment post-treatment (if applicable):    [x]  intact    [x]  redness- no adverse reaction                 []redness - adverse reaction:        Therapeutic Procedures: Tx Min Billable or 1:1 Min (if diff from Tx Min) Procedure, Rationale, Specifics   20  02849 Therapeutic Exercise (timed):  increase ROM, strength, coordination, balance, and proprioception to improve patient's ability to progress to PLOF and address remaining functional goals. (see flow sheet as applicable)     Details if applicable:       12  32646 Therapeutic Activity (timed):  use of dynamic activities replicating functional movements to increase ROM, strength, coordination, balance, and proprioception in order to improve patient's ability to progress to PLOF and address remaining functional goals. (see flow sheet as applicable)     Details if applicable:     52 47 MC BC Totals Reminder: bill using total billable min of TIMED therapeutic procedures (example: do not include dry needle or estim unattended, both untimed codes, in totals to left)  8-22 min = 1 unit; 23-37 min = 2 units; 38-52 min = 3 units; 53-67 min = 4 units; 68-82 min = 5 units   Total Total     [x]  Patient Education billed concurrently with other procedures   [x] Review HEP    [] Progressed/Changed HEP, detail:    [] Other detail:       Objective Information/Functional Measures/Assessment  - added red TB to glute x 3, challenged, trunk lean compensations  - decreased balance with tandem stance, especially right posterior  - continued hip flexor and quad tightness    Patient is making slow progress with knee flexion ROM d/t continued edema of right knee. He remains compliant with HEP and icing. Increase hold times with hip flexor/quad S today with fair tolerance. He continues to ambulate without AD with fair + gait pattern, with AROM at 0-95* today.  Reviewed self patella mobs as pt reported patellar pain with hell slides, and had some hypomobility of patella when checked. Patient will continue to benefit from skilled PT / OT services to modify and progress therapeutic interventions, analyze and address functional mobility deficits, analyze and address ROM deficits, analyze and address strength deficits, analyze and address soft tissue restrictions, analyze and cue for proper movement patterns, analyze and modify for postural abnormalities, analyze and address imbalance/dizziness, and instruct in home and community integration to address functional deficits and attain remaining goals. Progress toward goals / Updated goals:  [x]  See Progress Note/Recertification    Short term goals: To be accomplished within 1 week  1. Pt will be independent with HEP to maintain progression. Eval status: good understanding  Current: compliant (1/24/23)     Long term goals: To be accomplished within 4 weeks  1. Pt will improve FOTO score by 11 points to 68/100 to show improvement with functional mobility performance. Eval status: 57     2. Pt will have Right knee AROM -5 to 115 degrees to amb household and community with normal and safe pattern. Eval status: -15 to 95  Current: Progressing-ext -4 deg, flex 90 deg (1/20/23), 0-95* (2/8/23)     3. Pt will report 50% improvement of pain during standing & walking to improve ease with ADLs. Eval status: 2-9/10 with standing & walking  Current: 3-8/10 with standing/walking (2/8/23)    4. Pt will be able to amb household (100-300 ft) on level surface with no AD to perform ADLs comfortably. Eval status: amb with SPC just in case  Current: ambulating in community without AD (2/1/23)     5. Pt will report No difficulty with performing light activities around his home. Eval status:  Moderate difficulty    PLAN  yes Continue plan of care  [x]  Upgrade activities as tolerated  []  Discharge due to :  []  Other:    Rosie Davila, GLADYS    2/13/2023    8:13 AM    Future Appointments   Date Time Provider Marleni Pretty 2/13/2023  2:00 PM Brendalyn Orn, PTA [facility]   2/15/2023 12:00 PM Brendalyn Orn, PTA [facility]   2/17/2023  1:00 PM Brendalyn Orn, PTA [facility]   2/20/2023  2:00 PM Robles Body, [place] [facility]   2/22/2023  2:00 PM Robles Body, [place] [facility]   3/16/2023  9:45 AM [facility] LAB VISIT [facility]   3/23/2023 10:30 AM Mario Gant MD [facility] AM[facility]   3/30/2023  2:30 PM Brett Chavarria MD [facility] BS AMB   4/5/2023  8:00 AM Chris Brown MD [facility] AMB   9/11/2023 10:15 AM MD Remy Stahl 15

## 2024-03-31 PROBLEM — Z87.39 HISTORY OF POLYMYALGIA RHEUMATICA: Status: ACTIVE | Noted: 2024-03-31

## 2024-07-31 RX ORDER — LISINOPRIL 5 MG/1
5 TABLET ORAL DAILY
Qty: 90 TABLET | Refills: 3 | Status: SHIPPED | OUTPATIENT
Start: 2024-07-31

## 2024-09-11 ENCOUNTER — OFFICE VISIT (OUTPATIENT)
Facility: CLINIC | Age: 84
End: 2024-09-11

## 2024-09-11 VITALS
BODY MASS INDEX: 31.57 KG/M2 | SYSTOLIC BLOOD PRESSURE: 124 MMHG | WEIGHT: 246 LBS | RESPIRATION RATE: 16 BRPM | DIASTOLIC BLOOD PRESSURE: 70 MMHG | HEIGHT: 74 IN | TEMPERATURE: 98.3 F | OXYGEN SATURATION: 98 % | HEART RATE: 86 BPM

## 2024-09-11 DIAGNOSIS — S22.32XD CLOSED FRACTURE OF ONE RIB OF LEFT SIDE WITH ROUTINE HEALING, SUBSEQUENT ENCOUNTER: ICD-10-CM

## 2024-09-11 DIAGNOSIS — Z09 HOSPITAL DISCHARGE FOLLOW-UP: Primary | ICD-10-CM

## 2024-09-11 DIAGNOSIS — M47.812 CERVICAL SPONDYLOSIS: ICD-10-CM

## 2024-09-11 DIAGNOSIS — H11.32 SUBCONJUNCTIVAL HEMORRHAGE OF LEFT EYE: ICD-10-CM

## 2024-09-11 DIAGNOSIS — M54.2 NECK PAIN ON LEFT SIDE: ICD-10-CM
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
## 2024-09-11 DIAGNOSIS — I10 ESSENTIAL HYPERTENSION: ICD-10-CM

## 2024-09-11 DIAGNOSIS — E66.09 CLASS 1 OBESITY DUE TO EXCESS CALORIES WITH SERIOUS COMORBIDITY AND BODY MASS INDEX (BMI) OF 31.0 TO 31.9 IN ADULT: ICD-10-CM

## 2024-09-11 DIAGNOSIS — M15.9 PRIMARY OSTEOARTHRITIS INVOLVING MULTIPLE JOINTS: ICD-10-CM

## 2024-09-26 ENCOUNTER — HOSPITAL ENCOUNTER (OUTPATIENT)
Facility: HOSPITAL | Age: 84
Setting detail: SPECIMEN
Discharge: HOME | End: 2024-09-29
Payer: MEDICARE

## 2024-09-26 DIAGNOSIS — E55.9 VITAMIN D DEFICIENCY: ICD-10-CM

## 2024-09-26 DIAGNOSIS — I10 ESSENTIAL HYPERTENSION: ICD-10-CM

## 2024-09-26 DIAGNOSIS — E78.00 PURE HYPERCHOLESTEROLEMIA: ICD-10-CM

## 2024-09-26 DIAGNOSIS — R73.01 IMPAIRED FASTING GLUCOSE: ICD-10-CM

## 2024-09-26 DIAGNOSIS — Z87.39 HISTORY OF POLYMYALGIA RHEUMATICA: ICD-10-CM

## 2024-09-26 LAB
25(OH)D3 SERPL-MCNC: 51.2 NG/ML (ref 30–100)
ALBUMIN SERPL-MCNC: 4.3 G/DL (ref 3.4–5)
ALBUMIN/GLOB SERPL: 1.5 (ref 0.8–1.7)
ALP SERPL-CCNC: 84 U/L (ref 45–117)
ALT SERPL-CCNC: 23 U/L (ref 16–61)
ANION GAP SERPL CALC-SCNC: 2 MMOL/L (ref 3–18)
APPEARANCE UR: CLEAR
AST SERPL-CCNC: 14 U/L (ref 10–38)
BASOPHILS # BLD: 0 K/UL (ref 0–0.1)
BASOPHILS NFR BLD: 0 % (ref 0–2)
BILIRUB SERPL-MCNC: 0.6 MG/DL (ref 0.2–1)
BILIRUB UR QL: NEGATIVE
BUN SERPL-MCNC: 14 MG/DL (ref 7–18)
BUN/CREAT SERPL: 14 (ref 12–20)
CALCIUM SERPL-MCNC: 9.3 MG/DL (ref 8.5–10.1)
CHLORIDE SERPL-SCNC: 104 MMOL/L (ref 100–111)
CHOLEST SERPL-MCNC: 150 MG/DL
CO2 SERPL-SCNC: 29 MMOL/L (ref 21–32)
COLOR UR: YELLOW
CREAT SERPL-MCNC: 1 MG/DL (ref 0.6–1.3)
DIFFERENTIAL METHOD BLD: ABNORMAL
EOSINOPHIL # BLD: 0.1 K/UL (ref 0–0.4)
EOSINOPHIL NFR BLD: 2 % (ref 0–5)
EPITH CASTS URNS QL MICRO: NORMAL /LPF (ref 0–5)
ERYTHROCYTE [DISTWIDTH] IN BLOOD BY AUTOMATED COUNT: 12.7 % (ref 11.6–14.5)
EST. AVERAGE GLUCOSE BLD GHB EST-MCNC: 108 MG/DL
GLOBULIN SER CALC-MCNC: 2.9 G/DL (ref 2–4)
GLUCOSE SERPL-MCNC: 96 MG/DL (ref 74–99)
GLUCOSE UR STRIP.AUTO-MCNC: NEGATIVE MG/DL
HBA1C MFR BLD: 5.4 % (ref 4.2–5.6)
HCT VFR BLD AUTO: 43.1 % (ref 36–48)
HDLC SERPL-MCNC: 48 MG/DL (ref 40–60)
HDLC SERPL: 3.1 (ref 0–5)
HGB BLD-MCNC: 13.9 G/DL (ref 13–16)
HGB UR QL STRIP: NEGATIVE
IMM GRANULOCYTES # BLD AUTO: 0 K/UL (ref 0–0.04)
IMM GRANULOCYTES NFR BLD AUTO: 0 % (ref 0–0.5)
KETONES UR QL STRIP.AUTO: NEGATIVE MG/DL
LDLC SERPL CALC-MCNC: 69 MG/DL (ref 0–100)
LEUKOCYTE ESTERASE UR QL STRIP.AUTO: NEGATIVE
LIPID PANEL: ABNORMAL
LYMPHOCYTES # BLD: 1.4 K/UL (ref 0.9–3.6)
LYMPHOCYTES NFR BLD: 19 % (ref 21–52)
MCH RBC QN AUTO: 31.8 PG (ref 24–34)
MCHC RBC AUTO-ENTMCNC: 32.3 G/DL (ref 31–37)
MCV RBC AUTO: 98.6 FL (ref 78–100)
MONOCYTES # BLD: 0.6 K/UL (ref 0.05–1.2)
MONOCYTES NFR BLD: 8 % (ref 3–10)
NEUTS SEG # BLD: 4.9 K/UL (ref 1.8–8)
NEUTS SEG NFR BLD: 70 % (ref 40–73)
NITRITE UR QL STRIP.AUTO: NEGATIVE
NRBC # BLD: 0 K/UL (ref 0–0.01)
NRBC BLD-RTO: 0 PER 100 WBC
PH UR STRIP: 7 (ref 5–8)
PLATELET # BLD AUTO: 237 K/UL (ref 135–420)
PMV BLD AUTO: 9.6 FL (ref 9.2–11.8)
POTASSIUM SERPL-SCNC: 4.6 MMOL/L (ref 3.5–5.5)
PROT SERPL-MCNC: 7.2 G/DL (ref 6.4–8.2)
PROT UR STRIP-MCNC: NEGATIVE MG/DL
RBC # BLD AUTO: 4.37 M/UL (ref 4.35–5.65)
RBC #/AREA URNS HPF: NORMAL /HPF (ref 0–5)
SODIUM SERPL-SCNC: 135 MMOL/L (ref 136–145)
SP GR UR REFRACTOMETRY: 1.01 (ref 1–1.03)
TRIGL SERPL-MCNC: 165 MG/DL
UROBILINOGEN UR QL STRIP.AUTO: 0.2 EU/DL (ref 0.2–1)
VLDLC SERPL CALC-MCNC: 33 MG/DL
WBC # BLD AUTO: 7 K/UL (ref 4.6–13.2)
WBC URNS QL MICRO: NORMAL /HPF (ref 0–4)

## 2024-09-26 PROCEDURE — 81001 URINALYSIS AUTO W/SCOPE: CPT

## 2024-09-26 PROCEDURE — 80053 COMPREHEN METABOLIC PANEL: CPT

## 2024-09-26 PROCEDURE — 83036 HEMOGLOBIN GLYCOSYLATED A1C: CPT

## 2024-09-26 PROCEDURE — 80061 LIPID PANEL: CPT

## 2024-09-26 PROCEDURE — 36415 COLL VENOUS BLD VENIPUNCTURE: CPT

## 2024-09-26 PROCEDURE — 82306 VITAMIN D 25 HYDROXY: CPT

## 2024-09-26 PROCEDURE — 85025 COMPLETE CBC W/AUTO DIFF WBC: CPT

## 2024-09-29 SDOH — HEALTH STABILITY: PHYSICAL HEALTH: ON AVERAGE, HOW MANY MINUTES DO YOU ENGAGE IN EXERCISE AT THIS LEVEL?: 20 MIN

## 2024-09-29 SDOH — HEALTH STABILITY: PHYSICAL HEALTH: ON AVERAGE, HOW MANY DAYS PER WEEK DO YOU ENGAGE IN MODERATE TO STRENUOUS EXERCISE (LIKE A BRISK WALK)?: 3 DAYS

## 2024-09-29 ASSESSMENT — LIFESTYLE VARIABLES
HOW OFTEN DO YOU HAVE SIX OR MORE DRINKS ON ONE OCCASION: 1
HAS A RELATIVE, FRIEND, DOCTOR, OR ANOTHER HEALTH PROFESSIONAL EXPRESSED CONCERN ABOUT YOUR DRINKING OR SUGGESTED YOU CUT DOWN: NO
HOW OFTEN DURING THE LAST YEAR HAVE YOU FOUND THAT YOU WERE NOT ABLE TO STOP DRINKING ONCE YOU HAD STARTED: NEVER
HOW OFTEN DURING THE LAST YEAR HAVE YOU FAILED TO DO WHAT WAS NORMALLY EXPECTED FROM YOU BECAUSE OF DRINKING: NEVER
HOW OFTEN DO YOU HAVE A DRINK CONTAINING ALCOHOL: 4
HOW OFTEN DO YOU HAVE A DRINK CONTAINING ALCOHOL: 2-3 TIMES A WEEK
HOW OFTEN DURING THE LAST YEAR HAVE YOU NEEDED AN ALCOHOLIC DRINK FIRST THING IN THE MORNING TO GET YOURSELF GOING AFTER A NIGHT OF HEAVY DRINKING: NEVER
HOW MANY STANDARD DRINKS CONTAINING ALCOHOL DO YOU HAVE ON A TYPICAL DAY: 3 OR 4
HOW OFTEN DURING THE LAST YEAR HAVE YOU FAILED TO DO WHAT WAS NORMALLY EXPECTED FROM YOU BECAUSE OF DRINKING: NEVER
HAS A RELATIVE, FRIEND, DOCTOR, OR ANOTHER HEALTH PROFESSIONAL EXPRESSED CONCERN ABOUT YOUR DRINKING OR SUGGESTED YOU CUT DOWN: NO
HOW OFTEN DURING THE LAST YEAR HAVE YOU FOUND THAT YOU WERE NOT ABLE TO STOP DRINKING ONCE YOU HAD STARTED: NEVER
HOW MANY STANDARD DRINKS CONTAINING ALCOHOL DO YOU HAVE ON A TYPICAL DAY: 2
HOW OFTEN DURING THE LAST YEAR HAVE YOU HAD A FEELING OF GUILT OR REMORSE AFTER DRINKING: NEVER
HOW OFTEN DURING THE LAST YEAR HAVE YOU NEEDED AN ALCOHOLIC DRINK FIRST THING IN THE MORNING TO GET YOURSELF GOING AFTER A NIGHT OF HEAVY DRINKING: NEVER
HAVE YOU OR SOMEONE ELSE BEEN INJURED AS A RESULT OF YOUR DRINKING: NO
HOW OFTEN DURING THE LAST YEAR HAVE YOU BEEN UNABLE TO REMEMBER WHAT HAPPENED THE NIGHT BEFORE BECAUSE YOU HAD BEEN DRINKING: NEVER
HOW OFTEN DURING THE LAST YEAR HAVE YOU HAD A FEELING OF GUILT OR REMORSE AFTER DRINKING: NEVER
HAVE YOU OR SOMEONE ELSE BEEN INJURED AS A RESULT OF YOUR DRINKING: NO
HOW OFTEN DURING THE LAST YEAR HAVE YOU BEEN UNABLE TO REMEMBER WHAT HAPPENED THE NIGHT BEFORE BECAUSE YOU HAD BEEN DRINKING: NEVER

## 2024-09-29 ASSESSMENT — PATIENT HEALTH QUESTIONNAIRE - PHQ9
2. FEELING DOWN, DEPRESSED OR HOPELESS: NOT AT ALL
SUM OF ALL RESPONSES TO PHQ QUESTIONS 1-9: 0
SUM OF ALL RESPONSES TO PHQ9 QUESTIONS 1 & 2: 0
1. LITTLE INTEREST OR PLEASURE IN DOING THINGS: NOT AT ALL
SUM OF ALL RESPONSES TO PHQ QUESTIONS 1-9: 0

## 2024-10-01 SDOH — ECONOMIC STABILITY: FOOD INSECURITY: WITHIN THE PAST 12 MONTHS, THE FOOD YOU BOUGHT JUST DIDN'T LAST AND YOU DIDN'T HAVE MONEY TO GET MORE.: NEVER TRUE

## 2024-10-01 SDOH — ECONOMIC STABILITY: FOOD INSECURITY: WITHIN THE PAST 12 MONTHS, YOU WORRIED THAT YOUR FOOD WOULD RUN OUT BEFORE YOU GOT MONEY TO BUY MORE.: NEVER TRUE

## 2024-10-01 SDOH — ECONOMIC STABILITY: INCOME INSECURITY: HOW HARD IS IT FOR YOU TO PAY FOR THE VERY BASICS LIKE FOOD, HOUSING, MEDICAL CARE, AND HEATING?: NOT HARD AT ALL

## 2024-10-02 ENCOUNTER — OFFICE VISIT (OUTPATIENT)
Facility: CLINIC | Age: 84
End: 2024-10-02

## 2024-10-02 VITALS
OXYGEN SATURATION: 98 % | DIASTOLIC BLOOD PRESSURE: 72 MMHG | TEMPERATURE: 98.4 F | SYSTOLIC BLOOD PRESSURE: 128 MMHG | BODY MASS INDEX: 31.18 KG/M2 | HEIGHT: 74 IN | HEART RATE: 85 BPM | WEIGHT: 243 LBS | RESPIRATION RATE: 14 BRPM

## 2024-10-02 DIAGNOSIS — N40.1 BPH WITH OBSTRUCTION/LOWER URINARY TRACT SYMPTOMS: ICD-10-CM

## 2024-10-02 DIAGNOSIS — N13.8 BPH WITH OBSTRUCTION/LOWER URINARY TRACT SYMPTOMS: ICD-10-CM

## 2024-10-02 DIAGNOSIS — I10 ESSENTIAL HYPERTENSION: Primary | ICD-10-CM

## 2024-10-02 DIAGNOSIS — S91.012A LACERATION OF LEFT ANKLE, INITIAL ENCOUNTER: ICD-10-CM

## 2024-10-02 DIAGNOSIS — Z87.39 HISTORY OF POLYMYALGIA RHEUMATICA: ICD-10-CM

## 2024-10-02 DIAGNOSIS — E78.00 PURE HYPERCHOLESTEROLEMIA: ICD-10-CM

## 2024-10-02 DIAGNOSIS — Z71.89 ACP (ADVANCE CARE PLANNING): ICD-10-CM
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
## 2024-10-02 DIAGNOSIS — R73.01 IMPAIRED FASTING GLUCOSE: ICD-10-CM

## 2024-10-02 DIAGNOSIS — Z00.00 MEDICARE ANNUAL WELLNESS VISIT, SUBSEQUENT: ICD-10-CM

## 2024-10-02 DIAGNOSIS — E66.811 CLASS 1 OBESITY DUE TO EXCESS CALORIES WITH SERIOUS COMORBIDITY AND BODY MASS INDEX (BMI) OF 31.0 TO 31.9 IN ADULT: ICD-10-CM

## 2024-10-02 DIAGNOSIS — E66.09 CLASS 1 OBESITY DUE TO EXCESS CALORIES WITH SERIOUS COMORBIDITY AND BODY MASS INDEX (BMI) OF 31.0 TO 31.9 IN ADULT: ICD-10-CM

## 2024-10-02 DIAGNOSIS — Z23 NEED FOR PROPHYLACTIC VACCINATION AGAINST STREPTOCOCCUS PNEUMONIAE (PNEUMOCOCCUS): ICD-10-CM

## 2024-10-02 DIAGNOSIS — S22.32XS CLOSED FRACTURE OF ONE RIB OF LEFT SIDE, SEQUELA: ICD-10-CM

## 2024-10-02 DIAGNOSIS — Z23 ENCOUNTER FOR IMMUNIZATION: ICD-10-CM

## 2024-10-02 NOTE — PROGRESS NOTES
Jaciel Billings presents today for   Chief Complaint   Patient presents with    Medicare AWV       \"Have you been to the ER, urgent care clinic since your last visit?  Hospitalized since your last visit?\"    NO    “Have you seen or consulted any other health care providers outside of [facility] since your last visit?”    NO            
 Surg Hx  Soc Hx  Fam Hx              
stream, dysuria, or hematuria. He does report 2-3 episodes of nocturia.     He had a screening colonoscopy in 4/2012 by Dr. Leal which was normal. Follow-up recommended for 10 years. He had a repeat colonoscopy in 1/2020 by Dr. Brizuela for evaluation of rectal bleeding, showing mild diverticulosis in the descending/ sigmoid colon, medium grade/stage 2 internal hemorrhoids with evidence of recent bleeding, and three sessile 4-7 mm polyps in the hepatic flexure (pathology: tubular adenoma), transverse colon (pathology: tubular adenoma), and rectum (pathology: hyperplastic). No further screening felt to be needed. He denies any abdominal pain, nausea, vomiting, melena, hematochezia, or change in bowel movements.    Past Medical History:   Diagnosis Date    Bladder neck contracture     BPH with obstruction/lower urinary tract symptoms     Essential hypertension with goal blood pressure less than 140/90     Family history of colon cancer     History of blood transfusion 2015    with shoulder replacement    Hyperlipidemia     Osteoarthritis     Osteoarthritis of left hip     PMR (polymyalgia rheumatica) (HCC) 7/20/2016    Skin cancer     basal cell on face    Urinary retention      Past Surgical History:   Procedure Laterality Date    BACK SURGERY      lower    COLONOSCOPY      CYSTOSCOPY      JOINT REPLACEMENT Right 01/2023    Rt. TKA    LASER VAPORIZATION SURGERY PROSTATE, COMPLETE      MOHS SURGERY  1/11    rt hip repl    ORTHOPEDIC SURGERY Right 2015    shoulder replacement    OTHER SURGICAL HISTORY      tonsillectomy, back sx,     OTHER SURGICAL HISTORY      basel cell removed from right ear lobe    SHOULDER ARTHROSCOPY Left 2010    TOTAL KNEE ARTHROPLASTY Left 4/5/2023    LEFT TKA performed by Chris Tamez MD at [facility] MAIN OR    TURP      Laser     Current Outpatient Medications   Medication Sig    lisinopril (PRINIVIL;ZESTRIL) 5 MG tablet TAKE 1 TABLET BY MOUTH ONCE DAILY    rosuvastatin (CRESTOR) 5 MG tablet TAKE

## 2024-10-02 NOTE — ACP (ADVANCE CARE PLANNING)
Advance Care Planning     Advance Care Planning (ACP) Physician/NP/PA Conversation    Date of Conversation: 10/2/2024  Conducted with: Patient with Decision Making Capacity    Healthcare Decision Maker:      Primary Decision Maker: Sabrina Dodge - Domestic Partner - [phone]    Secondary Decision Maker: Alon Billings - Brother/Sister - [phone]    Click here to complete Healthcare Decision Makers including selection of the Healthcare Decision Maker Relationship (ie \"Primary\")  Today we confirmed healthcare decision makers as his domestic partner and twin brother    Care Preferences:    Hospitalization:  \"If your health worsens and it becomes clear that your chance of recovery is unlikely, what would be your preference regarding hospitalization?\"  The patient would prefer hospitalization.    Ventilation:  \"If you were unable to breath on your own and your chance of recovery was unlikely, what would be your preference about the use of a ventilator (breathing machine) if it was available to you?\"  The patient would desire the use of a ventilator.    Resuscitation:  \"In the event your heart stopped as a result of an underlying serious health condition, would you want attempts made to restart your heart, or would you prefer a natural death?\"  Yes, attempt to resuscitate.    treatment goals, benefit/burden of treatment options, ventilation preferences, hospitalization preferences, resuscitation preferences, and end of life care preferences (vegetative state/imminent death)    Conversation Outcomes / Follow-Up Plan:  ACP complete - no further action today.  Patient states that he would like resuscitation efforts as long as meaningful recovery possible.  Reviewed DNR/DNI and patient elects Full Code (Attempt Resuscitation)    Length of Voluntary ACP Conversation in minutes:  16 minutes    Gaby Devi MD

## 2024-10-02 NOTE — PATIENT INSTRUCTIONS
professional. [employer] disclaims any warranty or liability for your use of this information.      Personalized Preventive Plan for Jaciel Billings - 10/2/2024  Medicare offers a range of preventive health benefits. Some of the tests and screenings are paid in full while other may be subject to a deductible, co-insurance, and/or copay.    Some of these benefits include a comprehensive review of your medical history including lifestyle, illnesses that may run in your family, and various assessments and screenings as appropriate.    After reviewing your medical record and screening and assessments performed today your provider may have ordered immunizations, labs, imaging, and/or referrals for you.  A list of these orders (if applicable) as well as your Preventive Care list are included within your After Visit Summary for your review.    Other Preventive Recommendations:    A preventive eye exam performed by an eye specialist is recommended every 1-2 years to screen for glaucoma; cataracts, macular degeneration, and other eye disorders.  A preventive dental visit is recommended every 6 months.  Try to get at least 150 minutes of exercise per week or 10,000 steps per day on a pedometer .  Order or download the FREE \"Exercise & Physical Activity: Your Everyday Guide\" from [employer]. Call 1-626.975.2364 or search [employer] online.  You need 8393-8217 mg of calcium and 2876-6211 IU of vitamin D per day. It is possible to meet your calcium requirement with diet alone, but a vitamin D supplement is usually necessary to meet this goal.  When exposed to the sun, use a sunscreen that protects against both UVA and UVB radiation with an SPF of 30 or greater. Reapply every 2 to 3 hours or after sweating, drying off with a towel, or swimming.  Always wear a seat belt when traveling in a car. Always wear a helmet when riding a bicycle or motorcycle.

## 2025-03-26 ENCOUNTER — HOSPITAL ENCOUNTER (OUTPATIENT)
Facility: HOSPITAL | Age: 85
Setting detail: SPECIMEN
Discharge: HOME | End: 2025-03-29
Payer: MEDICARE

## 2025-03-26 DIAGNOSIS — I10 ESSENTIAL HYPERTENSION: ICD-10-CM

## 2025-03-26 DIAGNOSIS — E78.00 PURE HYPERCHOLESTEROLEMIA: ICD-10-CM

## 2025-03-26 DIAGNOSIS — R73.01 IMPAIRED FASTING GLUCOSE: ICD-10-CM

## 2025-03-26 LAB
ALBUMIN SERPL-MCNC: 3.7 G/DL (ref 3.4–5)
ALBUMIN/GLOB SERPL: 1.1 (ref 0.8–1.7)
ALP SERPL-CCNC: 73 U/L (ref 45–117)
ALT SERPL-CCNC: 17 U/L (ref 16–61)
ANION GAP SERPL CALC-SCNC: 4 MMOL/L (ref 3–18)
AST SERPL-CCNC: 12 U/L (ref 10–38)
BILIRUB SERPL-MCNC: 0.8 MG/DL (ref 0.2–1)
BUN SERPL-MCNC: 14 MG/DL (ref 7–18)
BUN/CREAT SERPL: 13 (ref 12–20)
CALCIUM SERPL-MCNC: 9.3 MG/DL (ref 8.5–10.1)
CHLORIDE SERPL-SCNC: 103 MMOL/L (ref 100–111)
CHOLEST SERPL-MCNC: 137 MG/DL
CO2 SERPL-SCNC: 30 MMOL/L (ref 21–32)
CREAT SERPL-MCNC: 1.08 MG/DL (ref 0.6–1.3)
CREAT UR-MCNC: 165 MG/DL (ref 30–125)
EST. AVERAGE GLUCOSE BLD GHB EST-MCNC: 114 MG/DL
GLOBULIN SER CALC-MCNC: 3.4 G/DL (ref 2–4)
GLUCOSE SERPL-MCNC: 99 MG/DL (ref 74–99)
HBA1C MFR BLD: 5.6 % (ref 4.2–5.6)
HDLC SERPL-MCNC: 52 MG/DL (ref 40–60)
HDLC SERPL: 2.6 (ref 0–5)
LDLC SERPL CALC-MCNC: 70 MG/DL (ref 0–100)
LIPID PANEL: NORMAL
MICROALBUMIN UR-MCNC: 2.44 MG/DL (ref 0–3)
MICROALBUMIN/CREAT UR-RTO: 15 MG/G (ref 0–30)
POTASSIUM SERPL-SCNC: 4.8 MMOL/L (ref 3.5–5.5)
PROT SERPL-MCNC: 7.1 G/DL (ref 6.4–8.2)
SODIUM SERPL-SCNC: 137 MMOL/L (ref 136–145)
TRIGL SERPL-MCNC: 75 MG/DL
VLDLC SERPL CALC-MCNC: 15 MG/DL

## 2025-03-26 PROCEDURE — 83036 HEMOGLOBIN GLYCOSYLATED A1C: CPT

## 2025-03-26 PROCEDURE — 80053 COMPREHEN METABOLIC PANEL: CPT

## 2025-03-26 PROCEDURE — 36415 COLL VENOUS BLD VENIPUNCTURE: CPT

## 2025-03-26 PROCEDURE — 82570 ASSAY OF URINE CREATININE: CPT

## 2025-03-26 PROCEDURE — 82043 UR ALBUMIN QUANTITATIVE: CPT

## 2025-03-26 PROCEDURE — 80061 LIPID PANEL: CPT

## 2025-04-01 RX ORDER — ROSUVASTATIN CALCIUM 5 MG/1
5 TABLET, COATED ORAL DAILY
Qty: 90 TABLET | Refills: 4 | Status: SHIPPED | OUTPATIENT
Start: 2025-04-01

## 2025-04-01 NOTE — TELEPHONE ENCOUNTER
PCP: Gaby Devi MD    LAST OFFICE VISIT: 10/02/2024    LAST REFILL PER CHART:  Medication:rosuvastatin (CRESTOR) 5 MG tablet   Ordered On:03/20/2024  Instructions:TAKE 1 TABLET BY MOUTH ONCE DAILY   Dispense:90 tablets  Refills:3      Future Appointments   Date Time Provider Department Center   4/9/2025 11:00 AM Gaby Devi MD [facility]   5/15/2025 10:30 AM Bertha El PA-C [facility] Sched

## 2025-04-07 SDOH — ECONOMIC STABILITY: INCOME INSECURITY: IN THE LAST 12 MONTHS, WAS THERE A TIME WHEN YOU WERE NOT ABLE TO PAY THE MORTGAGE OR RENT ON TIME?: NO

## 2025-04-07 SDOH — ECONOMIC STABILITY: FOOD INSECURITY: WITHIN THE PAST 12 MONTHS, THE FOOD YOU BOUGHT JUST DIDN'T LAST AND YOU DIDN'T HAVE MONEY TO GET MORE.: NEVER TRUE

## 2025-04-07 SDOH — ECONOMIC STABILITY: FOOD INSECURITY: WITHIN THE PAST 12 MONTHS, YOU WORRIED THAT YOUR FOOD WOULD RUN OUT BEFORE YOU GOT MONEY TO BUY MORE.: NEVER TRUE

## 2025-04-07 SDOH — ECONOMIC STABILITY: TRANSPORTATION INSECURITY
IN THE PAST 12 MONTHS, HAS THE LACK OF TRANSPORTATION KEPT YOU FROM MEDICAL APPOINTMENTS OR FROM GETTING MEDICATIONS?: NO

## 2025-04-09 ENCOUNTER — OFFICE VISIT (OUTPATIENT)
Facility: CLINIC | Age: 85
End: 2025-04-09

## 2025-04-09 VITALS
SYSTOLIC BLOOD PRESSURE: 112 MMHG | WEIGHT: 242 LBS | DIASTOLIC BLOOD PRESSURE: 60 MMHG | BODY MASS INDEX: 31.06 KG/M2 | TEMPERATURE: 98.8 F | HEIGHT: 74 IN | OXYGEN SATURATION: 96 % | HEART RATE: 70 BPM | RESPIRATION RATE: 16 BRPM

## 2025-04-09 DIAGNOSIS — Z87.39 HISTORY OF POLYMYALGIA RHEUMATICA: ICD-10-CM

## 2025-04-09 DIAGNOSIS — E55.9 VITAMIN D INSUFFICIENCY: ICD-10-CM

## 2025-04-09 DIAGNOSIS — E78.00 PURE HYPERCHOLESTEROLEMIA: ICD-10-CM

## 2025-04-09 DIAGNOSIS — I10 ESSENTIAL HYPERTENSION: Primary | ICD-10-CM

## 2025-04-09 DIAGNOSIS — M15.0 PRIMARY OSTEOARTHRITIS INVOLVING MULTIPLE JOINTS: ICD-10-CM

## 2025-04-09 DIAGNOSIS — E66.09 CLASS 1 OBESITY DUE TO EXCESS CALORIES WITH SERIOUS COMORBIDITY AND BODY MASS INDEX (BMI) OF 31.0 TO 31.9 IN ADULT: ICD-10-CM

## 2025-04-09 DIAGNOSIS — S22.32XS CLOSED FRACTURE OF ONE RIB OF LEFT SIDE, SEQUELA: ICD-10-CM

## 2025-04-09 DIAGNOSIS — N13.8 BPH WITH OBSTRUCTION/LOWER URINARY TRACT SYMPTOMS: ICD-10-CM

## 2025-04-09 DIAGNOSIS — R73.01 IMPAIRED FASTING GLUCOSE: ICD-10-CM

## 2025-04-09 DIAGNOSIS — N40.1 BPH WITH OBSTRUCTION/LOWER URINARY TRACT SYMPTOMS: ICD-10-CM

## 2025-04-09 DIAGNOSIS — E66.811 CLASS 1 OBESITY DUE TO EXCESS CALORIES WITH SERIOUS COMORBIDITY AND BODY MASS INDEX (BMI) OF 31.0 TO 31.9 IN ADULT: ICD-10-CM

## 2025-04-09 ASSESSMENT — PATIENT HEALTH QUESTIONNAIRE - PHQ9
SUM OF ALL RESPONSES TO PHQ QUESTIONS 1-9: 0
1. LITTLE INTEREST OR PLEASURE IN DOING THINGS: NOT AT ALL
SUM OF ALL RESPONSES TO PHQ QUESTIONS 1-9: 0
2. FEELING DOWN, DEPRESSED OR HOPELESS: NOT AT ALL

## 2025-04-09 NOTE — PATIENT INSTRUCTIONS
oatmeal), dried beans and peas, nuts and seeds, soy products (like tofu), and fat-free or low-fat dairy products.  Replace butter, margarine, and hydrogenated or partially hydrogenated oils with olive and canola oils. (Canola oil margarine without trans fat is fine.)  Replace red meat with fish, poultry, and soy protein (like tofu).  Limit processed and packaged foods like chips, crackers, and cookies.  Bake, broil, or steam foods. Don't duenas them.  Be physically active. Get at least 30 minutes of exercise on most days of the week. Walking is a good choice. You also may want to do other activities, such as running, swimming, cycling, or playing tennis or team sports.  Stay at a healthy weight or lose weight by making the changes in eating and physical activity listed above. Losing just a small amount of weight, even 5 to 10 pounds, can reduce your risk for having a heart attack or stroke.  Do not smoke.  When should you call for help?  Watch closely for changes in your health, and be sure to contact your doctor if:    You need help making lifestyle changes.     You have questions about your medicine.   Where can you learn more?  Go to https://www.[employer].net/[employer]  Enter I865 in the search box to learn more about \"High Cholesterol: Care Instructions.\"  Current as of: December 16, 2019               Content Version: 12.6  © 0310-9345 [employer].   Care instructions adapted under license by [employer] (which disclaims liability or warranty for this information). If you have questions about a medical condition or this instruction, always ask your healthcare professional. [employer] disclaims any warranty or liability for your use of this information.

## 2025-04-09 NOTE — PROGRESS NOTES
Jaciel Billings presents today for   Chief Complaint   Patient presents with    6 Month Follow-Up       \"Have you been to the ER, urgent care clinic since your last visit?  Hospitalized since your last visit?\"    YES  [facility]   September 2024  Fall      “Have you seen or consulted any other health care providers outside of [facility] since your last visit?”    NO            
date. Colonoscopy completed and no further screening needed. Continue regular eye exams with Dr. Ray. Vitamin D level remains normal on maintenance dose supplement. Medicare wellness visit up-to-date.     Patient understands recommendations and agrees with plan.  Follow-up in 6 months.

## 2025-07-31 RX ORDER — LISINOPRIL 5 MG/1
5 TABLET ORAL DAILY
Qty: 90 TABLET | Refills: 3 | Status: SHIPPED | OUTPATIENT
Start: 2025-07-31

## (undated) DEVICE — GLOVE ORANGE PI 7   MSG9070

## (undated) DEVICE — BASIC SINGLE BASIN-LF: Brand: MEDLINE INDUSTRIES, INC.

## (undated) DEVICE — SPONGE LAP W18XL18IN WHT COT 4 PLY FLD STRUNG RADPQ DISP ST 2 PER PACK

## (undated) DEVICE — AGENT HEMSTAT 3GM OXIDIZED REGENERATED CELOS ABSRB FOR CONT (ORDER MULTIPLES OF 5EA)

## (undated) DEVICE — SUTURE VCRL + SZ 0 L27IN ANTIBACTERIAL POLYGLACTIN 910 W VCP280H

## (undated) DEVICE — GOWN,AURORA,FABRIC-REINFORCED,X-LARGE: Brand: MEDLINE

## (undated) DEVICE — DRAPE,TOP,102X53,STERILE: Brand: MEDLINE

## (undated) DEVICE — BANDAGE COBAN 4 IN COMPR W4INXL5YD FOAM COHESIVE QUIK STK SELF ADH SFT

## (undated) DEVICE — GOWN,PRECEPT, XLNG/XLRG ,STRL: Brand: MEDLINE

## (undated) DEVICE — 3M™ IOBAN™ 2 ANTIMICROBIAL INCISE DRAPE 6650EZ: Brand: IOBAN™ 2

## (undated) DEVICE — (D)HANDPIECE IRR W/HI FLO TIP -- DUPLICATE USE ITEM 121586

## (undated) DEVICE — SUTURE VCRL SZ 3-0 L27IN ABSRB UD L24MM PS-1 3/8 CIR PRIM J936H

## (undated) DEVICE — STRYKER PERFORMANCE SERIES SAGITTAL BLADE: Brand: STRYKER PERFORMANCE SERIES

## (undated) DEVICE — SHEET,DRAPE,70X100,STERILE: Brand: MEDLINE

## (undated) DEVICE — TOTAL KNEE PACK: Brand: MEDLINE INDUSTRIES, INC.

## (undated) DEVICE — 450 ML BOTTLE OF 0.05% CHLORHEXIDINE GLUCONATE IN 99.95% STERILE WATER FOR IRRIGATION, USP AND APPLICATOR.: Brand: IRRISEPT ANTIMICROBIAL WOUND LAVAGE

## (undated) DEVICE — GARMENT COMPR M FOR 13IN FT INTMIT SGL BLDR HEM FORC II

## (undated) DEVICE — ELECTRODE PT RET AD L9FT HI MOIST COND ADH HYDRGEL CORDED

## (undated) DEVICE — ATTUNE SOLO PINNING SYSTEM

## (undated) DEVICE — GAUZE SPONGES,16 PLY: Brand: CURITY

## (undated) DEVICE — TOWEL,OR,DSP,ST,BLUE,STD,4/PK,20PK/CS: Brand: MEDLINE

## (undated) DEVICE — BASIN EMESIS 500CC ROSE 250/CS 60/PLT: Brand: MEDEGEN MEDICAL PRODUCTS, LLC

## (undated) DEVICE — GLOVE SURG UNDERGLOVE 7.5 PF BLU BIOGEL PI MIC LF

## (undated) DEVICE — BLADE SAW W12.5XL70MM THK1MM RECIP DBL SIDE OFFSET

## (undated) DEVICE — HOOD WITH PEEL AWAY FACE SHIELD: Brand: T7PLUS

## (undated) DEVICE — BANDAGE COBAN 6 IN WND 6INX5YD FOAM

## (undated) DEVICE — WRAP KNEE UNIV E STRP HK AND LOOP W/ GEL PK MEDCOOL

## (undated) DEVICE — GLOVE SURG SZ 65 THK91MIL LTX FREE SYN POLYISOPRENE

## (undated) DEVICE — POWDER SURG CELLERATE RX 1 GM HYDROL COLLEGEN

## (undated) DEVICE — GLOVE SURG SZ 6 THK91MIL LTX FREE SYN POLYISOPRENE ANTI

## (undated) DEVICE — BOWL MIXING VAC PALABOWL PALACOS

## (undated) DEVICE — 3M™ TEGADERM™ HP TRANSPARENT FILM DRESSING FRAME STYLE, 9546HP, 4 IN X 4-1/2 IN (10 CM X 11.5 CM), 50/CT 4CT/CASE: Brand: 3M™ TEGADERM™

## (undated) DEVICE — PICO 7 10CM X 40CM: Brand: PICO™ 7

## (undated) DEVICE — BLADE ELECTRODE: Brand: VALLEYLAB

## (undated) DEVICE — SPONGE LAP W18XL18IN WHT COT 4 PLY FLD STRUNG RADPQ DISP ST

## (undated) DEVICE — SUTURE VCRL + SZ 2-0 L27IN ABSRB UD CT-2 L26MM 1/2 CIR TAPR VCP269H

## (undated) DEVICE — SUTURE VCRL + SZ 1 L27IN ANTIBACTERIAL POLYGLACTIN 910 W VCP281H

## (undated) DEVICE — INTENDED FOR TISSUE SEPARATION, AND OTHER PROCEDURES THAT REQUIRE A SHARP SURGICAL BLADE TO PUNCTURE OR CUT.: Brand: BARD-PARKER SAFETY BLADES SIZE 10, STERILE

## (undated) DEVICE — GAUZE,SPONGE,4"X4",16PLY,STRL,LF,10/TRAY: Brand: MEDLINE

## (undated) DEVICE — ZIMMER® STERILE DISPOSABLE TOURNIQUET CUFF WITH PLC, DUAL PORT, SINGLE BLADDER, 34 IN. (86 CM)

## (undated) DEVICE — COVER,TABLE,HEAVY DUTY,77"X90",STRL: Brand: MEDLINE

## (undated) DEVICE — GLOVE SURG SZ 65 L12IN FNGR THK79MIL GRN LTX FREE

## (undated) DEVICE — GLOVE ORANGE PI 8   MSG9080

## (undated) DEVICE — SYSTEM SKIN CLSR 60CM 2-OCTYL CYNOACRLT W/ MESH DISPNS

## (undated) DEVICE — BNDG,ELSTC,MATRIX,STRL,6"X5YD,LF,HOOK&LP: Brand: MEDLINE

## (undated) DEVICE — GOWN,SIRUS,NONRNF,XLN/XL,20/CS: Brand: MEDLINE

## (undated) DEVICE — HYPODERMIC SAFETY NEEDLE: Brand: MAGELLAN

## (undated) DEVICE — GLOVE ORANGE PI 8 1/2   MSG9085

## (undated) DEVICE — (D)SYR 10ML 1/5ML GRAD NSAF -- PKGING CHANGE USE ITEM 338027

## (undated) DEVICE — APPLICATOR MEDICATED 26 CC SOLUTION HI LT ORNG CHLORAPREP

## (undated) DEVICE — SOLUTION IRRIG 500ML 0.9% SOD CHLO USP POUR PLAS BTL

## (undated) DEVICE — HANDPIECE SET WITH HIGH FLOW TIP AND SUCTION TUBE: Brand: INTERPULSE

## (undated) DEVICE — GLOVE SURG 7 BIOGEL PI ULTRATOUCH G

## (undated) DEVICE — PICO 7 10CM X 30CM: Brand: PICO™ 7

## (undated) DEVICE — SOL IRR NACL 0.9% 500ML POUR --

## (undated) DEVICE — PADDING CAST W6INXL4YD SYNTHETIC NATURAL PROTOUCH

## (undated) DEVICE — GLOVE SURG SZ 75 L12IN FNGR THK79MIL GRN LTX FREE

## (undated) DEVICE — DRESSING ANTIMIC FOAM OPTIFOAM POSTOP ADH 4X14 IN

## (undated) DEVICE — 4-PORT MANIFOLD: Brand: NEPTUNE 2

## (undated) DEVICE — ELECTRODE BLDE L6.5IN CAUT EXT DISP

## (undated) DEVICE — SYRINGE MED 3ML NDL 22GA L1IN PLAS N CTRL LUERLOCK TIP REG

## (undated) DEVICE — SYR 50ML LR LCK 1ML GRAD NSAF --